# Patient Record
Sex: FEMALE | Race: WHITE | NOT HISPANIC OR LATINO | Employment: OTHER | URBAN - METROPOLITAN AREA
[De-identification: names, ages, dates, MRNs, and addresses within clinical notes are randomized per-mention and may not be internally consistent; named-entity substitution may affect disease eponyms.]

---

## 2017-01-16 ENCOUNTER — PROCEDURE VISIT (OUTPATIENT)
Dept: OPHTHALMOLOGY | Facility: CLINIC | Age: 82
End: 2017-01-16
Payer: MEDICARE

## 2017-01-16 DIAGNOSIS — H35.3120 AGE-RELATED MACULAR DEGENERATION, DRY, LEFT EYE: ICD-10-CM

## 2017-01-16 DIAGNOSIS — H35.721 SEROUS DETACHMENT OF RETINAL PIGMENT EPITHELIUM OF RIGHT EYE: ICD-10-CM

## 2017-01-16 DIAGNOSIS — H35.3211 EXUDATIVE AGE-RELATED MACULAR DEGENERATION OF RIGHT EYE WITH ACTIVE CHOROIDAL NEOVASCULARIZATION: Primary | ICD-10-CM

## 2017-01-16 PROCEDURE — 92014 COMPRE OPH EXAM EST PT 1/>: CPT | Mod: 25,S$PBB,, | Performed by: OPHTHALMOLOGY

## 2017-01-16 PROCEDURE — 92134 CPTRZ OPH DX IMG PST SGM RTA: CPT | Mod: PBBFAC,PO | Performed by: OPHTHALMOLOGY

## 2017-01-16 PROCEDURE — 67028 INJECTION EYE DRUG: CPT | Mod: S$PBB,RT,, | Performed by: OPHTHALMOLOGY

## 2017-01-16 PROCEDURE — 67028 INJECTION EYE DRUG: CPT | Mod: PBBFAC,PO,RT | Performed by: OPHTHALMOLOGY

## 2017-01-16 RX ORDER — SOTALOL HYDROCHLORIDE 80 MG/1
TABLET ORAL
COMMUNITY
Start: 2016-12-30 | End: 2017-01-16

## 2017-01-16 RX ORDER — LISINOPRIL 10 MG/1
10 TABLET ORAL
COMMUNITY
Start: 2016-12-28 | End: 2017-01-16

## 2017-01-16 RX ORDER — ACETAMINOPHEN 500 MG
3 TABLET ORAL DAILY
Status: ON HOLD | COMMUNITY
End: 2023-07-29 | Stop reason: HOSPADM

## 2017-01-16 RX ADMIN — AFLIBERCEPT 2 MG: 40 INJECTION, SOLUTION INTRAVITREAL at 03:01

## 2017-01-16 NOTE — PROGRESS NOTES
OCT - mild increase SRF/SRH  OS - Drusen, no SRF - small HE - ? RAP lesion     Assessment:    1. AMD - Wet OD - post Avastin x 12, post Eylea #20  PED increased prior to 10/12 visit      Had slight increase 5/13/13, 7/14, 5/16    Eylea #21 OD today    Continue 8 week OD    2. H/o Dry OS  But small HE with trace CME - possible RAP lesion with Wet AMD OS?  Post Eylea OS #6    Watch OS      3. PVD OU    4. Pseudophakia - great result    5. Allergic conjunctivitis/sinusitis - try OTC antihistamines     Plan:      8 weeks OCT    Risks, benefits, and alternatives to treatment discussed in detail with the patient.  The patient voiced understanding and wished to proceed with the procedure    Injection Procedure Note:  Diagnosis: Wet AMD OD    Topical Proparacaine and Betadine. Prep Conj only  Inject Eylea OD at 6:00 @ 3.5-4mm posterior to limbus  Post Operative Dx: Same  Complications: None  Follow up as above.

## 2017-01-16 NOTE — PATIENT INSTRUCTIONS

## 2017-01-25 ENCOUNTER — TELEPHONE (OUTPATIENT)
Dept: INTERNAL MEDICINE | Facility: CLINIC | Age: 82
End: 2017-01-25

## 2017-01-25 DIAGNOSIS — R00.2 PALPITATION: Primary | ICD-10-CM

## 2017-01-25 NOTE — TELEPHONE ENCOUNTER
----- Message from Mickey Chance sent at 1/25/2017 10:58 AM CST -----  Contact: Pt  Pt would like a call back from staff for scheduling    Pt is requesting to [facility]    Can be reached at [phone]

## 2017-01-31 ENCOUNTER — TELEPHONE (OUTPATIENT)
Dept: INTERNAL MEDICINE | Facility: CLINIC | Age: 82
End: 2017-01-31

## 2017-01-31 NOTE — TELEPHONE ENCOUNTER
Pt has appt with Dr. Xiong to [facility] 02/22/17    Received medical records from former PCP that's sent to HIM to scan in pt's chart    Please refer to media tab to review document

## 2017-02-02 ENCOUNTER — OFFICE VISIT (OUTPATIENT)
Dept: CARDIOLOGY | Facility: CLINIC | Age: 82
End: 2017-02-02
Payer: MEDICARE

## 2017-02-02 ENCOUNTER — HOSPITAL ENCOUNTER (OUTPATIENT)
Dept: CARDIOLOGY | Facility: CLINIC | Age: 82
Discharge: HOME | End: 2017-02-02
Payer: MEDICARE

## 2017-02-02 VITALS
HEART RATE: 53 BPM | HEIGHT: 63 IN | BODY MASS INDEX: 21.53 KG/M2 | DIASTOLIC BLOOD PRESSURE: 58 MMHG | SYSTOLIC BLOOD PRESSURE: 125 MMHG | WEIGHT: 121.5 LBS

## 2017-02-02 DIAGNOSIS — I48.0 PAROXYSMAL ATRIAL FIBRILLATION: Primary | ICD-10-CM

## 2017-02-02 DIAGNOSIS — H35.3120 AGE-RELATED MACULAR DEGENERATION, DRY, LEFT EYE: ICD-10-CM

## 2017-02-02 DIAGNOSIS — H35.3210 AGE-RELATED MACULAR DEGENERATION, WET, RIGHT EYE: ICD-10-CM

## 2017-02-02 DIAGNOSIS — R00.2 PALPITATION: ICD-10-CM

## 2017-02-02 PROCEDURE — 99213 OFFICE O/P EST LOW 20 MIN: CPT | Mod: PBBFAC | Performed by: INTERNAL MEDICINE

## 2017-02-02 PROCEDURE — 99213 OFFICE O/P EST LOW 20 MIN: CPT | Mod: S$PBB,,, | Performed by: INTERNAL MEDICINE

## 2017-02-02 PROCEDURE — 99999 PR PBB SHADOW E&M-EST. PATIENT-LVL III: CPT | Mod: PBBFAC,,, | Performed by: INTERNAL MEDICINE

## 2017-02-02 PROCEDURE — 93010 ELECTROCARDIOGRAM REPORT: CPT | Mod: S$PBB,,, | Performed by: INTERNAL MEDICINE

## 2017-02-02 RX ORDER — ASPIRIN 325 MG
325 TABLET ORAL DAILY
COMMUNITY
End: 2017-08-08 | Stop reason: ALTCHOICE

## 2017-02-02 NOTE — MR AVS SNAPSHOT
[facility] - Cardiology  [address]  [facility] [address]  Phone: [phone]                  Cathy BRITO Som   2017 2:00 PM   Office Visit    Description:  Female : 1929   Provider:  Eriberto Mcelroy MD   Department:  [facility]           Reason for Visit     Establish Care     Atrial Fibrillation           Diagnoses this Visit        Comments    Exudative age-related macular degeneration of right eye with active choroidal neovascularization    -  Primary     Paroxysmal atrial fibrillation                To Do List           Future Appointments        Provider Department Dept Phone    2017 11:20 AM Wilma Xiong MD [facility] Internal Medicine [phone]    3/2/2017 10:20 AM Keara Peterson MD [facility] Internal Medicine [phone]    3/21/2017 1:50 PM KYAW Mandujano MD [facility] Ophthalmology [phone]      Goals (5 Years of Data)     None      Follow-Up and Disposition     Return in about 3 months (around 2017).      [facility] On Call     [facility] On Call Nurse [facility] - 24/7 Assistance  Registered nurses in the [facility] On Call Center provide clinical advisement, health education, appointment booking, and other advisory services.  Call for this free service at [phone].             Medications           Message regarding Medications     Verify the changes and/or additions to your medication regime listed below are the same as discussed with your clinician today.  If any of these changes or additions are incorrect, please notify your healthcare provider.        STOP taking these medications     omeprazole (PRILOSEC) 40 MG capsule     levofloxacin (LEVAQUIN) 500 MG tablet     aspirin (ECOTRIN) 81 MG EC tablet Take 81 mg by mouth once daily.           Verify that the below list of medications is an accurate representation of the medications you are currently taking.  If none reported, the list may be blank. If incorrect, please contact your  "healthcare provider. Carry this list with you in case of emergency.           Current Medications     ANTIOX #8/OM3/DHA/EPA/LUT/ZEAX (PRESERVISION AREDS 2, OMEGA-3, ORAL) Take 1 tablet by mouth.    aspirin 325 MG tablet Take 325 mg by mouth once daily.    fluticasone-vilanterol (BREO) 100-25 mcg/dose diskus inhaler Inhale 1 puff into the lungs 2 (two) times daily.    lisinopril 10 MG tablet     montelukast (SINGULAIR) 10 mg tablet     omega 3-dha-epa-fish oil 300-1,000 mg Cap Take 3 g by mouth.    sotalol (BETAPACE) 120 MG Tab     ZOSTAVAX, PF, 19,400 unit injection            Clinical Reference Information           Your Vitals Were     BP Pulse Height Weight BMI    125/58 (BP Location: Left arm, Patient Position: Sitting, BP Method: Automatic) 53 5' 3" (1.6 m) 55.1 kg (121 lb 7.6 oz) 21.52 kg/m2      Blood Pressure          Most Recent Value    Right Arm BP - Sitting  136/64    Left Arm BP - Sitting  125/58    BP  (!)  125/58      Allergies as of 2/2/2017     Penicillin G    Sulfa (Sulfonamide Antibiotics)    Pseudoephedrine Hcl      Immunizations Administered on Date of Encounter - 2/2/2017     None      [facility] Sign-Up     Activating your [facility] account is as easy as 1-2-3!     1) Visit [email or web address], select Sign Up Now, enter this activation code and your date of birth, then select Next.  [email or web address]  Expires: 3/19/2017 12:37 PM      2) Create a username and password to use when you visit [facility] in the future and select a security question in case you lose your password and select Next.    3) Enter your e-mail address and click Sign Up!    Additional Information  If you have questions, please e-mail [email or web address] or call [phone] to talk to our [facility] staff. Remember, [facility] is NOT to be used for urgent needs. For medical emergencies, dial 911.         Language Assistance Services     ATTENTION: Language assistance services are available, free of charge. Please call " [phone].      ATENCIÓN: Si habla español, tiene a beltran disposición servicios gratuitos de asistencia lingüística. Llame al [phone].     CHÚ Ý: N?u b?n nói Ti?ng Vi?t, có các d?ch v? h? tr? ngôn ng? mi?n phí dành cho b?n. G?i s? [phone].         [facility] complies with applicable Federal civil rights laws and does not discriminate on the basis of race, color, national origin, age, disability, or sex.

## 2017-02-02 NOTE — Clinical Note
Thank you for referring Cathy Kearns for evaluation of paroxysmal atrial fibrillation. Please see my note for details of this encounter. If you have any questions, please contact me.  Thank you again for the referral.

## 2017-02-02 NOTE — PROGRESS NOTES
"Chart has been dictated using voice recognition software.  It is not been reviewed carefully for any transcriptional errors due to this technology.   Subjective:   Patient ID:  Cathy Kearns is a 87 y.o. female who presents for evaluation of Establish Care and Atrial Fibrillation      HPI: Patient with h/o atrial fibrillation x2, the last time was 2 years ago.  First time was in [place], second time was in [place] where she was cardioverted with IV drug.  Records unavailable at this time.  Symptoms were a rapid heart beat and dizziness.  Only recurrence of palpitations was a year when under stress when she had the wrong flight information in travelling to a cruise. No other known heart problems other than a small leakage in her one of her heart valves. Patient denies any chest discomfort on exertion or at rest. Has had dyspnea under stress or with significant exertion.  Told by pulmonary doc in [place] that she had "asthma". Occasional pedal edema. No orthopnea, no PND, syncope.    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    Skin cancer        Past Surgical History   Procedure Laterality Date    Cataract extraction      Hysterectomy      Appendectomy      Intravitreal injection       Avastin od x's 12 dltx 2-16-12       Social History   Substance Use Topics    Smoking status: Former Smoker    Smokeless tobacco: Never Used    Alcohol use Yes      Comment: 1-2 drinks at night         Current Outpatient Prescriptions:     ANTIOX #8/OM3/DHA/EPA/LUT/ZEAX (PRESERVISION AREDS 2, OMEGA-3, ORAL), Take 1 tablet by mouth., Disp: , Rfl:     aspirin 325 MG tablet, Take 325 mg by mouth once daily., Disp: , Rfl:     fluticasone-vilanterol (BREO) 100-25 mcg/dose diskus inhaler, Inhale 1 puff into the lungs 2 " "(two) times daily., Disp: , Rfl:     lisinopril 10 MG tablet, , Disp: , Rfl:     montelukast (SINGULAIR) 10 mg tablet, , Disp: , Rfl:     omega 3-dha-epa-fish oil 300-1,000 mg Cap, Take 3 g by mouth., Disp: , Rfl:     sotalol (BETAPACE) 120 MG Tab, , Disp: , Rfl:     ZOSTAVAX, PF, 19,400 unit injection, , Disp: , Rfl:     Current Facility-Administered Medications:     bevacizumab (AVASTIN) 2.5 mg / 0.10 mL 25 mg, 25 mg, Intraocular, 1 time in Clinic/HOD, KYAW Mandujano MD    bevacizumab 2.5 mg / 0.10 mL 25 mg, 25 mg, Intraocular, 1 time in Clinic/HOD, KYAW Mandujano MD    bevacizumab 2.5 mg / 0.10 mL 25 mg, 25 mg, Intraocular, 1 time in Clinic/HOD, KYAW Mandujano MD    Review of patient's allergies indicates:   Allergen Reactions    Penicillin g Swelling    Sulfa (sulfonamide antibiotics) Other (See Comments)     Rapid heart beat    Pseudoephedrine hcl Palpitations       ROS - The patient's review of systems is negative for any significant constitutional, respiratory, endocrine, hematologic, musculoskeletal or neurologic symptoms.  Objective:   Physical Exam   Constitutional: She is oriented to person, place, and time. She appears well-developed and well-nourished.   BP (!) 125/58 (BP Location: Left arm, Patient Position: Sitting, BP Method: Automatic)  Pulse (!) 53  Ht 5' 3" (1.6 m)  Wt 55.1 kg (121 lb 7.6 oz)  BMI 21.52 kg/m2   Neck: Neck supple. No JVD present. Carotid bruit is not present. No thyromegaly present.   Cardiovascular: Normal rate, regular rhythm, S1 normal, S2 normal and intact distal pulses.  Exam reveals S4. Exam reveals no friction rub.    No murmur heard.  Pulmonary/Chest: Breath sounds normal. She has no wheezes. She has no rales.   Abdominal: Soft. Bowel sounds are normal. There is no hepatosplenomegaly. There is no tenderness.   Musculoskeletal: She exhibits edema (Trace bilateral ankle edema).   Neurological: She is alert and oriented to person, place, and time. " She has normal strength.   Skin: No cyanosis. Nails show no clubbing.       No results found for: WBC, HGB, HCT, MCV, PLT    No results found for: NA, K, BUN, CREATININE, GLU, HGBA1C, CHOL, HDL, LDLCALC, TRIG, CHOLHDL, HGB, HCT, PLT, INR    Assessment:     1. Paroxysmal atrial fibrillation    2. Age-related macular degeneration, dry, left eye    3. Age-related macular degeneration, wet, right eye      The patient appears to be doing well at this time without any significant recurrence of her atrial fibrillation.  The patient would have better stroke protection she was on full dose anticoagulation, however, in view of her macular degeneration, it is not clear whether the risk of an intraocular bleed may not be greater than the benefit of preventing a CVA.  Therefore, discussion will be had with her ophthalmologist as to the advisability of full anticoagulation.  Additionally discussion will be held with electrophysiology as to the advisability of continuing sotalol in this patient.  The patient should return in 3 months for reevaluation.  Plan:     Cathy was seen today for establish care and atrial fibrillation.    Diagnoses and all orders for this visit:    Paroxysmal atrial fibrillation    Age-related macular degeneration, dry, left eye    Age-related macular degeneration, wet, right eye    Other orders  -     aspirin 325 MG tablet; Take 325 mg by mouth once daily.

## 2017-02-22 ENCOUNTER — HOSPITAL ENCOUNTER (OUTPATIENT)
Dept: RADIOLOGY | Facility: OTHER | Age: 82
Discharge: HOME | End: 2017-02-22
Attending: INTERNAL MEDICINE
Payer: MEDICARE

## 2017-02-22 ENCOUNTER — OFFICE VISIT (OUTPATIENT)
Dept: INTERNAL MEDICINE | Facility: CLINIC | Age: 82
End: 2017-02-22
Attending: INTERNAL MEDICINE
Payer: MEDICARE

## 2017-02-22 VITALS
HEART RATE: 55 BPM | WEIGHT: 119.5 LBS | SYSTOLIC BLOOD PRESSURE: 122 MMHG | BODY MASS INDEX: 21.17 KG/M2 | HEIGHT: 63 IN | DIASTOLIC BLOOD PRESSURE: 66 MMHG | OXYGEN SATURATION: 96 %

## 2017-02-22 DIAGNOSIS — N95.9 POSTMENOPAUSAL SYMPTOMS: ICD-10-CM

## 2017-02-22 DIAGNOSIS — M85.80 OSTEOPENIA: ICD-10-CM

## 2017-02-22 DIAGNOSIS — J45.30 MILD PERSISTENT ASTHMA WITHOUT COMPLICATION: ICD-10-CM

## 2017-02-22 DIAGNOSIS — C44.92 SQUAMOUS CELL SKIN CANCER: ICD-10-CM

## 2017-02-22 DIAGNOSIS — I48.0 PAROXYSMAL ATRIAL FIBRILLATION: ICD-10-CM

## 2017-02-22 DIAGNOSIS — Z00.00 PREVENTATIVE HEALTH CARE: Primary | ICD-10-CM

## 2017-02-22 DIAGNOSIS — I10 ESSENTIAL HYPERTENSION: ICD-10-CM

## 2017-02-22 PROCEDURE — 99204 OFFICE O/P NEW MOD 45 MIN: CPT | Mod: S$PBB,,, | Performed by: INTERNAL MEDICINE

## 2017-02-22 PROCEDURE — 77080 DXA BONE DENSITY AXIAL: CPT | Mod: 26,,, | Performed by: RADIOLOGY

## 2017-02-22 PROCEDURE — 77080 DXA BONE DENSITY AXIAL: CPT | Mod: TC

## 2017-02-22 PROCEDURE — 99999 PR PBB SHADOW E&M-EST. PATIENT-LVL III: CPT | Mod: PBBFAC,,, | Performed by: INTERNAL MEDICINE

## 2017-02-22 NOTE — PATIENT INSTRUCTIONS
Your test results will be communicated to you via: [facility], Telephone or Letter.  If you have not received your test results within one week. Please contact the clinic at [phone].

## 2017-02-22 NOTE — PROGRESS NOTES
Subjective:       Patient ID: Cathy Kearns is a 87 y.o. female.    Chief Complaint: Annual Exam (est care)    HPI     Recently moved from [place] to [facility]  Seen by cards Dr. Mcelroy for hx of afib - on BB  And asa. Not on anticoagulation at this time - cards to discuss with ophtho if ok to start.   F/u with pulm Dr. Tiffany Medrano for asthma per her. Reports breathing markedly improved since moving.   Reports was told had mild kidney disease - reports had labs done recently by prior pcp.   F/u with ophthalmology - [employer]     Review of Systems   Constitutional: Negative for chills and fever.   HENT: Negative for sore throat and trouble swallowing.    Respiratory: Negative for shortness of breath and wheezing.    Cardiovascular: Negative for chest pain and palpitations.   Gastrointestinal: Negative for abdominal pain and diarrhea.   Skin: Negative for color change and rash.   Psychiatric/Behavioral: Negative for dysphoric mood. The patient is not nervous/anxious.        Objective:      Physical Exam   Constitutional: She is oriented to person, place, and time. She appears well-developed and well-nourished.   HENT:   Head: Normocephalic and atraumatic.   Right Ear: External ear normal.   Left Ear: External ear normal.   Nose: Nose normal.   Mouth/Throat: Oropharynx is clear and moist. No oropharyngeal exudate.   No carotid bruits   Eyes: Conjunctivae and EOM are normal.   Neck: Neck supple. No thyromegaly present.   Cardiovascular: Normal rate, regular rhythm and intact distal pulses.    Pulmonary/Chest: Effort normal and breath sounds normal.   Abdominal: Soft. Bowel sounds are normal.   Musculoskeletal: She exhibits no edema or tenderness.   Lymphadenopathy:     She has no cervical adenopathy.   Neurological: She is alert and oriented to person, place, and time. Coordination normal.   Skin: Skin is warm and dry.   Psychiatric: She has a normal mood and affect. Her behavior is normal. Judgment and thought  content normal.       Assessment:       Cathy was seen today for annual exam.    Diagnoses and all orders for this visit:    Preventative health care: review records with recent labs when received. Pt to sign KAIDEN today  Recommend daily sunscreen, cardiovascular exercise min 30 min 5 days per week. Seatbelts routinely.    Postmenopausal symptoms  -     DXA Bone Density Spine And Hip_Axial Skeleton; Future    Paroxysmal atrial fibrillation: followed by cards, cont meds    Osteopenia: rec otc supplement of calcium 1200mg and vit d 800u divided into 2 doses daily along with reg exercise    Essential hypertension: controlled, cont meds    Mild persistent asthma without complication: stable, to f/u with pulm - charles let me know when or if she wants to est care with a new pulm since recent move    Squamous cell skin cancer  -     Ambulatory Referral to Dermatology       HM: utd on zostavax  Pt to sign kaiden to get old records.   Unsure if utd on tdap  Bone density done - unsure of date

## 2017-02-22 NOTE — MR AVS SNAPSHOT
[facility]  [address]  [facility] [address]  Phone: [phone]  Fax: [phone]                  Cathy BRITO Som   2017 11:20 AM   Office Visit    Description:  Female : 1929   Provider:  Wilma Xiong MD   Department:  [facility]           Reason for Visit     Annual Exam           Diagnoses this Visit        Comments    Preventative health care    -  Primary     Postmenopausal symptoms         Paroxysmal atrial fibrillation         Osteopenia         Essential hypertension         Mild persistent asthma without complication         Squamous cell skin cancer                To Do List           Future Appointments        Provider Department Dept Phone    2017 1:00 PM [facility] DEXA1 [facility] [phone]    3/2/2017 10:20 AM Keara Peterson MD [facility] - Internal Medicine [phone]    3/21/2017 1:50 PM KYAW Mandujano MD [facility] - Ophthalmology [phone]      Goals (5 Years of Data)     None      Follow-Up and Disposition     Return in about 6 months (around 2017), or if symptoms worsen or fail to improve.      [facility]     [facility] Care [facility] -  Assistance  Registered nurses in the [facility] Center provide clinical advisement, health education, appointment booking, and other advisory services.  Call for this free service at [phone].             Medications           Message regarding Medications     Verify the changes and/or additions to your medication regime listed below are the same as discussed with your clinician today.  If any of these changes or additions are incorrect, please notify your healthcare provider.             Verify that the below list of medications is an accurate representation of the medications you are currently taking.  If none reported, the list may be blank. If incorrect, please contact your healthcare provider. Carry this list with you in  "case of emergency.           Current Medications     ANTIOX #8/OM3/DHA/EPA/LUT/ZEAX (PRESERVISION AREDS 2, OMEGA-3, ORAL) Take 1 tablet by mouth.    aspirin 325 MG tablet Take 325 mg by mouth once daily.    fluticasone-vilanterol (BREO) 100-25 mcg/dose diskus inhaler Inhale 1 puff into the lungs 2 (two) times daily.    lisinopril 10 MG tablet Take 10 mg by mouth once daily.     montelukast (SINGULAIR) 10 mg tablet     omega 3-dha-epa-fish oil 300-1,000 mg Cap Take 3 g by mouth.    sotalol (BETAPACE) 120 MG Tab     ZOSTAVAX, PF, 19,400 unit injection            Clinical Reference Information           Your Vitals Were     BP Pulse Height Weight SpO2 BMI    122/66 (BP Location: Left arm, Patient Position: Sitting, BP Method: Manual) 55 5' 3" (1.6 m) 54.2 kg (119 lb 7.8 oz) 96% 21.17 kg/m2      Blood Pressure          Most Recent Value    BP  122/66      Allergies as of 2/22/2017     Penicillin G    Sulfa (Sulfonamide Antibiotics)    Pseudoephedrine Hcl      Immunizations Administered on Date of Encounter - 2/22/2017     None      Orders Placed During Today's Visit      Normal Orders This Visit    Ambulatory Referral to Dermatology     Future Labs/Procedures Expected by Expires    DXA Bone Density Spine And Hip_Axial Skeleton  2/22/2017 2/8/2018      Instructions    Your test results will be communicated to you via: [facility], Telephone or Letter.  If you have not received your test results within one week. Please contact the clinic at [phone].             Language Assistance Services     ATTENTION: Language assistance services are available, free of charge. Please call [phone].      ATENCIÓN: Si habla satya, tiene a beltran disposición servicios gratuitos de asistencia lingüística. Llame al [phone].     ALIRIO Ý: N?u b?n nói Ti?ng Vi?t, có các d?ch v? h? tr? ngôn ng? mi?n phí dành cho b?n. G?i s? [phone].         [facility] complies with applicable Federal civil rights laws and " does not discriminate on the basis of race, color, national origin, age, disability, or sex.

## 2017-02-23 ENCOUNTER — TELEPHONE (OUTPATIENT)
Dept: INTERNAL MEDICINE | Facility: CLINIC | Age: 82
End: 2017-02-23

## 2017-02-24 NOTE — TELEPHONE ENCOUNTER
Pt has been advised of pcp's recommendation.Pt states that calcium supplements causes her to have constipation. Pt states that she would like to avoid the constipation that calcium supplements causes her if possible.    Please Advise of further instruction.   LCV 2/22/2017

## 2017-02-24 NOTE — TELEPHONE ENCOUNTER
rec inc dietary sources of calcium (mild, yogurt, cheese, salmon) since unable to rosenda calcium supplements

## 2017-02-24 NOTE — TELEPHONE ENCOUNTER
Left a message on pt's voicemail advising her to increase her diatary source of calcium by eating mild,yogurt,cheese,and salmon.

## 2017-03-21 ENCOUNTER — PROCEDURE VISIT (OUTPATIENT)
Dept: OPHTHALMOLOGY | Facility: CLINIC | Age: 82
End: 2017-03-21
Payer: MEDICARE

## 2017-03-21 VITALS — SYSTOLIC BLOOD PRESSURE: 155 MMHG | DIASTOLIC BLOOD PRESSURE: 61 MMHG | HEART RATE: 56 BPM

## 2017-03-21 DIAGNOSIS — H35.3211 EXUDATIVE AGE-RELATED MACULAR DEGENERATION OF RIGHT EYE WITH ACTIVE CHOROIDAL NEOVASCULARIZATION: Primary | ICD-10-CM

## 2017-03-21 DIAGNOSIS — H35.721 SEROUS DETACHMENT OF RETINAL PIGMENT EPITHELIUM OF RIGHT EYE: ICD-10-CM

## 2017-03-21 PROCEDURE — 92014 COMPRE OPH EXAM EST PT 1/>: CPT | Mod: 25,S$PBB,, | Performed by: OPHTHALMOLOGY

## 2017-03-21 PROCEDURE — 67028 INJECTION EYE DRUG: CPT | Mod: PBBFAC,RT | Performed by: OPHTHALMOLOGY

## 2017-03-21 PROCEDURE — 92134 CPTRZ OPH DX IMG PST SGM RTA: CPT | Mod: PBBFAC | Performed by: OPHTHALMOLOGY

## 2017-03-21 PROCEDURE — 67028 INJECTION EYE DRUG: CPT | Mod: S$PBB,RT,, | Performed by: OPHTHALMOLOGY

## 2017-03-21 RX ADMIN — AFLIBERCEPT 2 MG: 40 INJECTION, SOLUTION INTRAVITREAL at 03:03

## 2017-03-21 NOTE — PROGRESS NOTES
OCT - mild increase SRF/SRH  OS - Drusen, no SRF - small HE - ? RAP lesion     Assessment:    1. AMD - Wet OD - post Avastin x 12, post Eylea #21  PED increased prior to 10/12 visit      Had slight increase 5/13/13, 7/14, 5/16    Eylea #22 OD today    Continue 8 week OD    2. H/o Dry OS  But small HE with trace CME - possible RAP lesion with Wet AMD OS?  Post Eylea OS #6    Watch OS      3. PVD OU    4. Pseudophakia - great result    5. Allergic conjunctivitis/sinusitis - try OTC antihistamines    6. AFib - considering anticoagulation - ok to proceed  On Anti-VEGF therapy, risk of large macular hemorrhage is minimal.       Plan:      8 weeks OCT    Risks, benefits, and alternatives to treatment discussed in detail with the patient.  The patient voiced understanding and wished to proceed with the procedure    Injection Procedure Note:  Diagnosis: Wet AMD OD    Topical Proparacaine and Betadine. Prep Conj only  Inject Eylea OD at 6:00 @ 3.5-4mm posterior to limbus  Post Operative Dx: Same  Complications: None  Follow up as above.

## 2017-03-21 NOTE — MR AVS SNAPSHOT
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      [facility] On Call     [facility] On Call Nurse Care Line - / Assistance  Registered nurses in the [facility] On Call Center provide clinical advisement, health education, appointment booking, and other advisory services.  Call for this free service at [phone].             Medications           Message regarding Medications     Verify the changes and/or additions to your medication regime listed below are the same as discussed with your clinician today.  If any of these changes or additions are incorrect, please notify your healthcare provider.        These medications were administered today        Dose Freq    aflibercept Soln 2 mg 2 mg Clinic/HOD 1 time    Si.05 mLs (2 mg total) by Intravitreal route one time.    Class: Normal    Route: Intravitreal           Verify that the below list of medications is an accurate representation of the medications you are currently taking.  If none reported, the list may be blank. If incorrect, please contact your healthcare provider. Carry this list with you in case of emergency.           Current Medications     ANTIOX #8/OM3/DHA/EPA/LUT/ZEAX (PRESERVISION AREDS 2, OMEGA-3, ORAL) Take 1 tablet by mouth.    aspirin 325 MG tablet Take 325 mg by mouth once daily.     fluticasone-vilanterol (BREO) 100-25 mcg/dose diskus inhaler Inhale 1 puff into the lungs 2 (two) times daily.    lisinopril 10 MG tablet Take 10 mg by mouth once daily.     montelukast (SINGULAIR) 10 mg tablet     omega 3-dha-epa-fish oil 300-1,000 mg Cap Take 3 g by mouth.    sotalol (BETAPACE) 120 MG Tab     ZOSTAVAX, PF, 19,400 unit injection            Clinical Reference Information           Your Vitals Were     BP Pulse                155/61 (BP Location: Right arm, Patient Position: Sitting, BP Method: Automatic) 56          Blood Pressure          Most Recent Value    BP  (!)  155/61      Allergies as of 3/21/2017     Penicillin G    Sulfa (Sulfonamide Antibiotics)    Pseudoephedrine Hcl      Immunizations Administered on Date of Encounter - 3/21/2017     None      Orders Placed During Today's Visit      Normal Orders This Visit    Posterior Segment OCT Retina-Both eyes     Prior Authorization Order     Future Labs/Procedures Expected by Expires    Posterior Segment OCT Retina-Both eyes  As directed 3/21/2018      Language Assistance Services     ATTENTION: Language assistance services are available, free of charge. Please call [phone].      ATENCIÓN: Si habla español, tiene a beltran disposición servicios gratuitos de asistencia lingüística. Llame al [phone].     ALIRIO Ý: N?u b?n nói Ti?ng Vi?t, có các d?ch v? h? tr? ngôn ng? mi?n phí dành cho b?n. G?i s? [phone].         [facility] complies with applicable Federal civil rights laws and does not discriminate on the basis of race, color, national origin, age, disability, or sex.

## 2017-03-21 NOTE — PATIENT INSTRUCTIONS

## 2017-05-18 ENCOUNTER — OFFICE VISIT (OUTPATIENT)
Dept: CARDIOLOGY | Facility: CLINIC | Age: 82
End: 2017-05-18
Payer: MEDICARE

## 2017-05-18 VITALS
BODY MASS INDEX: 20.71 KG/M2 | HEIGHT: 63 IN | DIASTOLIC BLOOD PRESSURE: 65 MMHG | WEIGHT: 116.88 LBS | SYSTOLIC BLOOD PRESSURE: 142 MMHG | HEART RATE: 57 BPM

## 2017-05-18 DIAGNOSIS — H35.30: ICD-10-CM

## 2017-05-18 DIAGNOSIS — I10 ESSENTIAL HYPERTENSION: ICD-10-CM

## 2017-05-18 DIAGNOSIS — I48.0 PAROXYSMAL ATRIAL FIBRILLATION: Primary | ICD-10-CM

## 2017-05-18 PROCEDURE — 99213 OFFICE O/P EST LOW 20 MIN: CPT | Mod: S$PBB,,, | Performed by: INTERNAL MEDICINE

## 2017-05-18 PROCEDURE — 99213 OFFICE O/P EST LOW 20 MIN: CPT | Mod: PBBFAC | Performed by: INTERNAL MEDICINE

## 2017-05-18 PROCEDURE — 99999 PR PBB SHADOW E&M-EST. PATIENT-LVL III: CPT | Mod: PBBFAC,,, | Performed by: INTERNAL MEDICINE

## 2017-05-18 RX ORDER — SOTALOL HYDROCHLORIDE 80 MG/1
TABLET ORAL 2 TIMES DAILY
COMMUNITY
Start: 2017-03-23 | End: 2017-09-01 | Stop reason: SDUPTHER

## 2017-05-18 NOTE — MR AVS SNAPSHOT
[facility] - Cardiology  [address]  [place] [address]  Phone: [phone]                  Cathy BRITO Som   2017 11:00 AM   Office Visit    Description:  Female : 1929   Provider:  Eriberto Mcelroy MD   Department:  [facility] - Cardiology           Reason for Visit     Paroxysmal Atrial Fibrillation           Diagnoses this Visit        Comments    Paroxysmal atrial fibrillation    -  Primary     Essential hypertension         Degeneration macular                To Do List           Future Appointments        Provider Department Dept Phone    2017 10:50 AM KYAW Mandujano MD [facility] - Ophthalmology [phone]      Goals (5 Years of Data)     None      Follow-Up and Disposition     Return in about 3 months (around 2017).       These Medications        Disp Refills Start End    apixaban 2.5 mg Tab 60 tablet 11 2017     Take 1 tablet (2.5 mg total) by mouth 2 (two) times daily. - Oral    Pharmacy: [facility] - [place], [place] - [address] Ph #: [phone]         [facility]     [facility] Nurse Care Line -  Assistance  Unless otherwise directed by your provider, please contact [facility], our nurse care line that is available for  assistance.     Registered nurses in the [facility] Center provide: appointment scheduling, clinical advisement, health education, and other advisory services.  Call: [phone] (toll free)               Medications           Message regarding Medications     Verify the changes and/or additions to your medication regime listed below are the same as discussed with your clinician today.  If any of these changes or additions are incorrect, please notify your healthcare provider.        START taking these NEW medications        Refills    apixaban 2.5 mg Tab 11    Sig: Take 1 tablet (2.5 mg total) by mouth 2 (two) times daily.    Class: Normal    Route: Oral     "  STOP taking these medications     ZOSTAVAX, PF, 19,400 unit injection     montelukast (SINGULAIR) 10 mg tablet     fluticasone-vilanterol (BREO) 100-25 mcg/dose diskus inhaler Inhale 1 puff into the lungs 2 (two) times daily.           Verify that the below list of medications is an accurate representation of the medications you are currently taking.  If none reported, the list may be blank. If incorrect, please contact your healthcare provider. Carry this list with you in case of emergency.           Current Medications     ANTIOX #8/OM3/DHA/EPA/LUT/ZEAX (PRESERVISION AREDS 2, OMEGA-3, ORAL) Take 1 tablet by mouth once daily at 6am.     aspirin 325 MG tablet Take 325 mg by mouth once daily.    lisinopril 10 MG tablet Take 10 mg by mouth once daily.     omega 3-dha-epa-fish oil 300-1,000 mg Cap Take 3 g by mouth.    sotalol (BETAPACE) 80 MG tablet 2 (two) times daily.     apixaban 2.5 mg Tab Take 1 tablet (2.5 mg total) by mouth 2 (two) times daily.           Clinical Reference Information           Your Vitals Were     BP Pulse Height Weight BMI    142/65 (BP Location: Left arm, Patient Position: Sitting, BP Method: Automatic) 57 5' 3" (1.6 m) 53 kg (116 lb 13.5 oz) 20.7 kg/m2      Blood Pressure          Most Recent Value    Right Arm BP - Sitting  149/65    Left Arm BP - Sitting  142/65    BP  (!)  142/65      Allergies as of 5/18/2017     Penicillin G    Sulfa (Sulfonamide Antibiotics)    Pseudoephedrine Hcl      Immunizations Administered on Date of Encounter - 5/18/2017     None      Orders Placed During Today's Visit     Future Labs/Procedures Expected by Expires    Cardiology Lab Abdominal Aorta Evaluation  As directed 5/18/2018      Language Assistance Services     ATTENTION: Language assistance services are available, free of charge. Please call [phone].      ATENCIÓN: Si debla satya, tiene a beltran disposición servicios gratuitos de asistencia lingüística. Llame al [phone].     ALIRIO Ý: N?u " b?n nói Ti?ng Vi?t, có các d?ch v? h? tr? ngôn ng? mi?n phí dành cho b?n. G?i s? [phone].         [facility] complies with applicable Federal civil rights laws and does not discriminate on the basis of race, color, national origin, age, disability, or sex.

## 2017-05-18 NOTE — Clinical Note
Thank you for referring Cathy Kearns for follow-up of paroxysmal atrial fibrillation. Please see my note for details of this encounter. If you have any questions, please contact me.  Thank you again for the referral.

## 2017-05-18 NOTE — PROGRESS NOTES
"Chart has been dictated using voice recognition software.  It is not been reviewed carefully for any transcriptional errors due to this technology.   Subjective:   Patient ID:  Cathy Kearns is a 87 y.o. female who presents for follow-up of Paroxysmal Atrial Fibrillation (8 months fu)      HPI: Patient with history of paroxysmal atrial fibrillation, hyperlipidemia, and macular degeneration hypertension returns for reevaluation.  Patient was not started on anticoagulation last visit concern retinal bleeding with macular degeneration degeneration.  After talking with her ophthalmologist, Dr. Alec Mandujano, she has been cleared for use of anticoagulant.  Patient has been doing well, now living in [facility].  [place]. Patient denies any chest discomfort on exertion or at rest. No dyspnea, walks in park, participates in exercise classes.  Patient notes that her systolic blood pressure acouple of weeks ago at [facility] was 113    Past Medical History:   Diagnosis Date    Aortic valve regurgitation     Arthritis     Asthma in adult     dx 4/2016, followed by pulm Dr. 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       aspirin 325 MG tablet Take 325 mg by mouth once daily.      lisinopril 10 MG tablet Take 10 mg by mouth once daily.  " "     omega 3-dha-epa-fish oil 300-1,000 mg Cap Take 3 g by mouth.      fluticasone-vilanterol (BREO) 100-25 mcg/dose diskus inhaler Inhale 1 puff into the lungs 2 (two) times daily.      montelukast (SINGULAIR) 10 mg tablet       sotalol (BETAPACE) 120 MG Tab       ZOSTAVAX, PF, 19,400 unit injection        Facility-Administered Medications Prior to Visit   Medication Dose Route Frequency Provider Last Rate Last Dose    bevacizumab (AVASTIN) 2.5 mg / 0.10 mL 25 mg  25 mg Intraocular 1 time in Clinic/HOD KYAW Mandujano MD        bevacizumab 2.5 mg / 0.10 mL 25 mg  25 mg Intraocular 1 time in Clinic/HOD KYAW Mandujano MD        bevacizumab 2.5 mg / 0.10 mL 25 mg  25 mg Intraocular 1 time in Clinic/HOD KYAW Mandujano MD           ROS  Objective:   Physical Exam   Constitutional: She is oriented to person, place, and time. She appears well-developed and well-nourished.   BP (!) 142/65 (BP Location: Left arm, Patient Position: Sitting, BP Method: Automatic)  Pulse (!) 57  Ht 5' 3" (1.6 m)  Wt 53 kg (116 lb 13.5 oz)  BMI 20.7 kg/m2   Neck: Neck supple. No JVD present. Carotid bruit is not present. No thyromegaly present.   Cardiovascular: Normal rate, regular rhythm, S1 normal, S2 normal and intact distal pulses.  Exam reveals S4. Exam reveals no friction rub.    No murmur heard.  Pulmonary/Chest: She has wheezes in the right lower field. She has no rales.   Abdominal: Soft. Bowel sounds are normal. She exhibits pulsatile midline mass. There is no hepatosplenomegaly. There is no tenderness.   Musculoskeletal: She exhibits no edema.   Neurological: She is alert and oriented to person, place, and time. She has normal strength.   Skin: No cyanosis. Nails show no clubbing.         No results found for: NA, K, BUN, CREATININE, GLU, HGBA1C, CHOL, HDL, LDLCALC, TRIG, CHOLHDL, HGB, HCT, PLT, INR    Assessment:     1. Paroxysmal atrial fibrillation    2. Essential hypertension    3. Degeneration " macular      The patient appears to be stable without recurrent palpitations.  She has no symptoms compatible with any cerebrovascular incidents. Patient has no symptoms of cardiac ischemia, heart failure, or significant arrhythmias.  The patient is a candidate for full anticoagulation.  Therefore, she will be started on apixaban 2.5 mg twice a day.  Additionally, due to the pulsatile aorta in her abdomen, she will be sent for an ultrasound to determine abdominal aortic size.  Further decisions will be made after reviewing abdominal ultrasound.  Patient should return for reevaluation in 3 months.    Plan:     Cathy was seen today for paroxysmal atrial fibrillation.    Diagnoses and all orders for this visit:    Paroxysmal atrial fibrillation  -     apixaban 2.5 mg Tab; Take 1 tablet (2.5 mg total) by mouth 2 (two) times daily.    Essential hypertension  -     Cardiology Lab Abdominal Aorta Evaluation; Future    Degeneration macular    Other orders  -     sotalol (BETAPACE) 80 MG tablet; 2 (two) times daily.

## 2017-05-19 ENCOUNTER — PROCEDURE VISIT (OUTPATIENT)
Dept: OPHTHALMOLOGY | Facility: CLINIC | Age: 82
End: 2017-05-19
Attending: OPHTHALMOLOGY
Payer: MEDICARE

## 2017-05-19 VITALS — HEART RATE: 52 BPM | DIASTOLIC BLOOD PRESSURE: 66 MMHG | SYSTOLIC BLOOD PRESSURE: 157 MMHG

## 2017-05-19 DIAGNOSIS — H35.721 SEROUS DETACHMENT OF RETINAL PIGMENT EPITHELIUM OF RIGHT EYE: ICD-10-CM

## 2017-05-19 DIAGNOSIS — H35.3211 EXUDATIVE AGE-RELATED MACULAR DEGENERATION OF RIGHT EYE WITH ACTIVE CHOROIDAL NEOVASCULARIZATION: Primary | ICD-10-CM

## 2017-05-19 PROCEDURE — 67028 INJECTION EYE DRUG: CPT | Mod: S$PBB,RT,, | Performed by: OPHTHALMOLOGY

## 2017-05-19 PROCEDURE — 92134 CPTRZ OPH DX IMG PST SGM RTA: CPT | Mod: PBBFAC | Performed by: OPHTHALMOLOGY

## 2017-05-19 PROCEDURE — 92014 COMPRE OPH EXAM EST PT 1/>: CPT | Mod: 25,S$PBB,, | Performed by: OPHTHALMOLOGY

## 2017-05-19 PROCEDURE — 67028 INJECTION EYE DRUG: CPT | Mod: PBBFAC,RT | Performed by: OPHTHALMOLOGY

## 2017-05-19 RX ORDER — LISINOPRIL 10 MG/1
10 TABLET ORAL DAILY
Qty: 90 TABLET | Refills: 3 | Status: SHIPPED | OUTPATIENT
Start: 2017-05-19 | End: 2017-09-01 | Stop reason: SDUPTHER

## 2017-05-19 RX ADMIN — AFLIBERCEPT 2 MG: 40 INJECTION, SOLUTION INTRAVITREAL at 11:05

## 2017-05-19 NOTE — PATIENT INSTRUCTIONS
POST INTRAVITREAL INJECTION PATIENT INSTRUCTIONS   Below are some guidelines for what to expect after your treatment and additional care instructions.    You may experience mild discomfort in your eye after the treatment. Usually your eye feels better within 24 to 48 hours after the procedure.      You have been given drops that numb the surface of your eye. DO NOT rub or touch your eye. DO NOT wear contacts until numbness goes away.      You may experience small spots that appear in your field of vision. These are usually caused by an air bubble or from the medication. It takes a few hours or days for these to go away.      The use of sunglasses will help reduce light sensitivity and glare.      DO NOT swim or put sink water (tap water) in your eyes for at least 4 hours after the injection.      You may get a gritty, burning, irritating or stinging feeling in the injected eye as a result of the antiseptic used. Use artificial tears or eye lubricant to reduce the sensation. These are available over-the-counter from your local pharmacy. If you already have some at home, be sure to check the expiration date and to avoid contaminating your eye. A cool pack over your eye brow above the injected eye may also relieve discomfort.     Call us right away or go to the Emergency Department if you have a dramatic decrease in vision or extreme pain in the eye.   [facility] [phone]   Item: [id number]   Revised: 09/2015         Controlling High Blood Pressure  High blood pressure (hypertension) is often called the silent killer. This is because many people who have it dont know it. High blood pressure is defined as 140/90 mm Hg or higher. Know your blood pressure and remember to check it regularly. Doing so can save your life. Here are some things you can do to help control your blood pressure.    Choose heart-healthy foods  · Select low-salt, low-fat foods. Limit sodium intake to 2,400 mg per day or  the amount suggested by your healthcare provider.  · Limit canned, dried, cured, packaged, and fast foods. These can contain a lot of salt.  · Eat 8 to 10 servings of fruits and vegetables every day.  · Choose lean meats, fish, or chicken.  · Eat whole-grain pasta, brown rice, and beans.  · Eat 2 to 3 servings of low-fat or fat-free dairy products.  · Ask your doctor about the DASH eating plan. This plan helps reduce blood pressure.  · When you go to a restaurant, ask that your meal be prepared with no added salt.  Maintain a healthy weight  · Ask your healthcare provider how many calories to eat a day. Then stick to that number.  · Ask your healthcare provider what weight range is healthiest for you. If you are overweight, a weight loss of only 3% to 5% of your body weight can help lower blood pressure. Generally, a good weight loss goal is to lose 10% of your body weight in a year.  · Limit snacks and sweets.  · Get regular exercise.  Get up and get active  · Choose activities you enjoy. Find ones you can do with friends or family. This includes bicycling, dancing, walking, and jogging.  · Park farther away from building entrances.  · Use stairs instead of the elevator.  · When you can, walk or bike instead of driving.  · Mize leaves, garden, or do household repairs.  · Be active at a moderate to vigorous level of physical activity for at least 40 minutes for a minimum of 3 to 4 days a week.   Manage stress  · Make time to relax and enjoy life. Find time to laugh.  · Communicate your concerns with your loved ones and your healthcare provider.  · Visit with family and friends, and keep up with hobbies.  Limit alcohol and quit smoking  · Men should have no more than 2 drinks per day.  · Women should have no more than 1 drink per day.  · Talk with your healthcare provider about quitting smoking. Smoking significantly increases your risk for heart disease and stroke. Ask your healthcare provider about community  smoking cessation programs and other options.  Medicines  If lifestyle changes arent enough, your healthcare provider may prescribe high blood pressure medicine. Take all medicines as prescribed. If you have any questions about your medicines, ask your healthcare provider before stopping or changing them.   © 6386-6886 [employer]. [address] [place] [address], [place] [address]. All rights reserved. This information is not intended as a substitute for professional medical care. Always follow your healthcare professional's instructions.

## 2017-05-19 NOTE — PROGRESS NOTES
OCT - mild increase SRF/SRH  OS - Drusen, no SRF - small HE - ? RAP lesion     Assessment:    1. AMD - Wet OD - post Avastin x 12, post Eylea #22  PED increased prior to 10/12 visit      Had slight increase 5/13/13, 7/14, 5/16    Eylea OD today    Continue 8 week OD    2. H/o Dry OS  But small HE with trace CME - possible RAP lesion with Wet AMD OS?  Post Eylea OS #6    Watch OS      3. PVD OU    4. Pseudophakia - great result    5. Allergic conjunctivitis/sinusitis - try OTC antihistamines    6. AFib - considering anticoagulation - ok to proceed  On Anti-VEGF therapy, risk of large macular hemorrhage is minimal.       Plan:      8 weeks OCT    Risks, benefits, and alternatives to treatment discussed in detail with the patient.  The patient voiced understanding and wished to proceed with the procedure    Injection Procedure Note:  Diagnosis: Wet AMD OD    Topical Proparacaine and Betadine. Prep Conj only  Inject Eylea OD at 6:00 @ 3.5-4mm posterior to limbus  Post Operative Dx: Same  Complications: None  Follow up as above.

## 2017-05-19 NOTE — MR AVS SNAPSHOT
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      [facility] On [facility]     [facility] On Call Nurse Care Line -  Assistance  Unless otherwise directed by your provider, please contact [facility], our nurse care line that is available for  assistance.     Registered nurses in the [facility] On Call Center provide: appointment scheduling, clinical advisement, health education, and other advisory services.  Call: [phone] (toll free)               Medications           Message regarding Medications     Verify the changes and/or additions to your medication regime listed below are the same as discussed with your clinician today.  If any of these changes or additions are incorrect, please notify your healthcare provider.        These medications were administered today        Dose Freq    aflibercept Soln 2 mg 2 mg Clinic/HOD 1 time    Si.05 mLs (2 mg total) by Intravitreal route one time.    Class: Normal    Route: Intravitreal           Verify that the below list of medications is an accurate representation of the medications you are currently taking.  If none reported, the list may be blank. If  incorrect, please contact your healthcare provider. Carry this list with you in case of emergency.           Current Medications     ANTIOX #8/OM3/DHA/EPA/LUT/ZEAX (PRESERVISION AREDS 2, OMEGA-3, ORAL) Take 1 tablet by mouth once daily at 6am.     apixaban 2.5 mg Tab Take 1 tablet (2.5 mg total) by mouth 2 (two) times daily.    aspirin 325 MG tablet Take 325 mg by mouth once daily.    lisinopril 10 MG tablet Take 10 mg by mouth once daily.     omega 3-dha-epa-fish oil 300-1,000 mg Cap Take 3 g by mouth.    sotalol (BETAPACE) 80 MG tablet 2 (two) times daily.            Clinical Reference Information           Your Vitals Were     BP Pulse                157/66 (BP Location: Right arm, Patient Position: Sitting, BP Method: Automatic) 52          Blood Pressure          Most Recent Value    BP  (!)  157/66      Allergies as of 5/19/2017     Penicillin G    Sulfa (Sulfonamide Antibiotics)    Pseudoephedrine Hcl      Immunizations Administered on Date of Encounter - 5/19/2017     None      Orders Placed During Today's Visit      Normal Orders This Visit    Posterior Segment OCT Retina-Both eyes     Prior Authorization Order     Future Labs/Procedures Expected by Expires    Posterior Segment OCT Retina-Both eyes  As directed 5/19/2018      Language Assistance Services     ATTENTION: Language assistance services are available, free of charge. Please call [phone].      ATENCIÓN: Si matt hernadez, tiene a beltran disposición servicios gratuitos de asistencia lingüística. Llame al [phone].     CHÚ Ý: N?u b?n nói Ti?ng Vi?t, có các d?ch v? h? tr? ngôn ng? mi?n phí dành cho b?n. G?i s? [phone].         [facility] complies with applicable Federal civil rights laws and does not discriminate on the basis of race, color, national origin, age, disability, or sex.

## 2017-05-23 ENCOUNTER — CLINICAL SUPPORT (OUTPATIENT)
Dept: CARDIOLOGY | Facility: CLINIC | Age: 82
End: 2017-05-23
Payer: MEDICARE

## 2017-05-23 DIAGNOSIS — I10 ESSENTIAL HYPERTENSION: ICD-10-CM

## 2017-05-23 LAB
AORTIC ATHEROMA: YES
VASCULAR ABDOMINAL AORTIC ANEURYSM (AAA): 2.1

## 2017-05-23 PROCEDURE — 93978 VASCULAR STUDY: CPT | Mod: PBBFAC | Performed by: INTERNAL MEDICINE

## 2017-06-08 DIAGNOSIS — I48.0 PAROXYSMAL ATRIAL FIBRILLATION: ICD-10-CM

## 2017-06-12 DIAGNOSIS — I48.0 PAROXYSMAL ATRIAL FIBRILLATION: ICD-10-CM

## 2017-06-12 NOTE — TELEPHONE ENCOUNTER
----- Message from Misty Moore sent at 6/12/2017  1:33 PM CDT -----  Contact: pt   Pt need a refill on her Apixaban 2.5 mg and she uses [facility]. She says [facility] [place] [facility] [place] and they deliver to their building; if this medicine gets called in before 3:30 pm today they will include her medicine with the delivery for tomorrow.  LOV 5/18/17 Dr. Mcelroy    Thanks

## 2017-07-18 ENCOUNTER — PROCEDURE VISIT (OUTPATIENT)
Dept: OPHTHALMOLOGY | Facility: CLINIC | Age: 82
End: 2017-07-18
Payer: MEDICARE

## 2017-07-18 VITALS — SYSTOLIC BLOOD PRESSURE: 147 MMHG | DIASTOLIC BLOOD PRESSURE: 74 MMHG | HEART RATE: 58 BPM

## 2017-07-18 DIAGNOSIS — H35.3211 EXUDATIVE AGE-RELATED MACULAR DEGENERATION OF RIGHT EYE WITH ACTIVE CHOROIDAL NEOVASCULARIZATION: Primary | ICD-10-CM

## 2017-07-18 DIAGNOSIS — H35.721 SEROUS DETACHMENT OF RETINAL PIGMENT EPITHELIUM OF RIGHT EYE: ICD-10-CM

## 2017-07-18 PROCEDURE — 92014 COMPRE OPH EXAM EST PT 1/>: CPT | Mod: 25,S$PBB,, | Performed by: OPHTHALMOLOGY

## 2017-07-18 PROCEDURE — 92134 CPTRZ OPH DX IMG PST SGM RTA: CPT | Mod: PBBFAC | Performed by: OPHTHALMOLOGY

## 2017-07-18 PROCEDURE — 67028 INJECTION EYE DRUG: CPT | Mod: PBBFAC,RT | Performed by: OPHTHALMOLOGY

## 2017-07-18 PROCEDURE — 67028 INJECTION EYE DRUG: CPT | Mod: S$PBB,RT,, | Performed by: OPHTHALMOLOGY

## 2017-07-18 RX ADMIN — AFLIBERCEPT 2 MG: 40 INJECTION, SOLUTION INTRAVITREAL at 11:07

## 2017-07-18 NOTE — PROGRESS NOTES
HPI     Eye Problem    Additional comments: 8 week check           Comments   DLS 5/19/17- Black spot in vision is about the same as last visit.       OCT - mild increase SRF/SRH  OS - Drusen, no SRF - small HE - ? RAP lesion     Assessment:    1. AMD - Wet OD - post Avastin x 12, post Eylea #22  PED increased prior to 10/12 visit      Had slight increase 5/13/13, 7/14, 5/16    Eylea OD today    Continue 8 week OD    2. H/o Dry OS  But small HE with trace CME - possible RAP lesion with Wet AMD OS?  Post Eylea OS #6    Watch OS      3. PVD OU    4. Pseudophakia - great result    5. Allergic conjunctivitis/sinusitis - try OTC antihistamines    6. AFib - considering anticoagulation - ok to proceed  On Anti-VEGF therapy, risk of large macular hemorrhage is minimal.       Plan:      8 weeks OCT    Risks, benefits, and alternatives to treatment discussed in detail with the patient.  The patient voiced understanding and wished to proceed with the procedure    Injection Procedure Note:  Diagnosis: Wet AMD OD    Topical Proparacaine and Betadine. Prep Conj only  Inject Eylea OD at 6:00 @ 3.5-4mm posterior to limbus  Post Operative Dx: Same  Complications: None  Follow up as above.

## 2017-08-08 ENCOUNTER — OFFICE VISIT (OUTPATIENT)
Dept: INTERNAL MEDICINE | Facility: CLINIC | Age: 82
End: 2017-08-08
Attending: INTERNAL MEDICINE
Payer: MEDICARE

## 2017-08-08 VITALS
WEIGHT: 114.19 LBS | BODY MASS INDEX: 20.23 KG/M2 | DIASTOLIC BLOOD PRESSURE: 75 MMHG | HEIGHT: 63 IN | HEART RATE: 59 BPM | OXYGEN SATURATION: 99 % | SYSTOLIC BLOOD PRESSURE: 125 MMHG

## 2017-08-08 DIAGNOSIS — I48.0 PAROXYSMAL ATRIAL FIBRILLATION: ICD-10-CM

## 2017-08-08 DIAGNOSIS — H35.30: ICD-10-CM

## 2017-08-08 DIAGNOSIS — M85.80 OSTEOPENIA, UNSPECIFIED LOCATION: ICD-10-CM

## 2017-08-08 DIAGNOSIS — I10 ESSENTIAL HYPERTENSION: Primary | ICD-10-CM

## 2017-08-08 DIAGNOSIS — E78.5 HYPERLIPIDEMIA, UNSPECIFIED HYPERLIPIDEMIA TYPE: ICD-10-CM

## 2017-08-08 PROCEDURE — 99213 OFFICE O/P EST LOW 20 MIN: CPT | Mod: PBBFAC | Performed by: INTERNAL MEDICINE

## 2017-08-08 PROCEDURE — 1159F MED LIST DOCD IN RCRD: CPT | Mod: ,,, | Performed by: INTERNAL MEDICINE

## 2017-08-08 PROCEDURE — 99999 PR PBB SHADOW E&M-EST. PATIENT-LVL III: CPT | Mod: PBBFAC,,, | Performed by: INTERNAL MEDICINE

## 2017-08-08 PROCEDURE — 3008F BODY MASS INDEX DOCD: CPT | Mod: ,,, | Performed by: INTERNAL MEDICINE

## 2017-08-08 PROCEDURE — 1126F AMNT PAIN NOTED NONE PRSNT: CPT | Mod: ,,, | Performed by: INTERNAL MEDICINE

## 2017-08-08 PROCEDURE — 99214 OFFICE O/P EST MOD 30 MIN: CPT | Mod: S$PBB,,, | Performed by: INTERNAL MEDICINE

## 2017-08-08 NOTE — PROGRESS NOTES
Subjective:       Patient ID: Cathy Kearns is a 87 y.o. female.    Chief Complaint: HTN    HPI   Pt here for f/u of HTN - bp at goal on repeat after sitting. She reports that home blood pressure readings are typically 120s/70s-80s  Seen by cards for parox afib and started on eliquis and rosenda this well at this time.   Hx of Asthma - took self off of Breo after made lips swell - reports no wheezing or sob - not taking albuterol inh and no nighttime awakenings. She would like to hold off on establishing care with a new pulmonologist since moving to [place] since her symptoms are stable off medication at this time.   No wheezing or sob. Reports + post nasal drip that is intermittent. denies reflux. Followed previously by pulm on [facility].   rosenda calcium and vitamin D supplementation for osteopenia  Pt due for labs    Review of Systems   Constitutional: Negative for chills and fever.   HENT: Positive for postnasal drip. Negative for congestion and sore throat.    Respiratory: Negative for cough, chest tightness, shortness of breath and wheezing.    Cardiovascular: Negative for chest pain and palpitations.   Gastrointestinal: Negative for abdominal pain, constipation and diarrhea.   Skin: Negative for color change, rash and wound.       Objective:      Physical Exam   Constitutional: She is oriented to person, place, and time. She appears well-developed and well-nourished.   HENT:   Head: Normocephalic and atraumatic.   Right Ear: External ear normal.   Left Ear: External ear normal.   Mouth/Throat: Oropharynx is clear and moist. No oropharyngeal exudate.   Eyes: Conjunctivae and EOM are normal.   Neck: Neck supple.   Cardiovascular: Normal rate, regular rhythm and intact distal pulses.    Pulmonary/Chest: Effort normal and breath sounds normal.   Abdominal: Soft. Normal appearance and bowel sounds are normal. She exhibits no mass. There is no tenderness. There is no guarding.   Musculoskeletal: She exhibits no  tenderness or deformity.   Lymphadenopathy:     She has no cervical adenopathy.   Neurological: She is alert and oriented to person, place, and time. She has normal strength. Gait normal.   Skin: Skin is warm, dry and intact. Capillary refill takes less than 2 seconds. No cyanosis. Nails show no clubbing.   Psychiatric: She has a normal mood and affect. Her speech is normal and behavior is normal. Judgment and thought content normal. Cognition and memory are normal.   Vitals reviewed.      Assessment:       Essential hypertension: bp at goal after repeating - cont meds and low salt diet and graded exercise program  -     CBC auto differential; Future; Expected date: 08/08/2017  -     Comprehensive metabolic panel; Future; Expected date: 08/08/2017    Osteopenia, unspecified location: rec otc supplement of calcium 1200mg and vit d 800u divided into 2 doses daily along with reg exercise    Degeneration macular: followed by ophtho    Hyperlipidemia, unspecified hyperlipidemia type: due for labs - pt would like to hold off on statin but will consider after labs return.   -     TSH; Future; Expected date: 08/08/2017  -     Lipid panel; Future; Expected date: 08/08/2017    PAfib: cont bb and OAC    rec tdap at pharmacy  Recommend flu vaccine in fall

## 2017-08-10 ENCOUNTER — LAB VISIT (OUTPATIENT)
Dept: LAB | Facility: HOSPITAL | Age: 82
End: 2017-08-10
Attending: INTERNAL MEDICINE
Payer: MEDICARE

## 2017-08-10 DIAGNOSIS — E78.5 HYPERLIPIDEMIA, UNSPECIFIED HYPERLIPIDEMIA TYPE: ICD-10-CM

## 2017-08-10 DIAGNOSIS — I10 ESSENTIAL HYPERTENSION: ICD-10-CM

## 2017-08-10 LAB
ALBUMIN SERPL BCP-MCNC: 3.9 G/DL
ALP SERPL-CCNC: 52 U/L
ALT SERPL W/O P-5'-P-CCNC: 12 U/L
ANION GAP SERPL CALC-SCNC: 7 MMOL/L
AST SERPL-CCNC: 15 U/L
BASOPHILS # BLD AUTO: 0.04 K/UL
BASOPHILS NFR BLD: 0.7 %
BILIRUB SERPL-MCNC: 0.7 MG/DL
BUN SERPL-MCNC: 12 MG/DL
CALCIUM SERPL-MCNC: 10.1 MG/DL
CHLORIDE SERPL-SCNC: 104 MMOL/L
CHOLEST/HDLC SERPL: 3.5 {RATIO}
CO2 SERPL-SCNC: 28 MMOL/L
CREAT SERPL-MCNC: 0.7 MG/DL
DIFFERENTIAL METHOD: ABNORMAL
EOSINOPHIL # BLD AUTO: 0.1 K/UL
EOSINOPHIL NFR BLD: 2 %
ERYTHROCYTE [DISTWIDTH] IN BLOOD BY AUTOMATED COUNT: 13.1 %
EST. GFR  (AFRICAN AMERICAN): >60 ML/MIN/1.73 M^2
EST. GFR  (NON AFRICAN AMERICAN): >60 ML/MIN/1.73 M^2
GLUCOSE SERPL-MCNC: 103 MG/DL
HCT VFR BLD AUTO: 38.7 %
HDL/CHOLESTEROL RATIO: 28.9 %
HDLC SERPL-MCNC: 228 MG/DL
HDLC SERPL-MCNC: 66 MG/DL
HGB BLD-MCNC: 13.3 G/DL
LDLC SERPL CALC-MCNC: 138.6 MG/DL
LYMPHOCYTES # BLD AUTO: 2.1 K/UL
LYMPHOCYTES NFR BLD: 37.1 %
MCH RBC QN AUTO: 32 PG
MCHC RBC AUTO-ENTMCNC: 34.4 G/DL
MCV RBC AUTO: 93 FL
MONOCYTES # BLD AUTO: 1 K/UL
MONOCYTES NFR BLD: 18 %
NEUTROPHILS # BLD AUTO: 2.3 K/UL
NEUTROPHILS NFR BLD: 42 %
NONHDLC SERPL-MCNC: 162 MG/DL
PLATELET # BLD AUTO: 199 K/UL
PMV BLD AUTO: 10.4 FL
POTASSIUM SERPL-SCNC: 5.2 MMOL/L
PROT SERPL-MCNC: 7 G/DL
RBC # BLD AUTO: 4.16 M/UL
SODIUM SERPL-SCNC: 139 MMOL/L
TRIGL SERPL-MCNC: 117 MG/DL
TSH SERPL DL<=0.005 MIU/L-ACNC: 1.11 UIU/ML
WBC # BLD AUTO: 5.56 K/UL

## 2017-08-10 PROCEDURE — 85025 COMPLETE CBC W/AUTO DIFF WBC: CPT

## 2017-08-10 PROCEDURE — 84443 ASSAY THYROID STIM HORMONE: CPT

## 2017-08-10 PROCEDURE — 80061 LIPID PANEL: CPT

## 2017-08-10 PROCEDURE — 36415 COLL VENOUS BLD VENIPUNCTURE: CPT

## 2017-08-10 PROCEDURE — 80053 COMPREHEN METABOLIC PANEL: CPT

## 2017-08-11 ENCOUNTER — TELEPHONE (OUTPATIENT)
Dept: INTERNAL MEDICINE | Facility: CLINIC | Age: 82
End: 2017-08-11

## 2017-08-11 DIAGNOSIS — E87.5 HYPERKALEMIA: Primary | ICD-10-CM

## 2017-08-12 NOTE — TELEPHONE ENCOUNTER
Please notify pt that labs returned.   Blood counts, thyroid, kidneys and liver and electrolytes are stable except potassium is mildly elevated. - rec repeat this at main campus lab Monday or tuesday    Cholesterol is elevated - rec cholesterol lowering med lipitor to improve this. If pt agrees please let me know and will order med and labs in 3 months.   I recommend regular exercise along with a diet low in fat and cholesterol and high in fiber and fresh fruits and vegetables to improve this.

## 2017-08-14 NOTE — TELEPHONE ENCOUNTER
Pt has been informed of pcp's message. Pt's lab has been scheduled for Wednesday  as requested by the pt. Pt states that she will not be able to complete the lab on Monday or Tuesday due to not having a ride. Pt states that she would like a print out of her labs results prior to deciding if she would like to start Lipitor rx. Pt's lab results have been mailed as requested.

## 2017-08-16 ENCOUNTER — APPOINTMENT (OUTPATIENT)
Dept: LAB | Facility: HOSPITAL | Age: 82
End: 2017-08-16
Attending: INTERNAL MEDICINE
Payer: MEDICARE

## 2017-08-17 ENCOUNTER — TELEPHONE (OUTPATIENT)
Dept: INTERNAL MEDICINE | Facility: CLINIC | Age: 82
End: 2017-08-17

## 2017-08-17 NOTE — TELEPHONE ENCOUNTER
Please notify pt that repeat labs showed normal potassium level - no further testing is indicated

## 2017-08-18 NOTE — TELEPHONE ENCOUNTER
Spoke w/ pt and discussed pt lab results, pt verbally understands lab results and has no further questions.

## 2017-08-21 ENCOUNTER — OFFICE VISIT (OUTPATIENT)
Dept: CARDIOLOGY | Facility: CLINIC | Age: 82
End: 2017-08-21
Payer: MEDICARE

## 2017-08-21 VITALS
OXYGEN SATURATION: 100 % | SYSTOLIC BLOOD PRESSURE: 179 MMHG | HEIGHT: 63 IN | DIASTOLIC BLOOD PRESSURE: 77 MMHG | HEART RATE: 54 BPM | BODY MASS INDEX: 21.02 KG/M2 | WEIGHT: 118.63 LBS

## 2017-08-21 DIAGNOSIS — R19.00 ABDOMINAL PULSATILE MASS: ICD-10-CM

## 2017-08-21 DIAGNOSIS — I10 ESSENTIAL HYPERTENSION: ICD-10-CM

## 2017-08-21 DIAGNOSIS — D18.03 LIVER HEMANGIOMA: ICD-10-CM

## 2017-08-21 DIAGNOSIS — I48.0 PAROXYSMAL ATRIAL FIBRILLATION: Primary | ICD-10-CM

## 2017-08-21 PROCEDURE — 1159F MED LIST DOCD IN RCRD: CPT | Mod: ,,, | Performed by: NURSE PRACTITIONER

## 2017-08-21 PROCEDURE — 99214 OFFICE O/P EST MOD 30 MIN: CPT | Mod: PBBFAC | Performed by: NURSE PRACTITIONER

## 2017-08-21 PROCEDURE — 1126F AMNT PAIN NOTED NONE PRSNT: CPT | Mod: ,,, | Performed by: NURSE PRACTITIONER

## 2017-08-21 PROCEDURE — 99999 PR PBB SHADOW E&M-EST. PATIENT-LVL IV: CPT | Mod: PBBFAC,,, | Performed by: NURSE PRACTITIONER

## 2017-08-21 PROCEDURE — 99214 OFFICE O/P EST MOD 30 MIN: CPT | Mod: S$PBB,,, | Performed by: NURSE PRACTITIONER

## 2017-08-21 RX ORDER — HYDROCHLOROTHIAZIDE 12.5 MG/1
12.5 TABLET ORAL DAILY
Qty: 30 TABLET | Refills: 11 | Status: SHIPPED | OUTPATIENT
Start: 2017-08-21 | End: 2017-09-01 | Stop reason: ALTCHOICE

## 2017-08-21 NOTE — PATIENT INSTRUCTIONS
Start hydrochlorothiazide 12.5mg daily.   Continue other medications.  Obtain a blood pressure monitor. Take twice daily blood pressure measurements. Call cardiology clinic after one week with measurements.   Blood test in one week.  Return to clinic in 6 months.

## 2017-08-21 NOTE — PROGRESS NOTES
Ms. Kearns is a patient of Dr. Mcelroy and was last seen in [facility] 5/18/2017.      Subjective:   Patient ID:  Cathy Kearns is a 87 y.o. female who presents for follow-up of Paroxysmal atrial fibrillation (3 months fu)  .   HPI:    Ms. Kearns is an 88yo female with a PMHx of paroxysmal atrial fibrillation, HTN, HLD, liver hemangioma (diagnosed years ago), and macular degeneration here for follow up. At her last clinic visit she was started on abixaban 2.5mg after being cleared by her ophthalmologist. She has a pulsatile abdominal aorta but ultrasound on 5/23/2017 was negative for aneurysm. She says that when she started her apixaban she experienced some bilateral hip pain for about 2 weeks which has since resolved. She says that she has not experienced palpitations. She has periodic SOB and says that she has been diagnosed with asthma. She says she can walk detention around [place] before experiencing WHITEHEAD and some light-headedness. She does exercise classes regularly (3 times per week) at [facility]. (She quit [employer] because she said it was too slow). She has periodic leg swelling but not recently. Ms. Kearns denies chest pain with exertion or at rest,  syncope, claudication, PND, or orthopnea.  She is currently taking lisinopril 10mg. She does not take her BP at home but she does have a monitor. Her AF is controlled with sotalol 80mg daily and she is taking apixaban 2.5mg for stroke prophylaxis. She denies bleeding episodes.     Recent Cardiac Tests:    Abdominal U/S (5/23/2017):  There is no evidence of an Abdominal Aortic Aneurysm.    Current Outpatient Prescriptions   Medication Sig    ANTIOX #8/OM3/DHA/EPA/LUT/ZEAX (PRESERVISION AREDS 2, OMEGA-3, ORAL) Take 1 tablet by mouth once daily at 6am.     apixaban 2.5 mg Tab Take 1 tablet (2.5 mg total) by mouth 2 (two) times daily.    lisinopril 10 MG tablet Take 1 tablet (10 mg total) by mouth once daily.    omega 3-dha-epa-fish oil 300-1,000  "mg Cap Take 3 g by mouth.    sotalol (BETAPACE) 80 MG tablet 2 (two) times daily.      Review of Systems   Constitution: Negative for malaise/fatigue.   HENT: Negative for headaches.    Eyes: Negative for blurred vision.   Cardiovascular: Negative for chest pain, claudication, dyspnea on exertion, irregular heartbeat, leg swelling, orthopnea, palpitations, paroxysmal nocturnal dyspnea and syncope.   Respiratory: Negative for shortness of breath.    Hematologic/Lymphatic: Negative for bleeding problem.   Skin: Negative for rash.   Musculoskeletal: Positive for joint pain. Negative for myalgias.   Gastrointestinal: Negative for abdominal pain, constipation, diarrhea and nausea.   Genitourinary: Negative for hematuria.   Neurological: Negative for loss of balance and numbness.   Psychiatric/Behavioral: Negative for altered mental status.   Allergic/Immunologic: Negative for persistent infections.     Objective:     Right Arm BP - Sittin/69 (17 1107)  Left Arm BP - Sittin/77 (17 0956)    BP (!) 179/77 (BP Location: Left arm, Patient Position: Sitting, BP Method: Large (Automatic))   Pulse (!) 54   Ht 5' 3" (1.6 m)   Wt 53.8 kg (118 lb 9.7 oz)   SpO2 100%   BMI 21.01 kg/m²     Physical Exam   Constitutional: She is oriented to person, place, and time. She appears well-developed and well-nourished.   HENT:   Head: Normocephalic.   Nose: Nose normal.   Eyes: Pupils are equal, round, and reactive to light.   Neck: No JVD present. Carotid bruit is not present.   Cardiovascular: Normal rate, regular rhythm, S1 normal and S2 normal.  Exam reveals no gallop.    No murmur heard.  Pulses:       Carotid pulses are 2+ on the right side, and 2+ on the left side.       Radial pulses are 2+ on the right side, and 2+ on the left side.        Dorsalis pedis pulses are 2+ on the right side, and 2+ on the left side.   Pulmonary/Chest: No respiratory distress. She has wheezes in the right lower field and the " left lower field.   Abdominal: Soft. Bowel sounds are normal. She exhibits no distension. There is no tenderness.   Musculoskeletal: Normal range of motion. She exhibits no edema.   Neurological: She is alert and oriented to person, place, and time.   Skin: Skin is warm and dry. No erythema.   Psychiatric: She has a normal mood and affect. Her speech is normal and behavior is normal.   Nursing note and vitals reviewed.    Lab Results   Component Value Date     (L) 08/16/2017    K 4.9 08/16/2017     08/16/2017    CO2 26 08/16/2017    BUN 10 08/16/2017    CREATININE 0.7 08/16/2017    GLU 95 08/16/2017    AST 15 08/10/2017    ALT 12 08/10/2017    ALBUMIN 3.9 08/10/2017    PROT 7.0 08/10/2017    BILITOT 0.7 08/10/2017    WBC 5.56 08/10/2017    HGB 13.3 08/10/2017    HCT 38.7 08/10/2017    MCV 93 08/10/2017     08/10/2017    TSH 1.108 08/10/2017    CHOL 228 (H) 08/10/2017    HDL 66 08/10/2017    LDLCALC 138.6 08/10/2017    TRIG 117 08/10/2017          Test(s) Reviewed  I have reviewed the following in detail:  [] Stress test   [] Angiography   [] Echocardiogram   [x] Labs   [] Other:         Assessment:         1. Paroxysmal atrial fibrillation. On apixaban and sotalol. Patient denies palpitations. In SR today. Denies bleeding episodes.     2. Essential hypertension. Hypertensive in clinic. Will add HCTZ and ask patient to check BP regularly. Report to clinic if BPs are consistently over 140/90.      3. Abdominal pulsatile mass.  No abdominal aneurysm per abdominal u/s on 5/2017.     4. Liver hemangioma. Asymptomatic. No bleeding.     Plan:     Cathy was seen today for paroxysmal atrial fibrillation.    Diagnoses and all orders for this visit:    Paroxysmal atrial fibrillation    Essential hypertension  -     hydrochlorothiazide (HYDRODIURIL) 12.5 MG Tab; Take 1 tablet (12.5 mg total) by mouth once daily.  -     Basic metabolic panel; Future; Expected date: 08/28/2017    Abdominal pulsatile  mass    Liver hemangioma    Other orders  -     CALCIUM CARBONATE (CALCIUM 500 ORAL); Take by mouth once daily.      Start hydrochlorothiazide 12.5mg daily.   Continue other medications.  Obtain a blood pressure monitor. Take twice daily blood pressure measurements. Call cardiology clinic after one week with measurements.   Blood test in one week.  Return to clinic in 6 months.    Return in about 6 months (around 2/21/2018).    ------------------------------------------------------------------    ASIF Vazquez, NP-C  Consult Cardiology

## 2017-08-31 ENCOUNTER — LAB VISIT (OUTPATIENT)
Dept: LAB | Facility: HOSPITAL | Age: 82
End: 2017-08-31
Payer: MEDICARE

## 2017-08-31 DIAGNOSIS — I10 ESSENTIAL HYPERTENSION: ICD-10-CM

## 2017-08-31 LAB
ANION GAP SERPL CALC-SCNC: 4 MMOL/L
BUN SERPL-MCNC: 13 MG/DL
CALCIUM SERPL-MCNC: 10.1 MG/DL
CHLORIDE SERPL-SCNC: 92 MMOL/L
CO2 SERPL-SCNC: 33 MMOL/L
CREAT SERPL-MCNC: 0.8 MG/DL
EST. GFR  (AFRICAN AMERICAN): >60 ML/MIN/1.73 M^2
EST. GFR  (NON AFRICAN AMERICAN): >60 ML/MIN/1.73 M^2
GLUCOSE SERPL-MCNC: 118 MG/DL
POTASSIUM SERPL-SCNC: 4.5 MMOL/L
SODIUM SERPL-SCNC: 129 MMOL/L

## 2017-08-31 PROCEDURE — 36415 COLL VENOUS BLD VENIPUNCTURE: CPT

## 2017-08-31 PROCEDURE — 80048 BASIC METABOLIC PNL TOTAL CA: CPT

## 2017-09-01 ENCOUNTER — TELEPHONE (OUTPATIENT)
Dept: CARDIOLOGY | Facility: CLINIC | Age: 82
End: 2017-09-01

## 2017-09-01 DIAGNOSIS — I10 ESSENTIAL HYPERTENSION: ICD-10-CM

## 2017-09-01 DIAGNOSIS — I48.0 PAROXYSMAL ATRIAL FIBRILLATION: Primary | ICD-10-CM

## 2017-09-01 RX ORDER — LISINOPRIL 20 MG/1
20 TABLET ORAL DAILY
Qty: 30 TABLET | Refills: 11 | Status: SHIPPED | OUTPATIENT
Start: 2017-09-01 | End: 2017-11-10 | Stop reason: SDUPTHER

## 2017-09-01 RX ORDER — SOTALOL HYDROCHLORIDE 80 MG/1
80 TABLET ORAL 2 TIMES DAILY
Qty: 60 TABLET | Refills: 0 | Status: SHIPPED | OUTPATIENT
Start: 2017-09-01 | End: 2017-10-14 | Stop reason: SDUPTHER

## 2017-09-01 NOTE — TELEPHONE ENCOUNTER
Called patient to discuss lab results. Sodium 129. Will stop HCTZ and increase lisinopril to 20mg daily. BMP in one week. Patient to continue taking daily BPs. Patient verbalized understanding. Patient also asked to switch sotalol 80mg BID from [facility] to [facility]. Confirmed change.

## 2017-09-08 ENCOUNTER — LAB VISIT (OUTPATIENT)
Dept: LAB | Facility: HOSPITAL | Age: 82
End: 2017-09-08
Payer: MEDICARE

## 2017-09-08 DIAGNOSIS — I10 ESSENTIAL HYPERTENSION: ICD-10-CM

## 2017-09-08 LAB
ANION GAP SERPL CALC-SCNC: 7 MMOL/L
BUN SERPL-MCNC: 15 MG/DL
CALCIUM SERPL-MCNC: 9.8 MG/DL
CHLORIDE SERPL-SCNC: 96 MMOL/L
CO2 SERPL-SCNC: 28 MMOL/L
CREAT SERPL-MCNC: 0.8 MG/DL
EST. GFR  (AFRICAN AMERICAN): >60 ML/MIN/1.73 M^2
EST. GFR  (NON AFRICAN AMERICAN): >60 ML/MIN/1.73 M^2
GLUCOSE SERPL-MCNC: 100 MG/DL
POTASSIUM SERPL-SCNC: 4.6 MMOL/L
SODIUM SERPL-SCNC: 131 MMOL/L

## 2017-09-08 PROCEDURE — 80048 BASIC METABOLIC PNL TOTAL CA: CPT

## 2017-09-08 PROCEDURE — 36415 COLL VENOUS BLD VENIPUNCTURE: CPT

## 2017-09-12 ENCOUNTER — PROCEDURE VISIT (OUTPATIENT)
Dept: OPHTHALMOLOGY | Facility: CLINIC | Age: 82
End: 2017-09-12
Payer: MEDICARE

## 2017-09-12 VITALS — HEART RATE: 54 BPM | SYSTOLIC BLOOD PRESSURE: 158 MMHG | DIASTOLIC BLOOD PRESSURE: 67 MMHG

## 2017-09-12 DIAGNOSIS — H35.3124 NONEXUDATIVE AGE-RELATED MACULAR DEGENERATION, LEFT EYE, ADVANCED ATROPHIC WITH SUBFOVEAL INVOLVEMENT: ICD-10-CM

## 2017-09-12 DIAGNOSIS — H35.3211 EXUDATIVE AGE-RELATED MACULAR DEGENERATION OF RIGHT EYE WITH ACTIVE CHOROIDAL NEOVASCULARIZATION: Primary | ICD-10-CM

## 2017-09-12 DIAGNOSIS — H35.721 SEROUS DETACHMENT OF RETINAL PIGMENT EPITHELIUM OF RIGHT EYE: ICD-10-CM

## 2017-09-12 PROCEDURE — 92014 COMPRE OPH EXAM EST PT 1/>: CPT | Mod: 25,S$PBB,, | Performed by: OPHTHALMOLOGY

## 2017-09-12 PROCEDURE — 67028 INJECTION EYE DRUG: CPT | Mod: PBBFAC,RT | Performed by: OPHTHALMOLOGY

## 2017-09-12 PROCEDURE — 67028 INJECTION EYE DRUG: CPT | Mod: S$PBB,RT,, | Performed by: OPHTHALMOLOGY

## 2017-09-12 PROCEDURE — 92134 CPTRZ OPH DX IMG PST SGM RTA: CPT | Mod: PBBFAC | Performed by: OPHTHALMOLOGY

## 2017-09-12 RX ADMIN — AFLIBERCEPT 2 MG: 40 INJECTION, SOLUTION INTRAVITREAL at 10:09

## 2017-09-12 NOTE — PATIENT INSTRUCTIONS
POST INTRAVITREAL INJECTION PATIENT INSTRUCTIONS   Below are some guidelines for what to expect after your treatment and additional care instructions.   * you may experience mild discomfort in your eye after the treatment. Your eye usually feels better within 24 to 48 hours after the procedure.   * you have been given drops that numb the surface of your eye.   DO NOT rub or touch your eye, DO NOT wear contacts until numbness goes away.   * you may experience small spots that appear in your field of vision, these are usually caused by an air bubble or from the medication. It taked a few hours or days for these to go away.   * use of sunglasses will help reduce light sensitivity and glare.   * DO NOT swim or put sink water ( tap water ) or swin for at least 24 hours after the injection   * If you have a gritty, burning, irritating or stinging feeling in the injected eye. This may be a result of the antiseptic used. Use artifical tears or eye lubricant ( from over- the- counter from your local pharmacy ). If you have some at home already please check the expiration date, so not to use anything contaminated in your eye. A cool pack over your eye brow above the injected eye may also relieve discomfort.   Call us right away or go to the Emergency Department if you have a dramatic decrease in vision or extreme pain in the eye.   [facility] [phone]

## 2017-09-12 NOTE — PROGRESS NOTES
HPI     Eye Problem    Additional comments: 8 week check           Comments   DLS 07/18/17- No visual changes x last visit. OS has been bloodshot- no   pain or discomfort        OCT - mild increase SRF/SRH  OS - Drusen, no SRF - small HE - ? RAP lesion     Assessment:    1. AMD - Wet OD - post Avastin x 12, post Eylea #23  PED increased prior to 10/12 visit      Had slight increase 5/13/13, 7/14, 5/16    Eylea OD today    Continue 8 week OD    2. H/o Dry OS  But small HE with trace CME - possible RAP lesion with Wet AMD OS?  Post Eylea OS #6    Watch OS      3. PVD OU    4. Pseudophakia - great result    5. Allergic conjunctivitis/sinusitis - try OTC antihistamines    6. AFib - considering anticoagulation - ok to proceed  On Anti-VEGF therapy, risk of large macular hemorrhage is minimal.       Plan:      8 weeks OCT    Risks, benefits, and alternatives to treatment discussed in detail with the patient.  The patient voiced understanding and wished to proceed with the procedure    Injection Procedure Note:  Diagnosis: Wet AMD OD    Topical Proparacaine and Betadine. Prep Conj only  Inject Eylea OD at 6:00 @ 3.5-4mm posterior to limbus  Post Operative Dx: Same  Complications: None  Follow up as above.

## 2017-10-11 DIAGNOSIS — I48.0 PAROXYSMAL ATRIAL FIBRILLATION: ICD-10-CM

## 2017-10-12 RX ORDER — SOTALOL HYDROCHLORIDE 80 MG/1
TABLET ORAL
Qty: 60 TABLET | Refills: 0 | OUTPATIENT
Start: 2017-10-12

## 2017-10-14 DIAGNOSIS — I48.0 PAROXYSMAL ATRIAL FIBRILLATION: ICD-10-CM

## 2017-10-16 DIAGNOSIS — I48.0 PAROXYSMAL ATRIAL FIBRILLATION: ICD-10-CM

## 2017-10-16 RX ORDER — SOTALOL HYDROCHLORIDE 80 MG/1
TABLET ORAL
Qty: 60 TABLET | Refills: 6 | Status: SHIPPED | OUTPATIENT
Start: 2017-10-16 | End: 2017-11-10 | Stop reason: SDUPTHER

## 2017-10-16 RX ORDER — SOTALOL HYDROCHLORIDE 80 MG/1
80 TABLET ORAL 2 TIMES DAILY
Qty: 180 TABLET | Refills: 3 | Status: SHIPPED | OUTPATIENT
Start: 2017-10-16 | End: 2018-12-04 | Stop reason: SDUPTHER

## 2017-10-16 NOTE — TELEPHONE ENCOUNTER
----- Message from Ambrose Aldrich sent at 10/16/2017  9:38 AM CDT -----  Contact: patient  Please call pt at [phone] or [phone] after calling RX into the pharmacy. Refill needed for Sotalol 80 mg x 90 day supply called into [facility] at [phone]. Last seen KI Rodriguez 08/21/17    Thank you

## 2017-11-07 ENCOUNTER — PROCEDURE VISIT (OUTPATIENT)
Dept: OPHTHALMOLOGY | Facility: CLINIC | Age: 82
End: 2017-11-07
Attending: OPHTHALMOLOGY
Payer: MEDICARE

## 2017-11-07 VITALS — DIASTOLIC BLOOD PRESSURE: 62 MMHG | SYSTOLIC BLOOD PRESSURE: 145 MMHG | HEART RATE: 52 BPM

## 2017-11-07 DIAGNOSIS — H35.3124 NONEXUDATIVE AGE-RELATED MACULAR DEGENERATION, LEFT EYE, ADVANCED ATROPHIC WITH SUBFOVEAL INVOLVEMENT: ICD-10-CM

## 2017-11-07 DIAGNOSIS — H35.3211 EXUDATIVE AGE-RELATED MACULAR DEGENERATION OF RIGHT EYE WITH ACTIVE CHOROIDAL NEOVASCULARIZATION: Primary | ICD-10-CM

## 2017-11-07 DIAGNOSIS — H35.721 SEROUS DETACHMENT OF RETINAL PIGMENT EPITHELIUM OF RIGHT EYE: ICD-10-CM

## 2017-11-07 PROCEDURE — 92014 COMPRE OPH EXAM EST PT 1/>: CPT | Mod: S$PBB,,, | Performed by: OPHTHALMOLOGY

## 2017-11-07 PROCEDURE — 67028 INJECTION EYE DRUG: CPT | Mod: S$PBB,RT,, | Performed by: OPHTHALMOLOGY

## 2017-11-07 PROCEDURE — 92134 CPTRZ OPH DX IMG PST SGM RTA: CPT | Mod: PBBFAC | Performed by: OPHTHALMOLOGY

## 2017-11-07 PROCEDURE — 67028 INJECTION EYE DRUG: CPT | Mod: PBBFAC,RT | Performed by: OPHTHALMOLOGY

## 2017-11-07 RX ADMIN — AFLIBERCEPT 2 MG: 40 INJECTION, SOLUTION INTRAVITREAL at 10:11

## 2017-11-07 NOTE — PROGRESS NOTES
HPI     Eye Problem    Additional comments: 8 week check           Comments   DLS 9/12/17- Over the past couple of weeks she has been feeling like she   has trash in her eyes    HPI     Eye Problem    Additional comments: 8 week check           Comments   DLS 07/18/17- No visual changes x last visit. OS has been bloodshot- no   pain or discomfort        OCT - mild increase SRF/SRH  OS - Drusen, no SRF - small HE - ? RAP lesion     Assessment:    1. AMD - Wet OD - post Avastin x 12, post Eylea #24  PED increased prior to 10/12 visit      Had slight increase 5/13/13, 7/14, 5/16    Eylea OD today    Continue 8 week OD    2. H/o Dry OS  But small HE with trace CME - possible RAP lesion with Wet AMD OS?  Post Eylea OS #6    Watch OS      3. PVD OU    4. Pseudophakia - great result    5. Allergic conjunctivitis/sinusitis - try OTC antihistamines    6. AFib - considering anticoagulation - ok to proceed  On Anti-VEGF therapy, risk of large macular hemorrhage is minimal.       Plan:      8 weeks OCT    Risks, benefits, and alternatives to treatment discussed in detail with the patient.  The patient voiced understanding and wished to proceed with the procedure    Injection Procedure Note:  Diagnosis: Wet AMD OD    Topical Proparacaine and Betadine. Prep Conj only  Inject Eylea OD at 6:00 @ 3.5-4mm posterior to limbus  Post Operative Dx: Same  Complications: None  Follow up as above.

## 2017-11-07 NOTE — PATIENT INSTRUCTIONS

## 2017-11-10 DIAGNOSIS — I10 ESSENTIAL HYPERTENSION: ICD-10-CM

## 2017-11-10 DIAGNOSIS — I48.0 PAROXYSMAL ATRIAL FIBRILLATION: ICD-10-CM

## 2017-11-10 RX ORDER — LISINOPRIL 20 MG/1
20 TABLET ORAL DAILY
Qty: 90 TABLET | Refills: 3 | Status: SHIPPED | OUTPATIENT
Start: 2017-11-10 | End: 2018-12-04 | Stop reason: SDUPTHER

## 2017-11-10 RX ORDER — SOTALOL HYDROCHLORIDE 80 MG/1
TABLET ORAL
Qty: 180 TABLET | Refills: 3 | Status: SHIPPED | OUTPATIENT
Start: 2017-11-10 | End: 2017-12-29 | Stop reason: SDUPTHER

## 2017-11-10 NOTE — TELEPHONE ENCOUNTER
----- Message from Ambrose Aldrich sent at 11/10/2017 10:13 AM CST -----  Contact: patient  Please call pt at [phone] or [phone] if any questions. Refill needed for Sotalol 80 mg, Lisinopril 20 mg and Eliquis 2.5 mg x 90 day supply [facility] at [phone]. Last seen Keara Rodriguez 08/21/17    Thank you

## 2017-12-29 ENCOUNTER — PROCEDURE VISIT (OUTPATIENT)
Dept: OPHTHALMOLOGY | Facility: CLINIC | Age: 82
End: 2017-12-29
Attending: OPHTHALMOLOGY
Payer: MEDICARE

## 2017-12-29 VITALS — DIASTOLIC BLOOD PRESSURE: 72 MMHG | HEART RATE: 58 BPM | SYSTOLIC BLOOD PRESSURE: 143 MMHG

## 2017-12-29 DIAGNOSIS — H35.3211 EXUDATIVE AGE-RELATED MACULAR DEGENERATION OF RIGHT EYE WITH ACTIVE CHOROIDAL NEOVASCULARIZATION: Primary | ICD-10-CM

## 2017-12-29 DIAGNOSIS — H35.721 SEROUS DETACHMENT OF RETINAL PIGMENT EPITHELIUM OF RIGHT EYE: ICD-10-CM

## 2017-12-29 DIAGNOSIS — H35.3124 NONEXUDATIVE AGE-RELATED MACULAR DEGENERATION, LEFT EYE, ADVANCED ATROPHIC WITH SUBFOVEAL INVOLVEMENT: ICD-10-CM

## 2017-12-29 PROCEDURE — 67028 INJECTION EYE DRUG: CPT | Mod: S$PBB,RT,, | Performed by: OPHTHALMOLOGY

## 2017-12-29 PROCEDURE — 67028 INJECTION EYE DRUG: CPT | Mod: PBBFAC,RT | Performed by: OPHTHALMOLOGY

## 2017-12-29 PROCEDURE — 92134 CPTRZ OPH DX IMG PST SGM RTA: CPT | Mod: PBBFAC | Performed by: OPHTHALMOLOGY

## 2017-12-29 PROCEDURE — 92014 COMPRE OPH EXAM EST PT 1/>: CPT | Mod: 25,S$PBB,, | Performed by: OPHTHALMOLOGY

## 2017-12-29 RX ADMIN — AFLIBERCEPT 2 MG: 40 INJECTION, SOLUTION INTRAVITREAL at 10:12

## 2017-12-29 NOTE — PROGRESS NOTES
HPI     Macular Degeneration    Additional comments: 8 week ck            Comments   DLS: 11/7/17    Pt here for 8 week ck and Eylea OD.  Pt states no complaints today and feels she can read better.    HPI     Eye Problem    Additional comments: 8 week check           Comments   DLS 9/12/17- Over the past couple of weeks she has been feeling like she   has trash in her eyes    HPI     Eye Problem    Additional comments: 8 week check           Comments   DLS 07/18/17- No visual changes x last visit. OS has been bloodshot- no   pain or discomfort        OCT - mild increase SRF/SRH  OS - Drusen, no SRF - small HE - ? RAP lesion     Assessment:    1. AMD - Wet OD - post Avastin x 12, post Eylea #25  PED increased prior to 10/12 visit      Had slight increase 5/13/13, 7/14, 5/16    Eylea OD today    Continue 8 week OD    2. H/o Dry OS  But small HE with trace CME - possible RAP lesion with Wet AMD OS?  Post Eylea OS #6    Watch OS      3. PVD OU    4. Pseudophakia - great result    5. Allergic conjunctivitis/sinusitis - try OTC antihistamines    6. AFib - considering anticoagulation - ok to proceed  On Anti-VEGF therapy, risk of large macular hemorrhage is minimal.       Plan:      8 weeks OCT    Risks, benefits, and alternatives to treatment discussed in detail with the patient.  The patient voiced understanding and wished to proceed with the procedure    Injection Procedure Note:  Diagnosis: Wet AMD OD    Topical Proparacaine and Betadine. Prep Conj only  Inject Eylea OD at 6:00 @ 3.5-4mm posterior to limbus  Post Operative Dx: Same  Complications: None  Follow up as above.

## 2017-12-29 NOTE — PATIENT INSTRUCTIONS

## 2018-02-27 ENCOUNTER — PROCEDURE VISIT (OUTPATIENT)
Dept: OPHTHALMOLOGY | Facility: CLINIC | Age: 83
End: 2018-02-27
Payer: MEDICARE

## 2018-02-27 VITALS — DIASTOLIC BLOOD PRESSURE: 65 MMHG | SYSTOLIC BLOOD PRESSURE: 157 MMHG | HEART RATE: 54 BPM

## 2018-02-27 DIAGNOSIS — H35.721 SEROUS DETACHMENT OF RETINAL PIGMENT EPITHELIUM OF RIGHT EYE: ICD-10-CM

## 2018-02-27 DIAGNOSIS — H35.3124 NONEXUDATIVE AGE-RELATED MACULAR DEGENERATION, LEFT EYE, ADVANCED ATROPHIC WITH SUBFOVEAL INVOLVEMENT: ICD-10-CM

## 2018-02-27 DIAGNOSIS — H35.3211 EXUDATIVE AGE-RELATED MACULAR DEGENERATION OF RIGHT EYE WITH ACTIVE CHOROIDAL NEOVASCULARIZATION: Primary | ICD-10-CM

## 2018-02-27 PROCEDURE — 67028 INJECTION EYE DRUG: CPT | Mod: 50,PBBFAC | Performed by: OPHTHALMOLOGY

## 2018-02-27 PROCEDURE — 92134 CPTRZ OPH DX IMG PST SGM RTA: CPT | Mod: PBBFAC | Performed by: OPHTHALMOLOGY

## 2018-02-27 PROCEDURE — 99499 UNLISTED E&M SERVICE: CPT | Mod: S$PBB,,, | Performed by: OPHTHALMOLOGY

## 2018-02-27 PROCEDURE — 67028 INJECTION EYE DRUG: CPT | Mod: S$PBB,RT,, | Performed by: OPHTHALMOLOGY

## 2018-02-27 RX ADMIN — AFLIBERCEPT 2 MG: 40 INJECTION, SOLUTION INTRAVITREAL at 10:02

## 2018-02-27 NOTE — PATIENT INSTRUCTIONS

## 2018-02-27 NOTE — PROGRESS NOTES
HPI     8 week / OCT /Eylea OD  DLS-12/29/2017 Dr. Mandujano     Decline in reading vision OD and tired feeling OU.   Denies pain   (-)Flashes (+)Floaters  (+)Photophobia  (-)Glare    [place] PRN         OCT - mild increase SRF/SRH  OS - Drusen, no SRF - small HE - ? RAP lesion     Assessment:    1. AMD - Wet OD - post Avastin x 12, post Eylea #26  PED increased prior to 10/12 visit    Had slight increase 5/13/13, 7/14, 5/16    Eylea OD today    Continue 8 week OD    2. H/o Dry OS  But small HE with trace CME - possible RAP lesion with Wet AMD OS?  Post Eylea OS #6    Watch OS      3. PVD OU    4. Pseudophakia - great result    5. Allergic conjunctivitis/sinusitis - try OTC antihistamines    6. AFib - considering anticoagulation - ok to proceed  On Anti-VEGF therapy, risk of large macular hemorrhage is minimal.       Plan:      8 weeks OCT    Risks, benefits, and alternatives to treatment discussed in detail with the patient.  The patient voiced understanding and wished to proceed with the procedure    Injection Procedure Note:  Diagnosis: Wet AMD OD    Topical Proparacaine and Betadine. Prep Conj only  Inject Eylea OD at 6:00 @ 3.5-4mm posterior to limbus  Post Operative Dx: Same  Complications: None  Follow up as above.

## 2018-03-06 ENCOUNTER — OFFICE VISIT (OUTPATIENT)
Dept: CARDIOLOGY | Facility: CLINIC | Age: 83
End: 2018-03-06
Payer: MEDICARE

## 2018-03-06 VITALS
SYSTOLIC BLOOD PRESSURE: 157 MMHG | WEIGHT: 120.56 LBS | DIASTOLIC BLOOD PRESSURE: 70 MMHG | HEART RATE: 55 BPM | BODY MASS INDEX: 21.36 KG/M2 | HEIGHT: 63 IN

## 2018-03-06 DIAGNOSIS — H35.30 MACULAR DEGENERATION, UNSPECIFIED LATERALITY, UNSPECIFIED TYPE: ICD-10-CM

## 2018-03-06 DIAGNOSIS — I10 ESSENTIAL HYPERTENSION: ICD-10-CM

## 2018-03-06 DIAGNOSIS — I48.0 PAROXYSMAL ATRIAL FIBRILLATION: Primary | ICD-10-CM

## 2018-03-06 PROCEDURE — 99999 PR PBB SHADOW E&M-EST. PATIENT-LVL III: CPT | Mod: PBBFAC,,, | Performed by: INTERNAL MEDICINE

## 2018-03-06 PROCEDURE — 99213 OFFICE O/P EST LOW 20 MIN: CPT | Mod: PBBFAC | Performed by: INTERNAL MEDICINE

## 2018-03-06 PROCEDURE — 99213 OFFICE O/P EST LOW 20 MIN: CPT | Mod: S$PBB,,, | Performed by: INTERNAL MEDICINE

## 2018-03-06 NOTE — PROGRESS NOTES
"Chart has been dictated using voice recognition software.  It is not been reviewed carefully for any transcriptional errors due to this technology.   Subjective:   Patient ID:  Cathy Kearns is a 88 y.o. female who presents for follow-up of No chief complaint on file.      HPI:  Patient with paroxysmal atrial fibrillation, hypertension, liver hemangioma (diagnosed years ago), and macular degeneration here for follow up. Patient denies any chest discomfort on exertion or at rest.  Patient denies any dyspnea at rest or on exertion, orthopnea, PND, or edema. Although she finds she can only walk 1/2 way around [employer] before she is tired.  Patient denies any palpitations, lightheadedness, or syncope.      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       apixaban 2.5 mg Tab Take 1 tablet (2.5 mg total) by mouth 2 (two) times daily. 180 tablet 3    CALCIUM CARBONATE (CALCIUM 500 ORAL) Take 500 mg by mouth once daily.       lisinopril (PRINIVIL,ZESTRIL) 20 MG tablet Take 1 tablet (20 mg total) by mouth once daily. 90 tablet 3    omega 3-dha-epa-fish oil 300-1,000 mg Cap Take 3 g by mouth.      sotalol (BETAPACE) 80 MG tablet Take 1 tablet (80 mg total) by mouth 2 (two) " "times daily. 180 tablet 3     No facility-administered medications prior to visit.        ROS - No change from prior visit in neurologic, respiratory, endocrine, GI, hematologic, or constitutional complaints except not that she does not heal well.   Objective:   Physical Exam   Constitutional: She is oriented to person, place, and time. She appears well-developed and well-nourished.   BP (!) 157/70 (BP Location: Left arm, Patient Position: Sitting, BP Method: Large (Automatic))   Pulse (!) 55   Ht 5' 3" (1.6 m)   Wt 54.7 kg (120 lb 9.5 oz)   BMI 21.36 kg/m²    Neck: Neck supple. No JVD present. Carotid bruit is not present. No thyromegaly present.   Cardiovascular: Normal rate, regular rhythm, S1 normal, S2 normal and intact distal pulses.  Exam reveals S4. Exam reveals no friction rub.    No murmur heard.  Pulmonary/Chest: Breath sounds normal. She has no wheezes. She has no rales.   Abdominal: Soft. Bowel sounds are normal. There is no hepatosplenomegaly. There is no tenderness.   Musculoskeletal: She exhibits no edema.   Neurological: She is alert and oriented to person, place, and time. She has normal strength.   Skin: No cyanosis. Nails show no clubbing.         Lab Results   Component Value Date     (L) 09/08/2017    K 4.6 09/08/2017    BUN 15 09/08/2017    CREATININE 0.8 09/08/2017     09/08/2017    CHOL 228 (H) 08/10/2017    HDL 66 08/10/2017    LDLCALC 138.6 08/10/2017    TRIG 117 08/10/2017    CHOLHDL 28.9 08/10/2017    HGB 13.3 08/10/2017    HCT 38.7 08/10/2017     08/10/2017       Assessment:     1. Paroxysmal atrial fibrillation    2. Essential hypertension    3. Macular degeneration, unspecified laterality, unspecified type      The patient's exercise tolerance is slowly decreasing but I think she still is having a fair amount for patient who is in her late 80s.  No changes will be made at this time.Patient has no symptoms of cardiac ischemia, heart failure, or significant " arrhythmias. The patient should return for reevaluation in 6 months   Plan:     Diagnoses and all orders for this visit:    Paroxysmal atrial fibrillation    Essential hypertension    Macular degeneration, unspecified laterality, unspecified type          Eriberto Mcelroy MD  Consultative Cardiology

## 2018-03-15 ENCOUNTER — TELEPHONE (OUTPATIENT)
Dept: OPHTHALMOLOGY | Facility: CLINIC | Age: 83
End: 2018-03-15

## 2018-04-10 ENCOUNTER — OFFICE VISIT (OUTPATIENT)
Dept: INTERNAL MEDICINE | Facility: CLINIC | Age: 83
End: 2018-04-10
Attending: INTERNAL MEDICINE
Payer: MEDICARE

## 2018-04-10 VITALS
HEART RATE: 59 BPM | BODY MASS INDEX: 21.09 KG/M2 | HEIGHT: 63 IN | DIASTOLIC BLOOD PRESSURE: 70 MMHG | SYSTOLIC BLOOD PRESSURE: 131 MMHG | WEIGHT: 119.06 LBS | OXYGEN SATURATION: 96 %

## 2018-04-10 DIAGNOSIS — I51.89 DIASTOLIC DYSFUNCTION: ICD-10-CM

## 2018-04-10 DIAGNOSIS — H91.90 HEARING LOSS, UNSPECIFIED HEARING LOSS TYPE, UNSPECIFIED LATERALITY: ICD-10-CM

## 2018-04-10 DIAGNOSIS — I48.0 PAROXYSMAL ATRIAL FIBRILLATION: ICD-10-CM

## 2018-04-10 DIAGNOSIS — J45.20 MILD INTERMITTENT ASTHMA, UNSPECIFIED WHETHER COMPLICATED: ICD-10-CM

## 2018-04-10 DIAGNOSIS — I10 ESSENTIAL HYPERTENSION: Primary | ICD-10-CM

## 2018-04-10 DIAGNOSIS — M85.80 OSTEOPENIA, UNSPECIFIED LOCATION: ICD-10-CM

## 2018-04-10 DIAGNOSIS — R06.09 DOE (DYSPNEA ON EXERTION): ICD-10-CM

## 2018-04-10 DIAGNOSIS — Z79.01 ON CONTINUOUS ORAL ANTICOAGULATION: ICD-10-CM

## 2018-04-10 DIAGNOSIS — E55.9 VITAMIN D DEFICIENCY: ICD-10-CM

## 2018-04-10 DIAGNOSIS — E78.5 HYPERLIPIDEMIA, UNSPECIFIED HYPERLIPIDEMIA TYPE: ICD-10-CM

## 2018-04-10 PROCEDURE — 99999 PR PBB SHADOW E&M-EST. PATIENT-LVL IV: CPT | Mod: PBBFAC,,, | Performed by: INTERNAL MEDICINE

## 2018-04-10 PROCEDURE — 99214 OFFICE O/P EST MOD 30 MIN: CPT | Mod: S$PBB,,, | Performed by: INTERNAL MEDICINE

## 2018-04-10 PROCEDURE — 99214 OFFICE O/P EST MOD 30 MIN: CPT | Mod: PBBFAC | Performed by: INTERNAL MEDICINE

## 2018-04-10 NOTE — PROGRESS NOTES
Subjective:   Patient ID: Cathy Kearns is a 88 y.o. female  Chief complaint:   Chief Complaint   Patient presents with    Annual Exam       Hypertension   This is a chronic problem. The current episode started more than 1 year ago. The problem has been waxing and waning since onset. The problem is controlled. Pertinent negatives include no blurred vision, chest pain or headaches. There are no associated agents to hypertension. Risk factors for coronary artery disease include dyslipidemia, post-menopausal state and stress. Past treatments include beta blockers, diuretics and ACE inhibitors. The current treatment provides moderate improvement. There are no compliance problems.  There is no history of CAD/MI or CVA. There is no history of a hypertension causing med.     Pt here for 6 month f/u of HTN     HTN: started taking 30mg lisinopril when last seen by cards - bp dec to 90/55 and 96/48 on 2 occ and felt tired/fatigued with this on new dose. She then reduced back to 20mg since then and bp has been controlled at times. She is not resting for 3-5 min prior to checking bp and agrees to do so from now on. Also taking sotolol  Was started on diuretic and sodium dropped on this - this was stopped   Osteopenia: taking kwame/vit d supplement  P Afib: followed by cards - recently seen - still taking sotalol and eliquis  Asthma: took self off of Breo after made lips swell - reports no wheezing  At times feels sob with exercising - this is intermittent over past few months - she did not report this to her cardiologist at recent visit - this is not occurring today. Followed previously by pulm on [facility] for asthma. Is worried about using an ICS. Denies wheezing with sx above   - never has used albuterol inh - has this at home if needed but not used to date  - no nighttime awakenings  - does have intermittent allergies, has clear runny nose and post nasal drip - was started on multiple meds and per pt pills and nasal sprays  "were not helpful  At [facility] she wanted to hold off on establishing care with a new pulmonologist since moving to [place] since her symptoms are stable off medication at this time - however she agrees to now. No fevers or chills  + post nasal drip that is intermittent.   denies reflux.   Lives with ROICO who is 96 years ago - he had stroke and he recently moved back to [facility] with her    Derm: MD comes to her NH - she is utd on skin exam    Review of Systems   Eyes: Negative for blurred vision.   Cardiovascular: Negative for chest pain.   Neurological: Negative for headaches.       Objective:  Vitals:    04/10/18 0955   BP: 131/70   Pulse: (!) 59   SpO2: 96%   Weight: 54 kg (119 lb 0.8 oz)   Height: 5' 3" (1.6 m)     Body mass index is 21.09 kg/m².    Physical Exam   Constitutional: She is oriented to person, place, and time. She appears well-developed and well-nourished.   HENT:   Head: Normocephalic and atraumatic.   Eyes: Conjunctivae and EOM are normal.   Neck: Normal range of motion. Neck supple.   Cardiovascular: Normal rate, regular rhythm and intact distal pulses.    Pulmonary/Chest: Effort normal and breath sounds normal. She has no wheezes. She has no rales.   Abdominal: Soft. Normal appearance and bowel sounds are normal.   Musculoskeletal: She exhibits no edema or tenderness.   Neurological: She is alert and oriented to person, place, and time. She has normal strength. Gait normal.   Skin: Skin is warm, dry and intact. Capillary refill takes less than 2 seconds. No cyanosis. Nails show no clubbing.   Psychiatric: She has a normal mood and affect. Her speech is normal and behavior is normal. Cognition and memory are normal.   Vitals reviewed.      Assessment:  1. Essential hypertension    2. Hearing loss, unspecified hearing loss type, unspecified laterality    3. WHITEHEAD (dyspnea on exertion)    4. Mild intermittent asthma, unspecified whether complicated    5. Osteopenia, unspecified location    6. " Paroxysmal atrial fibrillation    7. Diastolic dysfunction    8. Hyperlipidemia, unspecified hyperlipidemia type    9. On continuous oral anticoagulation    10. Vitamin D deficiency        Plan:  Cathy was seen today for annual exam.    Diagnoses and all orders for this visit:    Essential hypertension  -     Comprehensive metabolic panel; Future    Hearing loss, unspecified hearing loss type, unspecified laterality  -     Ambulatory Referral to ENT    WHITEHEAD (dyspnea on exertion)  -     Ambulatory Referral to Pulmonology  -     2D Echo w/ Color Flow Doppler; Future  -     TSH; Future  -     CBC auto differential; Future  -     Brain natriuretic peptide; Future    Mild intermittent asthma, unspecified whether complicated  -     Ambulatory Referral to Pulmonology    Osteopenia, unspecified location  -     Vitamin D; Future    Paroxysmal atrial fibrillation    Diastolic dysfunction  -     2D Echo w/ Color Flow Doppler; Future    Hyperlipidemia, unspecified hyperlipidemia type  -     TSH; Future  -     Comprehensive metabolic panel; Future  -     Lipid panel; Future    On continuous oral anticoagulation  -     CBC auto differential; Future    Vitamin D deficiency  -     Vitamin D; Future    Check labs, cont meds  Reviewed correct bp monitoring technique  Keep appt with specialists - pt agrees to schedule sooner appt with cards  Check echo  rec she try albuterol if sx occur to see if helpful - may be pulm vs cardiac. Asymptomatic today. ER prompts reviewed  She is reluctant to try another inh until seen by pulm    Health Maintenance   Topic Date Due    Lipid Panel  08/10/2022    TETANUS VACCINE  08/16/2027    Zoster Vaccine  Completed    Pneumococcal (65+)  Completed    Influenza Vaccine  Completed

## 2018-04-13 ENCOUNTER — HOSPITAL ENCOUNTER (OUTPATIENT)
Dept: CARDIOLOGY | Facility: CLINIC | Age: 83
Discharge: HOME | End: 2018-04-13
Attending: INTERNAL MEDICINE
Payer: MEDICARE

## 2018-04-13 DIAGNOSIS — I51.89 DIASTOLIC DYSFUNCTION: ICD-10-CM

## 2018-04-13 DIAGNOSIS — R06.09 DOE (DYSPNEA ON EXERTION): ICD-10-CM

## 2018-04-13 LAB
AORTIC VALVE REGURGITATION: NORMAL
DIASTOLIC DYSFUNCTION: NO
ESTIMATED PA SYSTOLIC PRESSURE: 18.52
RETIRED EF AND QEF - SEE NOTES: 65 (ref 55–65)
TRICUSPID VALVE REGURGITATION: NORMAL

## 2018-04-13 PROCEDURE — 93306 TTE W/DOPPLER COMPLETE: CPT | Mod: PBBFAC | Performed by: INTERNAL MEDICINE

## 2018-04-24 ENCOUNTER — PROCEDURE VISIT (OUTPATIENT)
Dept: OPHTHALMOLOGY | Facility: CLINIC | Age: 83
End: 2018-04-24
Attending: OPHTHALMOLOGY
Payer: MEDICARE

## 2018-04-24 VITALS — DIASTOLIC BLOOD PRESSURE: 73 MMHG | HEART RATE: 65 BPM | SYSTOLIC BLOOD PRESSURE: 142 MMHG

## 2018-04-24 DIAGNOSIS — H35.3211 EXUDATIVE AGE-RELATED MACULAR DEGENERATION OF RIGHT EYE WITH ACTIVE CHOROIDAL NEOVASCULARIZATION: Primary | ICD-10-CM

## 2018-04-24 DIAGNOSIS — H35.721 SEROUS DETACHMENT OF RETINAL PIGMENT EPITHELIUM OF RIGHT EYE: ICD-10-CM

## 2018-04-24 DIAGNOSIS — H35.3124 NONEXUDATIVE AGE-RELATED MACULAR DEGENERATION, LEFT EYE, ADVANCED ATROPHIC WITH SUBFOVEAL INVOLVEMENT: ICD-10-CM

## 2018-04-24 PROCEDURE — 67028 INJECTION EYE DRUG: CPT | Mod: PBBFAC,RT | Performed by: OPHTHALMOLOGY

## 2018-04-24 PROCEDURE — 92134 CPTRZ OPH DX IMG PST SGM RTA: CPT | Mod: PBBFAC | Performed by: OPHTHALMOLOGY

## 2018-04-24 PROCEDURE — 67028 INJECTION EYE DRUG: CPT | Mod: S$PBB,RT,, | Performed by: OPHTHALMOLOGY

## 2018-04-24 PROCEDURE — 92014 COMPRE OPH EXAM EST PT 1/>: CPT | Mod: 25,S$PBB,, | Performed by: OPHTHALMOLOGY

## 2018-04-24 RX ADMIN — AFLIBERCEPT 2 MG: 40 INJECTION, SOLUTION INTRAVITREAL at 11:04

## 2018-04-24 NOTE — PROGRESS NOTES
HPI     8 week / OCT /Eylea OD  DLS-02/27/2018 Dr. Mandujano     Pt sts about the same vision since last visit. Still seeing black coverage   only when light is off in OS and dim orange coverage in OD when trying to   look out of a window at night time. Never see's it in light   Denies pain occasional irritation .  Occasional headache on right eye   (-)Flashes (+)Floaters  (+)Photophobia  (-)Glare    [place] PRN       OCT - mild increase SRF/SRH  OS - Drusen, no SRF - small HE - ? 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 H/o Dry OS  But small HE with trace CME - possible RAP lesion with Wet AMD OS?  Post Eylea OS #6    Watch OS      3. PVD OU    4. Pseudophakia - great result    5. Allergic conjunctivitis/sinusitis - try OTC antihistamines  Recent trouble with sinusitis - pain behind eye improved with antihistamines  Ok for Zaditor BID    6. AFib - considering anticoagulation - ok to proceed  On Anti-VEGF therapy, risk of large macular hemorrhage is minimal.       Plan:      8 weeks OCT    Risks, benefits, and alternatives to treatment discussed in detail with the patient.  The patient voiced understanding and wished to proceed with the procedure    Injection Procedure Note:  Diagnosis: Wet AMD OD    Topical Proparacaine and Betadine. Prep Conj only  Inject Eylea OD at 6:00 @ 3.5-4mm posterior to limbus  Post Operative Dx: Same  Complications: None  Follow up as above.

## 2018-05-10 ENCOUNTER — OFFICE VISIT (OUTPATIENT)
Dept: OTOLARYNGOLOGY | Facility: CLINIC | Age: 83
End: 2018-05-10
Attending: INTERNAL MEDICINE
Payer: MEDICARE

## 2018-05-10 ENCOUNTER — LAB VISIT (OUTPATIENT)
Dept: LAB | Facility: OTHER | Age: 83
End: 2018-05-10
Attending: INTERNAL MEDICINE
Payer: MEDICARE

## 2018-05-10 VITALS
TEMPERATURE: 97 F | WEIGHT: 119 LBS | BODY MASS INDEX: 21.09 KG/M2 | DIASTOLIC BLOOD PRESSURE: 65 MMHG | SYSTOLIC BLOOD PRESSURE: 151 MMHG | HEART RATE: 52 BPM | HEIGHT: 63 IN

## 2018-05-10 DIAGNOSIS — Z79.01 ON CONTINUOUS ORAL ANTICOAGULATION: ICD-10-CM

## 2018-05-10 DIAGNOSIS — H61.21 IMPACTED CERUMEN OF RIGHT EAR: ICD-10-CM

## 2018-05-10 DIAGNOSIS — E55.9 VITAMIN D DEFICIENCY: ICD-10-CM

## 2018-05-10 DIAGNOSIS — E78.5 HYPERLIPIDEMIA, UNSPECIFIED HYPERLIPIDEMIA TYPE: ICD-10-CM

## 2018-05-10 DIAGNOSIS — H93.13 TINNITUS OF BOTH EARS: ICD-10-CM

## 2018-05-10 DIAGNOSIS — M85.80 OSTEOPENIA, UNSPECIFIED LOCATION: ICD-10-CM

## 2018-05-10 DIAGNOSIS — Z79.01 CURRENT USE OF ANTICOAGULANT THERAPY: ICD-10-CM

## 2018-05-10 DIAGNOSIS — R06.09 DOE (DYSPNEA ON EXERTION): ICD-10-CM

## 2018-05-10 DIAGNOSIS — I10 ESSENTIAL HYPERTENSION: ICD-10-CM

## 2018-05-10 DIAGNOSIS — H61.22 EXCESSIVE CERUMEN IN LEFT EAR CANAL: ICD-10-CM

## 2018-05-10 LAB
25(OH)D3+25(OH)D2 SERPL-MCNC: 38 NG/ML
ALBUMIN SERPL BCP-MCNC: 4.1 G/DL
ALP SERPL-CCNC: 53 U/L
ALT SERPL W/O P-5'-P-CCNC: 11 U/L
ANION GAP SERPL CALC-SCNC: 8 MMOL/L
AST SERPL-CCNC: 19 U/L
BASOPHILS # BLD AUTO: 0.03 K/UL
BASOPHILS NFR BLD: 0.5 %
BILIRUB SERPL-MCNC: 0.6 MG/DL
BNP SERPL-MCNC: 81 PG/ML
BUN SERPL-MCNC: 12 MG/DL
CALCIUM SERPL-MCNC: 9.8 MG/DL
CHLORIDE SERPL-SCNC: 100 MMOL/L
CHOLEST SERPL-MCNC: 224 MG/DL
CHOLEST/HDLC SERPL: 3.2 {RATIO}
CO2 SERPL-SCNC: 25 MMOL/L
CREAT SERPL-MCNC: 0.8 MG/DL
DIFFERENTIAL METHOD: ABNORMAL
EOSINOPHIL # BLD AUTO: 0.1 K/UL
EOSINOPHIL NFR BLD: 1.4 %
ERYTHROCYTE [DISTWIDTH] IN BLOOD BY AUTOMATED COUNT: 13.4 %
EST. GFR  (AFRICAN AMERICAN): >60 ML/MIN/1.73 M^2
EST. GFR  (NON AFRICAN AMERICAN): >60 ML/MIN/1.73 M^2
GLUCOSE SERPL-MCNC: 99 MG/DL
HCT VFR BLD AUTO: 37.4 %
HDLC SERPL-MCNC: 71 MG/DL
HDLC SERPL: 31.7 %
HGB BLD-MCNC: 12.5 G/DL
LDLC SERPL CALC-MCNC: 143.2 MG/DL
LYMPHOCYTES # BLD AUTO: 2 K/UL
LYMPHOCYTES NFR BLD: 34.3 %
MCH RBC QN AUTO: 31.7 PG
MCHC RBC AUTO-ENTMCNC: 33.4 G/DL
MCV RBC AUTO: 95 FL
MONOCYTES # BLD AUTO: 1.1 K/UL
MONOCYTES NFR BLD: 17.9 %
NEUTROPHILS # BLD AUTO: 2.7 K/UL
NEUTROPHILS NFR BLD: 45.7 %
NONHDLC SERPL-MCNC: 153 MG/DL
PLATELET # BLD AUTO: 222 K/UL
PMV BLD AUTO: 10.5 FL
POTASSIUM SERPL-SCNC: 4.8 MMOL/L
PROT SERPL-MCNC: 7.1 G/DL
RBC # BLD AUTO: 3.94 M/UL
SODIUM SERPL-SCNC: 133 MMOL/L
TRIGL SERPL-MCNC: 49 MG/DL
TSH SERPL DL<=0.005 MIU/L-ACNC: 1.04 UIU/ML
WBC # BLD AUTO: 5.91 K/UL

## 2018-05-10 PROCEDURE — 36415 COLL VENOUS BLD VENIPUNCTURE: CPT

## 2018-05-10 PROCEDURE — 85025 COMPLETE CBC W/AUTO DIFF WBC: CPT

## 2018-05-10 PROCEDURE — 80061 LIPID PANEL: CPT

## 2018-05-10 PROCEDURE — 84443 ASSAY THYROID STIM HORMONE: CPT

## 2018-05-10 PROCEDURE — 83880 ASSAY OF NATRIURETIC PEPTIDE: CPT

## 2018-05-10 PROCEDURE — 69210 REMOVE IMPACTED EAR WAX UNI: CPT | Mod: S$GLB,,, | Performed by: OTOLARYNGOLOGY

## 2018-05-10 PROCEDURE — 99203 OFFICE O/P NEW LOW 30 MIN: CPT | Mod: S$GLB,,, | Performed by: OTOLARYNGOLOGY

## 2018-05-10 PROCEDURE — 80053 COMPREHEN METABOLIC PANEL: CPT

## 2018-05-10 PROCEDURE — 82306 VITAMIN D 25 HYDROXY: CPT

## 2018-05-10 RX ORDER — OFLOXACIN 3 MG/ML
5 SOLUTION AURICULAR (OTIC) 2 TIMES DAILY
Qty: 10 ML | Refills: 0 | Status: SHIPPED | OUTPATIENT
Start: 2018-05-10 | End: 2018-05-10 | Stop reason: CLARIF

## 2018-05-10 RX ORDER — OFLOXACIN 3 MG/ML
5 SOLUTION AURICULAR (OTIC) 2 TIMES DAILY
Qty: 10 ML | Refills: 0 | Status: SHIPPED | OUTPATIENT
Start: 2018-05-10 | End: 2018-06-11

## 2018-05-10 NOTE — LETTER
May 11, 2018      Wilma Xiong MD  [address]  [place] [facility] [address]           [facility] - Otorhinolaryngology  [address], [facility] [address]  [place] [address]  Phone: [phone]  Fax: [phone]          Patient: Cathy Kearns   MR Number: [id number]   YOB: 1929   Date of Visit: 5/10/2018       Dear Dr. Wilma Xiong:    Thank you for referring Cathy Kearns to me for evaluation. Attached you will find relevant portions of my assessment and plan of care.    If you have questions, please do not hesitate to call me. I look forward to following Cathy Kearns along with you.    Sincerely,    Moriah Jimenez MD    Enclosure  CC:  No Recipients    If you would like to receive this communication electronically, please contact [email or web address] or [phone] to request more information on [facility] Link access.    For providers and/or their staff who would like to refer a patient to [facility], please contact us through our one-stop-shop provider referral line, [facility], at [phone].    If you feel you have received this communication in error or would no longer like to receive these types of communications, please e-mail [email or web address]

## 2018-05-10 NOTE — PATIENT INSTRUCTIONS
Use the prescription ear drops in the right ear 2 - 3 times a day for one week.    Follow up in 2 weeks.

## 2018-05-11 ENCOUNTER — TELEPHONE (OUTPATIENT)
Dept: OTOLARYNGOLOGY | Facility: CLINIC | Age: 83
End: 2018-05-11

## 2018-05-11 ENCOUNTER — OFFICE VISIT (OUTPATIENT)
Dept: OTOLARYNGOLOGY | Facility: CLINIC | Age: 83
End: 2018-05-11
Payer: MEDICARE

## 2018-05-11 VITALS
WEIGHT: 119 LBS | SYSTOLIC BLOOD PRESSURE: 149 MMHG | DIASTOLIC BLOOD PRESSURE: 65 MMHG | BODY MASS INDEX: 21.09 KG/M2 | HEART RATE: 51 BPM | HEIGHT: 63 IN

## 2018-05-11 DIAGNOSIS — S00.412A EAR CANAL ABRASION, LEFT, INITIAL ENCOUNTER: Primary | ICD-10-CM

## 2018-05-11 PROCEDURE — 99213 OFFICE O/P EST LOW 20 MIN: CPT | Mod: S$GLB,,, | Performed by: SPECIALIST

## 2018-05-11 RX ORDER — MUPIROCIN 20 MG/G
OINTMENT TOPICAL
Refills: 0 | COMMUNITY
Start: 2018-05-02 | End: 2021-11-03

## 2018-05-11 NOTE — PROCEDURES
Ear Cerumen Removal  Date/Time: 5/11/2018 2:34 PM  Performed by: LEATHA THAKUR  Authorized by: LEATHA THAKUR     Consent Done?:  Yes (Verbal)  Location details:  Both ears  Procedure type: curette    Cerumen  Removal Results:  Cerumen completely removed     The ears were cleaned with curettes bilaterally revealing otherwise normal canals and tympanic membranes.  Soon after removing the right cerumen impaction mild oozing of bright red blood was noted from a small adherent area.  This was monitored and carefully/gently suctioned periodically to minimize any build up and associated muffled hearing.  After extended observation, cipro drops were instilled and continued with resolution and released after instructions reviewed.

## 2018-05-11 NOTE — TELEPHONE ENCOUNTER
"----- Message from Courtney Lopez sent at 5/11/2018  7:40 AM CDT -----  Contact: KAROLINA SINGH [[id number]]    Name of Who is Calling: KAROLINA SINGH [[id number]]      What is the request in detail: Patient called requesting to be seen today. Says, "she's bleeding externally and internally from her right ear."  I did attempt to accommodate patient's request but was unsuccessful.  Please give patient a call back at your earliest convenience.      THANKS!      Can the clinic reply by [facility]: No      What Number to Call Back if not in [facility]: KAROLINA SINGH / ph# [phone]                                    "

## 2018-05-11 NOTE — PROGRESS NOTES
Subjective:       Patient ID: Cathy Kearns is a 88 y.o. female.    Chief Complaint: Follow-up and Ear Drainage    The patient is taking adequate liquids for atrial fibrillation.  Yesterday she underwent removal of cerumen from the right ear and developed bleeding subsequent to that removal.  She had bleeding for the remainder of the day and from last night.  Her right ear does feel mildly blocked.  She is leaving town in 3 days and is concerned about the ear not healing properly.  She was given a prescription for Floxin otic drops, but has not yet started them.  They're to be delivered to her this afternoon.      Review of Systems   Constitutional: Negative for activity change, appetite change, chills, fatigue, fever and unexpected weight change.   HENT: Positive for ear discharge (bloody) and hearing loss. Negative for congestion, ear pain, facial swelling, mouth sores, postnasal drip, rhinorrhea, sinus pain, sinus pressure, sneezing, sore throat, tinnitus, trouble swallowing and voice change.    Eyes: Positive for visual disturbance. Negative for photophobia, pain, discharge, redness and itching.   Respiratory: Negative for apnea, cough, choking, shortness of breath and wheezing.    Cardiovascular: Negative for chest pain and palpitations.   Gastrointestinal: Negative for abdominal pain, nausea and vomiting.   Musculoskeletal: Negative for neck pain and neck stiffness.   Skin: Negative.    Allergic/Immunologic: Negative for environmental allergies, food allergies and immunocompromised state.   Neurological: Negative for dizziness, facial asymmetry, speech difficulty, weakness, light-headedness, numbness and headaches.   Hematological: Negative for adenopathy. Does not bruise/bleed easily.   Psychiatric/Behavioral: Negative for agitation, confusion, decreased concentration and sleep disturbance.       Objective:      Physical Exam   Constitutional: She is oriented to person, place, and time. She appears  well-developed and well-nourished.   HENT:   Head: Normocephalic.   Right Ear: Hearing, tympanic membrane, external ear and ear canal normal. No lacerations (abrasion of the skin overlying the lateral bony ear canal).   Left Ear: Hearing, tympanic membrane, external ear and ear canal normal.   Nose: Septal deviation present. No mucosal edema or rhinorrhea.   Mouth/Throat: Uvula is midline, oropharynx is clear and moist and mucous membranes are normal. No oropharyngeal exudate. Tonsils are 1+ on the right. Tonsils are 1+ on the left.   Eyes: Conjunctivae, EOM and lids are normal. Pupils are equal, round, and reactive to light. Right eye exhibits no discharge. Left eye exhibits no discharge. Right conjunctiva is not injected. Left conjunctiva is not injected.   Neck: Trachea normal, normal range of motion and full passive range of motion without pain. Neck supple. No tracheal deviation present. No thyroid mass and no thyromegaly present.   Cardiovascular: Normal rate, regular rhythm, normal heart sounds and normal pulses.  Exam reveals no gallop.    No murmur heard.  Pulmonary/Chest: Effort normal and breath sounds normal. She has no decreased breath sounds. She has no wheezes. She has no rhonchi. She has no rales.   Abdominal: Soft. Bowel sounds are normal. There is no tenderness.   Musculoskeletal: Normal range of motion.        Right shoulder: Normal.   Lymphadenopathy:        Head (right side): No submental, no submandibular and no occipital adenopathy present.        Head (left side): No submental, no submandibular and no occipital adenopathy present.     She has no cervical adenopathy.   Neurological: She is alert and oriented to person, place, and time. She has normal strength. No cranial nerve deficit or sensory deficit. Gait normal.   Skin: Skin is warm and dry. No rash noted. No cyanosis. Nails show no clubbing.   Psychiatric: She has a normal mood and affect. Her speech is normal and behavior is normal.  Cognition and memory are normal.   Vitals reviewed.      Assessment:       1. Ear canal abrasion, left, initial encounter        Plan:       I will have the patient use the Floxin otic drops for 5-7 days.  She needs to recheck with Dr. Jimenez  as schedule.

## 2018-05-11 NOTE — TELEPHONE ENCOUNTER
Called and spoke with pt about she called blood in her ear and would like to be seen today. Pt was added on to Dr. Petr chen.

## 2018-05-11 NOTE — PROGRESS NOTES
Subjective:       Patient ID: Cathy Kearns is a 88 y.o. female.    Chief Complaint: Ear Fullness (right ear more than left)    She is a new patient for me here today complaining of ear blockage.  She reports a history of recurring cerumen impactions requiring periodic cleaning.  She also reports feeling a scab at the right external meatus that she picks at periodically.  She denies otalgia or otorrhea.  She has had ringing in her ears for 10 years and more which she describes as the sound of crickets.  She denies acoustical trauma or use of NSAIDs and only one caffeinated beverage per day.  There is otherwise no prior otologic history except for a few earaches as a child.  There are no nasal throat or other otolaryngologic complaints.  Past medical history is pertinent for atrial fibrillation for which she is on Eloquis.          Review of Systems   Ears: Positive for hearing loss, ear pressure and ringing in ear.  Negative for ear pain, ear discharge, ear infections, dizziness, head trauma, taken gentramycin/streptomycin and family history of hearing loss.    Nose:  Positive for postnasal drip. Negative for nasal obstruction, nasal or sinus surgery, loss of smell and snoring.    Mouth/Throat: Negative for pain swallowing, impaired swallowing, throat mass, neck mass, oral ulcers and neck lumps.   Constitutional: Negative for recent unexplained weight loss, fever, chills and night sweats.    Eyes:  Negative for history of glaucoma.   Cardiovascular:  Positive for history of high blood pressure. Negative for chest pain and palpitations.   Respiratory:  Negative for asthma, emphysema, history of tuberculosis, recent cough and shortness of breath.    Gastrointestinal:  Positive for acid reflux and indigestion. Negative for history of stomach ulcers or pain, blood in stool and change in stool.   Other:  Negative for kidney problem, bladder problem, arthritis, weakness, disturbances in coordination, slurred, confusion,  swollen glands, anemia and persistent infections.           Objective:        Vitals:    05/10/18 1112   BP: (!) 151/65   Pulse: (!) 52   Temp: 97.1 °F (36.2 °C)     Body mass index is 21.08 kg/m².  Physical Exam   Constitutional: She is oriented to person, place, and time. She appears well-developed and well-nourished. No distress.   HENT:   Head: Normocephalic and atraumatic.   Right Ear: Tympanic membrane and external ear normal.   Left Ear: Tympanic membrane and external ear normal.   Nose: No mucosal edema, rhinorrhea or nasal deformity. No epistaxis.   Mouth/Throat: Uvula is midline, oropharynx is clear and moist and mucous membranes are normal. No oral lesions. No trismus in the jaw. No uvula swelling. No oropharyngeal exudate, posterior oropharyngeal edema or posterior oropharyngeal erythema.   The right ear canal is occluded by impacted cerumen and there is increased cerumen on the left but nonobstructing.   Eyes: Conjunctivae are normal. Right eye exhibits no discharge. Left eye exhibits no discharge. No scleral icterus.   Neck: Phonation normal. Neck supple. No tracheal deviation present. No thyromegaly present.   Pulmonary/Chest: Effort normal. No stridor. No respiratory distress.   Musculoskeletal: Normal range of motion. She exhibits no edema.   Lymphadenopathy:     She has no cervical adenopathy.   Neurological: She is alert and oriented to person, place, and time. She displays no weakness. Coordination normal.   Skin: Skin is warm and dry. She is not diaphoretic.   Psychiatric: She has a normal mood and affect. Her behavior is normal. Her speech is not slurred.       Tests / Results:        Assessment:       1. Impacted cerumen of right ear    2. Excessive cerumen in left ear canal    3. Current use of anticoagulant therapy    4. Tinnitus of both ears        Plan:        Ears cleaned as per procedure note.  See procedure note.  Generic Floxin drops ordered for the right ear and reviewed use with  patient.  Otherwise patient not to scratch or pick at ear canal.  Follow-up in 2 weeks unless problems prior.

## 2018-06-11 ENCOUNTER — HOSPITAL ENCOUNTER (OUTPATIENT)
Dept: PULMONOLOGY | Facility: CLINIC | Age: 83
Discharge: HOME | End: 2018-06-11
Payer: MEDICARE

## 2018-06-11 ENCOUNTER — OFFICE VISIT (OUTPATIENT)
Dept: PULMONOLOGY | Facility: CLINIC | Age: 83
End: 2018-06-11
Payer: MEDICARE

## 2018-06-11 ENCOUNTER — HOSPITAL ENCOUNTER (OUTPATIENT)
Dept: RADIOLOGY | Facility: HOSPITAL | Age: 83
Discharge: HOME | End: 2018-06-11
Attending: INTERNAL MEDICINE
Payer: MEDICARE

## 2018-06-11 VITALS
HEART RATE: 51 BPM | WEIGHT: 118.63 LBS | SYSTOLIC BLOOD PRESSURE: 100 MMHG | OXYGEN SATURATION: 98 % | DIASTOLIC BLOOD PRESSURE: 68 MMHG | BODY MASS INDEX: 21.02 KG/M2 | HEIGHT: 63 IN

## 2018-06-11 DIAGNOSIS — J45.909 ASTHMA, UNSPECIFIED ASTHMA SEVERITY, UNSPECIFIED WHETHER COMPLICATED, UNSPECIFIED WHETHER PERSISTENT: Primary | ICD-10-CM

## 2018-06-11 DIAGNOSIS — R06.09 DYSPNEA ON EXERTION: ICD-10-CM

## 2018-06-11 DIAGNOSIS — J44.9 CHRONIC OBSTRUCTIVE PULMONARY DISEASE, UNSPECIFIED COPD TYPE: Primary | ICD-10-CM

## 2018-06-11 DIAGNOSIS — J45.909 ASTHMA, UNSPECIFIED ASTHMA SEVERITY, UNSPECIFIED WHETHER COMPLICATED, UNSPECIFIED WHETHER PERSISTENT: ICD-10-CM

## 2018-06-11 DIAGNOSIS — R09.81 NASAL CONGESTION: ICD-10-CM

## 2018-06-11 LAB
POST FEV1 FVC: 0.62
POST FEV1: 1.44
POST FVC: 2.34
PRE FEV1 FVC: 61
PRE FEV1: 1.38
PRE FVC: 2.28
PREDICTED FEV1 FVC: 75
PREDICTED FEV1: 1.78
PREDICTED FVC: 2.44

## 2018-06-11 PROCEDURE — 71046 X-RAY EXAM CHEST 2 VIEWS: CPT | Mod: TC,FY

## 2018-06-11 PROCEDURE — 94060 EVALUATION OF WHEEZING: CPT | Mod: 26,S$PBB,, | Performed by: INTERNAL MEDICINE

## 2018-06-11 PROCEDURE — 99999 PR PBB SHADOW E&M-EST. PATIENT-LVL III: CPT | Mod: PBBFAC,,, | Performed by: INTERNAL MEDICINE

## 2018-06-11 PROCEDURE — 94060 EVALUATION OF WHEEZING: CPT | Mod: PBBFAC | Performed by: INTERNAL MEDICINE

## 2018-06-11 PROCEDURE — 99204 OFFICE O/P NEW MOD 45 MIN: CPT | Mod: S$PBB,,, | Performed by: INTERNAL MEDICINE

## 2018-06-11 PROCEDURE — 94729 DIFFUSING CAPACITY: CPT | Mod: 26,S$PBB,, | Performed by: INTERNAL MEDICINE

## 2018-06-11 PROCEDURE — 71046 X-RAY EXAM CHEST 2 VIEWS: CPT | Mod: 26,,, | Performed by: RADIOLOGY

## 2018-06-11 PROCEDURE — 99213 OFFICE O/P EST LOW 20 MIN: CPT | Mod: PBBFAC,25 | Performed by: INTERNAL MEDICINE

## 2018-06-11 PROCEDURE — 94729 DIFFUSING CAPACITY: CPT | Mod: PBBFAC | Performed by: INTERNAL MEDICINE

## 2018-06-11 NOTE — LETTER
June 14, 2018      Wilma Xiong MD  [address]  [facility] [address]           [facility] - Pulmonary Services  [address]  [place] [address]  Phone: [phone]          Patient: Cathy Kearns   MR Number: [id number]   YOB: 1929   Date of Visit: 6/11/2018       Dear Dr. Wilma Xoing:    Thank you for referring Cathy Kearns to me for evaluation. Attached you will find relevant portions of my assessment and plan of care.    If you have questions, please do not hesitate to call me. I look forward to following Cathy Kearns along with you.    Sincerely,    Xavi Rosenthal MD    Enclosure  CC:  No Recipients    If you would like to receive this communication electronically, please contact [email or web address] or [phone] to request more information on DEQ Link access.    For providers and/or their staff who would like to refer a patient to [facility], please contact us through our one-stop-shop provider referral line, [facility], at [phone].    If you feel you have received this communication in error or would no longer like to receive these types of communications, please e-mail [email or web address]

## 2018-06-11 NOTE — PROGRESS NOTES
Cathy Kearns  was seen as a new patient at the request  Wilma Xiong MD for the evaluation of  dyspnea.    CHIEF COMPLAINT:  Asthma (establish care); Shortness of Breath; and Atrial Fibrillation      HISTORY OF PRESENT ILLNESS: Cathy Kearns is a 88 y.o. female  has a past medical history of Aortic valve regurgitation; Arthritis; Asthma in adult; Atrial fibrillation; Diastolic dysfunction; Disorder of kidney and ureter; Hemangioma of liver; History of second hand smoke exposure; History of tobacco use; Hyperlipidemia; Hypertension; Macular degeneration; Osteopenia; 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 (7/9/2012); Skin cancer; and Vasovagal syncope.  Patient smoke ~6 cigarette per day x 20 years.  Lots of second hand smoke since  smoke 3 ppd.  Patient quit smoking since age 44.      Currently, patient with chronic benavidez ~ 0.75 mile.  Patient was diagnosed with asthma by Dr. Florence Medrano.  Patient was started on breo but stopped after 3 weeks due to irritation of skin around lips area.  Patient with daily cough.  Lots of nasal congestion.   No fever/chills.  No chest pain.  No orthopnea.  No pnd.  No hemoptysis.  No weight loss.      PAST MEDICAL HISTORY:    Active Ambulatory Problems     Diagnosis Date Noted    Degenerative drusen 02/16/2012    Nonexudative age-related macular degeneration, left eye, advanced atrophic with subfoveal involvement 05/14/2012    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 07/09/2012    Exudative age-related macular degeneration of right eye with active choroidal neovascularization 12/15/2014    Paroxysmal atrial fibrillation 02/02/2017    Degeneration macular 07/10/2013    Osteopenia 02/22/2017    Hypertension 02/22/2017    Asthma     Liver hemangioma 08/21/2017     Resolved Ambulatory Problems     Diagnosis Date Noted    PVD (posterior vitreous detachment) 02/16/2012     Past Medical History:   Diagnosis Date     "Aortic valve regurgitation     Arthritis     Asthma in adult     Atrial fibrillation     Diastolic dysfunction     Disorder of kidney and ureter     Hemangioma of liver     History of second hand smoke exposure     History of tobacco use     Hyperlipidemia     Hypertension     Macular degeneration     Osteopenia     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 7/9/2012    Skin cancer     Vasovagal syncope                 PAST SURGICAL HISTORY:    Past Surgical History:   Procedure Laterality Date    ADENOIDECTOMY      APPENDECTOMY      CATARACT EXTRACTION W/  INTRAOCULAR LENS IMPLANT Bilateral n/a    HYSTERECTOMY      2/2 uterine CA, removed ovaries, no longer f/u with gyn    Intravitreal Injection      Avastin od x's 12 dltx 2-16-12    TONSILLECTOMY           FAMILY HISTORY:                Family History   Problem Relation Age of Onset    Cancer Mother         ovarian CA    Cancer Father         prostate    Cataracts Father     Cancer Maternal Grandmother         colon    Heart attack Maternal Grandfather     Cancer Paternal Grandmother         colon    Cancer Paternal Grandfather     Strabismus Paternal Aunt     Hypertension Daughter     Cancer Daughter         "cancer of bone"    Hyperlipidemia Daughter     Thyroid disease Grandchild     Heart attack Brother     Cancer Brother         prostate, esophagus, bladder    Hypertension Son     Hyperlipidemia Son     Cancer Brother         Non hodgkins lymphoma    Amblyopia Neg Hx     Blindness Neg Hx     Diabetes Neg Hx     Glaucoma Neg Hx     Macular degeneration Neg Hx     Retinal detachment Neg Hx     Stroke Neg Hx        SOCIAL HISTORY:          Tobacco:   History   Smoking Status    Former Smoker    Packs/day: 0.25    Years: 15.00   Smokeless Tobacco    Never Used     alcohol use:    History   Alcohol Use    Yes     Comment: 1-2 drinks at night               Occupation:  Former stewardess, travel " "agent and     ALLERGIES:    Review of patient's allergies indicates:   Allergen Reactions    Penicillins Swelling    Pseudoephedrine hcl Palpitations    Sulfa (sulfonamide antibiotics) Palpitations       CURRENT MEDICATIONS:    Current Outpatient Prescriptions   Medication Sig Dispense Refill    ANTIOX #8/OM3/DHA/EPA/LUT/ZEAX (PRESERVISION AREDS 2, OMEGA-3, ORAL) Take 1 tablet by mouth once daily at 6am.       apixaban 2.5 mg Tab Take 1 tablet (2.5 mg total) by mouth 2 (two) times daily. 180 tablet 3    CALCIUM CARBONATE (CALCIUM 500 ORAL) Take 500 mg by mouth once daily.       lisinopril (PRINIVIL,ZESTRIL) 20 MG tablet Take 1 tablet (20 mg total) by mouth once daily. 90 tablet 3    mupirocin (BACTROBAN) 2 % ointment CRAIG AFTER SALT WATER COMPRESSES BID  0    omega 3-dha-epa-fish oil 300-1,000 mg Cap Take 3 g by mouth.      sotalol (BETAPACE) 80 MG tablet Take 1 tablet (80 mg total) by mouth 2 (two) times daily. 180 tablet 3     No current facility-administered medications for this visit.                   REVIEW OF SYSTEMS:     Pulmonary related symptoms as per HPI.  Gen:  no weight loss, no fever, no night sweat  HEENT:  + visual changes a/w macular degeneration, no sore throat, + hearing loss  CV:  Per hpi  GI:  no melena, no hematochezia, no diarhea, no constipation.  :  no dysuria, no hematuria, no hesistancy, no dribbling.  Occasional incontinence.  Neuro:  no syncope, no vertigo, no tinitus  Psych:  No homocide or suicide ideation; no depression.  Endocrine:  No heat or cold intolerance.  Sleep:  No snoring; no witnessed apnea.  Feeling rested upon awake.    Otherwise, a balance of systems reviewed is negative.          PHYSICAL EXAM:  Vitals:    06/11/18 1014   BP: 100/68   Pulse: (!) 51   SpO2: 98%   Weight: 53.8 kg (118 lb 9.7 oz)   Height: 5' 3" (1.6 m)     Body mass index is 21.01 kg/m².     GENERAL:  well develop; no apparent distress  HEENT:  + nasal congestion; no discharge " noted; class 2 modified mallampatti.   NECK:  supple; no palpable masses.  CARDIO: regular rate and rhythm  PULM:  clear to auscultation bilaterally; no intercostals retractions; no accessory muscle usage   ABDOMEN:  soft nontender/nondistended.  +bowel sound  EXTREMITIES no cce  NEURO:  CN II-XII intact.  5/5 motor in all extremities.  sensation grossly intact   to light touch.  PSYCH:  normal affect.  Alert and oriented x 4    LABS  Pulmonary Functions Testing Results: 6/11/18 ratio 61%; fvc 93%; fev1 78%; dlco 106%  ABG none  CXR:  6/11/18 calcified mediastinal lymph node.    CT CHEST:  none    bnp 5/10/18 81    4/13/18  CONCLUSIONS     1 - Normal left ventricular systolic function (EF 60-65%).     2 - No wall motion abnormalities.     3 - Normal left ventricular diastolic function.     4 - Normal right ventricular systolic function .     5 - The estimated PA systolic pressure is 19 mmHg.     6 - Mild aortic regurgitation.     7 - Trivial to mild tricuspid regurgitation.     ASSESSMENT    ICD-10-CM ICD-9-CM    1. Chronic obstructive pulmonary disease, unspecified COPD type J44.9 496    2. Dyspnea on exertion R06.09 786.09    3. Nasal congestion R09.81 478.19        PLAN:  -dyspnea - intermittent.  pft and cxr confirmed mild copd.  Poor tolerance of breo. Will start a trial of abuterol and reasess.    -tobacco abuse - quit over 40 year.      -nasal congestion - can contribute to increased wob.  Will recommend nasal steroid and sinus irrigation.    Patient will Follow-up in about 6 months (around 12/11/2018). with md.    CC: Send copy of this note to Wilma Xiong MD

## 2018-06-11 NOTE — PATIENT INSTRUCTIONS
1.  NeilMed Sinus Rinse Regular Kit    Recipe 1/4 teaspoon of salt and 1/4 teaspoon of baking soda in distilled water.  Be sure to tilt head forward as shown in picture.               flonase or nasonex over the counter.  2 sprays per nostril daily. Be sure to tilt head forward when dispensing medication.

## 2018-06-14 RX ORDER — ALBUTEROL SULFATE 90 UG/1
2 AEROSOL, METERED RESPIRATORY (INHALATION) EVERY 6 HOURS PRN
Qty: 18 G | Refills: 0 | Status: SHIPPED | OUTPATIENT
Start: 2018-06-14 | End: 2019-09-04 | Stop reason: SDUPTHER

## 2018-06-19 ENCOUNTER — PROCEDURE VISIT (OUTPATIENT)
Dept: OPHTHALMOLOGY | Facility: CLINIC | Age: 83
End: 2018-06-19
Payer: MEDICARE

## 2018-06-19 VITALS — DIASTOLIC BLOOD PRESSURE: 66 MMHG | SYSTOLIC BLOOD PRESSURE: 131 MMHG | HEART RATE: 57 BPM

## 2018-06-19 DIAGNOSIS — H35.3211 EXUDATIVE AGE-RELATED MACULAR DEGENERATION OF RIGHT EYE WITH ACTIVE CHOROIDAL NEOVASCULARIZATION: Primary | ICD-10-CM

## 2018-06-19 DIAGNOSIS — H35.721 SEROUS DETACHMENT OF RETINAL PIGMENT EPITHELIUM OF RIGHT EYE: ICD-10-CM

## 2018-06-19 DIAGNOSIS — H35.3124 NONEXUDATIVE AGE-RELATED MACULAR DEGENERATION, LEFT EYE, ADVANCED ATROPHIC WITH SUBFOVEAL INVOLVEMENT: ICD-10-CM

## 2018-06-19 PROCEDURE — 67028 INJECTION EYE DRUG: CPT | Mod: S$PBB,RT,, | Performed by: OPHTHALMOLOGY

## 2018-06-19 PROCEDURE — 67028 INJECTION EYE DRUG: CPT | Mod: PBBFAC,RT | Performed by: OPHTHALMOLOGY

## 2018-06-19 PROCEDURE — 92014 COMPRE OPH EXAM EST PT 1/>: CPT | Mod: 25,S$PBB,, | Performed by: OPHTHALMOLOGY

## 2018-06-19 PROCEDURE — 92134 CPTRZ OPH DX IMG PST SGM RTA: CPT | Mod: PBBFAC | Performed by: OPHTHALMOLOGY

## 2018-06-19 RX ADMIN — AFLIBERCEPT 2 MG: 40 INJECTION, SOLUTION INTRAVITREAL at 11:06

## 2018-06-19 NOTE — PROGRESS NOTES
"HPI     DLS-04/24/2018 Dr. Mandujano       8 weeks ck   Pt states that VA is stable ----still seeing the "black" coverage in left   eye ----denies eye pain or flashes     Eyewash PRN       OCT - mild increase SRF/SRH  OS - Drusen, no SRF - small HE - ? RAP lesion     Assessment:    1. AMD - Wet OD - post Avastin x 12, post Eylea #28  PED increased prior to 10/12 visit    Had slight increase 5/13/13, 7/14, 5/16    Eylea OD today    Continue 8 week OD    2. H/o Dry OS  But small HE with trace CME - possible RAP lesion with Wet AMD OS?  Post Eylea OS #6    Watch OS      3. PVD OU    4. Pseudophakia - great result    5. Allergic conjunctivitis/sinusitis - try OTC antihistamines  Recent trouble with sinusitis - pain behind eye improved with antihistamines  Ok for Zaditor BID - has improved with topical tx    6. AFib - considering anticoagulation - ok to proceed  On Anti-VEGF therapy, risk of large macular hemorrhage is minimal.         Plan:      8 weeks OCT    Risks, benefits, and alternatives to treatment discussed in detail with the patient.  The patient voiced understanding and wished to proceed with the procedure    Injection Procedure Note:  Diagnosis: Wet AMD OD    Topical Proparacaine and Betadine. Prep Conj only  Inject Eylea OD at 6:00 @ 3.5-4mm posterior to limbus  Post Operative Dx: Same  Complications: None  Follow up as above.  "

## 2018-08-21 ENCOUNTER — PROCEDURE VISIT (OUTPATIENT)
Dept: OPHTHALMOLOGY | Facility: CLINIC | Age: 83
End: 2018-08-21
Payer: MEDICARE

## 2018-08-21 VITALS — DIASTOLIC BLOOD PRESSURE: 71 MMHG | SYSTOLIC BLOOD PRESSURE: 143 MMHG | HEART RATE: 57 BPM

## 2018-08-21 DIAGNOSIS — H35.3211 EXUDATIVE AGE-RELATED MACULAR DEGENERATION OF RIGHT EYE WITH ACTIVE CHOROIDAL NEOVASCULARIZATION: Primary | ICD-10-CM

## 2018-08-21 DIAGNOSIS — H35.721 SEROUS DETACHMENT OF RETINAL PIGMENT EPITHELIUM OF RIGHT EYE: ICD-10-CM

## 2018-08-21 DIAGNOSIS — H35.3124 NONEXUDATIVE AGE-RELATED MACULAR DEGENERATION, LEFT EYE, ADVANCED ATROPHIC WITH SUBFOVEAL INVOLVEMENT: ICD-10-CM

## 2018-08-21 PROCEDURE — 67028 INJECTION EYE DRUG: CPT | Mod: PBBFAC,RT | Performed by: OPHTHALMOLOGY

## 2018-08-21 PROCEDURE — 67028 INJECTION EYE DRUG: CPT | Mod: S$PBB,RT,, | Performed by: OPHTHALMOLOGY

## 2018-08-21 PROCEDURE — 92134 CPTRZ OPH DX IMG PST SGM RTA: CPT | Mod: PBBFAC | Performed by: OPHTHALMOLOGY

## 2018-08-21 PROCEDURE — 92014 COMPRE OPH EXAM EST PT 1/>: CPT | Mod: 25,S$PBB,, | Performed by: OPHTHALMOLOGY

## 2018-08-21 RX ADMIN — AFLIBERCEPT 2 MG: 40 INJECTION, SOLUTION INTRAVITREAL at 11:08

## 2018-08-21 NOTE — PROGRESS NOTES
HPI     DLS-06/19/2018 Dr. Mandujano       8 weeks ck   Pt states that VA is okay today ----denies eye pain or flashes     Eyewash PRN       OCT - mild increase SRF/SRH  OS - Drusen, no SRF - small HE - ? RAP lesion     Assessment:    1. AMD - Wet OD - post Avastin x 12, post Eylea #29  PED increased prior to 10/12 visit    Had slight increase 5/13/13, 7/14, 5/16    Eylea OD today    Continue 8 week OD    2. 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 PVD OU    4. Pseudophakia - great result    5. Allergic conjunctivitis/sinusitis - try OTC antihistamines  Recent trouble with sinusitis - pain behind eye improved with antihistamines  Ok for Zaditor BID - has improved with topical tx    6. AFib - considering anticoagulation - ok to proceed  On Anti-VEGF therapy, risk of large macular hemorrhage is minimal.         Plan:      8 weeks OCT    Risks, benefits, and alternatives to treatment discussed in detail with the patient.  The patient voiced understanding and wished to proceed with the procedure    Injection Procedure Note:  Diagnosis: Wet AMD OD    Topical Proparacaine and Betadine. Prep Conj only  Inject Eylea OD at 6:00 @ 3.5-4mm posterior to limbus  Post Operative Dx: Same  Complications: None  Follow up as above.

## 2018-08-21 NOTE — PATIENT INSTRUCTIONS

## 2018-09-17 ENCOUNTER — OFFICE VISIT (OUTPATIENT)
Dept: CARDIOLOGY | Facility: CLINIC | Age: 83
End: 2018-09-17
Payer: MEDICARE

## 2018-09-17 VITALS
WEIGHT: 119.25 LBS | HEART RATE: 54 BPM | DIASTOLIC BLOOD PRESSURE: 65 MMHG | BODY MASS INDEX: 21.13 KG/M2 | HEIGHT: 63 IN | SYSTOLIC BLOOD PRESSURE: 143 MMHG

## 2018-09-17 DIAGNOSIS — I10 ESSENTIAL HYPERTENSION: ICD-10-CM

## 2018-09-17 DIAGNOSIS — I48.0 PAROXYSMAL ATRIAL FIBRILLATION: Primary | ICD-10-CM

## 2018-09-17 DIAGNOSIS — H35.30 MACULAR DEGENERATION OF BOTH EYES, UNSPECIFIED TYPE: ICD-10-CM

## 2018-09-17 PROCEDURE — 99999 PR PBB SHADOW E&M-EST. PATIENT-LVL III: CPT | Mod: PBBFAC,,, | Performed by: INTERNAL MEDICINE

## 2018-09-17 PROCEDURE — 99213 OFFICE O/P EST LOW 20 MIN: CPT | Mod: PBBFAC | Performed by: INTERNAL MEDICINE

## 2018-09-17 PROCEDURE — 99213 OFFICE O/P EST LOW 20 MIN: CPT | Mod: S$PBB,,, | Performed by: INTERNAL MEDICINE

## 2018-09-17 NOTE — PROGRESS NOTES
"Chart has been dictated using voice recognition software.  It is not been reviewed carefully for any transcriptional errors due to this technology.   Subjective:   Patient ID:  Cathy Kearns is a 88 y.o. female who presents for follow-up of Atrial Fibrillation (6 month f/u ) and Medication Management (eliquis)      HPI: Patient with paroxysmal atrial fibrillation, hypertension, liver hemangioma (diagnosed years ago), and macular degeneration recently diagnosed with COPD.    Patient is doing well with more exercise tolerance.  Patient denies any dyspnea at rest or on exertion, orthopnea, PND, or edema.  Patient denies any chest discomfort on exertion or at rest. Patient denies any palpitations, lightheadedness, or syncope. BP in PM in 140s and is in 120s in AM    Has lesion on foot that does not heal.  Ear had bleeding problem a couple of months ago and "has not healed". Bruises easily.    Past Medical History:   Diagnosis Date    Aortic valve regurgitation     Arthritis     Asthma in adult     [facility]    Atrial fibrillation     Diastolic dysfunction     Disorder of kidney and ureter     Hemangioma of liver     History of second hand smoke exposure     History of tobacco use     Hyperlipidemia     Hypertension     Macular degeneration     Osteopenia     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 7/9/2012    Skin cancer     "pre-uterine" s/p hysterectomy,  skin - squamous    Vasovagal syncope        Outpatient Medications Prior to Visit   Medication Sig Dispense Refill    ANTIOX #8/OM3/DHA/EPA/LUT/ZEAX (PRESERVISION AREDS 2, OMEGA-3, ORAL) Take 1 tablet by mouth once daily at 6am.       apixaban 2.5 mg Tab Take 1 tablet (2.5 mg total) by mouth 2 (two) times daily. 180 tablet 3    CALCIUM CARBONATE (CALCIUM 500 ORAL) Take 500 mg by mouth once daily.       lisinopril (PRINIVIL,ZESTRIL) 20 MG tablet Take 1 tablet (20 mg " total) by mouth once daily. 90 tablet 3    mupirocin (BACTROBAN) 2 % ointment CRAIG AFTER SALT WATER COMPRESSES BID  0    omega 3-dha-epa-fish oil 300-1,000 mg Cap Take 3 g by mouth.      sotalol (BETAPACE) 80 MG tablet Take 1 tablet (80 mg total) by mouth 2 (two) times daily. 180 tablet 3    albuterol 90 mcg/actuation inhaler Inhale 2 puffs into the lungs every 6 (six) hours as needed for Wheezing. 18 g 0     No facility-administered medications prior to visit.        ROS - No change from prior visit in neurologic, respiratory, endocrine, GI, hematologic, or constitutional complaints    Objective:   Physical Exam   Constitutional: She appears well-developed and well-nourished.   Cardiovascular: Regular rhythm, S1 normal, S2 normal and intact distal pulses. Bradycardia present. Exam reveals gallop and S4.   Pulmonary/Chest: Effort normal and breath sounds normal.   Musculoskeletal: She exhibits no edema.   Skin:   Small scab in ear canal at 3:00. Small nonhealing scab on left foot lateral surface         Lab Results   Component Value Date     (L) 05/10/2018    K 4.8 05/10/2018    BUN 12 05/10/2018    CREATININE 0.8 05/10/2018    GLU 99 05/10/2018    CHOL 224 (H) 05/10/2018    HDL 71 05/10/2018    LDLCALC 143.2 05/10/2018    TRIG 49 05/10/2018    CHOLHDL 31.7 05/10/2018    HGB 12.5 05/10/2018    HCT 37.4 05/10/2018     05/10/2018       Assessment:     1. Paroxysmal atrial fibrillation    2. Essential hypertension    3. Macular degeneration of both eyes, unspecified type      Patient has no symptoms of cardiac ischemia, heart failure, or significant arrhythmias.  Patient has been referred back to her primary care physician for dermatology referral to evaluate her skin lesions.  It is not clear to me why these lesions are clearing.  Patient is already on a reduced dose of apixaban.  Reducing the dose further with patient risk for stroke from her paroxysmal atrial fibrillation.    Unless there any  intervening problems, the patient should return in 6 months for re-evaluation.  Plan:     Cathy was seen today for atrial fibrillation and medication management.    Diagnoses and all orders for this visit:    Paroxysmal atrial fibrillation    Essential hypertension    Macular degeneration of both eyes, unspecified type          Eriberto Mcelroy MD  Consultative Cardiology

## 2018-10-16 ENCOUNTER — PROCEDURE VISIT (OUTPATIENT)
Dept: OPHTHALMOLOGY | Facility: CLINIC | Age: 83
End: 2018-10-16
Payer: MEDICARE

## 2018-10-16 VITALS — DIASTOLIC BLOOD PRESSURE: 65 MMHG | HEART RATE: 50 BPM | SYSTOLIC BLOOD PRESSURE: 128 MMHG

## 2018-10-16 DIAGNOSIS — H35.3211 EXUDATIVE AGE-RELATED MACULAR DEGENERATION OF RIGHT EYE WITH ACTIVE CHOROIDAL NEOVASCULARIZATION: Primary | ICD-10-CM

## 2018-10-16 DIAGNOSIS — H35.721 SEROUS DETACHMENT OF RETINAL PIGMENT EPITHELIUM OF RIGHT EYE: ICD-10-CM

## 2018-10-16 DIAGNOSIS — H35.3124 NONEXUDATIVE AGE-RELATED MACULAR DEGENERATION, LEFT EYE, ADVANCED ATROPHIC WITH SUBFOVEAL INVOLVEMENT: ICD-10-CM

## 2018-10-16 PROCEDURE — 92014 COMPRE OPH EXAM EST PT 1/>: CPT | Mod: 25,S$PBB,, | Performed by: OPHTHALMOLOGY

## 2018-10-16 PROCEDURE — 67028 INJECTION EYE DRUG: CPT | Mod: S$PBB,RT,, | Performed by: OPHTHALMOLOGY

## 2018-10-16 PROCEDURE — 67028 INJECTION EYE DRUG: CPT | Mod: PBBFAC,RT | Performed by: OPHTHALMOLOGY

## 2018-10-16 PROCEDURE — 92134 CPTRZ OPH DX IMG PST SGM RTA: CPT | Mod: PBBFAC | Performed by: OPHTHALMOLOGY

## 2018-10-16 RX ADMIN — AFLIBERCEPT 2 MG: 40 INJECTION, SOLUTION INTRAVITREAL at 11:10

## 2018-10-16 NOTE — PATIENT INSTRUCTIONS

## 2018-10-16 NOTE — PROGRESS NOTES
HPI     DLS-08/21/2018 Dr. Mandujano       8 weeks ck   Pt noticed that VA OU is stable ----OD has a large smooth spot that is orange and OS has a jaggy spot ---this is only noticeable in a dark room with a white background    Eyewash PRN       OCT - mild increase SRF/SRH  OS - Drusen, no SRF - small HE - ? RAP lesion     Assessment:    1. AMD - Wet OD - post Avastin x 12, post Eylea #30  PED increased prior to 10/12 visit    Had slight increase 5/13/13, 7/14, 5/16    Eylea OD today    Continue 8 week OD    2. 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 PVD OU    4. Pseudophakia - great result    5. Allergic conjunctivitis/sinusitis - try OTC antihistamines  Recent trouble with sinusitis - pain behind eye improved with antihistamines  Ok for Zaditor BID - has improved with topical tx    6. AFib - considering anticoagulation - ok to proceed  On Anti-VEGF therapy, risk of large macular hemorrhage is minimal.         Plan:      8 weeks OCT    Risks, benefits, and alternatives to treatment discussed in detail with the patient.  The patient voiced understanding and wished to proceed with the procedure    Injection Procedure Note:  Diagnosis: Wet AMD OD    Patient Identified and Time Out complete  Topical Proparacaine and Betadine. Conj lido  Inject Eylea OD at 6:00 @ 3.5-4mm posterior to limbus  Post Operative Dx: Same  Complications: None  Follow up as above.

## 2018-12-04 DIAGNOSIS — I48.0 PAROXYSMAL ATRIAL FIBRILLATION: ICD-10-CM

## 2018-12-04 DIAGNOSIS — I10 ESSENTIAL HYPERTENSION: ICD-10-CM

## 2018-12-04 RX ORDER — LISINOPRIL 20 MG/1
20 TABLET ORAL DAILY
Qty: 90 TABLET | Refills: 3 | Status: SHIPPED | OUTPATIENT
Start: 2018-12-04 | End: 2019-01-29

## 2018-12-04 RX ORDER — SOTALOL HYDROCHLORIDE 80 MG/1
80 TABLET ORAL 2 TIMES DAILY
Qty: 180 TABLET | Refills: 3 | Status: SHIPPED | OUTPATIENT
Start: 2018-12-04 | End: 2019-09-04 | Stop reason: SDUPTHER

## 2018-12-18 ENCOUNTER — PROCEDURE VISIT (OUTPATIENT)
Dept: OPHTHALMOLOGY | Facility: CLINIC | Age: 83
End: 2018-12-18
Attending: OPHTHALMOLOGY
Payer: MEDICARE

## 2018-12-18 VITALS — SYSTOLIC BLOOD PRESSURE: 194 MMHG | HEART RATE: 71 BPM | DIASTOLIC BLOOD PRESSURE: 77 MMHG

## 2018-12-18 DIAGNOSIS — H35.3124 NONEXUDATIVE AGE-RELATED MACULAR DEGENERATION, LEFT EYE, ADVANCED ATROPHIC WITH SUBFOVEAL INVOLVEMENT: ICD-10-CM

## 2018-12-18 DIAGNOSIS — H35.3211 EXUDATIVE AGE-RELATED MACULAR DEGENERATION OF RIGHT EYE WITH ACTIVE CHOROIDAL NEOVASCULARIZATION: Primary | ICD-10-CM

## 2018-12-18 DIAGNOSIS — H35.721 SEROUS DETACHMENT OF RETINAL PIGMENT EPITHELIUM OF RIGHT EYE: ICD-10-CM

## 2018-12-18 PROCEDURE — 92014 COMPRE OPH EXAM EST PT 1/>: CPT | Mod: 25,S$PBB,, | Performed by: OPHTHALMOLOGY

## 2018-12-18 PROCEDURE — 67028 INJECTION EYE DRUG: CPT | Mod: S$PBB,RT,, | Performed by: OPHTHALMOLOGY

## 2018-12-18 PROCEDURE — 92134 CPTRZ OPH DX IMG PST SGM RTA: CPT | Mod: PBBFAC | Performed by: OPHTHALMOLOGY

## 2018-12-18 PROCEDURE — 67028 INJECTION EYE DRUG: CPT | Mod: PBBFAC,RT | Performed by: OPHTHALMOLOGY

## 2018-12-18 RX ADMIN — AFLIBERCEPT 2 MG: 40 INJECTION, SOLUTION INTRAVITREAL at 11:12

## 2018-12-18 NOTE — PATIENT INSTRUCTIONS

## 2018-12-18 NOTE — PROGRESS NOTES
HPI     DLS 10/16/18  Pt sts she think her vision is a little worse in OD.       Eyewash PRN         OCT - mild increase SRF/SRH  OS - Drusen, no SRF - small HE - ? RAP lesion     Assessment:    1. AMD - Wet OD - post Avastin x 12, post Eylea #31  PED increased prior to 10/12 visit    Had slight increase 5/13/13, 7/14, 5/16    Eylea OD today    Continue 8 week OD    2. 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 PVD OU    4. Pseudophakia - great result    5. Allergic conjunctivitis/sinusitis - try OTC antihistamines  Recent trouble with sinusitis - pain behind eye improved with antihistamines  Ok for Zaditor BID - has improved with topical tx    6. AFib - considering anticoagulation - ok to proceed  On Anti-VEGF therapy, risk of large macular hemorrhage is minimal.         Plan:      8 weeks OCT    Risks, benefits, and alternatives to treatment discussed in detail with the patient.  The patient voiced understanding and wished to proceed with the procedure    Injection Procedure Note:  Diagnosis: Wet AMD OD    Patient Identified and Time Out complete  Topical Proparacaine and Betadine. Conj lido  Inject Eylea OD at 6:00 @ 3.5-4mm posterior to limbus  Post Operative Dx: Same  Complications: None  Follow up as above.

## 2019-01-29 ENCOUNTER — OFFICE VISIT (OUTPATIENT)
Dept: INTERNAL MEDICINE | Facility: CLINIC | Age: 84
End: 2019-01-29
Attending: INTERNAL MEDICINE
Payer: MEDICARE

## 2019-01-29 VITALS
SYSTOLIC BLOOD PRESSURE: 150 MMHG | WEIGHT: 117.5 LBS | HEART RATE: 58 BPM | OXYGEN SATURATION: 98 % | HEIGHT: 69 IN | BODY MASS INDEX: 17.4 KG/M2 | DIASTOLIC BLOOD PRESSURE: 72 MMHG

## 2019-01-29 DIAGNOSIS — H35.3211 EXUDATIVE AGE-RELATED MACULAR DEGENERATION OF RIGHT EYE WITH ACTIVE CHOROIDAL NEOVASCULARIZATION: ICD-10-CM

## 2019-01-29 DIAGNOSIS — M85.80 OSTEOPENIA, UNSPECIFIED LOCATION: ICD-10-CM

## 2019-01-29 DIAGNOSIS — I51.89 DIASTOLIC DYSFUNCTION: ICD-10-CM

## 2019-01-29 DIAGNOSIS — L97.501 ULCER OF FOOT, LIMITED TO BREAKDOWN OF SKIN, UNSPECIFIED LATERALITY: ICD-10-CM

## 2019-01-29 DIAGNOSIS — I48.0 PAROXYSMAL ATRIAL FIBRILLATION: ICD-10-CM

## 2019-01-29 DIAGNOSIS — C44.329 SQUAMOUS CELL CANCER OF SKIN OF EYEBROW: ICD-10-CM

## 2019-01-29 DIAGNOSIS — I10 ESSENTIAL HYPERTENSION: Primary | ICD-10-CM

## 2019-01-29 DIAGNOSIS — J44.9 CHRONIC OBSTRUCTIVE PULMONARY DISEASE, UNSPECIFIED COPD TYPE: ICD-10-CM

## 2019-01-29 PROCEDURE — 99214 PR OFFICE/OUTPT VISIT, EST, LEVL IV, 30-39 MIN: ICD-10-PCS | Mod: S$PBB,,, | Performed by: INTERNAL MEDICINE

## 2019-01-29 PROCEDURE — 99214 OFFICE O/P EST MOD 30 MIN: CPT | Mod: S$PBB,,, | Performed by: INTERNAL MEDICINE

## 2019-01-29 PROCEDURE — 99999 PR PBB SHADOW E&M-EST. PATIENT-LVL IV: ICD-10-PCS | Mod: PBBFAC,,, | Performed by: INTERNAL MEDICINE

## 2019-01-29 PROCEDURE — 99999 PR PBB SHADOW E&M-EST. PATIENT-LVL IV: CPT | Mod: PBBFAC,,, | Performed by: INTERNAL MEDICINE

## 2019-01-29 PROCEDURE — 99214 OFFICE O/P EST MOD 30 MIN: CPT | Mod: PBBFAC | Performed by: INTERNAL MEDICINE

## 2019-01-29 RX ORDER — AMLODIPINE BESYLATE 5 MG/1
5 TABLET ORAL DAILY
Qty: 30 TABLET | Refills: 1 | Status: SHIPPED | OUTPATIENT
Start: 2019-01-29 | End: 2019-02-27

## 2019-01-29 RX ORDER — SCOLOPAMINE TRANSDERMAL SYSTEM 1 MG/1
1 PATCH, EXTENDED RELEASE TRANSDERMAL
Qty: 10 PATCH | Refills: 0 | Status: SHIPPED | OUTPATIENT
Start: 2019-01-29 | End: 2020-08-26

## 2019-01-29 RX ORDER — FLUTICASONE PROPIONATE 50 MCG
2 SPRAY, SUSPENSION (ML) NASAL DAILY
Qty: 16 G | Refills: 12
Start: 2019-01-29 | End: 2019-02-28

## 2019-01-29 RX ORDER — LISINOPRIL 40 MG/1
40 TABLET ORAL DAILY
Qty: 90 TABLET | Refills: 3 | Status: SHIPPED | OUTPATIENT
Start: 2019-01-29 | End: 2019-09-04 | Stop reason: SDUPTHER

## 2019-01-29 NOTE — PATIENT INSTRUCTIONS
Twice daily nasal saline rinses (arm and hammer or oceans nasal saline 3-4 sprays per nostril twice daily) and gently blow nose followed by flonase 1 spray twice daily for 1 month to look for improvement. Flonase takes about 1 week to start working.     In pharmacy:   Shingles: shingrix

## 2019-01-29 NOTE — PROGRESS NOTES
"Subjective:   Patient ID: Cathy Kearns is a 89 y.o. female  Chief complaint:   Chief Complaint   Patient presents with    Annual Exam       HPI    Here for annual exam    P Afib: followed by cards - recently seen - still taking sotalol and eliquis  Seen by cards      HTN: home bp 139-149/60-75  Reports bp more elevated over past few months- prev taking lisinopril 20mg and recently increased to 40mg on her own   - taking sotalol 80mg twice daily   - took 40mg of lisinopril and 80mg of sotalol last night   - took am dose of BB this am   - checking bp at home as above   - no cp, sob, ha, vision changes   - exercising more   - following low salt diet     COPD:   Seen by pulm - dx with COPD - plan is to use albuterol inh prn - she reports has never used this to date but does have a refill for this and Rx at home if needed   - denies cp or wheezing, intermittent sob improved since last seen and better exercise tolerance   took self off of Breo after made lips swell     Post nasal drip: using NS nasal saline rinses intermittently - stopped flonase but did get good results from this - agrees to resume     Osteopenia: taking kwame/vit d supplement    Nonhealing ulcer at left lateral foot - present x 1 year per pt   - per pt wearing tennis shoes regularly which are comfortable per her - reports area "forms scab on sock and then comes off each time I take my sock off so it is not getting a chance to heal"  - seen by derm at [facility] facility and this was biopsied and not cancer per pt - was given topical ointment for impetigo - bactroban but this was too strong and did not complete treatment as causes mild irritation  - did not f/u with derm for this as was rec per pt   - area has not worsened but not healed either  - no cyanosis, or pain in LE with exertion/exercise   - no pain in feet or LE at night that improves with placing feet on floor    - has lesion at left eyebrow and bx and dx with SCC - has not f/u yet with " "derm and wanted to f/u with cards first prior to f/u for this   - would like to f/u with derm with [facility]   - area is stable     Review of Systems    Objective:  Vitals:    01/29/19 0931   BP: (!) 150/72   Pulse: (!) 58   SpO2: 98%   Weight: 53.3 kg (117 lb 8.1 oz)   Height: 5' 9" (1.753 m)     Body mass index is 17.35 kg/m².    Physical Exam   Constitutional: She is oriented to person, place, and time. She appears well-developed and well-nourished. No distress.   HENT:   Head: Normocephalic and atraumatic.   Right Ear: External ear normal.   Left Ear: External ear normal.   Nose: Nose normal.   Mouth/Throat: Oropharynx is clear and moist. No oropharyngeal exudate.   Eyes: Conjunctivae and EOM are normal.   Neck: Normal range of motion. Neck supple.   Cardiovascular: Normal rate, regular rhythm and intact distal pulses.   +2 distal pulses    Pulmonary/Chest: Effort normal and breath sounds normal. No stridor. She has no wheezes. She has no rales.   Abdominal: Soft. Normal appearance and bowel sounds are normal.   Musculoskeletal: She exhibits no edema or tenderness.   Neurological: She is alert and oriented to person, place, and time. She has normal strength. Gait normal.   Skin: Skin is warm, dry and intact. Capillary refill takes 2 to 3 seconds. She is not diaphoretic. No cyanosis. Nails show no clubbing.   Small shallow ulcer at left lateral foot - about 1 cm wide with honey crusted scab  + dorsalis pedis pulses  Cap refill < 3 seconds  Feet warm - no cyanosis or cool feet   Sensation to light touch in tact    Psychiatric: She has a normal mood and affect. Her speech is normal and behavior is normal. Cognition and memory are normal.   Vitals reviewed.    Assessment:  1. Essential hypertension    2. 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 Chronic obstructive pulmonary disease, unspecified COPD type    4. Osteopenia, unspecified location    5. Paroxysmal atrial fibrillation    6. Exudative age-related " macular degeneration of right eye with active choroidal neovascularization    7. Diastolic dysfunction    8. Ulcer of foot, limited to breakdown of skin, unspecified laterality        Plan:  Cathy was seen today for annual exam.    Diagnoses and all orders for this visit:    Essential hypertension  -     lisinopril (PRINIVIL,ZESTRIL) 40 MG tablet; Take 1 tablet (40 mg total) by mouth once daily.  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  -     Ambulatory Referral to Dermatology    Chronic obstructive pulmonary disease, unspecified COPD type  Stable - has not required alb inh - cont to monitor sx     Osteopenia, unspecified location  Cont ca and vit d supplements and exercise     Paroxysmal atrial fibrillation  Stable - followed by cards - on BB and OAC    Exudative age-related macular degeneration of right eye with active choroidal neovascularization  Followed by ophthal    Diastolic dysfunction  Needs better bp control   See plan above     Ulcer of foot, limited to breakdown of skin, unspecified laterality  -     Ambulatory Referral to Wound Clinic  Will consider ABIs if does not improve with wound care     Other orders  -     fluticasone (FLONASE) 50 mcg/actuation nasal spray; 2 sprays (100 mcg total) by Each Nare route once daily.  -     amLODIPine (NORVASC) 5 MG tablet; Take 1 tablet (5 mg total) by mouth once daily.  -     scopolamine (TRANSDERM-SCOP) 1.3-1.5 mg (1 mg over 3 days); Place 1 patch onto the skin every 72 hours.    Going on cruise in April and requesting rx for scopolamine patch - reports rosenda this without issues in past   Refill given     Health Maintenance   Topic Date Due    Lipid Panel  05/10/2023    TETANUS VACCINE  08/16/2027    Zoster Vaccine  Completed    Influenza Vaccine  Completed

## 2019-01-31 ENCOUNTER — TELEPHONE (OUTPATIENT)
Dept: INTERNAL MEDICINE | Facility: CLINIC | Age: 84
End: 2019-01-31

## 2019-01-31 NOTE — TELEPHONE ENCOUNTER
Left voice message for patient to schedule appointment from referral to Wound Clinic.  Flaco METZ  [phone]

## 2019-02-14 ENCOUNTER — INITIAL CONSULT (OUTPATIENT)
Dept: DERMATOLOGY | Facility: CLINIC | Age: 84
End: 2019-02-14
Attending: INTERNAL MEDICINE
Payer: MEDICARE

## 2019-02-14 DIAGNOSIS — L82.1 SEBORRHEIC KERATOSIS: ICD-10-CM

## 2019-02-14 DIAGNOSIS — S90.812A ABRASION, LEFT FOOT, INITIAL ENCOUNTER: ICD-10-CM

## 2019-02-14 DIAGNOSIS — L57.0 ACTINIC KERATOSIS: Primary | ICD-10-CM

## 2019-02-14 PROCEDURE — 17000 DESTRUCT PREMALG LESION: CPT | Mod: S$GLB,,, | Performed by: DERMATOLOGY

## 2019-02-14 PROCEDURE — 17003 DESTRUCTION, PREMALIGNANT LESIONS; SECOND THROUGH 14 LESIONS: ICD-10-PCS | Mod: S$GLB,,, | Performed by: DERMATOLOGY

## 2019-02-14 PROCEDURE — 17003 DESTRUCT PREMALG LES 2-14: CPT | Mod: S$GLB,,, | Performed by: DERMATOLOGY

## 2019-02-14 PROCEDURE — 17000 PR DESTRUCTION(LASER SURGERY,CRYOSURGERY,CHEMOSURGERY),PREMALIGNANT LESIONS,FIRST LESION: ICD-10-PCS | Mod: S$GLB,,, | Performed by: DERMATOLOGY

## 2019-02-14 PROCEDURE — 99202 PR OFFICE/OUTPT VISIT, NEW, LEVL II, 15-29 MIN: ICD-10-PCS | Mod: 25,S$GLB,, | Performed by: DERMATOLOGY

## 2019-02-14 PROCEDURE — 99202 OFFICE O/P NEW SF 15 MIN: CPT | Mod: 25,S$GLB,, | Performed by: DERMATOLOGY

## 2019-02-14 NOTE — PATIENT INSTRUCTIONS
INSTRUCTIONS FOR TREATING PRECANCERS ON THE SKIN WITH  FLUOROURACIL 5% + CALCIPOTRIENE 0.005% CREAM    1. Fluorouracil is a cream that will help eliminate early skin cancers by selectively destroying the precancerous cells.  You are to apply the cream to the following entire areas, not to individual spots: face  2. Apply only a pea-sized amount of the cream to each of these areas.  3. When applying to the face and ears, keep the cream away from the creases around the eyes, ears, nose and mouth.  4. When applying to the forearms and hands, apply the cream to the tops or backs of the arms and hands.   5. The cream may rub in more easily if you dampen the skin first.  6. After 2-3 days you will see small red spots appear.  This means the cream is working as it should.  7. To help avoid irritation, you can use a skin moisturizer, such as CeraVe cream or ointment.  8. Stop using the medication if any areas begin to ulcerate, bleed, blister, or ooze.  9. Apply a sunscreen every morning, especially when you are outside.  10. Treatment usually takes:   4-7 days for the scalp, face, ears, neck & chest, or 7-10 days for the arms and legs  11. Please schedule your follow up visit for 3-4 weeks from the time you start using the cream.                       Please message us via [facility] or call the office at [phone] if you have any questions or problems while using this medication.

## 2019-02-14 NOTE — LETTER
February 14, 2019      Wilma Xiong MD  [address] [facility] [address]           [facility] - Dermatology  [address] [place] [id number]  Phone: [phone]  Fax: [phone]          Patient: Cathy Kearns   MR Number: [id number]   YOB: 1929   Date of Visit: 2/14/2019       Dear Dr. Wilma Xiong:    Thank you for referring Cathy Kearns to me for evaluation. Attached you will find relevant portions of my assessment and plan of care.    If you have questions, please do not hesitate to call me. I look forward to following Cathy Kearns along with you.    Sincerely,    Meenakshi Butcher MD    Enclosure  CC:  No Recipients    If you would like to receive this communication electronically, please contact [email or web address] or [phone] to request more information on [facility] access.    For providers and/or their staff who would like to refer a patient to [facility], please contact us through our one-stop-shop provider referral line, [facility], at [phone].    If you feel you have received this communication in error or would no longer like to receive these types of communications, please e-mail [email or web address]

## 2019-02-14 NOTE — PROGRESS NOTES
Subjective:       Patient ID:  Cathy Kearns is a 89 y.o. female who presents for   Chief Complaint   Patient presents with    Lesion     left foot, 1 year    Spot     face, squam      Pt is here today today with a c/o of lesions to her face and spot on her left foot.       Lesion  - Initial  Affected locations: face (left eyebrow, left upper lip)  Duration: 6 months  Signs / symptoms: scaling  Severity: mild  Timing: constant  Aggravated by: nothing  Relieving factors/Treatments tried: nothing    Spot  - Initial  Affected locations: left foot  Duration: 1 year  Signs / symptoms: non-healing  Severity: mild  Timing: constant  Aggravated by: pressure  Treatments tried: bactroban ointment   Improvement on treatment: mild      Review of Systems   Skin: Negative for itching and rash.   Hematologic/Lymphatic: Bruises/bleeds easily (on Eliquis).        Objective:    Physical Exam   Constitutional: She appears well-developed and well-nourished. No distress.   HENT:   Mouth/Throat: Lips normal.    Eyes: Lids are normal.  No conjunctival no injection.   Neurological: She is alert and oriented to person, place, and time. She is not disoriented.   Psychiatric: She has a normal mood and affect.   Skin:   Areas Examined (abnormalities noted in diagram):   Head / Face Inspection Performed  Neck Inspection Performed  RUE Inspected  LUE Inspection Performed  LLE Inspection Performed                       Diagram Legend     Erythematous scaling macule/papule c/w actinic keratosis       Vascular papule c/w angioma      Pigmented verrucoid papule/plaque c/w seborrheic keratosis      Yellow umbilicated papule c/w sebaceous hyperplasia      Irregularly shaped tan macule c/w lentigo     1-2 mm smooth white papules consistent with Milia      Movable subcutaneous cyst with punctum c/w epidermal inclusion cyst      Subcutaneous movable cyst c/w pilar cyst      Firm pink to brown papule c/w dermatofibroma      Pedunculated fleshy  papule(s) c/w skin tag(s)      Evenly pigmented macule c/w junctional nevus     Mildly variegated pigmented, slightly irregular-bordered macule c/w mildly atypical nevus      Flesh colored to evenly pigmented papule c/w intradermal nevus       Pink pearly papule/plaque c/w basal cell carcinoma      Erythematous hyperkeratotic cursted plaque c/w SCC      Surgical scar with no sign of skin cancer recurrence      Open and closed comedones      Inflammatory papules and pustules      Verrucoid papule consistent consistent with wart     Erythematous eczematous patches and plaques     Dystrophic onycholytic nail with subungual debris c/w onychomycosis     Umbilicated papule    Erythematous-base heme-crusted tan verrucoid plaque consistent with inflamed seborrheic keratosis     Erythematous Silvery Scaling Plaque c/w Psoriasis     See annotation      Assessment / Plan:        Actinic keratosis  Prescribed fluorouracil 5% + calcipotriene 0.005% cream. Apply a pea-sized amount to entire face BID x 4-5 days.  Patient was counseled extensively regarding the proper use and expected side effects of fluorouracil cream. Written instructions were provided.  Patient will discontinue use if areas begin to ulcerate, bleed, blister, etc.    Cryosurgery procedure note:  Risk, benefits, and alternatives of cryosurgery are discussed with the patient, including risk of hypo- or hyperpigmentation, scar, infection, recurrence, and need for additional treatment of site. Verbal consent obtained from patient. Liquid nitrogen cryosurgery applied to 2 lesion(s) to produce a freeze injury. Counseled patient that blisters may form and instructed patient on wound care with gentle cleansing and use of Vaseline ointment to keep moist until healed. Handout was provided, and patient was instructed to return to clinic in 1-2 months if lesions do not completely resolve.    Seborrheic keratosis  These are benign, inherited growths without a malignant  potential. Reassurance given to patient. No treatment is necessary. Handout was provided.    Abrasion, left foot, initial encounter  Appears to be healing at present time. No signs of infection on exam today.  Recommended daily cleaning with a gentle cleanser, followed by Vaseline and a bandage until completely healed.    Follow-up in about 4 weeks (around 3/14/2019).

## 2019-02-15 ENCOUNTER — CLINICAL SUPPORT (OUTPATIENT)
Dept: INTERNAL MEDICINE | Facility: CLINIC | Age: 84
End: 2019-02-15
Payer: MEDICARE

## 2019-02-15 ENCOUNTER — TELEPHONE (OUTPATIENT)
Dept: INTERNAL MEDICINE | Facility: CLINIC | Age: 84
End: 2019-02-15

## 2019-02-15 VITALS — HEART RATE: 59 BPM | DIASTOLIC BLOOD PRESSURE: 68 MMHG | OXYGEN SATURATION: 99 % | SYSTOLIC BLOOD PRESSURE: 110 MMHG

## 2019-02-15 PROCEDURE — 99999 PR PBB SHADOW E&M-EST. PATIENT-LVL II: ICD-10-PCS | Mod: PBBFAC,,,

## 2019-02-15 PROCEDURE — 99212 OFFICE O/P EST SF 10 MIN: CPT | Mod: PBBFAC

## 2019-02-15 PROCEDURE — 99999 PR PBB SHADOW E&M-EST. PATIENT-LVL II: CPT | Mod: PBBFAC,,,

## 2019-02-15 NOTE — Clinical Note
Does patient have record of home blood pressure readings no. Readings have been averaging n/a. Last dose of blood pressure medication was taken at 9:00 pm 2/14/19.Patient is asymptomatic. Denies headaches, dizziness, CP, SOB, numbness/tingling to extremities.BP: 110/68 , Pulse: (!) 59.Dr. Santiago notified.

## 2019-02-15 NOTE — TELEPHONE ENCOUNTER
----- Message from Omid Mo LPN sent at 2/15/2019 10:17 AM CST -----  Does patient have record of home blood pressure readings no. Readings have been averaging n/a.   Last dose of blood pressure medication was taken at 9:00 pm 2/14/19.  Patient is asymptomatic.   Denies headaches, dizziness, CP, SOB, numbness/tingling to extremities.    BP: 110/68 , Pulse: (!) 59.    Dr. Santiago notified.

## 2019-02-15 NOTE — TELEPHONE ENCOUNTER
Sent message to inform pt Dr. Xiong recommendation:  Blood pressure in good range - rec cont current meds as controlled

## 2019-02-15 NOTE — PROGRESS NOTES
Cathy ALISHA Kearns 89 y.o. female is here today for Blood Pressure check.   History of HTN yes.    Review of patient's allergies indicates:   Allergen Reactions    Penicillins Swelling    Pseudoephedrine hcl Palpitations    Sulfa (sulfonamide antibiotics) Palpitations     Creatinine   Date Value Ref Range Status   05/10/2018 0.8 0.5 - 1.4 mg/dL Final     Sodium   Date Value Ref Range Status   05/10/2018 133 (L) 136 - 145 mmol/L Final     Potassium   Date Value Ref Range Status   05/10/2018 4.8 3.5 - 5.1 mmol/L Final   ]  Patient verifies taking blood pressure medications on a regular bases at the same time of the day.     Current Outpatient Medications:     amLODIPine (NORVASC) 5 MG tablet, Take 1 tablet (5 mg total) by mouth once daily., Disp: 30 tablet, Rfl: 1    lisinopril (PRINIVIL,ZESTRIL) 40 MG tablet, Take 1 tablet (40 mg total) by mouth once daily., Disp: 90 tablet, Rfl: 3    sotalol (BETAPACE) 80 MG tablet, Take 1 tablet (80 mg total) by mouth 2 (two) times daily., Disp: 180 tablet, Rfl: 3    albuterol 90 mcg/actuation inhaler, Inhale 2 puffs into the lungs every 6 (six) hours as needed for Wheezing., Disp: 18 g, Rfl: 0    ANTIOX #8/OM3/DHA/EPA/LUT/ZEAX (PRESERVISION AREDS 2, OMEGA-3, ORAL), Take 1 tablet by mouth once daily at 6am. , Disp: , Rfl:     apixaban (ELIQUIS) 2.5 mg Tab, Take 1 tablet (2.5 mg total) by mouth 2 (two) times daily., Disp: 180 tablet, Rfl: 3    CALCIUM CARBONATE (CALCIUM 500 ORAL), Take 500 mg by mouth once daily. , Disp: , Rfl:     fluticasone (FLONASE) 50 mcg/actuation nasal spray, 2 sprays (100 mcg total) by Each Nare route once daily., Disp: 16 g, Rfl: 12    mupirocin (BACTROBAN) 2 % ointment, CRAIG AFTER SALT WATER COMPRESSES BID, Disp: , Rfl: 0    omega 3-dha-epa-fish oil 300-1,000 mg Cap, Take 3 g by mouth., Disp: , Rfl:     scopolamine (TRANSDERM-SCOP) 1.3-1.5 mg (1 mg over 3 days), Place 1 patch onto the skin every 72 hours., Disp: 10 patch, Rfl: 0  Does patient have  record of home blood pressure readings no. Readings have been averaging n/a.   Last dose of blood pressure medication was taken at 9:00 pm 2/14/19.  Patient is asymptomatic.   Denies headaches, dizziness, CP, SOB, numbness/tingling to extremities.    BP: 110/68 , Pulse: (!) 59.    Dr. Santiago notified.

## 2019-02-19 ENCOUNTER — PROCEDURE VISIT (OUTPATIENT)
Dept: OPHTHALMOLOGY | Facility: CLINIC | Age: 84
End: 2019-02-19
Attending: OPHTHALMOLOGY
Payer: MEDICARE

## 2019-02-19 VITALS — DIASTOLIC BLOOD PRESSURE: 62 MMHG | SYSTOLIC BLOOD PRESSURE: 144 MMHG | HEART RATE: 57 BPM

## 2019-02-19 DIAGNOSIS — H35.3124 NONEXUDATIVE AGE-RELATED MACULAR DEGENERATION, LEFT EYE, ADVANCED ATROPHIC WITH SUBFOVEAL INVOLVEMENT: ICD-10-CM

## 2019-02-19 DIAGNOSIS — H35.721 SEROUS DETACHMENT OF RETINAL PIGMENT EPITHELIUM OF RIGHT EYE: ICD-10-CM

## 2019-02-19 DIAGNOSIS — H35.3211 EXUDATIVE AGE-RELATED MACULAR DEGENERATION OF RIGHT EYE WITH ACTIVE CHOROIDAL NEOVASCULARIZATION: Primary | ICD-10-CM

## 2019-02-19 PROCEDURE — 67028 INJECTION EYE DRUG: CPT | Mod: S$PBB,RT,, | Performed by: OPHTHALMOLOGY

## 2019-02-19 PROCEDURE — 92014 COMPRE OPH EXAM EST PT 1/>: CPT | Mod: 25,S$PBB,, | Performed by: OPHTHALMOLOGY

## 2019-02-19 PROCEDURE — 92134 CPTRZ OPH DX IMG PST SGM RTA: CPT | Mod: PBBFAC | Performed by: OPHTHALMOLOGY

## 2019-02-19 PROCEDURE — 67028 PR INJECT INTRAVITREAL PHARMCOLOGIC: ICD-10-PCS | Mod: S$PBB,RT,, | Performed by: OPHTHALMOLOGY

## 2019-02-19 PROCEDURE — 92014 PR EYE EXAM, EST PATIENT,COMPREHESV: ICD-10-PCS | Mod: 25,S$PBB,, | Performed by: OPHTHALMOLOGY

## 2019-02-19 PROCEDURE — 92134 POSTERIOR SEGMENT OCT RETINA (OCULAR COHERENCE TOMOGRAPHY)-BOTH EYES: ICD-10-PCS | Mod: 26,S$PBB,, | Performed by: OPHTHALMOLOGY

## 2019-02-19 PROCEDURE — 67028 INJECTION EYE DRUG: CPT | Mod: PBBFAC,RT | Performed by: OPHTHALMOLOGY

## 2019-02-19 RX ADMIN — AFLIBERCEPT 2 MG: 40 INJECTION, SOLUTION INTRAVITREAL at 11:02

## 2019-02-19 NOTE — PROGRESS NOTES
HPI     Eye Problem      Additional comments: 8 week check              Comments     DLS 12/18/18 She feels like her vision is getting dimmer. On dreary days like today she has a hard time seeing.         OCT - mild increase SRF/SRH  OS - Drusen, no SRF - small HE - ? RAP lesion     Assessment:    1. AMD - Wet OD - post Avastin x 12, post Eylea #32  PED increased prior to 10/12 visit    Had slight increase 5/13/13, 7/14, 5/16    Eylea OD today    Continue 8 week OD    2. 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 PVD OU    4. Pseudophakia - great result    5. Allergic conjunctivitis/sinusitis - try OTC antihistamines  Recent trouble with sinusitis - pain behind eye improved with antihistamines  Ok for Zaditor BID - has improved with topical tx    6. AFib - considering anticoagulation - ok to proceed  On Anti-VEGF therapy, risk of large macular hemorrhage is minimal.         Plan:      8 weeks OCT    Risks, benefits, and alternatives to treatment discussed in detail with the patient.  The patient voiced understanding and wished to proceed with the procedure    Injection Procedure Note:  Diagnosis: Wet AMD OD    Patient Identified and Time Out complete  Topical Proparacaine and Betadine. Conj lido  Inject Eylea OD at 6:00 @ 3.5-4mm posterior to limbus  Post Operative Dx: Same  Complications: None  Follow up as above.

## 2019-02-27 ENCOUNTER — OFFICE VISIT (OUTPATIENT)
Dept: CARDIOLOGY | Facility: CLINIC | Age: 84
End: 2019-02-27
Payer: MEDICARE

## 2019-02-27 VITALS
OXYGEN SATURATION: 98 % | SYSTOLIC BLOOD PRESSURE: 129 MMHG | BODY MASS INDEX: 20.98 KG/M2 | HEART RATE: 56 BPM | WEIGHT: 118.38 LBS | DIASTOLIC BLOOD PRESSURE: 58 MMHG | HEIGHT: 63 IN

## 2019-02-27 DIAGNOSIS — I10 ESSENTIAL HYPERTENSION: ICD-10-CM

## 2019-02-27 DIAGNOSIS — I48.0 PAROXYSMAL ATRIAL FIBRILLATION: Primary | ICD-10-CM

## 2019-02-27 DIAGNOSIS — Z79.899 LONG TERM CURRENT USE OF ANTIARRHYTHMIC DRUG: ICD-10-CM

## 2019-02-27 DIAGNOSIS — J44.9 CHRONIC OBSTRUCTIVE PULMONARY DISEASE, UNSPECIFIED COPD TYPE: ICD-10-CM

## 2019-02-27 DIAGNOSIS — S91.302A NON-HEALING WOUND OF LEFT HEEL: ICD-10-CM

## 2019-02-27 DIAGNOSIS — I51.89 DIASTOLIC DYSFUNCTION: ICD-10-CM

## 2019-02-27 PROCEDURE — 93010 EKG 12-LEAD: ICD-10-PCS | Mod: S$PBB,,, | Performed by: INTERNAL MEDICINE

## 2019-02-27 PROCEDURE — 99999 PR PBB SHADOW E&M-EST. PATIENT-LVL III: CPT | Mod: PBBFAC,,, | Performed by: NURSE PRACTITIONER

## 2019-02-27 PROCEDURE — 99214 OFFICE O/P EST MOD 30 MIN: CPT | Mod: S$PBB,,, | Performed by: NURSE PRACTITIONER

## 2019-02-27 PROCEDURE — 99999 PR PBB SHADOW E&M-EST. PATIENT-LVL III: ICD-10-PCS | Mod: PBBFAC,,, | Performed by: NURSE PRACTITIONER

## 2019-02-27 PROCEDURE — 93005 ELECTROCARDIOGRAM TRACING: CPT | Mod: PBBFAC | Performed by: INTERNAL MEDICINE

## 2019-02-27 PROCEDURE — 93010 ELECTROCARDIOGRAM REPORT: CPT | Mod: S$PBB,,, | Performed by: INTERNAL MEDICINE

## 2019-02-27 PROCEDURE — 99213 OFFICE O/P EST LOW 20 MIN: CPT | Mod: PBBFAC | Performed by: NURSE PRACTITIONER

## 2019-02-27 PROCEDURE — 99214 PR OFFICE/OUTPT VISIT, EST, LEVL IV, 30-39 MIN: ICD-10-PCS | Mod: S$PBB,,, | Performed by: NURSE PRACTITIONER

## 2019-02-27 RX ORDER — AMLODIPINE BESYLATE 5 MG/1
5 TABLET ORAL DAILY
Qty: 90 TABLET | Refills: 3 | Status: SHIPPED | OUTPATIENT
Start: 2019-02-27 | End: 2019-09-04 | Stop reason: SDUPTHER

## 2019-02-27 NOTE — PROGRESS NOTES
Ms. Kearns is a patient of Dr. Mcelroy and was last seen in [facility] 9/17/2018.    Subjective:   Patient ID:  Cathy Kearns is a 89 y.o. female who presents for follow-up of Hypertension    Problems:  Paroxysmal atrial fibrillation  -on sotolol  HTN  HLD  Diastolic dysfunction, EF 60-65%  Mild AI  COPD    HPI  Ms. Kearns is in clinic today for hypertension follow up after seeing her PCP on 1/29/19.  Her PCP added amlodipine 5 mg and increased her lisinopril to 40 mg because her SBP was going into the 140-180s routinely.  Her home BPs have improved to 120s.  Reports SOB with exertion over the course of the last year.  She was seen by Dr. Rosenthal in pulmonary clinic in 6/2018 and was dx with COPD.  Patient denies chest pain with exertion or at rest, palpitations, dizziness, syncope, edema, orthopnea, PND, or claudication.  Reports routine exercise, line dancing and group exercise.  Patient is taking apixaban 2.5 mg BID for thromboembolic prophylaxis.  Denies bleeding gums, epistaxis, hematuria, and hematochezia.      Review of Systems   Constitution: Negative for decreased appetite, diaphoresis, weakness, malaise/fatigue, weight gain and weight loss.   Eyes: Negative for visual disturbance.   Cardiovascular: Positive for dyspnea on exertion. Negative for chest pain, claudication, irregular heartbeat, leg swelling, near-syncope, orthopnea, palpitations, paroxysmal nocturnal dyspnea and syncope.        Denies chest pressure   Respiratory: Negative for cough, hemoptysis, shortness of breath, sleep disturbances due to breathing and snoring.    Endocrine: Negative for cold intolerance and heat intolerance.   Hematologic/Lymphatic: Negative for bleeding problem. Does not bruise/bleed easily.   Musculoskeletal: Negative for myalgias.   Gastrointestinal: Negative for bloating, abdominal pain, anorexia, change in bowel habit, constipation, diarrhea, nausea and vomiting.   Neurological: Negative for difficulty with  "concentration, disturbances in coordination, excessive daytime sleepiness, dizziness, headaches, light-headedness, loss of balance and numbness.   Psychiatric/Behavioral: The patient does not have insomnia.        Allergies and current medications updated and reviewed:  Review of patient's allergies indicates:   Allergen Reactions    Penicillins Swelling    Pseudoephedrine hcl Palpitations    Sulfa (sulfonamide antibiotics) Palpitations     Current Outpatient Medications   Medication Sig    albuterol 90 mcg/actuation inhaler Inhale 2 puffs into the lungs every 6 (six) hours as needed for Wheezing.    amLODIPine (NORVASC) 5 MG tablet Take 1 tablet (5 mg total) by mouth once daily.    ANTIOX #8/OM3/DHA/EPA/LUT/ZEAX (PRESERVISION AREDS 2, OMEGA-3, ORAL) Take 1 tablet by mouth once daily at 6am.     apixaban (ELIQUIS) 2.5 mg Tab Take 1 tablet (2.5 mg total) by mouth 2 (two) times daily.    CALCIUM CARBONATE (CALCIUM 500 ORAL) Take 500 mg by mouth once daily.     fluticasone (FLONASE) 50 mcg/actuation nasal spray 2 sprays (100 mcg total) by Each Nare route once daily.    lisinopril (PRINIVIL,ZESTRIL) 40 MG tablet Take 1 tablet (40 mg total) by mouth once daily.    mupirocin (BACTROBAN) 2 % ointment CRAIG AFTER SALT WATER COMPRESSES BID    omega 3-dha-epa-fish oil 300-1,000 mg Cap Take 3 g by mouth.    scopolamine (TRANSDERM-SCOP) 1.3-1.5 mg (1 mg over 3 days) Place 1 patch onto the skin every 72 hours.    sotalol (BETAPACE) 80 MG tablet Take 1 tablet (80 mg total) by mouth 2 (two) times daily.     No current facility-administered medications for this visit.      Objective:     Right Arm BP - Sittin/59 (19 1258)  Left Arm BP - Sittin/58 (19 1258)    BP (!) 129/58 (BP Location: Left arm, Patient Position: Sitting, BP Method: Medium (Automatic))   Pulse (!) 56   Ht 5' 3" (1.6 m)   Wt 53.7 kg (118 lb 6.2 oz)   SpO2 98%   BMI 20.97 kg/m²       Physical Exam   Constitutional: She is " oriented to person, place, and time. Vital signs are normal. She appears well-developed and well-nourished. She is active. No distress.   HENT:   Head: Normocephalic and atraumatic.   Eyes: Conjunctivae and lids are normal. No scleral icterus.   Neck: Neck supple. Normal carotid pulses, no hepatojugular reflux and no JVD present. Carotid bruit is not present.   Cardiovascular: Normal rate, regular rhythm, S1 normal, S2 normal and intact distal pulses. PMI is not displaced. Exam reveals no gallop and no friction rub.   No murmur heard.  Pulses:       Carotid pulses are 2+ on the right side, and 2+ on the left side.       Radial pulses are 2+ on the right side, and 2+ on the left side.        Dorsalis pedis pulses are 2+ on the right side, and 2+ on the left side.        Posterior tibial pulses are 1+ on the right side, and 1+ on the left side.   Pulmonary/Chest: Effort normal and breath sounds normal. No respiratory distress. She has no decreased breath sounds. She has no wheezes. She has no rhonchi. She has no rales. She exhibits no tenderness.   Abdominal: Soft. Normal appearance and bowel sounds are normal. She exhibits no distension, no fluid wave, no abdominal bruit, no ascites and no pulsatile midline mass. There is no hepatosplenomegaly. There is no tenderness.   Musculoskeletal: She exhibits no edema.   Neurological: She is alert and oriented to person, place, and time. Gait normal.   Skin: Skin is warm, dry and intact. No rash noted. She is not diaphoretic. Nails show no clubbing.   Psychiatric: She has a normal mood and affect. Her speech is normal and behavior is normal. Judgment and thought content normal. Cognition and memory are normal.   Nursing note and vitals reviewed.      Chemistry        Component Value Date/Time     (L) 05/10/2018 0900    K 4.8 05/10/2018 0900     05/10/2018 0900    CO2 25 05/10/2018 0900    BUN 12 05/10/2018 0900    CREATININE 0.8 05/10/2018 0900    GLU 99 05/10/2018  0900        Component Value Date/Time    CALCIUM 9.8 05/10/2018 0900    ALKPHOS 53 (L) 05/10/2018 0900    AST 19 05/10/2018 0900    ALT 11 05/10/2018 0900    BILITOT 0.6 05/10/2018 0900    ESTGFRAFRICA >60 05/10/2018 0900    EGFRNONAA >60 05/10/2018 0900        No results found for: LABA1C, HGBA1C      Recent Labs   Lab 05/10/18  0900   WHITE BLOOD CELL COUNT 5.91   HEMOGLOBIN 12.5   HEMATOCRIT 37.4   MCV 95   PLATELETS 222   BNP 81   TSH 1.042   CHOLESTEROL 224 H   HDL 71   LDL CHOLESTEROL 143.2   TRIGLYCERIDES 49   HDL/CHOLESTEROL RATIO 31.7              Test(s) Reviewed  I have reviewed the following in detail:  [] Stress test   [] Angiography   [x] Echocardiogram   [x] Labs   [] Other:         Assessment/Plan:   1. Paroxysmal atrial fibrillation  RRR. ECG today with normal QTc. Continue sotolol and eliquis 2.5mg BID.     - EKG 12-lead    2. Long term current use of antiarrhythmic drug- Sotolol    - EKG 12-lead    3. Essential hypertension  BP at goal <130/80. Continue current regimen.    - amLODIPine (NORVASC) 5 MG tablet; Take 1 tablet (5 mg total) by mouth once daily.  Dispense: 90 tablet; Refill: 3    4. Diastolic dysfunction  Euvolemic on exam.  Chronotropically competent, HR increased when walking in hallway and POX remained normal during ambulation. WHITEHEAD likely secondary to COPD.     5. Chronic obstructive pulmonary disease, unspecified COPD type      6. Non-healing wound of left heel  Reports left heal wound that has been preseffrent despite seeing wound care for about a year now.  Will check arterial US to evaluate circulation.     - CV Ultrasound doppler arterial legs bilat (Cupid Only); Future      Patient was discussed with but not examined by Dr. Snyder    Follow-up in about 6 months (around 8/27/2019).

## 2019-03-01 ENCOUNTER — CLINICAL SUPPORT (OUTPATIENT)
Dept: CARDIOLOGY | Facility: CLINIC | Age: 84
End: 2019-03-01
Attending: NURSE PRACTITIONER
Payer: MEDICARE

## 2019-03-01 DIAGNOSIS — S91.302A NON-HEALING WOUND OF LEFT HEEL: ICD-10-CM

## 2019-03-01 LAB
LEFT ANT TIBIAL SYS PSV: 93 CM/S
LEFT CFA PSV: 87 CM/S
LEFT EXTERNAL ILIAC PSV: 147 CM/S
LEFT PERONEAL SYS PSV: 29 CM/S
LEFT POPLITEAL PSV: 112 CM/S
LEFT POST TIBIAL SYS PSV: 67 CM/S
LEFT PROFUNDA SYS PSV: 81 CM/S
LEFT SUPER FEMORAL DIST SYS PSV: 138 CM/S
LEFT SUPER FEMORAL MID SYS PSV: 106 CM/S
LEFT SUPER FEMORAL OSTIAL SYS PSV: 140 CM/S
LEFT SUPER FEMORAL PROX SYS PSV: 106 CM/S
LEFT TIB/PER TRUNK SYS PSV: 75 CM/S
OHS CV LEFT LOWER EXTREMITY ABI (NO CALC): 1.21
OHS CV RIGHT ABI LOWER EXTREMITY (NO CALC): 1.14
RIGHT ANT TIBIAL SYS PSV: 67 CM/S
RIGHT CFA PSV: 135 CM/S
RIGHT EXTERNAL ILLIAC PSV: 108 CM/S
RIGHT PERONEAL SYS PSV: 38 CM/S
RIGHT POPLITEAL PSV: 152 CM/S
RIGHT POST TIBIAL SYS PSV: 58 CM/S
RIGHT PROFUNDA SYS PSV: 93 CM/S
RIGHT SUPER FEMORAL DIST SYS PSV: 90 CM/S
RIGHT SUPER FEMORAL MID SYS PSV: 93 CM/S
RIGHT SUPER FEMORAL OSTIAL SYS PSV: 108 CM/S
RIGHT SUPER FEMORAL PROX SYS PSV: 95 CM/S
RIGHT TIB/PER TRUNK SYS PSV: 59 CM/S

## 2019-03-01 PROCEDURE — 93925 CV US DOPPLER ARTERIAL LEGS BILATERAL (CUPID ONLY): ICD-10-PCS | Mod: 26,S$PBB,, | Performed by: INTERNAL MEDICINE

## 2019-03-01 PROCEDURE — 93925 LOWER EXTREMITY STUDY: CPT | Mod: PBBFAC | Performed by: INTERNAL MEDICINE

## 2019-03-09 ENCOUNTER — PATIENT MESSAGE (OUTPATIENT)
Dept: CARDIOLOGY | Facility: CLINIC | Age: 84
End: 2019-03-09

## 2019-04-23 ENCOUNTER — PROCEDURE VISIT (OUTPATIENT)
Dept: OPHTHALMOLOGY | Facility: CLINIC | Age: 84
End: 2019-04-23
Attending: OPHTHALMOLOGY
Payer: MEDICARE

## 2019-04-23 VITALS — SYSTOLIC BLOOD PRESSURE: 128 MMHG | DIASTOLIC BLOOD PRESSURE: 59 MMHG | HEART RATE: 59 BPM

## 2019-04-23 DIAGNOSIS — H35.721 SEROUS DETACHMENT OF RETINAL PIGMENT EPITHELIUM OF RIGHT EYE: ICD-10-CM

## 2019-04-23 DIAGNOSIS — H35.3211 EXUDATIVE AGE-RELATED MACULAR DEGENERATION OF RIGHT EYE WITH ACTIVE CHOROIDAL NEOVASCULARIZATION: Primary | ICD-10-CM

## 2019-04-23 DIAGNOSIS — H35.3124 NONEXUDATIVE AGE-RELATED MACULAR DEGENERATION, LEFT EYE, ADVANCED ATROPHIC WITH SUBFOVEAL INVOLVEMENT: ICD-10-CM

## 2019-04-23 PROCEDURE — 67028 INJECTION EYE DRUG: CPT | Mod: S$PBB,RT,, | Performed by: OPHTHALMOLOGY

## 2019-04-23 PROCEDURE — 92014 PR EYE EXAM, EST PATIENT,COMPREHESV: ICD-10-PCS | Mod: 25,S$PBB,, | Performed by: OPHTHALMOLOGY

## 2019-04-23 PROCEDURE — 92134 CPTRZ OPH DX IMG PST SGM RTA: CPT | Mod: PBBFAC | Performed by: OPHTHALMOLOGY

## 2019-04-23 PROCEDURE — 92014 COMPRE OPH EXAM EST PT 1/>: CPT | Mod: 25,S$PBB,, | Performed by: OPHTHALMOLOGY

## 2019-04-23 PROCEDURE — 67028 INJECTION EYE DRUG: CPT | Mod: PBBFAC,RT | Performed by: OPHTHALMOLOGY

## 2019-04-23 PROCEDURE — 92134 POSTERIOR SEGMENT OCT RETINA (OCULAR COHERENCE TOMOGRAPHY)-BOTH EYES: ICD-10-PCS | Mod: 26,S$PBB,, | Performed by: OPHTHALMOLOGY

## 2019-04-23 PROCEDURE — 67028 PR INJECT INTRAVITREAL PHARMCOLOGIC: ICD-10-PCS | Mod: S$PBB,RT,, | Performed by: OPHTHALMOLOGY

## 2019-04-23 RX ADMIN — AFLIBERCEPT 2 MG: 40 INJECTION, SOLUTION INTRAVITREAL at 10:04

## 2019-04-23 NOTE — PROGRESS NOTES
HPI     8 wk / OCT/ Eylea   DLS- 02/19/2019 Dr. Mandjuano     Pt sts vision getting dimmer can not the the clock anymore unless its back   lit. But overall vision the same.  (-)Flashes (+)Floaters  (-)Photophobia  (-)Glare    AT's PRN         OCT - mild increase SRF/SRH  OS - Drusen, no SRF - small HE - ? RAP lesion     Assessment:    1. AMD - Wet OD - post Avastin x 12, post Eylea #33  PED increased prior to 10/12 visit    Had slight increase 5/13/13, 7/14, 5/16    Eylea OD today    Continue 8 week OD    2. 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 PVD OU    4. Pseudophakia - great result    5. Allergic conjunctivitis/sinusitis - try OTC antihistamines  Recent trouble with sinusitis - pain behind eye improved with antihistamines  Ok for Zaditor BID - has improved with topical tx    6. AFib - considering anticoagulation - ok to proceed  On Anti-VEGF therapy, risk of large macular hemorrhage is minimal.         Plan:      8 weeks OCT    Risks, benefits, and alternatives to treatment discussed in detail with the patient.  The patient voiced understanding and wished to proceed with the procedure    Injection Procedure Note:  Diagnosis: Wet AMD OD    Patient Identified and Time Out complete  Topical Proparacaine and Betadine. Conj lido  Inject Eylea OD at 6:00 @ 3.5-4mm posterior to limbus  Post Operative Dx: Same  Complications: None  Follow up as above.

## 2019-04-23 NOTE — PATIENT INSTRUCTIONS

## 2019-05-01 ENCOUNTER — PES CALL (OUTPATIENT)
Dept: ADMINISTRATIVE | Facility: CLINIC | Age: 84
End: 2019-05-01

## 2019-06-17 ENCOUNTER — TELEPHONE (OUTPATIENT)
Dept: DERMATOLOGY | Facility: CLINIC | Age: 84
End: 2019-06-17

## 2019-06-17 NOTE — TELEPHONE ENCOUNTER
L/M for pt. To call back and schedule an appointment.  ----- Message from Rosalina Wells MA sent at 6/17/2019  8:41 AM CDT -----  Contact: pt  I believe this patient needs to reschedule? We have never seen her before and I noticed she had a cancellation with Dr. Butcher on 5/3/2019. Thanks!  ----- Message -----  From: Chyna Nava  Sent: 6/15/2019   9:49 AM  To: Bao Dumont S Staff    Pt would like to be called back regarding getting a new a patient appt    Pt can be reached at [phone]

## 2019-06-18 ENCOUNTER — PROCEDURE VISIT (OUTPATIENT)
Dept: OPHTHALMOLOGY | Facility: CLINIC | Age: 84
End: 2019-06-18
Payer: MEDICARE

## 2019-06-18 VITALS — HEART RATE: 56 BPM | SYSTOLIC BLOOD PRESSURE: 137 MMHG | DIASTOLIC BLOOD PRESSURE: 66 MMHG

## 2019-06-18 DIAGNOSIS — H35.3124 NONEXUDATIVE AGE-RELATED MACULAR DEGENERATION, LEFT EYE, ADVANCED ATROPHIC WITH SUBFOVEAL INVOLVEMENT: ICD-10-CM

## 2019-06-18 DIAGNOSIS — H35.721 SEROUS DETACHMENT OF RETINAL PIGMENT EPITHELIUM OF RIGHT EYE: ICD-10-CM

## 2019-06-18 DIAGNOSIS — H35.3211 EXUDATIVE AGE-RELATED MACULAR DEGENERATION OF RIGHT EYE WITH ACTIVE CHOROIDAL NEOVASCULARIZATION: Primary | ICD-10-CM

## 2019-06-18 PROCEDURE — 92134 CPTRZ OPH DX IMG PST SGM RTA: CPT | Mod: PBBFAC | Performed by: OPHTHALMOLOGY

## 2019-06-18 PROCEDURE — 92014 COMPRE OPH EXAM EST PT 1/>: CPT | Mod: 25,S$PBB,, | Performed by: OPHTHALMOLOGY

## 2019-06-18 PROCEDURE — 67028 INJECTION EYE DRUG: CPT | Mod: S$PBB,RT,, | Performed by: OPHTHALMOLOGY

## 2019-06-18 PROCEDURE — 92014 PR EYE EXAM, EST PATIENT,COMPREHESV: ICD-10-PCS | Mod: 25,S$PBB,, | Performed by: OPHTHALMOLOGY

## 2019-06-18 PROCEDURE — 92134 POSTERIOR SEGMENT OCT RETINA (OCULAR COHERENCE TOMOGRAPHY)-BOTH EYES: ICD-10-PCS | Mod: 26,S$PBB,, | Performed by: OPHTHALMOLOGY

## 2019-06-18 PROCEDURE — 67028 PR INJECT INTRAVITREAL PHARMCOLOGIC: ICD-10-PCS | Mod: S$PBB,RT,, | Performed by: OPHTHALMOLOGY

## 2019-06-18 PROCEDURE — 67028 INJECTION EYE DRUG: CPT | Mod: PBBFAC,RT | Performed by: OPHTHALMOLOGY

## 2019-06-18 RX ADMIN — AFLIBERCEPT 2 MG: 40 INJECTION, SOLUTION INTRAVITREAL at 09:06

## 2019-06-18 NOTE — PROGRESS NOTES
[facility]     DLS- 4-23-19 Dr. Mandujano       AT's PRN         OCT - mild increase SRF/SRH  OS - Drusen, no SRF - small HE - ? RAP lesion     Assessment:    1. AMD - Wet OD - post Avastin x 12, post Eylea #34  PED increased prior to 10/12 visit    Had slight increase 5/13/13, 7/14, 5/16    Eylea OD today    Continue 8 week OD    2. 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 PVD OU    4. Pseudophakia - great result    5. Allergic conjunctivitis/sinusitis - try OTC antihistamines  Recent trouble with sinusitis - pain behind eye improved with antihistamines  Ok for Zaditor BID - has improved with topical tx    6. AFib - considering anticoagulation - ok to proceed  On Anti-VEGF therapy, risk of large macular hemorrhage is minimal.         Plan:      8 weeks OCT    Risks, benefits, and alternatives to treatment discussed in detail with the patient.  The patient voiced understanding and wished to proceed with the procedure    Injection Procedure Note:  Diagnosis: Wet AMD OD    Patient Identified and Time Out complete  Topical Proparacaine and Betadine. Conj lido  Inject Eylea OD at 6:00 @ 3.5-4mm posterior to limbus  Post Operative Dx: Same  Complications: None  Follow up as above.

## 2019-06-18 NOTE — PATIENT INSTRUCTIONS

## 2019-08-23 ENCOUNTER — PROCEDURE VISIT (OUTPATIENT)
Dept: OPHTHALMOLOGY | Facility: CLINIC | Age: 84
End: 2019-08-23
Attending: OPHTHALMOLOGY
Payer: MEDICARE

## 2019-08-23 VITALS — DIASTOLIC BLOOD PRESSURE: 63 MMHG | SYSTOLIC BLOOD PRESSURE: 140 MMHG | HEART RATE: 59 BPM

## 2019-08-23 DIAGNOSIS — H35.721 SEROUS DETACHMENT OF RETINAL PIGMENT EPITHELIUM OF RIGHT EYE: ICD-10-CM

## 2019-08-23 DIAGNOSIS — H35.3211 EXUDATIVE AGE-RELATED MACULAR DEGENERATION OF RIGHT EYE WITH ACTIVE CHOROIDAL NEOVASCULARIZATION: Primary | ICD-10-CM

## 2019-08-23 DIAGNOSIS — H35.3124 NONEXUDATIVE AGE-RELATED MACULAR DEGENERATION, LEFT EYE, ADVANCED ATROPHIC WITH SUBFOVEAL INVOLVEMENT: ICD-10-CM

## 2019-08-23 PROCEDURE — 67028 PR INJECT INTRAVITREAL PHARMCOLOGIC: ICD-10-PCS | Mod: S$PBB,RT,, | Performed by: OPHTHALMOLOGY

## 2019-08-23 PROCEDURE — 92014 COMPRE OPH EXAM EST PT 1/>: CPT | Mod: 25,S$PBB,, | Performed by: OPHTHALMOLOGY

## 2019-08-23 PROCEDURE — 92134 CPTRZ OPH DX IMG PST SGM RTA: CPT | Mod: PBBFAC | Performed by: OPHTHALMOLOGY

## 2019-08-23 PROCEDURE — 92134 POSTERIOR SEGMENT OCT RETINA (OCULAR COHERENCE TOMOGRAPHY)-BOTH EYES: ICD-10-PCS | Mod: 26,S$PBB,, | Performed by: OPHTHALMOLOGY

## 2019-08-23 PROCEDURE — 92014 PR EYE EXAM, EST PATIENT,COMPREHESV: ICD-10-PCS | Mod: 25,S$PBB,, | Performed by: OPHTHALMOLOGY

## 2019-08-23 PROCEDURE — 67028 INJECTION EYE DRUG: CPT | Mod: PBBFAC,RT | Performed by: OPHTHALMOLOGY

## 2019-08-23 PROCEDURE — 67028 INJECTION EYE DRUG: CPT | Mod: S$PBB,RT,, | Performed by: OPHTHALMOLOGY

## 2019-08-23 RX ADMIN — AFLIBERCEPT 2 MG: 40 INJECTION, SOLUTION INTRAVITREAL at 10:08

## 2019-08-23 NOTE — PATIENT INSTRUCTIONS

## 2019-08-23 NOTE — PROGRESS NOTES
HPI     8 wk OCT Eylea   DLS- 06/18/2019 Dr. Mandujano     Pt sts OS vision declining central va has been bad for a long time but feels peripheral va declining as well. OD seems to be fatigued from being the better eye always using it more to read   Denies pain     (-)Flashes (+)Floaters always   (-)Photophobia need more light to see   (-)Glare    AT's QID       OCT - mild increase SRF/SRH  OS - Drusen, no SRF - small HE - ? RAP lesion     Assessment:    1. AMD - Wet OD - post Avastin x 12, post Eylea #35  PED increased prior to 10/12 visit    Had slight increase 5/13/13, 7/14, 5/16    Eylea OD today    Continue 8 week OD    2. 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 PVD OU    4. Pseudophakia - great result    5. Allergic conjunctivitis/sinusitis - try OTC antihistamines  Recent trouble with sinusitis - pain behind eye improved with antihistamines  Ok for Zaditor BID - has improved with topical tx    6. AFib - considering anticoagulation - ok to proceed  On Anti-VEGF therapy, risk of large macular hemorrhage is minimal.         Plan:      8 weeks OCT    Risks, benefits, and alternatives to treatment discussed in detail with the patient.  The patient voiced understanding and wished to proceed with the procedure    Injection Procedure Note:  Diagnosis: Wet AMD OD    Patient Identified and Time Out complete  Topical Proparacaine and Betadine. Conj prep only  Inject Eylea OD at 6:00 @ 3.5-4mm posterior to limbus  Post Operative Dx: Same  Complications: None  Follow up as above.

## 2019-09-04 ENCOUNTER — LAB VISIT (OUTPATIENT)
Dept: LAB | Facility: OTHER | Age: 84
End: 2019-09-04
Attending: INTERNAL MEDICINE
Payer: MEDICARE

## 2019-09-04 ENCOUNTER — OFFICE VISIT (OUTPATIENT)
Dept: INTERNAL MEDICINE | Facility: CLINIC | Age: 84
End: 2019-09-04
Attending: INTERNAL MEDICINE
Payer: MEDICARE

## 2019-09-04 VITALS
HEIGHT: 63 IN | HEART RATE: 67 BPM | DIASTOLIC BLOOD PRESSURE: 63 MMHG | WEIGHT: 118.38 LBS | SYSTOLIC BLOOD PRESSURE: 134 MMHG | BODY MASS INDEX: 20.98 KG/M2 | OXYGEN SATURATION: 99 %

## 2019-09-04 DIAGNOSIS — N95.9 MENOPAUSAL PROBLEM: ICD-10-CM

## 2019-09-04 DIAGNOSIS — M85.80 OSTEOPENIA, UNSPECIFIED LOCATION: ICD-10-CM

## 2019-09-04 DIAGNOSIS — Z12.39 SCREENING FOR BREAST CANCER: ICD-10-CM

## 2019-09-04 DIAGNOSIS — C44.329 SQUAMOUS CELL CANCER OF SKIN OF EYEBROW: ICD-10-CM

## 2019-09-04 DIAGNOSIS — I10 ESSENTIAL HYPERTENSION: ICD-10-CM

## 2019-09-04 DIAGNOSIS — I51.89 DIASTOLIC DYSFUNCTION: ICD-10-CM

## 2019-09-04 DIAGNOSIS — I10 ESSENTIAL HYPERTENSION: Primary | ICD-10-CM

## 2019-09-04 DIAGNOSIS — I48.0 PAROXYSMAL ATRIAL FIBRILLATION: ICD-10-CM

## 2019-09-04 DIAGNOSIS — J44.9 CHRONIC OBSTRUCTIVE PULMONARY DISEASE, UNSPECIFIED COPD TYPE: ICD-10-CM

## 2019-09-04 LAB
ALBUMIN SERPL BCP-MCNC: 4.5 G/DL (ref 3.5–5.2)
ALP SERPL-CCNC: 59 U/L (ref 55–135)
ALT SERPL W/O P-5'-P-CCNC: 13 U/L (ref 10–44)
ANION GAP SERPL CALC-SCNC: 10 MMOL/L (ref 8–16)
AST SERPL-CCNC: 16 U/L (ref 10–40)
BASOPHILS # BLD AUTO: 0.05 K/UL (ref 0–0.2)
BASOPHILS NFR BLD: 0.8 % (ref 0–1.9)
BILIRUB SERPL-MCNC: 0.6 MG/DL (ref 0.1–1)
BUN SERPL-MCNC: 12 MG/DL (ref 8–23)
CALCIUM SERPL-MCNC: 10.5 MG/DL (ref 8.7–10.5)
CHLORIDE SERPL-SCNC: 99 MMOL/L (ref 95–110)
CHOLEST SERPL-MCNC: 247 MG/DL (ref 120–199)
CHOLEST/HDLC SERPL: 3.1 {RATIO} (ref 2–5)
CO2 SERPL-SCNC: 25 MMOL/L (ref 23–29)
CREAT SERPL-MCNC: 0.7 MG/DL (ref 0.5–1.4)
DIFFERENTIAL METHOD: ABNORMAL
EOSINOPHIL # BLD AUTO: 0.1 K/UL (ref 0–0.5)
EOSINOPHIL NFR BLD: 1.4 % (ref 0–8)
ERYTHROCYTE [DISTWIDTH] IN BLOOD BY AUTOMATED COUNT: 13.5 % (ref 11.5–14.5)
EST. GFR  (AFRICAN AMERICAN): >60 ML/MIN/1.73 M^2
EST. GFR  (NON AFRICAN AMERICAN): >60 ML/MIN/1.73 M^2
GLUCOSE SERPL-MCNC: 91 MG/DL (ref 70–110)
HCT VFR BLD AUTO: 37.3 % (ref 37–48.5)
HDLC SERPL-MCNC: 79 MG/DL (ref 40–75)
HDLC SERPL: 32 % (ref 20–50)
HGB BLD-MCNC: 12.6 G/DL (ref 12–16)
IMM GRANULOCYTES # BLD AUTO: 0.04 K/UL (ref 0–0.04)
IMM GRANULOCYTES NFR BLD AUTO: 0.7 % (ref 0–0.5)
LDLC SERPL CALC-MCNC: 151 MG/DL (ref 63–159)
LYMPHOCYTES # BLD AUTO: 2.2 K/UL (ref 1–4.8)
LYMPHOCYTES NFR BLD: 37 % (ref 18–48)
MCH RBC QN AUTO: 31.1 PG (ref 27–31)
MCHC RBC AUTO-ENTMCNC: 33.8 G/DL (ref 32–36)
MCV RBC AUTO: 92 FL (ref 82–98)
MONOCYTES # BLD AUTO: 1.1 K/UL (ref 0.3–1)
MONOCYTES NFR BLD: 18 % (ref 4–15)
NEUTROPHILS # BLD AUTO: 2.5 K/UL (ref 1.8–7.7)
NEUTROPHILS NFR BLD: 42.1 % (ref 38–73)
NONHDLC SERPL-MCNC: 168 MG/DL
NRBC BLD-RTO: 0 /100 WBC
PLATELET # BLD AUTO: 225 K/UL (ref 150–350)
PMV BLD AUTO: 11 FL (ref 9.2–12.9)
POTASSIUM SERPL-SCNC: 4.7 MMOL/L (ref 3.5–5.1)
PROT SERPL-MCNC: 7.7 G/DL (ref 6–8.4)
RBC # BLD AUTO: 4.05 M/UL (ref 4–5.4)
SODIUM SERPL-SCNC: 134 MMOL/L (ref 136–145)
TRIGL SERPL-MCNC: 85 MG/DL (ref 30–150)
TSH SERPL DL<=0.005 MIU/L-ACNC: 0.81 UIU/ML (ref 0.4–4)
WBC # BLD AUTO: 5.89 K/UL (ref 3.9–12.7)

## 2019-09-04 PROCEDURE — 84443 ASSAY THYROID STIM HORMONE: CPT

## 2019-09-04 PROCEDURE — 99214 PR OFFICE/OUTPT VISIT, EST, LEVL IV, 30-39 MIN: ICD-10-PCS | Mod: S$PBB,,, | Performed by: INTERNAL MEDICINE

## 2019-09-04 PROCEDURE — 99214 OFFICE O/P EST MOD 30 MIN: CPT | Mod: S$PBB,,, | Performed by: INTERNAL MEDICINE

## 2019-09-04 PROCEDURE — 99999 PR PBB SHADOW E&M-EST. PATIENT-LVL IV: ICD-10-PCS | Mod: PBBFAC,,, | Performed by: INTERNAL MEDICINE

## 2019-09-04 PROCEDURE — 99999 PR PBB SHADOW E&M-EST. PATIENT-LVL IV: CPT | Mod: PBBFAC,,, | Performed by: INTERNAL MEDICINE

## 2019-09-04 PROCEDURE — 80053 COMPREHEN METABOLIC PANEL: CPT

## 2019-09-04 PROCEDURE — 36415 COLL VENOUS BLD VENIPUNCTURE: CPT

## 2019-09-04 PROCEDURE — 82306 VITAMIN D 25 HYDROXY: CPT

## 2019-09-04 PROCEDURE — 80061 LIPID PANEL: CPT

## 2019-09-04 PROCEDURE — 85025 COMPLETE CBC W/AUTO DIFF WBC: CPT

## 2019-09-04 PROCEDURE — 99214 OFFICE O/P EST MOD 30 MIN: CPT | Mod: PBBFAC | Performed by: INTERNAL MEDICINE

## 2019-09-04 RX ORDER — AMLODIPINE BESYLATE 5 MG/1
5 TABLET ORAL DAILY
Qty: 90 TABLET | Refills: 3 | Status: SHIPPED | OUTPATIENT
Start: 2019-09-04 | End: 2020-02-19 | Stop reason: SDUPTHER

## 2019-09-04 RX ORDER — ALBUTEROL SULFATE 90 UG/1
2 AEROSOL, METERED RESPIRATORY (INHALATION) EVERY 6 HOURS PRN
Qty: 18 G | Refills: 11 | Status: SHIPPED | OUTPATIENT
Start: 2019-09-04 | End: 2020-09-29

## 2019-09-04 RX ORDER — LISINOPRIL 40 MG/1
40 TABLET ORAL DAILY
Qty: 90 TABLET | Refills: 3 | Status: SHIPPED | OUTPATIENT
Start: 2019-09-04 | End: 2020-02-17

## 2019-09-04 RX ORDER — SOTALOL HYDROCHLORIDE 80 MG/1
80 TABLET ORAL 2 TIMES DAILY
Qty: 180 TABLET | Refills: 3 | Status: SHIPPED | OUTPATIENT
Start: 2019-09-04 | End: 2020-02-19 | Stop reason: SDUPTHER

## 2019-09-04 NOTE — PATIENT INSTRUCTIONS
Ortho Knee specialists:  Dr. Lockwood [facility] Dr. George Chimento    In pharmacy:   rec flu vaccine and shingrix (shingles)

## 2019-09-04 NOTE — PROGRESS NOTES
"Subjective:   Patient ID: Cathy Kearns is a 89 y.o. female  Chief complaint:   Chief Complaint   Patient presents with    Hypertension     f/u       HPI  Here for HTN:    Since last seen has f/u cami - anuj Mandujano - [place], derm      P Afib and diastolic dysfunction:  -  followed by marcela - 2/2019 - still taking sotalol and eliquis and rosenda meds well      HTN:   - home bp - not monitoring   - readings at clinic appts 120-130/50-60s  - taking sotalol 80mg twice daily   - took lisinopril 40mg    - amlodipine 5mg   - exercising more   - following low salt diet     COPD:   - Seen by pulm - dx with COPD - plan is to use albuterol inh prn    she reports has not used this to date but does have a refill for this and Rx at home if needed   - denies cp or wheezing, intermittent sob improved since last seen and better exercise tolerance   took self off of Breo after made lips swell    - all sx stable at this time      Osteopenia: taking kwame/vit d supplement  dexa - 2/2017 and due for repeat    SCC: had 3 Mohs surgeries of left foot, lip and eyebrow  - followed by Dr. [facility]ueroa at [facility]    - area is stable and healing       OA of knees with intermittent knee pain:   Reports more recently had episode of intermittent left knee  - in past has required steroid injections which were effective at that time when needed    - Is going to be using staircases more often as elevators are being repaired at [facility] - last flare was a couple of months ago   - currently no pain, edema or redness at this time but would like names of rec ortho in case needed in future     HM:   rec flu vaccine and shingrix (shingles)    Review of Systems    Objective:  Vitals:    09/04/19 1107   BP: 134/63   Pulse: 67   SpO2: 99%   Weight: 53.7 kg (118 lb 6.2 oz)   Height: 5' 3" (1.6 m)     Body mass index is 20.97 kg/m².    Physical Exam   Constitutional: She is oriented to person, place, and time. She appears well-developed and well-nourished. "   HENT:   Head: Normocephalic and atraumatic.   Right Ear: External ear normal.   Left Ear: External ear normal.   Nose: Nose normal.   Mouth/Throat: Oropharynx is clear and moist. No oropharyngeal exudate.   No carotid bruits   Eyes: Conjunctivae and EOM are normal.   Neck: Neck supple. No thyromegaly present.   Cardiovascular: Normal rate, regular rhythm and intact distal pulses.   Pulmonary/Chest: Effort normal and breath sounds normal.   Abdominal: Soft. Bowel sounds are normal.   Musculoskeletal: She exhibits no edema or tenderness.   Lymphadenopathy:     She has no cervical adenopathy.   Neurological: She is alert and oriented to person, place, and time.   Skin: Skin is warm and dry.   Psychiatric: Her behavior is normal. Thought content normal.   Vitals reviewed.    Assessment:  1. Essential hypertension    2. Paroxysmal atrial fibrillation    3. Chronic obstructive pulmonary disease, unspecified COPD type    4. Menopausal problem    5. Screening for breast cancer    6. Osteopenia, unspecified location    7. Diastolic dysfunction    8. 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    Diagnoses and all orders for this visit:    Essential hypertension  -     lisinopril (PRINIVIL,ZESTRIL) 40 MG tablet; Take 1 tablet (40 mg total) by mouth once daily.  -     amLODIPine (NORVASC) 5 MG tablet; Take 1 tablet (5 mg total) by mouth once daily.  -     TSH; Future  -     Lipid panel; Future  -     CBC auto differential; Future  -     Comprehensive metabolic panel; Future  Stable, cont meds     Paroxysmal atrial fibrillation  -     sotalol (BETAPACE) 80 MG tablet; Take 1 tablet (80 mg total) by mouth 2 (two) times daily.  -     apixaban (ELIQUIS) 2.5 mg Tab; Take 1 tablet (2.5 mg total) by mouth 2 (two) times daily.  -     CBC auto differential; Future  Stable, cont meds and followed by cards     Chronic obstructive pulmonary disease, unspecified COPD type  -     albuterol  (PROVENTIL/VENTOLIN HFA) 90 mcg/actuation inhaler; Inhale 2 puffs into the lungs every 6 (six) hours as needed for Wheezing.    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

## 2019-09-05 LAB — 25(OH)D3+25(OH)D2 SERPL-MCNC: 37 NG/ML (ref 30–96)

## 2019-09-09 ENCOUNTER — TELEPHONE (OUTPATIENT)
Dept: INTERNAL MEDICINE | Facility: CLINIC | Age: 84
End: 2019-09-09

## 2019-09-09 DIAGNOSIS — R79.89 ABNORMAL CBC: Primary | ICD-10-CM

## 2019-09-09 DIAGNOSIS — E78.5 HYPERLIPIDEMIA, UNSPECIFIED HYPERLIPIDEMIA TYPE: ICD-10-CM

## 2019-09-09 NOTE — TELEPHONE ENCOUNTER
Ms Jose Antonio Xiong has ordered labs cbc for 2 weeks and fasting lipid panel for 3 months with will be in December. I need a day and time to schedule these lab work

## 2019-09-09 NOTE — TELEPHONE ENCOUNTER
Message sent to pt via my chart with lab results and updates to plan.     Please arrange cbc and path interp diff (peripheral blood smear) in 2 weeks     Please arrange flp in 3 months

## 2019-10-22 ENCOUNTER — PROCEDURE VISIT (OUTPATIENT)
Dept: OPHTHALMOLOGY | Facility: CLINIC | Age: 84
End: 2019-10-22
Payer: MEDICARE

## 2019-10-22 VITALS — SYSTOLIC BLOOD PRESSURE: 128 MMHG | DIASTOLIC BLOOD PRESSURE: 56 MMHG | HEART RATE: 57 BPM

## 2019-10-22 DIAGNOSIS — H35.3211 EXUDATIVE AGE-RELATED MACULAR DEGENERATION OF RIGHT EYE WITH ACTIVE CHOROIDAL NEOVASCULARIZATION: Primary | ICD-10-CM

## 2019-10-22 DIAGNOSIS — H35.3124 NONEXUDATIVE AGE-RELATED MACULAR DEGENERATION, LEFT EYE, ADVANCED ATROPHIC WITH SUBFOVEAL INVOLVEMENT: ICD-10-CM

## 2019-10-22 DIAGNOSIS — H35.721 SEROUS DETACHMENT OF RETINAL PIGMENT EPITHELIUM OF RIGHT EYE: ICD-10-CM

## 2019-10-22 PROCEDURE — 92134 POSTERIOR SEGMENT OCT RETINA (OCULAR COHERENCE TOMOGRAPHY)-BOTH EYES: ICD-10-PCS | Mod: 26,S$PBB,, | Performed by: OPHTHALMOLOGY

## 2019-10-22 PROCEDURE — 67028 INJECTION EYE DRUG: CPT | Mod: S$PBB,RT,, | Performed by: OPHTHALMOLOGY

## 2019-10-22 PROCEDURE — 92134 CPTRZ OPH DX IMG PST SGM RTA: CPT | Mod: PBBFAC | Performed by: OPHTHALMOLOGY

## 2019-10-22 PROCEDURE — 67028 INJECTION EYE DRUG: CPT | Mod: PBBFAC,RT | Performed by: OPHTHALMOLOGY

## 2019-10-22 PROCEDURE — 67028 PR INJECT INTRAVITREAL PHARMCOLOGIC: ICD-10-PCS | Mod: S$PBB,RT,, | Performed by: OPHTHALMOLOGY

## 2019-10-22 PROCEDURE — 92014 PR EYE EXAM, EST PATIENT,COMPREHESV: ICD-10-PCS | Mod: 25,S$PBB,, | Performed by: OPHTHALMOLOGY

## 2019-10-22 PROCEDURE — 92014 COMPRE OPH EXAM EST PT 1/>: CPT | Mod: 25,S$PBB,, | Performed by: OPHTHALMOLOGY

## 2019-10-22 RX ADMIN — AFLIBERCEPT 2 MG: 40 INJECTION, SOLUTION INTRAVITREAL at 09:10

## 2019-12-17 ENCOUNTER — PROCEDURE VISIT (OUTPATIENT)
Dept: OPHTHALMOLOGY | Facility: CLINIC | Age: 84
End: 2019-12-17
Attending: OPHTHALMOLOGY
Payer: MEDICARE

## 2019-12-17 DIAGNOSIS — H35.3211 EXUDATIVE AGE-RELATED MACULAR DEGENERATION OF RIGHT EYE WITH ACTIVE CHOROIDAL NEOVASCULARIZATION: Primary | ICD-10-CM

## 2019-12-17 DIAGNOSIS — H35.721 SEROUS DETACHMENT OF RETINAL PIGMENT EPITHELIUM OF RIGHT EYE: ICD-10-CM

## 2019-12-17 DIAGNOSIS — H35.3124 NONEXUDATIVE AGE-RELATED MACULAR DEGENERATION, LEFT EYE, ADVANCED ATROPHIC WITH SUBFOVEAL INVOLVEMENT: ICD-10-CM

## 2019-12-17 PROCEDURE — 92014 COMPRE OPH EXAM EST PT 1/>: CPT | Mod: 25,S$PBB,, | Performed by: OPHTHALMOLOGY

## 2019-12-17 PROCEDURE — 92014 PR EYE EXAM, EST PATIENT,COMPREHESV: ICD-10-PCS | Mod: 25,S$PBB,, | Performed by: OPHTHALMOLOGY

## 2019-12-17 PROCEDURE — 67028 INJECTION EYE DRUG: CPT | Mod: S$PBB,RT,, | Performed by: OPHTHALMOLOGY

## 2019-12-17 PROCEDURE — 92134 POSTERIOR SEGMENT OCT RETINA (OCULAR COHERENCE TOMOGRAPHY)-BOTH EYES: ICD-10-PCS | Mod: 26,S$PBB,, | Performed by: OPHTHALMOLOGY

## 2019-12-17 PROCEDURE — 92134 CPTRZ OPH DX IMG PST SGM RTA: CPT | Mod: PBBFAC | Performed by: OPHTHALMOLOGY

## 2019-12-17 PROCEDURE — 67028 INJECTION EYE DRUG: CPT | Mod: PBBFAC,RT | Performed by: OPHTHALMOLOGY

## 2019-12-17 PROCEDURE — 67028 PR INJECT INTRAVITREAL PHARMCOLOGIC: ICD-10-PCS | Mod: S$PBB,RT,, | Performed by: OPHTHALMOLOGY

## 2019-12-17 RX ADMIN — AFLIBERCEPT 2 MG: 40 INJECTION, SOLUTION INTRAVITREAL at 10:12

## 2019-12-17 NOTE — PROGRESS NOTES
HPI     Pt here for 2 month follow up ARMD OD    Last edited by Jacobo Valdez on 12/17/2019  9:21 AM. (History)          OCT - mild increase SRF/SRH  OS - Drusen, no SRF - small HE - ? RAP lesion     Assessment:    1. AMD - Wet OD - post Avastin x 12, post Eylea #36  PED increased prior to 10/12 visit    Had slight increase 5/13/13, 7/14, 5/16    Eylea OD today    Continue 8 week OD    2. 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 PVD OU    4. Pseudophakia - great result    5. Allergic conjunctivitis/sinusitis - try OTC antihistamines  Recent trouble with sinusitis - pain behind eye improved with antihistamines  Ok for Zaditor BID - has improved with topical tx    6. AFib - considering anticoagulation - ok to proceed  On Anti-VEGF therapy, risk of large macular hemorrhage is minimal.         Plan:      8 weeks OCT    Risks, benefits, and alternatives to treatment discussed in detail with the patient.  The patient voiced understanding and wished to proceed with the procedure    Injection Procedure Note:  Diagnosis: Wet AMD OD    Patient Identified and Time Out complete  Topical Proparacaine and Betadine. Conj prep only  Inject Eylea OD at 6:00 @ 3.5-4mm posterior to limbus  Post Operative Dx: Same  Complications: None  Follow up as above.

## 2019-12-17 NOTE — PATIENT INSTRUCTIONS

## 2020-02-17 DIAGNOSIS — I10 ESSENTIAL HYPERTENSION: ICD-10-CM

## 2020-02-17 RX ORDER — LISINOPRIL 40 MG/1
TABLET ORAL
Qty: 90 TABLET | Refills: 3 | Status: SHIPPED | OUTPATIENT
Start: 2020-02-17 | End: 2020-02-19 | Stop reason: SDUPTHER

## 2020-02-18 ENCOUNTER — PROCEDURE VISIT (OUTPATIENT)
Dept: OPHTHALMOLOGY | Facility: CLINIC | Age: 85
End: 2020-02-18
Attending: OPHTHALMOLOGY
Payer: MEDICARE

## 2020-02-18 VITALS — SYSTOLIC BLOOD PRESSURE: 119 MMHG | HEART RATE: 50 BPM | DIASTOLIC BLOOD PRESSURE: 59 MMHG

## 2020-02-18 DIAGNOSIS — H35.3124 NONEXUDATIVE AGE-RELATED MACULAR DEGENERATION, LEFT EYE, ADVANCED ATROPHIC WITH SUBFOVEAL INVOLVEMENT: ICD-10-CM

## 2020-02-18 DIAGNOSIS — H35.721 SEROUS DETACHMENT OF RETINAL PIGMENT EPITHELIUM OF RIGHT EYE: ICD-10-CM

## 2020-02-18 DIAGNOSIS — H35.3211 EXUDATIVE AGE-RELATED MACULAR DEGENERATION OF RIGHT EYE WITH ACTIVE CHOROIDAL NEOVASCULARIZATION: Primary | ICD-10-CM

## 2020-02-18 PROCEDURE — 67028 PR INJECT INTRAVITREAL PHARMCOLOGIC: ICD-10-PCS | Mod: S$PBB,RT,, | Performed by: OPHTHALMOLOGY

## 2020-02-18 PROCEDURE — 92134 POSTERIOR SEGMENT OCT RETINA (OCULAR COHERENCE TOMOGRAPHY)-BOTH EYES: ICD-10-PCS | Mod: 26,S$PBB,, | Performed by: OPHTHALMOLOGY

## 2020-02-18 PROCEDURE — 92014 PR EYE EXAM, EST PATIENT,COMPREHESV: ICD-10-PCS | Mod: 25,S$PBB,, | Performed by: OPHTHALMOLOGY

## 2020-02-18 PROCEDURE — 67028 INJECTION EYE DRUG: CPT | Mod: PBBFAC,RT | Performed by: OPHTHALMOLOGY

## 2020-02-18 PROCEDURE — 67028 INJECTION EYE DRUG: CPT | Mod: S$PBB,RT,, | Performed by: OPHTHALMOLOGY

## 2020-02-18 PROCEDURE — 92134 CPTRZ OPH DX IMG PST SGM RTA: CPT | Mod: PBBFAC | Performed by: OPHTHALMOLOGY

## 2020-02-18 PROCEDURE — 92014 COMPRE OPH EXAM EST PT 1/>: CPT | Mod: 25,S$PBB,, | Performed by: OPHTHALMOLOGY

## 2020-02-18 RX ADMIN — AFLIBERCEPT 2 MG: 40 INJECTION, SOLUTION INTRAVITREAL at 10:02

## 2020-02-18 NOTE — PROGRESS NOTES
HPI     DLS: 12/17/2019 Dr. Mandujano     Patient states she sees a transparent screen in the center vision OS. She   states it looks like its pulsating. Vision OD is stable.      AT's QID         OCT - mild increase SRF/SRH  OS - Drusen, no SRF - small HE - ? RAP lesion     Assessment:    1. AMD - Wet OD - post Avastin x 12, post Eylea #37  PED increased prior to 10/12 visit    Had slight increase 5/13/13, 7/14, 5/16    Eylea OD today    Continue 8 week OD    2. 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 PVD OU    4. Pseudophakia - great result    5. Allergic conjunctivitis/sinusitis - try OTC antihistamines  Recent trouble with sinusitis - pain behind eye improved with antihistamines  Ok for Zaditor BID - has improved with topical tx    6. AFib - considering anticoagulation - ok to proceed  On Anti-VEGF therapy, risk of large macular hemorrhage is minimal.         Plan:      8 weeks OCT    Risks, benefits, and alternatives to treatment discussed in detail with the patient.  The patient voiced understanding and wished to proceed with the procedure    Injection Procedure Note:  Diagnosis: Wet AMD OD    Patient Identified and Time Out complete  Topical Proparacaine and Betadine. Conj prep only  Inject Eylea OD at 6:00 @ 3.5-4mm posterior to limbus  Post Operative Dx: Same  Complications: None  Follow up as above.

## 2020-02-18 NOTE — PATIENT INSTRUCTIONS

## 2020-02-19 ENCOUNTER — OFFICE VISIT (OUTPATIENT)
Dept: CARDIOLOGY | Facility: CLINIC | Age: 85
End: 2020-02-19
Payer: MEDICARE

## 2020-02-19 VITALS
WEIGHT: 122.81 LBS | HEIGHT: 63 IN | DIASTOLIC BLOOD PRESSURE: 65 MMHG | HEART RATE: 61 BPM | BODY MASS INDEX: 21.76 KG/M2 | SYSTOLIC BLOOD PRESSURE: 143 MMHG

## 2020-02-19 DIAGNOSIS — I10 ESSENTIAL HYPERTENSION: ICD-10-CM

## 2020-02-19 DIAGNOSIS — J44.9 CHRONIC OBSTRUCTIVE PULMONARY DISEASE, UNSPECIFIED COPD TYPE: ICD-10-CM

## 2020-02-19 DIAGNOSIS — H35.30 MACULAR DEGENERATION OF BOTH EYES, UNSPECIFIED TYPE: Primary | ICD-10-CM

## 2020-02-19 DIAGNOSIS — I48.0 PAROXYSMAL ATRIAL FIBRILLATION: ICD-10-CM

## 2020-02-19 PROCEDURE — 99999 PR PBB SHADOW E&M-EST. PATIENT-LVL III: CPT | Mod: PBBFAC,,, | Performed by: INTERNAL MEDICINE

## 2020-02-19 PROCEDURE — 99214 PR OFFICE/OUTPT VISIT, EST, LEVL IV, 30-39 MIN: ICD-10-PCS | Mod: S$PBB,,, | Performed by: INTERNAL MEDICINE

## 2020-02-19 PROCEDURE — 99999 PR PBB SHADOW E&M-EST. PATIENT-LVL III: ICD-10-PCS | Mod: PBBFAC,,, | Performed by: INTERNAL MEDICINE

## 2020-02-19 PROCEDURE — 99214 OFFICE O/P EST MOD 30 MIN: CPT | Mod: S$PBB,,, | Performed by: INTERNAL MEDICINE

## 2020-02-19 PROCEDURE — 99213 OFFICE O/P EST LOW 20 MIN: CPT | Mod: PBBFAC | Performed by: INTERNAL MEDICINE

## 2020-02-19 RX ORDER — LISINOPRIL 40 MG/1
TABLET ORAL
Qty: 90 TABLET | Refills: 3 | Status: SHIPPED | OUTPATIENT
Start: 2020-02-19 | End: 2021-03-12 | Stop reason: SDUPTHER

## 2020-02-19 RX ORDER — SOTALOL HYDROCHLORIDE 80 MG/1
80 TABLET ORAL 2 TIMES DAILY
Qty: 180 TABLET | Refills: 3 | Status: SHIPPED | OUTPATIENT
Start: 2020-02-19 | End: 2021-03-12 | Stop reason: SDUPTHER

## 2020-02-19 RX ORDER — AMLODIPINE BESYLATE 5 MG/1
5 TABLET ORAL DAILY
Qty: 90 TABLET | Refills: 3 | Status: SHIPPED | OUTPATIENT
Start: 2020-02-19 | End: 2021-03-12 | Stop reason: SDUPTHER

## 2020-02-19 NOTE — PROGRESS NOTES
"Chart has been dictated using voice recognition software.  It is not been reviewed carefully for any transcriptional errors due to this technology.   Subjective:   Patient ID:  Cathy Kearns is a 89 y.o. female who presents for follow-up of Paroxysmal atrial fibrillation (1 yr f/u )      HPI:  Patient with paroxysmal atrial fibrillation, hypertension, hyperlipidemia, normal ejection fraction with diastolic dysfunction, mild aortic insufficiency, and COPD.    Patient has some dyspnea on exertion, but states that it edwina be due to COPD.  Able to walk fro [facility] to [facility] and back without stopping to catch he breath.   Patient denies any dyspnea at rest, orthopnea, PND, or edema.  Patient denies any chest discomfort on exertion or at rest. Patient denies any palpitations, lightheadedness, or syncope, although has some mild orthostatic dizziness.     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 18 g 11    ANTIOX #8/OM3/DHA/EPA/LUT/ZEAX (PRESERVISION AREDS 2, OMEGA-3, ORAL) Take 1 tablet by mouth once daily at 6am.       CALCIUM CARBONATE (CALCIUM 500 ORAL) Take 500 mg by mouth once daily.       mupirocin (BACTROBAN) 2 % ointment CRAIG AFTER SALT WATER COMPRESSES BID  0    omega 3-dha-epa-fish oil 300-1,000 mg Cap " "Take 3 g by mouth.      amLODIPine (NORVASC) 5 MG tablet Take 1 tablet (5 mg total) by mouth once daily. 90 tablet 3    apixaban (ELIQUIS) 2.5 mg Tab Take 1 tablet (2.5 mg total) by mouth 2 (two) times daily. 180 tablet 3    lisinopril (PRINIVIL,ZESTRIL) 40 MG tablet TAKE 1 TABLET(40 MG) BY MOUTH EVERY DAY 90 tablet 3    sotalol (BETAPACE) 80 MG tablet Take 1 tablet (80 mg total) by mouth 2 (two) times daily. 180 tablet 3    scopolamine (TRANSDERM-SCOP) 1.3-1.5 mg (1 mg over 3 days) Place 1 patch onto the skin every 72 hours. (Patient not taking: Reported on 2/19/2020) 10 patch 0     No facility-administered medications prior to visit.        Review of Systems   Constitution: Negative for weight gain and weight loss.   HENT: Positive for congestion (sinus and ear). Negative for nosebleeds.    Eyes: Positive for vision loss in left eye (has only peripheral vison) and vision loss in right eye (diminished due to macular degeneration+).   Cardiovascular: Negative for claudication.        As above   Respiratory: Negative for hemoptysis, shortness of breath, snoring, sputum production and wheezing.    Endocrine: Negative for polydipsia and polyuria.   Hematologic/Lymphatic: Does not bruise/bleed easily.   Musculoskeletal: Negative for myalgias.   Gastrointestinal: Negative for change in bowel habit, hematemesis, hematochezia, melena, nausea and vomiting.   Genitourinary: Negative for hematuria.   Neurological: Positive for loss of balance (mild). Negative for focal weakness and numbness.      Objective:   Physical Exam   Constitutional: She is oriented to person, place, and time. She appears well-developed and well-nourished.   BP (!) 143/65 (BP Location: Left arm, Patient Position: Sitting, BP Method: Medium (Automatic))   Pulse 61   Ht 5' 3" (1.6 m)   Wt 55.7 kg (122 lb 12.7 oz)   BMI 21.75 kg/m²    Neck: Neck supple. No JVD present. Carotid bruit is not present. No thyromegaly present.   Cardiovascular: Normal " rate, regular rhythm, S1 normal, S2 normal and intact distal pulses. Exam reveals S4. Exam reveals no friction rub.   No murmur heard.  Pulmonary/Chest: Breath sounds normal. She has no wheezes. She has no rales.   Abdominal: Soft. Normal aorta and bowel sounds are normal. She exhibits no pulsatile midline mass. There is no hepatosplenomegaly. There is no tenderness.   Musculoskeletal: She exhibits edema (trace ankle bilateral).   Neurological: She is alert and oriented to person, place, and time. She has normal strength.   Skin: No cyanosis. Nails show no clubbing.          Lab Results   Component Value Date     (L) 09/04/2019    K 4.7 09/04/2019    BUN 12 09/04/2019    CREATININE 0.7 09/04/2019    GLU 91 09/04/2019    CHOL 247 (H) 09/04/2019    HDL 79 (H) 09/04/2019    LDLCALC 151.0 09/04/2019    TRIG 85 09/04/2019    CHOLHDL 32.0 09/04/2019    HGB 12.6 09/04/2019    HCT 37.3 09/04/2019     09/04/2019     Patient has no symptoms of cardiac ischemia, heart failure, or significant arrhythmias.  No changes will be made in the patient's medications at this time. Unless there are intervening problems, patient should return for re-evaluation in 1 year.   Assessment:     1. Macular degeneration of both eyes, unspecified type    2. Paroxysmal atrial fibrillation    3. Essential hypertension    4. Chronic obstructive pulmonary disease, unspecified COPD type        Plan:     Cathy was seen today for paroxysmal atrial fibrillation.    Diagnoses and all orders for this visit:    Macular degeneration of both eyes, unspecified type    Paroxysmal atrial fibrillation  -     apixaban (ELIQUIS) 2.5 mg Tab; Take 1 tablet (2.5 mg total) by mouth 2 (two) times daily.  -     sotalol (BETAPACE) 80 MG tablet; Take 1 tablet (80 mg total) by mouth 2 (two) times daily.    Essential hypertension  -     lisinopril (PRINIVIL,ZESTRIL) 40 MG tablet; TAKE 1 TABLET(40 MG) BY MOUTH EVERY DAY  -     amLODIPine (NORVASC) 5 MG tablet; Take  1 tablet (5 mg total) by mouth once daily.    Chronic obstructive pulmonary disease, unspecified COPD type          Eriberto Mcelroy MD  Consultative Cardiology

## 2020-06-02 ENCOUNTER — PROCEDURE VISIT (OUTPATIENT)
Dept: OPHTHALMOLOGY | Facility: CLINIC | Age: 85
End: 2020-06-02
Attending: OPHTHALMOLOGY
Payer: MEDICARE

## 2020-06-02 VITALS — DIASTOLIC BLOOD PRESSURE: 76 MMHG | HEART RATE: 74 BPM | SYSTOLIC BLOOD PRESSURE: 179 MMHG

## 2020-06-02 DIAGNOSIS — H35.3124 NONEXUDATIVE AGE-RELATED MACULAR DEGENERATION, LEFT EYE, ADVANCED ATROPHIC WITH SUBFOVEAL INVOLVEMENT: ICD-10-CM

## 2020-06-02 DIAGNOSIS — H35.3211 EXUDATIVE AGE-RELATED MACULAR DEGENERATION OF RIGHT EYE WITH ACTIVE CHOROIDAL NEOVASCULARIZATION: Primary | ICD-10-CM

## 2020-06-02 DIAGNOSIS — H35.721 SEROUS DETACHMENT OF RETINAL PIGMENT EPITHELIUM OF RIGHT EYE: ICD-10-CM

## 2020-06-02 PROCEDURE — 67028 INJECTION EYE DRUG: CPT | Mod: PBBFAC,RT | Performed by: OPHTHALMOLOGY

## 2020-06-02 PROCEDURE — 92014 PR EYE EXAM, EST PATIENT,COMPREHESV: ICD-10-PCS | Mod: 25,S$PBB,, | Performed by: OPHTHALMOLOGY

## 2020-06-02 PROCEDURE — 67028 PR INJECT INTRAVITREAL PHARMCOLOGIC: ICD-10-PCS | Mod: S$PBB,RT,, | Performed by: OPHTHALMOLOGY

## 2020-06-02 PROCEDURE — 67028 INJECTION EYE DRUG: CPT | Mod: S$PBB,RT,, | Performed by: OPHTHALMOLOGY

## 2020-06-02 PROCEDURE — 92014 COMPRE OPH EXAM EST PT 1/>: CPT | Mod: 25,S$PBB,, | Performed by: OPHTHALMOLOGY

## 2020-06-02 PROCEDURE — 92134 CPTRZ OPH DX IMG PST SGM RTA: CPT | Mod: PBBFAC | Performed by: OPHTHALMOLOGY

## 2020-06-02 PROCEDURE — 92134 POSTERIOR SEGMENT OCT RETINA (OCULAR COHERENCE TOMOGRAPHY)-BOTH EYES: ICD-10-PCS | Mod: 26,S$PBB,, | Performed by: OPHTHALMOLOGY

## 2020-06-02 RX ADMIN — AFLIBERCEPT 2 MG: 40 INJECTION, SOLUTION INTRAVITREAL at 10:06

## 2020-06-02 NOTE — PATIENT INSTRUCTIONS

## 2020-06-02 NOTE — PROGRESS NOTES
HPI     DLS: 02/18/2020 Dr. Mandujano     Patient states her vision has been stable but states she has layer of fog   in her eyes. Patient states she was in [place] for a few months and did   have na injection while there. She states her records were pulled but not   sure of the name of the medication given. The injection was only done in   her right eye.     AT's QID         OCT - mild increase SRF/SRH  OS - Drusen, no SRF - small HE - ? RAP lesion     Assessment:    1. AMD - Wet OD - post Avastin x 12, post Eylea #38  PED increased prior to 10/12 visit    Had slight increase 5/13/13, 7/14, 5/16 4/20 tx done in [place] during Covid    Eylea OD today    Continue q8 week tx    2. 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 PVD OU    4. Pseudophakia - great result    5. Allergic conjunctivitis/sinusitis - try OTC antihistamines  Recent trouble with sinusitis - pain behind eye improved with antihistamines  Ok for Zaditor BID - has improved with topical tx    6. AFib - considering anticoagulation - ok to proceed  On Anti-VEGF therapy, risk of large macular hemorrhage is minimal.         Plan:      2  months OCT Dilate OU    Risks, benefits, and alternatives to treatment discussed in detail with the patient.  The patient voiced understanding and wished to proceed with the procedure    Injection Procedure Note:  Diagnosis: Wet AMD OD    Patient Identified and Time Out complete  Topical Proparacaine and Betadine. Conj prep only  Inject Eylea OD at 6:00 @ 3.5-4mm posterior to limbus  Post Operative Dx: Same  Complications: None  Follow up as above.

## 2020-07-15 DIAGNOSIS — Z71.89 COMPLEX CARE COORDINATION: ICD-10-CM

## 2020-08-04 ENCOUNTER — PROCEDURE VISIT (OUTPATIENT)
Dept: OPHTHALMOLOGY | Facility: CLINIC | Age: 85
End: 2020-08-04
Payer: MEDICARE

## 2020-08-04 VITALS — DIASTOLIC BLOOD PRESSURE: 56 MMHG | SYSTOLIC BLOOD PRESSURE: 109 MMHG | HEART RATE: 58 BPM

## 2020-08-04 DIAGNOSIS — H35.3124 NONEXUDATIVE AGE-RELATED MACULAR DEGENERATION, LEFT EYE, ADVANCED ATROPHIC WITH SUBFOVEAL INVOLVEMENT: ICD-10-CM

## 2020-08-04 DIAGNOSIS — H35.3211 EXUDATIVE AGE-RELATED MACULAR DEGENERATION OF RIGHT EYE WITH ACTIVE CHOROIDAL NEOVASCULARIZATION: Primary | ICD-10-CM

## 2020-08-04 DIAGNOSIS — H35.721 SEROUS DETACHMENT OF RETINAL PIGMENT EPITHELIUM OF RIGHT EYE: ICD-10-CM

## 2020-08-04 PROCEDURE — 92134 CPTRZ OPH DX IMG PST SGM RTA: CPT | Mod: PBBFAC | Performed by: OPHTHALMOLOGY

## 2020-08-04 PROCEDURE — 92134 POSTERIOR SEGMENT OCT RETINA (OCULAR COHERENCE TOMOGRAPHY)-BOTH EYES: ICD-10-PCS | Mod: 26,S$PBB,, | Performed by: OPHTHALMOLOGY

## 2020-08-04 PROCEDURE — 67028 PR INJECT INTRAVITREAL PHARMCOLOGIC: ICD-10-PCS | Mod: S$PBB,RT,, | Performed by: OPHTHALMOLOGY

## 2020-08-04 PROCEDURE — 67028 INJECTION EYE DRUG: CPT | Mod: S$PBB,RT,, | Performed by: OPHTHALMOLOGY

## 2020-08-04 PROCEDURE — 67028 INJECTION EYE DRUG: CPT | Mod: PBBFAC,LT | Performed by: OPHTHALMOLOGY

## 2020-08-04 PROCEDURE — 92012 PR EYE EXAM, EST PATIENT,INTERMED: ICD-10-PCS | Mod: 25,S$PBB,, | Performed by: OPHTHALMOLOGY

## 2020-08-04 PROCEDURE — 92012 INTRM OPH EXAM EST PATIENT: CPT | Mod: 25,S$PBB,, | Performed by: OPHTHALMOLOGY

## 2020-08-04 RX ADMIN — AFLIBERCEPT 2 MG: 40 INJECTION, SOLUTION INTRAVITREAL at 12:08

## 2020-08-04 NOTE — PROGRESS NOTES
HPI     DLS 06/02/2020 by Dr. JULITO Mandujano MD    89 YO F here today for her Eylea OD, OCT and Full Exam.     CC:     Eye Meds: AT's QID           OCT - mild increase SRF/SRH  OS - Drusen, no SRF - small HE - ? RAP lesion     Assessment:    1. AMD - Wet OD - post Avastin x 12, post Eylea #39  PED increased prior to 10/12 visit    Had slight increase 5/13/13, 7/14, 5/16 4/20 tx done in [place] during Covid    Eylea OD today    Continue q8 week tx    2. 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 PVD OU    4. Pseudophakia - great result    5. Allergic conjunctivitis/sinusitis - try OTC antihistamines  Recent trouble with sinusitis - pain behind eye improved with antihistamines  Ok for Zaditor BID - has improved with topical tx    6. AFib - considering anticoagulation - ok to proceed  On Anti-VEGF therapy, risk of large macular hemorrhage is minimal.         Plan:      2  months OCT no dilate    Risks, benefits, and alternatives to treatment discussed in detail with the patient.  The patient voiced understanding and wished to proceed with the procedure    Injection Procedure Note:  Diagnosis: Wet AMD OD    Patient Identified and Time Out complete  Topical Proparacaine and Betadine. Conj prep only  Inject Eylea OD at 6:00 @ 3.5-4mm posterior to limbus  Post Operative Dx: Same  Complications: None  Follow up as above.

## 2020-08-04 NOTE — PATIENT INSTRUCTIONS

## 2020-08-19 ENCOUNTER — PATIENT OUTREACH (OUTPATIENT)
Dept: ADMINISTRATIVE | Facility: HOSPITAL | Age: 85
End: 2020-08-19

## 2020-08-26 ENCOUNTER — OFFICE VISIT (OUTPATIENT)
Dept: INTERNAL MEDICINE | Facility: CLINIC | Age: 85
End: 2020-08-26
Attending: INTERNAL MEDICINE
Payer: MEDICARE

## 2020-08-26 ENCOUNTER — HOSPITAL ENCOUNTER (OUTPATIENT)
Dept: RADIOLOGY | Facility: OTHER | Age: 85
Discharge: HOME | End: 2020-08-26
Attending: INTERNAL MEDICINE
Payer: MEDICARE

## 2020-08-26 VITALS
OXYGEN SATURATION: 97 % | WEIGHT: 121.94 LBS | HEIGHT: 63 IN | SYSTOLIC BLOOD PRESSURE: 126 MMHG | BODY MASS INDEX: 21.61 KG/M2 | DIASTOLIC BLOOD PRESSURE: 65 MMHG | HEART RATE: 52 BPM

## 2020-08-26 DIAGNOSIS — N95.9 MENOPAUSAL PROBLEM: ICD-10-CM

## 2020-08-26 DIAGNOSIS — C44.329 SQUAMOUS CELL CANCER OF SKIN OF EYEBROW: ICD-10-CM

## 2020-08-26 DIAGNOSIS — M85.80 OSTEOPENIA, UNSPECIFIED LOCATION: ICD-10-CM

## 2020-08-26 DIAGNOSIS — I10 ESSENTIAL HYPERTENSION: Primary | ICD-10-CM

## 2020-08-26 DIAGNOSIS — I48.0 PAROXYSMAL ATRIAL FIBRILLATION: ICD-10-CM

## 2020-08-26 DIAGNOSIS — H35.3211 EXUDATIVE AGE-RELATED MACULAR DEGENERATION OF RIGHT EYE WITH ACTIVE CHOROIDAL NEOVASCULARIZATION: ICD-10-CM

## 2020-08-26 DIAGNOSIS — J44.9 CHRONIC OBSTRUCTIVE PULMONARY DISEASE, UNSPECIFIED COPD TYPE: ICD-10-CM

## 2020-08-26 PROCEDURE — 99999 PR PBB SHADOW E&M-EST. PATIENT-LVL V: CPT | Mod: PBBFAC,,, | Performed by: INTERNAL MEDICINE

## 2020-08-26 PROCEDURE — 99214 OFFICE O/P EST MOD 30 MIN: CPT | Mod: S$PBB,,, | Performed by: INTERNAL MEDICINE

## 2020-08-26 PROCEDURE — 77080 DEXA BONE DENSITY SPINE HIP: ICD-10-PCS | Mod: 26,,, | Performed by: INTERNAL MEDICINE

## 2020-08-26 PROCEDURE — 77080 DXA BONE DENSITY AXIAL: CPT | Mod: TC

## 2020-08-26 PROCEDURE — 99215 OFFICE O/P EST HI 40 MIN: CPT | Mod: PBBFAC,25 | Performed by: INTERNAL MEDICINE

## 2020-08-26 PROCEDURE — 99999 PR PBB SHADOW E&M-EST. PATIENT-LVL V: ICD-10-PCS | Mod: PBBFAC,,, | Performed by: INTERNAL MEDICINE

## 2020-08-26 PROCEDURE — 77080 DXA BONE DENSITY AXIAL: CPT | Mod: 26,,, | Performed by: INTERNAL MEDICINE

## 2020-08-26 PROCEDURE — 99214 PR OFFICE/OUTPT VISIT, EST, LEVL IV, 30-39 MIN: ICD-10-PCS | Mod: S$PBB,,, | Performed by: INTERNAL MEDICINE

## 2020-08-26 RX ORDER — FLUTICASONE PROPIONATE 50 MCG
2 SPRAY, SUSPENSION (ML) NASAL DAILY
Qty: 16 G | Refills: 3 | Status: SHIPPED | OUTPATIENT
Start: 2020-08-26 | End: 2021-11-03

## 2020-08-26 RX ORDER — AZELASTINE 1 MG/ML
1 SPRAY, METERED NASAL 2 TIMES DAILY
Qty: 30 ML | Refills: 11 | Status: SHIPPED | OUTPATIENT
Start: 2020-08-26 | End: 2021-07-02

## 2020-08-26 NOTE — PROGRESS NOTES
"Subjective:   Patient ID: Cathy Kearns is a 89 y.o. female  Chief complaint:   Chief Complaint   Patient presents with    Annual Exam    Sinus Problem       HPI    Here for annual exam and HTN:    Living at [facility] and 'hearing aid lady is coming tomorrow'     Since last seen has f/u cami - Nazia, anuj - Navi, derm      Chronic allergic rhinitis and ear fullness and pressure over past few years:    - used flonase and nasal saline in past and helped and then stopped a while ago   - never tried astelin   - taking oral anti-histamine intermittently     P Afib and diastolic dysfunction:  -  followed by marcela - 2/2020   - still taking sotalol and eliquis and rosenda meds well   - no cp or sob or decrease in stamina     HTN:   - home bp - 110-120/50-60s  - taking sotalol 80mg twice daily   - took lisinopril 40mg    - amlodipine 5mg   - exercising   - following low salt diet     COPD:   - Seen by pulm - dx with COPD - plan is to use albuterol inh prn    she reports has not used this to date but does have a refill for this and Rx at home if needed   - denies cp or wheezing, intermittent sob improved since last seen and better exercise tolerance   - previously, took self off of Breo after made lips swell    - all sx stable at this time      Osteopenia:   - taking kwame/vit d supplement but not taking full amt as diet contains good sources of calcium and vit d   dexa - 2/2017 and due for repeat    SCC: had mult Mohs surgeries of left foot, lip and eyebrow - areas have healed well   - followed by maribel in [place]      OA of knees with intermittent knee pain: stable     Foot ulcer healed     HM:   rec flu vaccine and shingrix (shingles)  dexa   Declines further mammograms     Review of Systems      Objective:  Vitals:    08/26/20 0857 08/26/20 0928   BP: (!) 140/72 126/65   BP Location: Left arm    Patient Position: Sitting    Pulse: (!) 52    SpO2: 97%    Weight: 55.3 kg (121 lb 14.6 oz)    Height: 5' 3" (1.6 m)  "     Body mass index is 21.6 kg/m².    Physical Exam  Vitals signs reviewed.   Constitutional:       Appearance: Normal appearance. She is well-developed.   HENT:      Head: Normocephalic and atraumatic.      Right Ear: Tympanic membrane, ear canal and external ear normal.      Left Ear: Tympanic membrane, ear canal and external ear normal.      Nose: No rhinorrhea.      Comments: Nasal turbinates pale and edematous  No ttp of maxillary sinuses     Mouth/Throat:      Mouth: Mucous membranes are moist.      Pharynx: Oropharynx is clear. No oropharyngeal exudate.   Eyes:      Extraocular Movements: Extraocular movements intact.      Conjunctiva/sclera: Conjunctivae normal.   Neck:      Musculoskeletal: Neck supple.      Thyroid: No thyromegaly.   Cardiovascular:      Rate and Rhythm: Normal rate and regular rhythm.      Pulses: Normal pulses.      Heart sounds: Normal heart sounds.   Pulmonary:      Effort: Pulmonary effort is normal.      Breath sounds: Normal breath sounds.   Abdominal:      General: Bowel sounds are normal.      Palpations: Abdomen is soft.   Musculoskeletal:         General: No swelling or tenderness.   Lymphadenopathy:      Cervical: No cervical adenopathy.   Skin:     General: Skin is warm and dry.      Capillary Refill: Capillary refill takes less than 2 seconds.   Neurological:      General: No focal deficit present.      Mental Status: She is alert and oriented to person, place, and time. Mental status is at baseline.   Psychiatric:         Behavior: Behavior normal.         Thought Content: Thought content normal.       Assessment:  1. Essential hypertension    2. Exudative age-related macular degeneration of right eye with active choroidal neovascularization    3. Chronic obstructive pulmonary disease, unspecified COPD type    4. Osteopenia, unspecified location    5. Paroxysmal atrial fibrillation    6. 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 Menopausal problem        Plan:  Refills  given   Cont current meds   Keep appt with specialists - cards and derm     HM: due for shingrix, flu vaccine   Check flp, cbc, cmp  Bone density  rtc in 6 months or sooner prn     Health Maintenance   Topic Date Due    Lipid Panel  09/04/2024    TETANUS VACCINE  08/16/2027    Pneumococcal Vaccine (65+ High/Highest Risk)  Completed

## 2020-08-27 ENCOUNTER — TELEPHONE (OUTPATIENT)
Dept: INTERNAL MEDICINE | Facility: CLINIC | Age: 85
End: 2020-08-27

## 2020-08-27 DIAGNOSIS — R79.9 ABNORMAL BLOOD SMEAR: Primary | ICD-10-CM

## 2020-08-27 NOTE — TELEPHONE ENCOUNTER
Please notify pt that her labs are stable overall except that one lab that evaluates blood counts showed a finding that one type of white blood cell has an atypical appearance - recommend follow up with hematology to review this further     - referral signed - please arrange

## 2020-09-02 ENCOUNTER — OFFICE VISIT (OUTPATIENT)
Dept: HEMATOLOGY/ONCOLOGY | Facility: CLINIC | Age: 85
End: 2020-09-02
Payer: MEDICARE

## 2020-09-02 VITALS
SYSTOLIC BLOOD PRESSURE: 161 MMHG | BODY MASS INDEX: 21.62 KG/M2 | WEIGHT: 122 LBS | TEMPERATURE: 99 F | HEIGHT: 63 IN | OXYGEN SATURATION: 97 % | HEART RATE: 60 BPM | RESPIRATION RATE: 18 BRPM | DIASTOLIC BLOOD PRESSURE: 70 MMHG

## 2020-09-02 DIAGNOSIS — I48.0 PAROXYSMAL ATRIAL FIBRILLATION: ICD-10-CM

## 2020-09-02 DIAGNOSIS — R79.9 ABNORMAL BLOOD SMEAR: ICD-10-CM

## 2020-09-02 DIAGNOSIS — E78.5 HYPERLIPIDEMIA, UNSPECIFIED HYPERLIPIDEMIA TYPE: ICD-10-CM

## 2020-09-02 DIAGNOSIS — I51.89 DIASTOLIC DYSFUNCTION: ICD-10-CM

## 2020-09-02 DIAGNOSIS — D72.821 MONOCYTOSIS: ICD-10-CM

## 2020-09-02 DIAGNOSIS — C44.329 SQUAMOUS CELL CANCER OF SKIN OF EYEBROW: Primary | ICD-10-CM

## 2020-09-02 DIAGNOSIS — I10 ESSENTIAL HYPERTENSION: ICD-10-CM

## 2020-09-02 DIAGNOSIS — J44.9 CHRONIC OBSTRUCTIVE PULMONARY DISEASE, UNSPECIFIED COPD TYPE: ICD-10-CM

## 2020-09-02 PROCEDURE — 99999 PR PBB SHADOW E&M-EST. PATIENT-LVL IV: ICD-10-PCS | Mod: PBBFAC,,, | Performed by: INTERNAL MEDICINE

## 2020-09-02 PROCEDURE — 99214 OFFICE O/P EST MOD 30 MIN: CPT | Mod: PBBFAC | Performed by: INTERNAL MEDICINE

## 2020-09-02 PROCEDURE — 99999 PR PBB SHADOW E&M-EST. PATIENT-LVL IV: CPT | Mod: PBBFAC,,, | Performed by: INTERNAL MEDICINE

## 2020-09-02 PROCEDURE — 99205 PR OFFICE/OUTPT VISIT, NEW, LEVL V, 60-74 MIN: ICD-10-PCS | Mod: S$PBB,,, | Performed by: INTERNAL MEDICINE

## 2020-09-02 PROCEDURE — 99205 OFFICE O/P NEW HI 60 MIN: CPT | Mod: S$PBB,,, | Performed by: INTERNAL MEDICINE

## 2020-09-02 NOTE — PROGRESS NOTES
CC: Abnormal CBC, hematology consultation      HPI: Ms. Kearns,89, is here for hematology consultation for abnormal CBC. She has HLD, HTn, diastolic dysfunction. Recent CBC ( 8/26/20) showed abnormal appearing white blood cells, and was reviewed by pathology. It showed increased monocyte sand atypical lymphs. No recent change in weight or appetite. No recent fevers, night sweats, or other s/s of infections. She has mild monocytosis from at least 2017.         Review of Systems   Constitutional: Positive for malaise/fatigue. Negative for chills, fever and weight loss.   HENT: Negative for congestion, hearing loss, nosebleeds and tinnitus.    Eyes: Positive for blurred vision. Negative for double vision, photophobia and pain.   Respiratory: Negative for cough and sputum production.    Cardiovascular: Negative for chest pain, palpitations and claudication.   Gastrointestinal: Negative for abdominal pain, constipation, diarrhea and heartburn.   Genitourinary: Negative for dysuria, frequency, hematuria and urgency.   Musculoskeletal: Negative for joint pain, myalgias and neck pain.   Neurological: Negative for dizziness, tremors, sensory change, speech change, seizures and headaches.   Endo/Heme/Allergies: Does not bruise/bleed easily.   Psychiatric/Behavioral: Negative for depression and hallucinations. The patient is not nervous/anxious.          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    Vasovagal syncope          Past Surgical History:   Procedure Laterality Date    ADENOIDECTOMY      APPENDECTOMY      CATARACT EXTRACTION W/  INTRAOCULAR LENS IMPLANT Bilateral n/a    HYSTERECTOMY      2/2 uterine CA,  "removed ovaries, no longer f/u with gyn    Intravitreal Injection      Avastin od x's 12 dltx 2-16-12    TONSILLECTOMY             Family History   Problem Relation Age of Onset    Cancer Mother         ovarian CA    Cancer Father         prostate    Cataracts Father     Cancer Maternal Grandmother         colon    Heart attack Maternal Grandfather     Cancer Paternal Grandmother         colon    Cancer Paternal Grandfather     Strabismus Paternal Aunt     Hypertension Daughter     Cancer Daughter         "cancer of bone"           Review of patient's allergies indicates:   Allergen Reactions    Penicillins Swelling    Pseudoephedrine hcl Palpitations    Sulfa (sulfonamide antibiotics) Palpitations         Current Outpatient Medications   Medication Sig    albuterol (PROVENTIL/VENTOLIN HFA) 90 mcg/actuation inhaler Inhale 2 puffs into the lungs every 6 (six) hours as needed for Wheezing. (Patient not taking: Reported on 8/26/2020)    amLODIPine (NORVASC) 5 MG tablet Take 1 tablet (5 mg total) by mouth once daily.    ANTIOX #8/OM3/DHA/EPA/LUT/ZEAX (PRESERVISION AREDS 2, OMEGA-3, ORAL) Take 1 tablet by mouth once daily at 6am.     apixaban (ELIQUIS) 2.5 mg Tab Take 1 tablet (2.5 mg total) by mouth 2 (two) times daily.    azelastine (ASTELIN) 137 mcg (0.1 %) nasal spray 1 spray (137 mcg total) by Nasal route 2 (two) times daily.    CALCIUM CARBONATE (CALCIUM 500 ORAL) Take 500 mg by mouth once daily.     fluticasone propionate (FLONASE) 50 mcg/actuation nasal spray 2 sprays (100 mcg total) by Each Nostril route once daily.    lisinopril (PRINIVIL,ZESTRIL) 40 MG tablet TAKE 1 TABLET(40 MG) BY MOUTH EVERY DAY    mupirocin (BACTROBAN) 2 % ointment CRAIG AFTER SALT WATER COMPRESSES BID    omega 3-dha-epa-fish oil 300-1,000 mg Cap Take 3 g by mouth.    sotalol (BETAPACE) 80 MG tablet Take 1 tablet (80 mg total) by mouth 2 (two) times daily.     Current Facility-Administered Medications   Medication "    aflibercept Syrg 2 mg         Vitals:    09/02/20 1329   BP: (!) 161/70   Pulse: 60   Resp: 18   Temp: 98.8 °F (37.1 °C)       Physical Exam   Constitutional: She appears well-developed.   HENT:   Head: Normocephalic and atraumatic.   Mouth/Throat: No oropharyngeal exudate.   Eyes: No scleral icterus.   Cardiovascular: Normal rate.   No murmur heard.  Pulmonary/Chest: Effort normal. No respiratory distress. She has no wheezes. She has no rales.   Abdominal: Soft. She exhibits no distension. There is no abdominal tenderness. There is no rebound.   Musculoskeletal:         General: No edema.   Lymphadenopathy:     She has no cervical adenopathy.   Neurological: She is alert. No cranial nerve deficit.   Skin: Skin is warm.     Component      Latest Ref Rng & Units 8/26/2020 9/4/2019   WBC      3.90 - 12.70 K/uL 7.08 5.89   RBC      4.00 - 5.40 M/uL 4.21 4.05   Hemoglobin      12.0 - 16.0 g/dL 13.3 12.6   Hematocrit      37.0 - 48.5 % 38.8 37.3   MCV      82 - 98 fL 92 92   MCH      27.0 - 31.0 pg 31.6 (H) 31.1 (H)   MCHC      32.0 - 36.0 g/dL 34.3 33.8   RDW      11.5 - 14.5 % 13.2 13.5   Platelets      150 - 350 K/uL 206 225   MPV      9.2 - 12.9 fL 10.5 11.0   Immature Granulocytes      0.0 - 0.5 % 0.4 0.7 (H)   Gran # (ANC)      1.8 - 7.7 K/uL 3.6 2.5   Immature Grans (Abs)      0.00 - 0.04 K/uL 0.03 0.04   Lymph #      1.0 - 4.8 K/uL 2.1 2.2   Mono #      0.3 - 1.0 K/uL 1.3 (H) 1.1 (H)   Eos #      0.0 - 0.5 K/uL 0.0 0.1   Baso #      0.00 - 0.20 K/uL 0.06 0.05   nRBC      0 /100 WBC 0 0   Gran%      38.0 - 73.0 % 50.7 42.1   Lymph%      18.0 - 48.0 % 29.4 37.0   Mono%      4.0 - 15.0 % 18.1 (H) 18.0 (H)   Eosinophil%      0.0 - 8.0 % 0.6 1.4   Basophil%      0.0 - 1.9 % 0.8 0.8   Differential Method       Automated Automated   Sodium      136 - 145 mmol/L 133 (L) 134 (L)   Potassium      3.5 - 5.1 mmol/L 4.4 4.7   Chloride      95 - 110 mmol/L 100 99   CO2      23 - 29 mmol/L 25 25   Glucose      70 - 110  mg/dL 96 91   BUN, Bld      8 - 23 mg/dL 13 12   Creatinine      0.5 - 1.4 mg/dL 0.8 0.7   Calcium      8.7 - 10.5 mg/dL 10.2 10.5   PROTEIN TOTAL      6.0 - 8.4 g/dL 7.6 7.7   Albumin      3.5 - 5.2 g/dL 4.4 4.5   BILIRUBIN TOTAL      0.1 - 1.0 mg/dL 0.7 0.6   Alkaline Phosphatase      55 - 135 U/L 50 (L) 59   AST      10 - 40 U/L 15 16   ALT      10 - 44 U/L 10 13   Anion Gap      8 - 16 mmol/L 8 10   eGFR if African American      >60 mL/min/1.73 m:2 >60 >60   eGFR if non African American      >60 mL/min/1.73 m:2 >60 >60   Cholesterol      120 - 199 mg/dL 238 (H)    Triglycerides      30 - 150 mg/dL 96    HDL      40 - 75 mg/dL 70    LDL Cholesterol External      63.0 - 159.0 mg/dL 148.8    Hdl/Cholesterol Ratio      20.0 - 50.0 % 29.4    Total Cholesterol/HDL Ratio      2.0 - 5.0 3.4    Non-HDL Cholesterol      mg/dL 168      8/26/20 pathologist review of peripheral smear:     --RBCs are within normal limits with mild anisopoikiloctosis.   --WBCs are adequate in number with circulating monocytes and atypical and large granular lymphocytes. No blasts. No basophilia.   --Platelets within normal limits.   --Findings in WBCs are suggestive of possible recent viral illness.   Repeat CBC at a clinically designated interval may be useful      Assessment:    1. Relative monocytosis  2. Mild absolute monocytosis  3. Dyslipidemia  4. Htn, essential  5. COPD  6. Atrial fibrillation        Plan:    1,2: Mild, chronic, asymptomatic. 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 Bone marrow biopsy was discussed. Treatment options for either MDS or CMML are limited at her age , and outcomes uncertain. She elected to defer BM biopsy. She will follow here in 3 months [facility] repeat CBC.          4. She is on Amlodipine. Follows with her PCP    5. She uses albuterol inhaler as needed.      6. Rate controlled, on Apixaban

## 2020-09-02 NOTE — LETTER
September 2, 2020      Wilma Xiong MD  [address]  [facility] [address]           [facility]  [address]  [facility] [address]  Phone: [phone]          Patient: Cathy Kearns   MR Number: [id number]   YOB: 1929   Date of Visit: 9/2/2020       Dear Dr. Wilma Xiong:    Thank you for referring Cathy Kearns to me for evaluation. Attached you will find relevant portions of my assessment and plan of care.    If you have questions, please do not hesitate to call me. I look forward to following Cathy Kearns along with you.    Sincerely,    Vladislav Yun MD    Enclosure  CC:  No Recipients    If you would like to receive this communication electronically, please contact [email or web address] or [phone] to request more information on [employer] Link access.    For providers and/or their staff who would like to refer a patient to [facility], please contact us through our one-stop-shop provider referral line, [facility], at [phone].    If you feel you have received this communication in error or would no longer like to receive these types of communications, please e-mail [email or web address]

## 2020-09-10 ENCOUNTER — OFFICE VISIT (OUTPATIENT)
Dept: INTERNAL MEDICINE | Facility: CLINIC | Age: 85
End: 2020-09-10
Attending: INTERNAL MEDICINE
Payer: MEDICARE

## 2020-09-10 ENCOUNTER — TELEPHONE (OUTPATIENT)
Dept: INTERNAL MEDICINE | Facility: CLINIC | Age: 85
End: 2020-09-10

## 2020-09-10 DIAGNOSIS — Z79.899 LONG TERM CURRENT USE OF ANTIARRHYTHMIC DRUG: ICD-10-CM

## 2020-09-10 DIAGNOSIS — I48.0 PAROXYSMAL ATRIAL FIBRILLATION: ICD-10-CM

## 2020-09-10 DIAGNOSIS — R40.20 LOC (LOSS OF CONSCIOUSNESS): Primary | ICD-10-CM

## 2020-09-10 PROCEDURE — 99442 PR PHYSICIAN TELEPHONE EVALUATION 11-20 MIN: CPT | Mod: 95,,, | Performed by: INTERNAL MEDICINE

## 2020-09-10 PROCEDURE — 99442 PR PHYSICIAN TELEPHONE EVALUATION 11-20 MIN: ICD-10-PCS | Mod: 95,,, | Performed by: INTERNAL MEDICINE

## 2020-09-10 NOTE — TELEPHONE ENCOUNTER
1. Please see if pt can be scheduled for virtual visit with me today or idris if that works for her   2. Recommend that she follow up with her cardiologist ASAP as well   3. Agree with stopping astelin   4. If she has another episode of loss of consciousness then she should go to ER immediately for full evaluation

## 2020-09-10 NOTE — PROGRESS NOTES
Subjective:   Patient ID: Cathy Kearns is a 89 y.o. female  Chief complaint: No chief complaint on file.      HPI  Audio Only Telehealth Visit     The patient location is: home  The chief complaint leading to consultation is: 20 min  Visit type: Virtual visit with audio only (telephone)  Total time spent with patient: 20     The reason for the audio only service rather than synchronous audio and video virtual visit was related to technical difficulties or patient preference/necessity.     Each patient to whom I provide medical services by telemedicine is:  (1) informed of the relationship between the physician and patient and the respective role of any other health care provider with respect to management of the patient; and (2) notified that they may decline to receive medical services by telemedicine and may withdraw from such care at any time. Patient verbally consented to receive this service via voice-only telephone call.       HPI:   89F with pAfib, HLD, HTN, diast dysfunction, COPD    She fainted last Sunday evening now 5 days ago.    Prior to this was sitting outside on the [place] visiting with friend and reports that it was quite hot  Felt very sleepy while eating dinner a little later  She had eaten dinner, and walked to kitchen sink and reports that she started to feel very drowsy and lost consciousness as she fell into sink. Thinks she was out for a few seconds. She awoke leaning over sink with water running over her head. She then turned water off and then later awoke feeling very comfortable on the floor - no pain at that time. When she awoke she was not panicked. She then got up, took out hearing aids, changed clothes and went to bed.   The next day she noticed that only bruise was on left arm which she attrib to when she fell into sink as arm was caught on faucet   The next day that noticed that ribs were sore from where she was leaning over into sink.    No palpitations prior to this episode or  chest pain.  Reports that she knows what afib feels like but this was not c/w that    - does not recall becoming sweating or hot prior to this episode of loc  Denies head pain, or neck pain     Vitals when she checked at home were 148/60, pulse around 40 the next day and reports pulse has increased to 60 bpm since then when she has checked at home    Reports had an episode of loc when started on bp meds many years ago and at that time hit head on tile floor and had bruising - but again none with this most recent episode     She was taking antihistamine nasal spray (astelin) 2 sprays twice daily after she doubled dose thinking that this was flonase dosing. She has since stopped this along with flonase     Today she denies: confusion, weakness, recurrent falls, LOC since then   No LH or feeling like this may happen again   No numbness or tingling, gait disturbance, incontinence, dysarthria, dysphagia, facial asymmetry.   No nosebleeds or facial bruising   - no cp, sob, orthopnea, swelling, palpitations  - denies dysuria, hematuria, inc frequency or urgency   - denies neck or back pain     Assessment and plan:    - recommend that she come into clinic today for in person assessment due to LOC and taking OAC - she reports that she has an appt at 1p for hearing aid follow up and declines coming in today as she is feeling well overall   - discussed that if she changes her mind to let office know so she can be scheduled with a provider this afternoon   - counseled her extensively on ER prompts - if any new or concerning symptoms then she agrees to be assessed today     - she does agree to in person assessment with me at 1pm idris 9/11/2020  - f/u with cards asap  - will send message to discuss if should decrease BB       This service was not originating from a related E/M service provided within the previous 7 days nor will  to an E/M service or procedure within the next 24 hours or my soonest available appointment.   Prevailing standard of care was able to be met in this audio-only visit.

## 2020-09-10 NOTE — TELEPHONE ENCOUNTER
Spoke with pt, she fainted last Sunday evening. She had eaten dinner, and sat out on the balcony. She then got sleepy. She got up to take her plate to the kitchen sink and fainted. She leaned over on the counter and fainted and now her ribs under her breast is sore. She woke up under the water. She then must have passed out again because she woke back up on the floor. She was comfy.     She was taking antihistamine nasal spray (astelin), she doubled dosed this. Could this be the reason she passed out? She read the side effects are 'extreme drowsiness'. She has stopped using this and Flonase.    She got new hearing aids that are tuned up a little too loud.  She has been taking her blood pressure and it is running okay. She truly thinks it may be nasal spray. Her daughter thinks she remembers this happening last year with the nasal spray.    She also got dizziness previously will sitting outside in the heat.      ----- Message from Fernando Rosario sent at 9/8/2020  1:53 PM CDT -----  Pt would like to be called back regarding questions concerning her current condition.  Pt would like to discuss further.    Pt can be reached at [phone].    Thanks

## 2020-09-11 ENCOUNTER — HOSPITAL ENCOUNTER (OUTPATIENT)
Dept: RADIOLOGY | Facility: OTHER | Age: 85
Discharge: HOME | End: 2020-09-11
Attending: INTERNAL MEDICINE
Payer: MEDICARE

## 2020-09-11 ENCOUNTER — OFFICE VISIT (OUTPATIENT)
Dept: INTERNAL MEDICINE | Facility: CLINIC | Age: 85
End: 2020-09-11
Attending: INTERNAL MEDICINE
Payer: MEDICARE

## 2020-09-11 VITALS
HEART RATE: 57 BPM | WEIGHT: 122.13 LBS | RESPIRATION RATE: 12 BRPM | BODY MASS INDEX: 21.64 KG/M2 | DIASTOLIC BLOOD PRESSURE: 70 MMHG | OXYGEN SATURATION: 98 % | SYSTOLIC BLOOD PRESSURE: 112 MMHG | HEIGHT: 63 IN

## 2020-09-11 DIAGNOSIS — R53.83 FATIGUE, UNSPECIFIED TYPE: ICD-10-CM

## 2020-09-11 DIAGNOSIS — R07.9 LEFT-SIDED CHEST PAIN: ICD-10-CM

## 2020-09-11 DIAGNOSIS — R55 SYNCOPE, UNSPECIFIED SYNCOPE TYPE: ICD-10-CM

## 2020-09-11 DIAGNOSIS — I10 ESSENTIAL HYPERTENSION: ICD-10-CM

## 2020-09-11 DIAGNOSIS — R55 SYNCOPE, UNSPECIFIED SYNCOPE TYPE: Primary | ICD-10-CM

## 2020-09-11 DIAGNOSIS — I48.0 PAROXYSMAL ATRIAL FIBRILLATION: ICD-10-CM

## 2020-09-11 PROCEDURE — 93010 ELECTROCARDIOGRAM REPORT: CPT | Mod: S$PBB,,, | Performed by: INTERNAL MEDICINE

## 2020-09-11 PROCEDURE — 71100 X-RAY EXAM RIBS UNI 2 VIEWS: CPT | Mod: TC,FY,LT

## 2020-09-11 PROCEDURE — 70450 CT HEAD/BRAIN W/O DYE: CPT | Mod: 26,,, | Performed by: RADIOLOGY

## 2020-09-11 PROCEDURE — 93010 EKG 12-LEAD: ICD-10-PCS | Mod: S$PBB,,, | Performed by: INTERNAL MEDICINE

## 2020-09-11 PROCEDURE — 71100 XR RIBS 2 VIEW LEFT: ICD-10-PCS | Mod: 26,LT,, | Performed by: RADIOLOGY

## 2020-09-11 PROCEDURE — 93005 ELECTROCARDIOGRAM TRACING: CPT | Mod: PBBFAC | Performed by: INTERNAL MEDICINE

## 2020-09-11 PROCEDURE — 99215 OFFICE O/P EST HI 40 MIN: CPT | Mod: PBBFAC,25 | Performed by: INTERNAL MEDICINE

## 2020-09-11 PROCEDURE — 99214 PR OFFICE/OUTPT VISIT, EST, LEVL IV, 30-39 MIN: ICD-10-PCS | Mod: S$PBB,,, | Performed by: INTERNAL MEDICINE

## 2020-09-11 PROCEDURE — 70450 CT HEAD WITHOUT CONTRAST: ICD-10-PCS | Mod: 26,,, | Performed by: RADIOLOGY

## 2020-09-11 PROCEDURE — 70450 CT HEAD/BRAIN W/O DYE: CPT | Mod: TC

## 2020-09-11 PROCEDURE — 99214 OFFICE O/P EST MOD 30 MIN: CPT | Mod: S$PBB,,, | Performed by: INTERNAL MEDICINE

## 2020-09-11 PROCEDURE — 71100 X-RAY EXAM RIBS UNI 2 VIEWS: CPT | Mod: 26,LT,, | Performed by: RADIOLOGY

## 2020-09-11 PROCEDURE — 99999 PR PBB SHADOW E&M-EST. PATIENT-LVL V: CPT | Mod: PBBFAC,,, | Performed by: INTERNAL MEDICINE

## 2020-09-11 PROCEDURE — 99999 PR PBB SHADOW E&M-EST. PATIENT-LVL V: ICD-10-PCS | Mod: PBBFAC,,, | Performed by: INTERNAL MEDICINE

## 2020-09-11 NOTE — PROGRESS NOTES
"Subjective:   Patient ID: Cathy Kearns is a 89 y.o. female  Chief complaint: No chief complaint on file.      HPI  Pt here for follow from [facility] yesterday - see note for more those details  Stopped astelin     Since yesterday no LH, no feeling like will pass out     This am in general felt tired but once got dressed and started moving felt more energized which is typical for her  EKG stable and unchanged today   Reviewed this with her cardiologist during appt - plan is to continue current meds at current doses but if has recurrence then will f/u with cards for extensive eval     No LH with standing up or changing position   No dysuria or inc frequency   No change in smell or color or look of her urine   Has been monitoring water intake     Left arm bruising since fall   And ttp at left ant chest wall - no popping or clicking at rib    No pain in head or neck     Reports no defecation at time of syncope but had urinated on self after discussion today - does not recall if confused upon waking  - no hx of sz or changes in meds      Seen by h/o     Review of Systems    Objective:  Vitals:    09/11/20 1300   BP: 112/70   BP Location: Left arm   Patient Position: Sitting   BP Method: Medium (Manual)   Pulse: (!) 57   Resp: 12   SpO2: 98%   Weight: 55.4 kg (122 lb 2.2 oz)   Height: 5' 3" (1.6 m)     Body mass index is 21.64 kg/m².    Physical Exam  Vitals signs reviewed.   Constitutional:       General: She is not in acute distress.     Appearance: Normal appearance. She is well-developed. She is not toxic-appearing.   HENT:      Head: Normocephalic and atraumatic.      Right Ear: Tympanic membrane, ear canal and external ear normal.      Left Ear: Tympanic membrane, ear canal and external ear normal.      Ears:      Comments: No hemotympanum      Nose: Nose normal. No congestion.      Mouth/Throat:      Mouth: Mucous membranes are moist.      Pharynx: Oropharynx is clear. No oropharyngeal exudate.   Eyes:      Extraocular " Movements: Extraocular movements intact.      Conjunctiva/sclera: Conjunctivae normal.      Pupils: Pupils are equal, round, and reactive to light.   Neck:      Musculoskeletal: Neck supple.      Thyroid: No thyromegaly.   Cardiovascular:      Rate and Rhythm: Normal rate and regular rhythm.      Pulses: Normal pulses.      Heart sounds: Normal heart sounds.   Pulmonary:      Effort: Pulmonary effort is normal.      Breath sounds: Normal breath sounds.      Comments: + ttp at left ant chest wall   Chest:      Chest wall: Tenderness present.   Abdominal:      General: Bowel sounds are normal.      Palpations: Abdomen is soft.   Musculoskeletal:         General: No swelling or tenderness.      Comments: No spinal ttp   Lymphadenopathy:      Cervical: No cervical adenopathy.   Skin:     General: Skin is warm and dry.      Capillary Refill: Capillary refill takes less than 2 seconds.      Comments: Bruise at left forearm - no skin breakdown   Neurological:      General: No focal deficit present.      Mental Status: She is alert and oriented to person, place, and time. Mental status is at baseline.      Comments: PERRLA, EOMI  No nystagmus  Unable to test visual field due to chronic eye disease/vision loss  + facial symmetry and sensation in tact  Hearing in tact bilaterally  SCM and SS in tact  Tongue and uvula midline  Tongue strength in tact  Light touch in tact bilateral UE and LE  Gait normal  5/5 strength throughout  +2 reflexes throughout  FAIZA in tact UE and LE  + Romberg  Normal heal to toe      Psychiatric:         Behavior: Behavior normal.         Thought Content: Thought content normal.         Assessment:  1. Syncope, unspecified syncope type    2. Fatigue, unspecified type    3. Left-sided chest pain    4. Paroxysmal atrial fibrillation    5. Essential hypertension        Plan:  Diagnoses and all orders for this visit:    Syncope, unspecified syncope type  -     IN OFFICE EKG 12-LEAD (to Muse)  -     Basic  metabolic panel; Future  -     TSH; Future  -     Cancel: CT Head Without Contrast; Future  -     CT Head Without Contrast; Future  -     Ambulatory referral/consult to Neurology; Future    Fatigue, unspecified type  -     TSH; Future  -     Urinalysis, Reflex to Urine Culture Urine, Clean Catch  -     POCT urinalysis, dipstick or tablet reag    Left-sided chest pain  -     X-Ray Ribs 2 View Left; Future    Paroxysmal atrial fibrillation    Essential hypertension    reviewed ekg with cardiologist   Stable when compared to prior  Cont currents meds   Will check imaging today and refer to neuro for opinion   Er and rtc prompts discussed at length -if recurrence over the weekend she understands to go to ER   All questions were answered and pt verbalized understanding of plan.     Addendum: called pt and lmovm with xray - rib fx, nondisplaced - and ct head - stable with no bleed     Check approp labs     Health Maintenance   Topic Date Due    Lipid Panel  08/26/2025    TETANUS VACCINE  08/16/2027    Pneumococcal Vaccine (65+ High/Highest Risk)  Completed

## 2020-09-14 ENCOUNTER — TELEPHONE (OUTPATIENT)
Dept: INTERNAL MEDICINE | Facility: CLINIC | Age: 85
End: 2020-09-14

## 2020-09-14 NOTE — TELEPHONE ENCOUNTER
Please CALL pt today to ensure that she received my voicemail on Friday informing her that her CT scan was negative for a bleed - great news! - BUT her rib xray showed a fracture of the 8th rib.     This should heal on it's own over the next several weeks.   Recommend take tylenol arthritis which is otc for pain and if she feels like she needs something stronger to let me know.

## 2020-09-15 NOTE — TELEPHONE ENCOUNTER
Yes, I recommend that she follow up with her cardiologist in the next few weeks   - please help schedule neuro appt as well - thanks!

## 2020-09-15 NOTE — TELEPHONE ENCOUNTER
Spoke with pt, they had a verbal understanding. Patient would like to know if she should see cardiology.

## 2020-09-29 ENCOUNTER — PATIENT MESSAGE (OUTPATIENT)
Dept: OTHER | Facility: OTHER | Age: 85
End: 2020-09-29

## 2020-09-29 ENCOUNTER — PROCEDURE VISIT (OUTPATIENT)
Dept: OPHTHALMOLOGY | Facility: CLINIC | Age: 85
End: 2020-09-29
Payer: MEDICARE

## 2020-09-29 ENCOUNTER — LAB VISIT (OUTPATIENT)
Dept: LAB | Facility: HOSPITAL | Age: 85
End: 2020-09-29
Attending: INTERNAL MEDICINE
Payer: MEDICARE

## 2020-09-29 DIAGNOSIS — R53.83 FATIGUE, UNSPECIFIED TYPE: ICD-10-CM

## 2020-09-29 DIAGNOSIS — H35.3211 EXUDATIVE AGE-RELATED MACULAR DEGENERATION OF RIGHT EYE WITH ACTIVE CHOROIDAL NEOVASCULARIZATION: Primary | ICD-10-CM

## 2020-09-29 DIAGNOSIS — R55 SYNCOPE, UNSPECIFIED SYNCOPE TYPE: ICD-10-CM

## 2020-09-29 DIAGNOSIS — H35.3124 NONEXUDATIVE AGE-RELATED MACULAR DEGENERATION, LEFT EYE, ADVANCED ATROPHIC WITH SUBFOVEAL INVOLVEMENT: ICD-10-CM

## 2020-09-29 DIAGNOSIS — H35.721 SEROUS DETACHMENT OF RETINAL PIGMENT EPITHELIUM OF RIGHT EYE: ICD-10-CM

## 2020-09-29 LAB
ANION GAP SERPL CALC-SCNC: 13 MMOL/L (ref 8–16)
BUN SERPL-MCNC: 12 MG/DL (ref 8–23)
CALCIUM SERPL-MCNC: 10.4 MG/DL (ref 8.7–10.5)
CHLORIDE SERPL-SCNC: 98 MMOL/L (ref 95–110)
CO2 SERPL-SCNC: 25 MMOL/L (ref 23–29)
CREAT SERPL-MCNC: 0.8 MG/DL (ref 0.5–1.4)
EST. GFR  (AFRICAN AMERICAN): >60 ML/MIN/1.73 M^2
EST. GFR  (NON AFRICAN AMERICAN): >60 ML/MIN/1.73 M^2
GLUCOSE SERPL-MCNC: 94 MG/DL (ref 70–110)
POTASSIUM SERPL-SCNC: 4.6 MMOL/L (ref 3.5–5.1)
SODIUM SERPL-SCNC: 136 MMOL/L (ref 136–145)
TSH SERPL DL<=0.005 MIU/L-ACNC: 0.87 UIU/ML (ref 0.4–4)

## 2020-09-29 PROCEDURE — 67028 INJECTION EYE DRUG: CPT | Mod: S$PBB,RT,, | Performed by: OPHTHALMOLOGY

## 2020-09-29 PROCEDURE — 92012 PR EYE EXAM, EST PATIENT,INTERMED: ICD-10-PCS | Mod: 25,S$PBB,, | Performed by: OPHTHALMOLOGY

## 2020-09-29 PROCEDURE — 92134 POSTERIOR SEGMENT OCT RETINA (OCULAR COHERENCE TOMOGRAPHY)-BOTH EYES: ICD-10-PCS | Mod: 26,S$PBB,, | Performed by: OPHTHALMOLOGY

## 2020-09-29 PROCEDURE — 36415 COLL VENOUS BLD VENIPUNCTURE: CPT

## 2020-09-29 PROCEDURE — 92012 INTRM OPH EXAM EST PATIENT: CPT | Mod: 25,S$PBB,, | Performed by: OPHTHALMOLOGY

## 2020-09-29 PROCEDURE — 92134 CPTRZ OPH DX IMG PST SGM RTA: CPT | Mod: PBBFAC | Performed by: OPHTHALMOLOGY

## 2020-09-29 PROCEDURE — 84443 ASSAY THYROID STIM HORMONE: CPT

## 2020-09-29 PROCEDURE — 67028 INJECTION EYE DRUG: CPT | Mod: PBBFAC,RT | Performed by: OPHTHALMOLOGY

## 2020-09-29 PROCEDURE — 67028 PR INJECT INTRAVITREAL PHARMCOLOGIC: ICD-10-PCS | Mod: S$PBB,RT,, | Performed by: OPHTHALMOLOGY

## 2020-09-29 PROCEDURE — 80048 BASIC METABOLIC PNL TOTAL CA: CPT

## 2020-09-29 RX ADMIN — AFLIBERCEPT 2 MG: 40 INJECTION, SOLUTION INTRAVITREAL at 10:09

## 2020-09-29 NOTE — PROGRESS NOTES
HPI     8 wk Eylea  DLS- 08/04/20 Dr. Mandujano    Vision in OD getting dimmer and is worried she wishes she could hold on to   good vision in OD.  (-)Flashes (+)Floaters only when closing eyes   (-)Photophobia  (-)Glare      Eye Meds: AT's QID         OCT - mild increase SRF/SRH  OS - Drusen, no SRF - small HE - ? RAP lesion     Assessment:    1. AMD - Wet OD - post Avastin x 12, post Eylea #40  PED increased prior to 10/12 visit    Had slight increase 5/13/13, 7/14, 5/16 4/20 tx done in [place] during Covid    Eylea OD today    Continue q8 week tx    2. 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 PVD OU    4. Pseudophakia - great result    5. Allergic conjunctivitis/sinusitis - try OTC antihistamines  Recent trouble with sinusitis - pain behind eye improved with antihistamines  Ok for Zaditor BID - has improved with topical tx    6. AFib - considering anticoagulation - ok to proceed  On Anti-VEGF therapy, risk of large macular hemorrhage is minimal.         Plan:      2  months OCT no dilate    Risks, benefits, and alternatives to treatment discussed in detail with the patient.  The patient voiced understanding and wished to proceed with the procedure    Injection Procedure Note:  Diagnosis: Wet AMD OD    Patient Identified and Time Out complete  Topical Proparacaine and Betadine. Conj prep only  Inject Eylea OD at 6:00 @ 3.5-4mm posterior to limbus  Post Operative Dx: Same  Complications: None  Follow up as above.

## 2020-09-29 NOTE — PATIENT INSTRUCTIONS

## 2020-10-02 ENCOUNTER — OFFICE VISIT (OUTPATIENT)
Dept: CARDIOLOGY | Facility: CLINIC | Age: 85
End: 2020-10-02
Payer: MEDICARE

## 2020-10-02 VITALS
OXYGEN SATURATION: 98 % | WEIGHT: 121.69 LBS | SYSTOLIC BLOOD PRESSURE: 120 MMHG | HEART RATE: 58 BPM | BODY MASS INDEX: 21.56 KG/M2 | HEIGHT: 63 IN | DIASTOLIC BLOOD PRESSURE: 58 MMHG

## 2020-10-02 DIAGNOSIS — E78.5 HYPERLIPIDEMIA, UNSPECIFIED HYPERLIPIDEMIA TYPE: ICD-10-CM

## 2020-10-02 DIAGNOSIS — I10 ESSENTIAL HYPERTENSION: Primary | ICD-10-CM

## 2020-10-02 DIAGNOSIS — I48.0 PAROXYSMAL ATRIAL FIBRILLATION: ICD-10-CM

## 2020-10-02 DIAGNOSIS — Z79.899 LONG TERM CURRENT USE OF ANTIARRHYTHMIC DRUG: ICD-10-CM

## 2020-10-02 PROCEDURE — 99999 PR PBB SHADOW E&M-EST. PATIENT-LVL IV: CPT | Mod: PBBFAC,,, | Performed by: INTERNAL MEDICINE

## 2020-10-02 PROCEDURE — 99214 OFFICE O/P EST MOD 30 MIN: CPT | Mod: S$PBB,,, | Performed by: INTERNAL MEDICINE

## 2020-10-02 PROCEDURE — 99999 PR PBB SHADOW E&M-EST. PATIENT-LVL IV: ICD-10-PCS | Mod: PBBFAC,,, | Performed by: INTERNAL MEDICINE

## 2020-10-02 PROCEDURE — 99214 OFFICE O/P EST MOD 30 MIN: CPT | Mod: PBBFAC | Performed by: INTERNAL MEDICINE

## 2020-10-02 PROCEDURE — 99214 PR OFFICE/OUTPT VISIT, EST, LEVL IV, 30-39 MIN: ICD-10-PCS | Mod: S$PBB,,, | Performed by: INTERNAL MEDICINE

## 2020-10-02 NOTE — LETTER
October 2, 2020      Wilma Xiong MD  [address]  [facility] [address]           [facility] 3rd Floor  [address]  [facility] [address]  Phone: [phone]          Patient: Cathy Kearns   MR Number: [id number]   YOB: 1929   Date of Visit: 10/2/2020       Dear Dr. Wilma Xiong:    Thank you for referring Cathy Kearns to me for evaluation. Attached you will find relevant portions of my assessment and plan of care.    If you have questions, please do not hesitate to call me. I look forward to following Cathy Kearns along with you.    Sincerely,    Jayy Narvaez MD    Enclosure  CC:  No Recipients    If you would like to receive this communication electronically, please contact [email or web address] or [phone] to request more information on [employer] access.    For providers and/or their staff who would like to refer a patient to [facility], please contact us through our one-stop-shop provider referral line, [facility], at [phone].    If you feel you have received this communication in error or would no longer like to receive these types of communications, please e-mail [email or web address]

## 2020-10-02 NOTE — PROGRESS NOTES
Subjective:   Patient ID:  Cathy Kearns is a 89 y.o. female who presents for follow up of Atrial Fibrillation    Problems:  Paroxysmal atrial fibrillation  -on sotolol  HTN  HLD  Diastolic dysfunction, EF 60-65%  Mild AI  COPD - mild    HPI: Very pleasant woman previously seen here by other physicians and NPs presents for evaluation after an episode of syncope.  She was eating dinner and then was out on her [place] [facility] when she began feeling bad and lightheaded.  She then walked back inside and at some point lost consciousness.  She woke up with some lower costal margin pain and was found to have a fracture of her left 8th rib.  She feels better now.  No head/facial injury.      She states that her BP always drops in the afternoon and evening after meals, and that she feels fairly lousy with SBPs < 120.    Otherwise, she is doing well with no new symptoms or cardiovascular complaints and no change in exercise capacity.  She denies chest discomfort, WHITEHEAD, palpitations, PND/orthopnea, lightheadedness and syncope.    No problematic bleeding, hematochezia, or melena.  No TIA/stroke symtoms.        Dr. Mcelroy's Feb 2020 HPI:  Patient with paroxysmal atrial fibrillation, hypertension, hyperlipidemia, normal ejection fraction with diastolic dysfunction, mild aortic insufficiency, and COPD.     Patient has some dyspnea on exertion, but states that it edwina be due to COPD.  Able to walk fro [facility] to [facility] and back without stopping to catch he breath.   Patient denies any dyspnea at rest, orthopnea, PND, or edema.  Patient denies any chest discomfort on exertion or at rest. Patient denies any palpitations, lightheadedness, or syncope, although has some mild orthostatic dizziness.     Ms. Sam' Feb 2019 HPI  Ms. Kearns is in clinic today for hypertension follow up after seeing her PCP on 1/29/19.  Her PCP added amlodipine 5 mg and increased her lisinopril to 40 mg  because her SBP was going into the 140-180s routinely.  Her home BPs have improved to 120s.  Reports SOB with exertion over the course of the last year.  She was seen by Dr. Rosenthal in pulmonary clinic in 6/2018 and was dx with COPD.  Patient denies chest pain with exertion or at rest, palpitations, dizziness, syncope, edema, orthopnea, PND, or claudication.  Reports routine exercise, line dancing and group exercise.  Patient is taking apixaban 2.5 mg BID for thromboembolic prophylaxis.  Denies bleeding gums, epistaxis, hematuria, and hematochezia.      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    ANTIOX #8/OM3/DHA/EPA/LUT/ZEAX (PRESERVISION AREDS 2, OMEGA-3, ORAL) Take 1 tablet by mouth once daily at 6am.     apixaban (ELIQUIS) 2.5 mg Tab Take 1 tablet (2.5 mg total) by mouth 2 (two) times daily.    azelastine (ASTELIN) 137 mcg (0.1 %) nasal spray 1 spray (137 mcg total) by Nasal route 2 (two) times daily.    CALCIUM CARBONATE (CALCIUM 500 ORAL) Take 500 mg by mouth once daily.     fluticasone propionate (FLONASE) 50 mcg/actuation nasal spray 2 sprays (100 mcg total) by Each Nostril route once daily.    lisinopril (PRINIVIL,ZESTRIL) 40 MG tablet TAKE 1 TABLET(40 MG) BY MOUTH  EVERY DAY    mupirocin (BACTROBAN) 2 % ointment CRAIG AFTER SALT WATER COMPRESSES BID    omega 3-dha-epa-fish oil 300-1,000 mg Cap Take 3 g by mouth.    sotalol (BETAPACE) 80 MG tablet Take 1 tablet (80 mg total) by mouth 2 (two) times daily.     Current Facility-Administered Medications   Medication    aflibercept Syrg 2 mg       Review of Systems   Constitution: Negative.   HENT: Negative.    Eyes: Negative.    Cardiovascular: Positive for syncope. Negative for chest pain, dyspnea on exertion, near-syncope, orthopnea and palpitations.   Respiratory: Negative.  Negative for cough and shortness of breath.    Endocrine: Negative.    Hematologic/Lymphatic: Negative.    Skin: Negative.    Musculoskeletal: Negative.    Gastrointestinal: Negative.    Genitourinary: Negative.    Psychiatric/Behavioral: Negative.      Objective:   Physical Exam   Constitutional: She is oriented to person, place, and time. She appears well-developed and well-nourished.   HENT:   Head: Normocephalic and atraumatic.   Mouth/Throat: Oropharynx is clear and moist.   Eyes: Conjunctivae and EOM are normal. No scleral icterus.   Neck: Normal range of motion. Neck supple. No JVD present.   Cardiovascular: Normal rate, regular rhythm, normal heart sounds and intact distal pulses. Exam reveals no gallop and no friction rub.   No murmur heard.  Pulmonary/Chest: Effort normal and breath sounds normal. She has no wheezes. She has no rales.   Abdominal: Soft. Bowel sounds are normal. She exhibits no distension. There is no abdominal tenderness.   Musculoskeletal: Normal range of motion.         General: No edema.   Neurological: She is alert and oriented to person, place, and time.   Skin: Skin is warm and dry. No rash noted. No erythema.   Psychiatric: She has a normal mood and affect. Her behavior is normal. Judgment and thought content normal.   Vitals reviewed.      Lab Results   Component Value Date    WBC 7.08 08/26/2020    HGB 13.3 08/26/2020     HCT 38.8 08/26/2020    MCV 92 08/26/2020     08/26/2020         Chemistry        Component Value Date/Time     09/29/2020 1117    K 4.6 09/29/2020 1117    CL 98 09/29/2020 1117    CO2 25 09/29/2020 1117    BUN 12 09/29/2020 1117    CREATININE 0.8 09/29/2020 1117    GLU 94 09/29/2020 1117        Component Value Date/Time    CALCIUM 10.4 09/29/2020 1117    ALKPHOS 50 (L) 08/26/2020 0952    AST 15 08/26/2020 0952    ALT 10 08/26/2020 0952    BILITOT 0.7 08/26/2020 0952    ESTGFRAFRICA >60.0 09/29/2020 1117    EGFRNONAA >60.0 09/29/2020 1117            Lab Results   Component Value Date    CHOL 238 (H) 08/26/2020    CHOL 247 (H) 09/04/2019    CHOL 224 (H) 05/10/2018     Lab Results   Component Value Date    HDL 70 08/26/2020    HDL 79 (H) 09/04/2019    HDL 71 05/10/2018     Lab Results   Component Value Date    LDLCALC 148.8 08/26/2020    LDLCALC 151.0 09/04/2019    LDLCALC 143.2 05/10/2018     Lab Results   Component Value Date    TRIG 96 08/26/2020    TRIG 85 09/04/2019    TRIG 49 05/10/2018     Lab Results   Component Value Date    CHOLHDL 29.4 08/26/2020    CHOLHDL 32.0 09/04/2019    CHOLHDL 31.7 05/10/2018       Lab Results   Component Value Date    TSH 0.872 09/29/2020       No results found for: HGBA1C    Assessment:     1. Essential hypertension    2. Hyperlipidemia, unspecified hyperlipidemia type    3. Paroxysmal atrial fibrillation    4. Long term current use of antiarrhythmic drug- Sotolol      ECG reviewed from 9/11/20 - NSR, left axis deviation, normal QT interval.  NOT a septal infarct, as the computer read.    Plan:     Continue current medicines.  Agree with lower dose of Eliquis given age > 80 and weight < 60kg.    I offered her holter/event monitoring and she declines, which I don't think is unreasonable.    Encouraged good hydration, especially with meals.    Diet/exercise goals reinforced.    Hand washing and social distancing stressed.    F/U 12 months

## 2020-11-12 DIAGNOSIS — I48.0 PAROXYSMAL ATRIAL FIBRILLATION: ICD-10-CM

## 2020-12-01 ENCOUNTER — LAB VISIT (OUTPATIENT)
Dept: LAB | Facility: HOSPITAL | Age: 85
End: 2020-12-01
Payer: MEDICARE

## 2020-12-01 ENCOUNTER — OFFICE VISIT (OUTPATIENT)
Dept: HEMATOLOGY/ONCOLOGY | Facility: CLINIC | Age: 85
End: 2020-12-01
Payer: MEDICARE

## 2020-12-01 VITALS
SYSTOLIC BLOOD PRESSURE: 178 MMHG | WEIGHT: 120.25 LBS | TEMPERATURE: 98 F | OXYGEN SATURATION: 96 % | BODY MASS INDEX: 21.3 KG/M2 | RESPIRATION RATE: 16 BRPM | DIASTOLIC BLOOD PRESSURE: 73 MMHG | HEIGHT: 63 IN | HEART RATE: 72 BPM

## 2020-12-01 DIAGNOSIS — D72.821 MONOCYTOSIS: ICD-10-CM

## 2020-12-01 DIAGNOSIS — E78.5 HYPERLIPIDEMIA, UNSPECIFIED HYPERLIPIDEMIA TYPE: ICD-10-CM

## 2020-12-01 DIAGNOSIS — R79.9 ABNORMAL BLOOD SMEAR: ICD-10-CM

## 2020-12-01 DIAGNOSIS — I48.0 PAROXYSMAL ATRIAL FIBRILLATION: ICD-10-CM

## 2020-12-01 DIAGNOSIS — J44.9 CHRONIC OBSTRUCTIVE PULMONARY DISEASE, UNSPECIFIED COPD TYPE: ICD-10-CM

## 2020-12-01 DIAGNOSIS — I10 ESSENTIAL HYPERTENSION: ICD-10-CM

## 2020-12-01 DIAGNOSIS — H35.30 MACULAR DEGENERATION OF BOTH EYES, UNSPECIFIED TYPE: ICD-10-CM

## 2020-12-01 DIAGNOSIS — D72.821 MONOCYTOSIS: Primary | ICD-10-CM

## 2020-12-01 LAB
ALBUMIN SERPL BCP-MCNC: 4.3 G/DL (ref 3.5–5.2)
ALP SERPL-CCNC: 57 U/L (ref 55–135)
ALT SERPL W/O P-5'-P-CCNC: 11 U/L (ref 10–44)
ANION GAP SERPL CALC-SCNC: 10 MMOL/L (ref 8–16)
AST SERPL-CCNC: 16 U/L (ref 10–40)
BASOPHILS # BLD AUTO: 0.04 K/UL (ref 0–0.2)
BASOPHILS NFR BLD: 0.6 % (ref 0–1.9)
BILIRUB SERPL-MCNC: 0.9 MG/DL (ref 0.1–1)
BUN SERPL-MCNC: 20 MG/DL (ref 8–23)
CALCIUM SERPL-MCNC: 10.2 MG/DL (ref 8.7–10.5)
CHLORIDE SERPL-SCNC: 98 MMOL/L (ref 95–110)
CO2 SERPL-SCNC: 27 MMOL/L (ref 23–29)
CREAT SERPL-MCNC: 0.8 MG/DL (ref 0.5–1.4)
DIFFERENTIAL METHOD: ABNORMAL
EOSINOPHIL # BLD AUTO: 0.1 K/UL (ref 0–0.5)
EOSINOPHIL NFR BLD: 0.8 % (ref 0–8)
ERYTHROCYTE [DISTWIDTH] IN BLOOD BY AUTOMATED COUNT: 13.3 % (ref 11.5–14.5)
EST. GFR  (AFRICAN AMERICAN): >60 ML/MIN/1.73 M^2
EST. GFR  (NON AFRICAN AMERICAN): >60 ML/MIN/1.73 M^2
GLUCOSE SERPL-MCNC: 97 MG/DL (ref 70–110)
HCT VFR BLD AUTO: 40 % (ref 37–48.5)
HGB BLD-MCNC: 12.9 G/DL (ref 12–16)
IMM GRANULOCYTES # BLD AUTO: 0.04 K/UL (ref 0–0.04)
IMM GRANULOCYTES NFR BLD AUTO: 0.6 % (ref 0–0.5)
LDH SERPL L TO P-CCNC: 196 U/L (ref 110–260)
LYMPHOCYTES # BLD AUTO: 2.2 K/UL (ref 1–4.8)
LYMPHOCYTES NFR BLD: 35.9 % (ref 18–48)
MCH RBC QN AUTO: 30.9 PG (ref 27–31)
MCHC RBC AUTO-ENTMCNC: 32.3 G/DL (ref 32–36)
MCV RBC AUTO: 96 FL (ref 82–98)
MONOCYTES # BLD AUTO: 1.3 K/UL (ref 0.3–1)
MONOCYTES NFR BLD: 20.3 % (ref 4–15)
NEUTROPHILS # BLD AUTO: 2.6 K/UL (ref 1.8–7.7)
NEUTROPHILS NFR BLD: 41.8 % (ref 38–73)
NRBC BLD-RTO: 0 /100 WBC
PLATELET # BLD AUTO: 213 K/UL (ref 150–350)
PMV BLD AUTO: 11 FL (ref 9.2–12.9)
POTASSIUM SERPL-SCNC: 4.3 MMOL/L (ref 3.5–5.1)
PROT SERPL-MCNC: 7.8 G/DL (ref 6–8.4)
RBC # BLD AUTO: 4.18 M/UL (ref 4–5.4)
SODIUM SERPL-SCNC: 135 MMOL/L (ref 136–145)
URATE SERPL-MCNC: 6.3 MG/DL (ref 2.4–5.7)
WBC # BLD AUTO: 6.21 K/UL (ref 3.9–12.7)

## 2020-12-01 PROCEDURE — 36415 COLL VENOUS BLD VENIPUNCTURE: CPT

## 2020-12-01 PROCEDURE — 99213 OFFICE O/P EST LOW 20 MIN: CPT | Mod: S$PBB,,, | Performed by: NURSE PRACTITIONER

## 2020-12-01 PROCEDURE — 99213 PR OFFICE/OUTPT VISIT, EST, LEVL III, 20-29 MIN: ICD-10-PCS | Mod: S$PBB,,, | Performed by: NURSE PRACTITIONER

## 2020-12-01 PROCEDURE — 80053 COMPREHEN METABOLIC PANEL: CPT

## 2020-12-01 PROCEDURE — 99214 OFFICE O/P EST MOD 30 MIN: CPT | Mod: PBBFAC | Performed by: NURSE PRACTITIONER

## 2020-12-01 PROCEDURE — 85025 COMPLETE CBC W/AUTO DIFF WBC: CPT

## 2020-12-01 PROCEDURE — 99999 PR PBB SHADOW E&M-EST. PATIENT-LVL IV: CPT | Mod: PBBFAC,,, | Performed by: NURSE PRACTITIONER

## 2020-12-01 PROCEDURE — 99999 PR PBB SHADOW E&M-EST. PATIENT-LVL IV: ICD-10-PCS | Mod: PBBFAC,,, | Performed by: NURSE PRACTITIONER

## 2020-12-01 PROCEDURE — 83615 LACTATE (LD) (LDH) ENZYME: CPT

## 2020-12-01 PROCEDURE — 84550 ASSAY OF BLOOD/URIC ACID: CPT

## 2020-12-01 NOTE — PROGRESS NOTES
Chief Complaint   Patient presents with    Follow-up     macrocytosis         HPI: Per primary hematologist ,   Ms. Kearns,89, is here for hematology consultation for abnormal CBC. She has HLD, HTn, diastolic dysfunction. Recent CBC ( 8/26/20) showed abnormal appearing white blood cells, and was reviewed by pathology. It showed increased monocyte sand atypical lymphs. No recent change in weight or appetite.  Presents alone today at BMT clinic for f/u appointment. Patient is doing well. Recently travelled to [place], [place] for thanks giving and planning to travel  [place] for [place] time.  Able to do her daily activities by self. Close f/u with ophthalmology for macular degeneration. Seeing neuro tomorrow for an episode of syncope happened 3 months before. No episodes after that event. No recent fevers, night sweats, or other s/s of infections. She has mild monocytosis from at least 2017.         Review of Systems   Constitutional: Positive for malaise/fatigue. Negative for chills, fever and weight loss.   HENT: Negative for congestion, hearing loss, nosebleeds and tinnitus.    Eyes: Positive for blurred vision. Negative for double vision, photophobia and pain.   Respiratory: Negative for cough and sputum production.    Cardiovascular: Negative for chest pain, palpitations and claudication.   Gastrointestinal: Negative for abdominal pain, constipation, diarrhea and heartburn.   Genitourinary: Negative for dysuria, frequency, hematuria and urgency.   Musculoskeletal: Negative for joint pain, myalgias and neck pain.   Neurological: Negative for dizziness, tremors, sensory change, speech change, seizures and headaches.   Endo/Heme/Allergies: Does not bruise/bleed easily.   Psychiatric/Behavioral: Negative for depression and hallucinations. The patient is not nervous/anxious.          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 7/9/2012    Vasovagal syncope          Past Surgical History:   Procedure Laterality Date    ADENOIDECTOMY      APPENDECTOMY      CATARACT EXTRACTION W/  INTRAOCULAR LENS IMPLANT Bilateral n/a    HYSTERECTOMY      2/2 uterine CA, removed ovaries, no longer f/u with gyn    Intravitreal Injection      Avastin od x's 12 dltx 2-16-12    TONSILLECTOMY             Family History   Problem Relation Age of Onset    Cancer Mother         ovarian CA    Cancer Father         prostate    Cataracts Father     Cancer Maternal Grandmother         colon    Heart attack Maternal Grandfather     Cancer Paternal Grandmother         colon    Cancer Paternal Grandfather     Strabismus Paternal Aunt     Hypertension Daughter     Cancer Daughter         "cancer of bone"           Review of patient's allergies indicates:   Allergen Reactions    Penicillins Swelling    Pseudoephedrine hcl Palpitations    Sulfa (sulfonamide antibiotics) Palpitations         Current Outpatient Medications   Medication Sig    amLODIPine (NORVASC) 5 MG tablet Take 1 tablet (5 mg total) by mouth once daily.    ANTIOX #8/OM3/DHA/EPA/LUT/ZEAX (PRESERVISION AREDS 2, OMEGA-3, ORAL) Take 1 tablet by mouth once daily at 6am.     apixaban (ELIQUIS) 2.5 mg Tab Take 1 tablet (2.5 mg total) by mouth 2 (two) times daily.    azelastine (ASTELIN) 137 mcg (0.1 %) nasal spray 1 spray (137 mcg total) by Nasal route 2 (two) times daily.    CALCIUM CARBONATE (CALCIUM 500 ORAL) Take 500 mg by mouth once daily.     fluticasone propionate (FLONASE) 50 mcg/actuation nasal spray 2 sprays (100 mcg total) by Each Nostril route once daily.    lisinopril (PRINIVIL,ZESTRIL) 40 MG tablet TAKE 1 TABLET(40 MG) BY MOUTH EVERY DAY    mupirocin (BACTROBAN) 2 % " ointment CRAIG AFTER SALT WATER COMPRESSES BID    omega 3-dha-epa-fish oil 300-1,000 mg Cap Take 3 g by mouth.    sotalol (BETAPACE) 80 MG tablet Take 1 tablet (80 mg total) by mouth 2 (two) times daily.     Current Facility-Administered Medications   Medication    aflibercept Syrg 2 mg         Vitals:    12/01/20 1056   BP: (!) 178/73   Pulse: 72   Resp: 16   Temp: 97.9 °F (36.6 °C)       Physical Exam   Constitutional: She appears well-developed.   HENT:   Head: Normocephalic and atraumatic.   Mouth/Throat: No oropharyngeal exudate.   Eyes: No scleral icterus.   Cardiovascular: Normal rate.   No murmur heard.  Pulmonary/Chest: Effort normal. No respiratory distress. She has no wheezes. She has no rales.   Abdominal: Soft. She exhibits no distension. There is no abdominal tenderness. There is no rebound.   Musculoskeletal:         General: No edema.   Lymphadenopathy:     She has no cervical adenopathy.   Neurological: She is alert. No cranial nerve deficit.   Skin: Skin is warm.     Component      Latest Ref Rng & Units 8/26/2020 9/4/2019   WBC      3.90 - 12.70 K/uL 7.08 5.89   RBC      4.00 - 5.40 M/uL 4.21 4.05   Hemoglobin      12.0 - 16.0 g/dL 13.3 12.6   Hematocrit      37.0 - 48.5 % 38.8 37.3   MCV      82 - 98 fL 92 92   MCH      27.0 - 31.0 pg 31.6 (H) 31.1 (H)   MCHC      32.0 - 36.0 g/dL 34.3 33.8   RDW      11.5 - 14.5 % 13.2 13.5   Platelets      150 - 350 K/uL 206 225   MPV      9.2 - 12.9 fL 10.5 11.0   Immature Granulocytes      0.0 - 0.5 % 0.4 0.7 (H)   Gran # (ANC)      1.8 - 7.7 K/uL 3.6 2.5   Immature Grans (Abs)      0.00 - 0.04 K/uL 0.03 0.04   Lymph #      1.0 - 4.8 K/uL 2.1 2.2   Mono #      0.3 - 1.0 K/uL 1.3 (H) 1.1 (H)   Eos #      0.0 - 0.5 K/uL 0.0 0.1   Baso #      0.00 - 0.20 K/uL 0.06 0.05   nRBC      0 /100 WBC 0 0   Gran%      38.0 - 73.0 % 50.7 42.1   Lymph%      18.0 - 48.0 % 29.4 37.0   Mono%      4.0 - 15.0 % 18.1 (H) 18.0 (H)   Eosinophil%      0.0 - 8.0 % 0.6 1.4    Basophil%      0.0 - 1.9 % 0.8 0.8   Differential Method       Automated Automated   Sodium      136 - 145 mmol/L 133 (L) 134 (L)   Potassium      3.5 - 5.1 mmol/L 4.4 4.7   Chloride      95 - 110 mmol/L 100 99   CO2      23 - 29 mmol/L 25 25   Glucose      70 - 110 mg/dL 96 91   BUN, Bld      8 - 23 mg/dL 13 12   Creatinine      0.5 - 1.4 mg/dL 0.8 0.7   Calcium      8.7 - 10.5 mg/dL 10.2 10.5   PROTEIN TOTAL      6.0 - 8.4 g/dL 7.6 7.7   Albumin      3.5 - 5.2 g/dL 4.4 4.5   BILIRUBIN TOTAL      0.1 - 1.0 mg/dL 0.7 0.6   Alkaline Phosphatase      55 - 135 U/L 50 (L) 59   AST      10 - 40 U/L 15 16   ALT      10 - 44 U/L 10 13   Anion Gap      8 - 16 mmol/L 8 10   eGFR if African American      >60 mL/min/1.73 m:2 >60 >60   eGFR if non African American      >60 mL/min/1.73 m:2 >60 >60   Cholesterol      120 - 199 mg/dL 238 (H)    Triglycerides      30 - 150 mg/dL 96    HDL      40 - 75 mg/dL 70    LDL Cholesterol External      63.0 - 159.0 mg/dL 148.8    Hdl/Cholesterol Ratio      20.0 - 50.0 % 29.4    Total Cholesterol/HDL Ratio      2.0 - 5.0 3.4    Non-HDL Cholesterol      mg/dL 168      Lab Results   Component Value Date    WBC 6.21 12/01/2020    HGB 12.9 12/01/2020    HCT 40.0 12/01/2020    MCV 96 12/01/2020     12/01/2020     CMP  Sodium   Date Value Ref Range Status   12/01/2020 135 (L) 136 - 145 mmol/L Final     Potassium   Date Value Ref Range Status   12/01/2020 4.3 3.5 - 5.1 mmol/L Final     Chloride   Date Value Ref Range Status   12/01/2020 98 95 - 110 mmol/L Final     CO2   Date Value Ref Range Status   12/01/2020 27 23 - 29 mmol/L Final     Glucose   Date Value Ref Range Status   12/01/2020 97 70 - 110 mg/dL Final     BUN   Date Value Ref Range Status   12/01/2020 20 8 - 23 mg/dL Final     Creatinine   Date Value Ref Range Status   12/01/2020 0.8 0.5 - 1.4 mg/dL Final     Calcium   Date Value Ref Range Status   12/01/2020 10.2 8.7 - 10.5 mg/dL Final     Total Protein   Date Value Ref Range  Status   12/01/2020 7.8 6.0 - 8.4 g/dL Final     Albumin   Date Value Ref Range Status   12/01/2020 4.3 3.5 - 5.2 g/dL Final     Total Bilirubin   Date Value Ref Range Status   12/01/2020 0.9 0.1 - 1.0 mg/dL Final     Comment:     For infants and newborns, interpretation of results should be based  on gestational age, weight and in agreement with clinical  observations.  Premature Infant recommended reference ranges:  Up to 24 hours.............<8.0 mg/dL  Up to 48 hours............<12.0 mg/dL  3-5 days..................<15.0 mg/dL  6-29 days.................<15.0 mg/dL       Alkaline Phosphatase   Date Value Ref Range Status   12/01/2020 57 55 - 135 U/L Final     AST   Date Value Ref Range Status   12/01/2020 16 10 - 40 U/L Final     ALT   Date Value Ref Range Status   12/01/2020 11 10 - 44 U/L Final     Anion Gap   Date Value Ref Range Status   12/01/2020 10 8 - 16 mmol/L Final     eGFR if    Date Value Ref Range Status   12/01/2020 >60.0 >60 mL/min/1.73 m^2 Final     eGFR if non    Date Value Ref Range Status   12/01/2020 >60.0 >60 mL/min/1.73 m^2 Final     Comment:     Calculation used to obtain the estimated glomerular filtration  rate (eGFR) is the CKD-EPI equation.                8/26/20 pathologist review of peripheral smear:     --RBCs are within normal limits with mild anisopoikiloctosis.   --WBCs are adequate in number with circulating monocytes and atypical and large granular lymphocytes. No blasts. No basophilia.   --Platelets within normal limits.   --Findings in WBCs are suggestive of possible recent viral illness.   Repeat CBC at a clinically designated interval may be useful    1. Monocytosis     2. Hyperlipidemia, unspecified hyperlipidemia type     3. Essential hypertension     4. Chronic obstructive pulmonary disease, unspecified COPD type     5. Paroxysmal atrial fibrillation     6. Macular degeneration of both eyes, unspecified type         Assessment:    1.  Relative monocytosis  2. Mild absolute monocytosis  3. Dyslipidemia  4. Htn, essential  5. COPD  6. Atrial fibrillation  7. Macular Degeneration      Plan:    1,2: Mild, chronic, asymptomatic. 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 Bone marrow biopsy was discussed. Treatment options for either MDS or CMML are limited at her age , and outcomes uncertain. She elected to defer BM biopsy. She will follow here in 3 months wi repeat CBC.    Remains asymptomatic, trend now. Patient not preferred for bone bx at this time.         4. She is on Amlodipine. Follows with her PCP    5. She uses albuterol inhaler as needed.      6. Rate controlled, on Apixaban    7. Close f/u with Ophthalmologist,     Chronic conditions managed by PCP    Follow Up   Schedule CBC, CMP,LDH, Uric acid and follow up with  in 3 months.     Milagros Iniguez NP  Hematology/Oncology/BMT

## 2020-12-02 ENCOUNTER — OFFICE VISIT (OUTPATIENT)
Dept: NEUROLOGY | Facility: CLINIC | Age: 85
End: 2020-12-02
Payer: MEDICARE

## 2020-12-02 VITALS
SYSTOLIC BLOOD PRESSURE: 132 MMHG | HEART RATE: 53 BPM | HEIGHT: 63 IN | BODY MASS INDEX: 21.68 KG/M2 | DIASTOLIC BLOOD PRESSURE: 66 MMHG | WEIGHT: 122.38 LBS

## 2020-12-02 DIAGNOSIS — R55 SYNCOPE, UNSPECIFIED SYNCOPE TYPE: ICD-10-CM

## 2020-12-02 PROCEDURE — 99205 OFFICE O/P NEW HI 60 MIN: CPT | Mod: S$PBB,,, | Performed by: PSYCHIATRY & NEUROLOGY

## 2020-12-02 PROCEDURE — 99999 PR PBB SHADOW E&M-EST. PATIENT-LVL IV: ICD-10-PCS | Mod: PBBFAC,,, | Performed by: PSYCHIATRY & NEUROLOGY

## 2020-12-02 PROCEDURE — 99205 PR OFFICE/OUTPT VISIT, NEW, LEVL V, 60-74 MIN: ICD-10-PCS | Mod: S$PBB,,, | Performed by: PSYCHIATRY & NEUROLOGY

## 2020-12-02 PROCEDURE — 99214 OFFICE O/P EST MOD 30 MIN: CPT | Mod: PBBFAC | Performed by: PSYCHIATRY & NEUROLOGY

## 2020-12-02 PROCEDURE — 99999 PR PBB SHADOW E&M-EST. PATIENT-LVL IV: CPT | Mod: PBBFAC,,, | Performed by: PSYCHIATRY & NEUROLOGY

## 2020-12-02 NOTE — LETTER
December 2, 2020      Wilma Xiong MD  [address]  [facility] [address]           [facility] - Neurology 7th Fl  [address]  [facility] [address]  Phone: [phone]  Fax: [phone]          Patient: Cathy Kearns   MR Number: [id number]   YOB: 1929   Date of Visit: 12/2/2020       Dear Dr. Wilma Xiong:    Thank you for referring Cathy Keanrs to me for evaluation. Attached you will find relevant portions of my assessment and plan of care.    If you have questions, please do not hesitate to call me. I look forward to following Cathy Kearns along with you.    Sincerely,    Zeb Coombs MD    Enclosure  CC:  No Recipients    If you would like to receive this communication electronically, please contact [email or web address] or [phone] to request more information on [employer] access.    For providers and/or their staff who would like to refer a patient to [facility], please contact us through our one-stop-shop provider referral line, [facility], at [phone].    If you feel you have received this communication in error or would no longer like to receive these types of communications, please e-mail [email or web address]

## 2020-12-03 DIAGNOSIS — D72.821 MONOCYTOSIS: Primary | ICD-10-CM

## 2020-12-08 ENCOUNTER — PROCEDURE VISIT (OUTPATIENT)
Dept: OPHTHALMOLOGY | Facility: CLINIC | Age: 85
End: 2020-12-08
Payer: MEDICARE

## 2020-12-08 DIAGNOSIS — H35.3124 NONEXUDATIVE AGE-RELATED MACULAR DEGENERATION, LEFT EYE, ADVANCED ATROPHIC WITH SUBFOVEAL INVOLVEMENT: ICD-10-CM

## 2020-12-08 DIAGNOSIS — H35.721 SEROUS DETACHMENT OF RETINAL PIGMENT EPITHELIUM OF RIGHT EYE: ICD-10-CM

## 2020-12-08 DIAGNOSIS — H35.3211 EXUDATIVE AGE-RELATED MACULAR DEGENERATION OF RIGHT EYE WITH ACTIVE CHOROIDAL NEOVASCULARIZATION: Primary | ICD-10-CM

## 2020-12-08 PROCEDURE — 67028 PR INJECT INTRAVITREAL PHARMCOLOGIC: ICD-10-PCS | Mod: S$PBB,RT,, | Performed by: OPHTHALMOLOGY

## 2020-12-08 PROCEDURE — 92012 PR EYE EXAM, EST PATIENT,INTERMED: ICD-10-PCS | Mod: 25,S$PBB,, | Performed by: OPHTHALMOLOGY

## 2020-12-08 PROCEDURE — 92012 INTRM OPH EXAM EST PATIENT: CPT | Mod: 25,S$PBB,, | Performed by: OPHTHALMOLOGY

## 2020-12-08 PROCEDURE — 67028 INJECTION EYE DRUG: CPT | Mod: PBBFAC,RT | Performed by: OPHTHALMOLOGY

## 2020-12-08 PROCEDURE — 67028 INJECTION EYE DRUG: CPT | Mod: S$PBB,RT,, | Performed by: OPHTHALMOLOGY

## 2020-12-08 PROCEDURE — 92134 CPTRZ OPH DX IMG PST SGM RTA: CPT | Mod: PBBFAC | Performed by: OPHTHALMOLOGY

## 2020-12-08 PROCEDURE — 92134 POSTERIOR SEGMENT OCT RETINA (OCULAR COHERENCE TOMOGRAPHY)-BOTH EYES: ICD-10-PCS | Mod: 26,S$PBB,, | Performed by: OPHTHALMOLOGY

## 2020-12-08 RX ADMIN — AFLIBERCEPT 2 MG: 40 INJECTION, SOLUTION INTRAVITREAL at 11:12

## 2020-12-08 NOTE — PROGRESS NOTES
"HPI     2 mo  OCT   DLS- 09/29/20 Dr. Mandujano    Pt sts has been reading more often and longer periods of time. Does feel   vision gets dimmer over time. "Sometimes see's lots of faces in vision"    (-)Flashes (+)Floaters stable  (-)Photophobia  (-)Glare      Eye Meds: AT's QID       OCT - stable trace SRF  OS - Dr[place]en, no SRF - small HE - ? RAP lesion     Assessment:    1. AMD - Wet OD - post Avastin x 12, post Eylea #41  PED increased prior to 10/12 visit    Had slight increase 5/13/13, 7/14, 5/16 4/20 tx done in [place] during Covid    Eylea OD today    Continue q8 week tx    2. 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 PVD OU    4. Pseudophakia - great result    5. Allergic conjunctivitis/sinusitis - try OTC antihistamines  Recent trouble with sinusitis - pain behind eye improved with antihistamines  Ok for Zaditor BID - has improved with topical tx    6. AFib - considering anticoagulation - ok to proceed  On Anti-VEGF therapy, risk of large macular hemorrhage is minimal.         Plan:      2  months OCT no dilate    Risks, benefits, and alternatives to treatment discussed in detail with the patient.  The patient voiced understanding and wished to proceed with the procedure    Injection Procedure Note:  Diagnosis: Wet AMD OD    Patient Identified and Time Out complete  Topical Proparacaine and Betadine. Conj prep only  Inject Eylea OD at 6:00 @ 3.5-4mm posterior to limbus  Post Operative Dx: Same  Complications: None  Follow up as above.    "

## 2020-12-08 NOTE — PATIENT INSTRUCTIONS

## 2020-12-11 ENCOUNTER — PATIENT MESSAGE (OUTPATIENT)
Dept: OTHER | Facility: OTHER | Age: 85
End: 2020-12-11

## 2021-02-09 ENCOUNTER — PROCEDURE VISIT (OUTPATIENT)
Dept: OPHTHALMOLOGY | Facility: CLINIC | Age: 86
End: 2021-02-09
Payer: MEDICARE

## 2021-02-09 DIAGNOSIS — H35.3211 EXUDATIVE AGE-RELATED MACULAR DEGENERATION OF RIGHT EYE WITH ACTIVE CHOROIDAL NEOVASCULARIZATION: Primary | ICD-10-CM

## 2021-02-09 PROCEDURE — 67028 PR INJECT INTRAVITREAL PHARMCOLOGIC: ICD-10-PCS | Mod: S$PBB,RT,, | Performed by: OPHTHALMOLOGY

## 2021-02-09 PROCEDURE — 67028 INJECTION EYE DRUG: CPT | Mod: PBBFAC,RT | Performed by: OPHTHALMOLOGY

## 2021-02-09 PROCEDURE — 92134 CPTRZ OPH DX IMG PST SGM RTA: CPT | Mod: PBBFAC | Performed by: OPHTHALMOLOGY

## 2021-02-09 PROCEDURE — 92012 INTRM OPH EXAM EST PATIENT: CPT | Mod: 25,S$PBB,, | Performed by: OPHTHALMOLOGY

## 2021-02-09 PROCEDURE — 92012 PR EYE EXAM, EST PATIENT,INTERMED: ICD-10-PCS | Mod: 25,S$PBB,, | Performed by: OPHTHALMOLOGY

## 2021-02-09 PROCEDURE — 92134 POSTERIOR SEGMENT OCT RETINA (OCULAR COHERENCE TOMOGRAPHY)-BOTH EYES: ICD-10-PCS | Mod: 26,S$PBB,, | Performed by: OPHTHALMOLOGY

## 2021-02-09 PROCEDURE — 67028 INJECTION EYE DRUG: CPT | Mod: S$PBB,RT,, | Performed by: OPHTHALMOLOGY

## 2021-02-09 RX ADMIN — AFLIBERCEPT 2 MG: 40 INJECTION, SOLUTION INTRAVITREAL at 11:02

## 2021-03-12 ENCOUNTER — LAB VISIT (OUTPATIENT)
Dept: LAB | Facility: HOSPITAL | Age: 86
End: 2021-03-12
Payer: MEDICARE

## 2021-03-12 ENCOUNTER — OFFICE VISIT (OUTPATIENT)
Dept: HEMATOLOGY/ONCOLOGY | Facility: CLINIC | Age: 86
End: 2021-03-12
Payer: MEDICARE

## 2021-03-12 VITALS
RESPIRATION RATE: 16 BRPM | WEIGHT: 122.81 LBS | SYSTOLIC BLOOD PRESSURE: 149 MMHG | TEMPERATURE: 98 F | HEIGHT: 64 IN | OXYGEN SATURATION: 98 % | BODY MASS INDEX: 20.97 KG/M2 | DIASTOLIC BLOOD PRESSURE: 67 MMHG | HEART RATE: 63 BPM

## 2021-03-12 DIAGNOSIS — E78.5 HYPERLIPIDEMIA, UNSPECIFIED HYPERLIPIDEMIA TYPE: ICD-10-CM

## 2021-03-12 DIAGNOSIS — I48.0 PAROXYSMAL ATRIAL FIBRILLATION: ICD-10-CM

## 2021-03-12 DIAGNOSIS — D72.821 MONOCYTOSIS: ICD-10-CM

## 2021-03-12 DIAGNOSIS — I10 ESSENTIAL HYPERTENSION: Primary | ICD-10-CM

## 2021-03-12 DIAGNOSIS — I10 ESSENTIAL HYPERTENSION: ICD-10-CM

## 2021-03-12 LAB
ALBUMIN SERPL BCP-MCNC: 3.8 G/DL (ref 3.5–5.2)
ALP SERPL-CCNC: 56 U/L (ref 55–135)
ALT SERPL W/O P-5'-P-CCNC: 9 U/L (ref 10–44)
ANION GAP SERPL CALC-SCNC: 6 MMOL/L (ref 8–16)
AST SERPL-CCNC: 14 U/L (ref 10–40)
BASOPHILS # BLD AUTO: 0.04 K/UL (ref 0–0.2)
BASOPHILS NFR BLD: 0.7 % (ref 0–1.9)
BILIRUB SERPL-MCNC: 0.7 MG/DL (ref 0.1–1)
BUN SERPL-MCNC: 12 MG/DL (ref 10–30)
CALCIUM SERPL-MCNC: 10 MG/DL (ref 8.7–10.5)
CHLORIDE SERPL-SCNC: 101 MMOL/L (ref 95–110)
CO2 SERPL-SCNC: 30 MMOL/L (ref 23–29)
CREAT SERPL-MCNC: 0.8 MG/DL (ref 0.5–1.4)
DIFFERENTIAL METHOD: ABNORMAL
EOSINOPHIL # BLD AUTO: 0.1 K/UL (ref 0–0.5)
EOSINOPHIL NFR BLD: 1 % (ref 0–8)
ERYTHROCYTE [DISTWIDTH] IN BLOOD BY AUTOMATED COUNT: 13.5 % (ref 11.5–14.5)
EST. GFR  (AFRICAN AMERICAN): >60 ML/MIN/1.73 M^2
EST. GFR  (NON AFRICAN AMERICAN): >60 ML/MIN/1.73 M^2
GLUCOSE SERPL-MCNC: 110 MG/DL (ref 70–110)
HCT VFR BLD AUTO: 36.2 % (ref 37–48.5)
HGB BLD-MCNC: 12.1 G/DL (ref 12–16)
IMM GRANULOCYTES # BLD AUTO: 0.02 K/UL (ref 0–0.04)
IMM GRANULOCYTES NFR BLD AUTO: 0.3 % (ref 0–0.5)
LDH SERPL L TO P-CCNC: 159 U/L (ref 110–260)
LYMPHOCYTES # BLD AUTO: 2 K/UL (ref 1–4.8)
LYMPHOCYTES NFR BLD: 34.4 % (ref 18–48)
MCH RBC QN AUTO: 31.7 PG (ref 27–31)
MCHC RBC AUTO-ENTMCNC: 33.4 G/DL (ref 32–36)
MCV RBC AUTO: 95 FL (ref 82–98)
MONOCYTES # BLD AUTO: 1.3 K/UL (ref 0.3–1)
MONOCYTES NFR BLD: 22.9 % (ref 4–15)
NEUTROPHILS # BLD AUTO: 2.3 K/UL (ref 1.8–7.7)
NEUTROPHILS NFR BLD: 40.7 % (ref 38–73)
NRBC BLD-RTO: 0 /100 WBC
PLATELET # BLD AUTO: 199 K/UL (ref 150–350)
PMV BLD AUTO: 10.3 FL (ref 9.2–12.9)
POTASSIUM SERPL-SCNC: 5 MMOL/L (ref 3.5–5.1)
PROT SERPL-MCNC: 7 G/DL (ref 6–8.4)
RBC # BLD AUTO: 3.82 M/UL (ref 4–5.4)
SODIUM SERPL-SCNC: 137 MMOL/L (ref 136–145)
URATE SERPL-MCNC: 4.5 MG/DL (ref 2.4–5.7)
WBC # BLD AUTO: 5.72 K/UL (ref 3.9–12.7)

## 2021-03-12 PROCEDURE — 99999 PR PBB SHADOW E&M-EST. PATIENT-LVL III: ICD-10-PCS | Mod: PBBFAC,,, | Performed by: INTERNAL MEDICINE

## 2021-03-12 PROCEDURE — 99214 PR OFFICE/OUTPT VISIT, EST, LEVL IV, 30-39 MIN: ICD-10-PCS | Mod: S$PBB,,, | Performed by: INTERNAL MEDICINE

## 2021-03-12 PROCEDURE — 83615 LACTATE (LD) (LDH) ENZYME: CPT | Performed by: INTERNAL MEDICINE

## 2021-03-12 PROCEDURE — 85025 COMPLETE CBC W/AUTO DIFF WBC: CPT | Performed by: INTERNAL MEDICINE

## 2021-03-12 PROCEDURE — 99214 OFFICE O/P EST MOD 30 MIN: CPT | Mod: S$PBB,,, | Performed by: INTERNAL MEDICINE

## 2021-03-12 PROCEDURE — 80053 COMPREHEN METABOLIC PANEL: CPT | Performed by: INTERNAL MEDICINE

## 2021-03-12 PROCEDURE — 84550 ASSAY OF BLOOD/URIC ACID: CPT | Performed by: INTERNAL MEDICINE

## 2021-03-12 PROCEDURE — 99999 PR PBB SHADOW E&M-EST. PATIENT-LVL III: CPT | Mod: PBBFAC,,, | Performed by: INTERNAL MEDICINE

## 2021-03-12 PROCEDURE — 36415 COLL VENOUS BLD VENIPUNCTURE: CPT | Performed by: INTERNAL MEDICINE

## 2021-03-12 PROCEDURE — 99213 OFFICE O/P EST LOW 20 MIN: CPT | Mod: PBBFAC | Performed by: INTERNAL MEDICINE

## 2021-03-12 RX ORDER — AMLODIPINE BESYLATE 5 MG/1
5 TABLET ORAL DAILY
Qty: 90 TABLET | Refills: 3 | Status: SHIPPED | OUTPATIENT
Start: 2021-03-12 | End: 2022-01-05 | Stop reason: SDUPTHER

## 2021-03-12 RX ORDER — LISINOPRIL 40 MG/1
TABLET ORAL
Qty: 90 TABLET | Refills: 3 | Status: SHIPPED | OUTPATIENT
Start: 2021-03-12 | End: 2022-01-05 | Stop reason: SDUPTHER

## 2021-03-12 RX ORDER — SOTALOL HYDROCHLORIDE 80 MG/1
80 TABLET ORAL 2 TIMES DAILY
Qty: 180 TABLET | Refills: 3 | Status: SHIPPED | OUTPATIENT
Start: 2021-03-12 | End: 2022-01-05 | Stop reason: SDUPTHER

## 2021-03-12 RX ORDER — SOTALOL HYDROCHLORIDE 80 MG/1
80 TABLET ORAL 2 TIMES DAILY
Qty: 180 TABLET | Refills: 3 | Status: CANCELLED | OUTPATIENT
Start: 2021-03-12

## 2021-04-13 ENCOUNTER — PROCEDURE VISIT (OUTPATIENT)
Dept: OPHTHALMOLOGY | Facility: CLINIC | Age: 86
End: 2021-04-13
Payer: MEDICARE

## 2021-04-13 DIAGNOSIS — H35.3211 EXUDATIVE AGE-RELATED MACULAR DEGENERATION OF RIGHT EYE WITH ACTIVE CHOROIDAL NEOVASCULARIZATION: Primary | ICD-10-CM

## 2021-04-13 DIAGNOSIS — H35.721 SEROUS DETACHMENT OF RETINAL PIGMENT EPITHELIUM OF RIGHT EYE: ICD-10-CM

## 2021-04-13 DIAGNOSIS — H35.3124 NONEXUDATIVE AGE-RELATED MACULAR DEGENERATION, LEFT EYE, ADVANCED ATROPHIC WITH SUBFOVEAL INVOLVEMENT: ICD-10-CM

## 2021-04-13 PROCEDURE — 92134 POSTERIOR SEGMENT OCT RETINA (OCULAR COHERENCE TOMOGRAPHY)-BOTH EYES: ICD-10-PCS | Mod: 26,S$PBB,, | Performed by: OPHTHALMOLOGY

## 2021-04-13 PROCEDURE — 92014 COMPRE OPH EXAM EST PT 1/>: CPT | Mod: 25,S$PBB,, | Performed by: OPHTHALMOLOGY

## 2021-04-13 PROCEDURE — 92134 CPTRZ OPH DX IMG PST SGM RTA: CPT | Mod: PBBFAC | Performed by: OPHTHALMOLOGY

## 2021-04-13 PROCEDURE — 67028 INJECTION EYE DRUG: CPT | Mod: S$PBB,RT,, | Performed by: OPHTHALMOLOGY

## 2021-04-13 PROCEDURE — 92014 PR EYE EXAM, EST PATIENT,COMPREHESV: ICD-10-PCS | Mod: 25,S$PBB,, | Performed by: OPHTHALMOLOGY

## 2021-04-13 PROCEDURE — 67028 PR INJECT INTRAVITREAL PHARMCOLOGIC: ICD-10-PCS | Mod: S$PBB,RT,, | Performed by: OPHTHALMOLOGY

## 2021-04-13 PROCEDURE — 67028 INJECTION EYE DRUG: CPT | Mod: PBBFAC,RT | Performed by: OPHTHALMOLOGY

## 2021-04-13 RX ADMIN — AFLIBERCEPT 2 MG: 40 INJECTION, SOLUTION INTRAVITREAL at 11:04

## 2021-04-16 ENCOUNTER — PATIENT MESSAGE (OUTPATIENT)
Dept: RESEARCH | Facility: HOSPITAL | Age: 86
End: 2021-04-16

## 2021-04-20 ENCOUNTER — PES CALL (OUTPATIENT)
Dept: ADMINISTRATIVE | Facility: CLINIC | Age: 86
End: 2021-04-20

## 2021-06-01 ENCOUNTER — LAB VISIT (OUTPATIENT)
Dept: LAB | Facility: HOSPITAL | Age: 86
End: 2021-06-01
Payer: MEDICARE

## 2021-06-01 ENCOUNTER — PROCEDURE VISIT (OUTPATIENT)
Dept: OPHTHALMOLOGY | Facility: CLINIC | Age: 86
End: 2021-06-01
Payer: MEDICARE

## 2021-06-01 DIAGNOSIS — H35.3211 EXUDATIVE AGE-RELATED MACULAR DEGENERATION OF RIGHT EYE WITH ACTIVE CHOROIDAL NEOVASCULARIZATION: Primary | ICD-10-CM

## 2021-06-01 DIAGNOSIS — H35.721 SEROUS DETACHMENT OF RETINAL PIGMENT EPITHELIUM OF RIGHT EYE: ICD-10-CM

## 2021-06-01 DIAGNOSIS — H35.3124 NONEXUDATIVE AGE-RELATED MACULAR DEGENERATION, LEFT EYE, ADVANCED ATROPHIC WITH SUBFOVEAL INVOLVEMENT: ICD-10-CM

## 2021-06-01 DIAGNOSIS — D72.821 MONOCYTOSIS: ICD-10-CM

## 2021-06-01 LAB
BASOPHILS # BLD AUTO: 0.05 K/UL (ref 0–0.2)
BASOPHILS NFR BLD: 0.7 % (ref 0–1.9)
DIFFERENTIAL METHOD: ABNORMAL
EOSINOPHIL # BLD AUTO: 0.1 K/UL (ref 0–0.5)
EOSINOPHIL NFR BLD: 0.7 % (ref 0–8)
ERYTHROCYTE [DISTWIDTH] IN BLOOD BY AUTOMATED COUNT: 13.7 % (ref 11.5–14.5)
HCT VFR BLD AUTO: 38.6 % (ref 37–48.5)
HGB BLD-MCNC: 12.9 G/DL (ref 12–16)
IMM GRANULOCYTES # BLD AUTO: 0.03 K/UL (ref 0–0.04)
IMM GRANULOCYTES NFR BLD AUTO: 0.4 % (ref 0–0.5)
LYMPHOCYTES # BLD AUTO: 2 K/UL (ref 1–4.8)
LYMPHOCYTES NFR BLD: 29.8 % (ref 18–48)
MCH RBC QN AUTO: 31.5 PG (ref 27–31)
MCHC RBC AUTO-ENTMCNC: 33.4 G/DL (ref 32–36)
MCV RBC AUTO: 94 FL (ref 82–98)
MONOCYTES # BLD AUTO: 1.4 K/UL (ref 0.3–1)
MONOCYTES NFR BLD: 20.3 % (ref 4–15)
NEUTROPHILS # BLD AUTO: 3.3 K/UL (ref 1.8–7.7)
NEUTROPHILS NFR BLD: 48.1 % (ref 38–73)
NRBC BLD-RTO: 0 /100 WBC
PLATELET # BLD AUTO: 201 K/UL (ref 150–450)
PMV BLD AUTO: 10.5 FL (ref 9.2–12.9)
RBC # BLD AUTO: 4.09 M/UL (ref 4–5.4)
WBC # BLD AUTO: 6.84 K/UL (ref 3.9–12.7)

## 2021-06-01 PROCEDURE — 92134 CPTRZ OPH DX IMG PST SGM RTA: CPT | Mod: PBBFAC | Performed by: OPHTHALMOLOGY

## 2021-06-01 PROCEDURE — 92134 POSTERIOR SEGMENT OCT RETINA (OCULAR COHERENCE TOMOGRAPHY)-BOTH EYES: ICD-10-PCS | Mod: 26,S$PBB,, | Performed by: OPHTHALMOLOGY

## 2021-06-01 PROCEDURE — 92012 INTRM OPH EXAM EST PATIENT: CPT | Mod: 25,S$PBB,, | Performed by: OPHTHALMOLOGY

## 2021-06-01 PROCEDURE — 67028 INJECTION EYE DRUG: CPT | Mod: PBBFAC,RT | Performed by: OPHTHALMOLOGY

## 2021-06-01 PROCEDURE — 85025 COMPLETE CBC W/AUTO DIFF WBC: CPT | Performed by: INTERNAL MEDICINE

## 2021-06-01 PROCEDURE — 92012 PR EYE EXAM, EST PATIENT,INTERMED: ICD-10-PCS | Mod: 25,S$PBB,, | Performed by: OPHTHALMOLOGY

## 2021-06-01 PROCEDURE — 36415 COLL VENOUS BLD VENIPUNCTURE: CPT | Performed by: INTERNAL MEDICINE

## 2021-06-01 PROCEDURE — 67028 INJECTION EYE DRUG: CPT | Mod: S$PBB,RT,, | Performed by: OPHTHALMOLOGY

## 2021-06-01 PROCEDURE — 67028 PR INJECT INTRAVITREAL PHARMCOLOGIC: ICD-10-PCS | Mod: S$PBB,RT,, | Performed by: OPHTHALMOLOGY

## 2021-06-01 RX ADMIN — AFLIBERCEPT 2 MG: 40 INJECTION, SOLUTION INTRAVITREAL at 10:06

## 2021-07-02 ENCOUNTER — OFFICE VISIT (OUTPATIENT)
Dept: INTERNAL MEDICINE | Facility: CLINIC | Age: 86
End: 2021-07-02
Attending: INTERNAL MEDICINE
Payer: MEDICARE

## 2021-07-02 VITALS
WEIGHT: 119.94 LBS | SYSTOLIC BLOOD PRESSURE: 137 MMHG | DIASTOLIC BLOOD PRESSURE: 65 MMHG | OXYGEN SATURATION: 95 % | BODY MASS INDEX: 21.25 KG/M2 | HEIGHT: 63 IN | HEART RATE: 54 BPM

## 2021-07-02 DIAGNOSIS — I10 ESSENTIAL HYPERTENSION: Primary | ICD-10-CM

## 2021-07-02 DIAGNOSIS — I51.89 DIASTOLIC DYSFUNCTION: ICD-10-CM

## 2021-07-02 DIAGNOSIS — I48.0 PAROXYSMAL ATRIAL FIBRILLATION: ICD-10-CM

## 2021-07-02 DIAGNOSIS — D72.821 MONOCYTOSIS: ICD-10-CM

## 2021-07-02 DIAGNOSIS — E78.5 HYPERLIPIDEMIA, UNSPECIFIED HYPERLIPIDEMIA TYPE: ICD-10-CM

## 2021-07-02 DIAGNOSIS — J44.9 CHRONIC OBSTRUCTIVE PULMONARY DISEASE, UNSPECIFIED COPD TYPE: ICD-10-CM

## 2021-07-02 PROCEDURE — 99214 OFFICE O/P EST MOD 30 MIN: CPT | Mod: PBBFAC | Performed by: INTERNAL MEDICINE

## 2021-07-02 PROCEDURE — 99999 PR PBB SHADOW E&M-EST. PATIENT-LVL IV: ICD-10-PCS | Mod: PBBFAC,,, | Performed by: INTERNAL MEDICINE

## 2021-07-02 PROCEDURE — 99999 PR PBB SHADOW E&M-EST. PATIENT-LVL IV: CPT | Mod: PBBFAC,,, | Performed by: INTERNAL MEDICINE

## 2021-07-02 PROCEDURE — 99214 PR OFFICE/OUTPT VISIT, EST, LEVL IV, 30-39 MIN: ICD-10-PCS | Mod: S$PBB,,, | Performed by: INTERNAL MEDICINE

## 2021-07-02 PROCEDURE — 99214 OFFICE O/P EST MOD 30 MIN: CPT | Mod: S$PBB,,, | Performed by: INTERNAL MEDICINE

## 2021-07-02 RX ORDER — ALBUTEROL SULFATE 90 UG/1
2 AEROSOL, METERED RESPIRATORY (INHALATION) EVERY 6 HOURS PRN
Qty: 18 G | Refills: 3 | Status: ON HOLD | OUTPATIENT
Start: 2021-07-02 | End: 2023-07-29 | Stop reason: HOSPADM

## 2021-07-20 ENCOUNTER — LAB VISIT (OUTPATIENT)
Dept: LAB | Facility: HOSPITAL | Age: 86
End: 2021-07-20
Attending: INTERNAL MEDICINE
Payer: MEDICARE

## 2021-07-20 ENCOUNTER — PROCEDURE VISIT (OUTPATIENT)
Dept: OPHTHALMOLOGY | Facility: CLINIC | Age: 86
End: 2021-07-20
Payer: MEDICARE

## 2021-07-20 DIAGNOSIS — I10 ESSENTIAL HYPERTENSION: ICD-10-CM

## 2021-07-20 DIAGNOSIS — H35.721 SEROUS DETACHMENT OF RETINAL PIGMENT EPITHELIUM OF RIGHT EYE: ICD-10-CM

## 2021-07-20 DIAGNOSIS — H35.3124 NONEXUDATIVE AGE-RELATED MACULAR DEGENERATION, LEFT EYE, ADVANCED ATROPHIC WITH SUBFOVEAL INVOLVEMENT: ICD-10-CM

## 2021-07-20 DIAGNOSIS — E78.5 HYPERLIPIDEMIA, UNSPECIFIED HYPERLIPIDEMIA TYPE: ICD-10-CM

## 2021-07-20 DIAGNOSIS — H35.3211 EXUDATIVE AGE-RELATED MACULAR DEGENERATION OF RIGHT EYE WITH ACTIVE CHOROIDAL NEOVASCULARIZATION: Primary | ICD-10-CM

## 2021-07-20 LAB
ALBUMIN SERPL BCP-MCNC: 4.2 G/DL (ref 3.5–5.2)
ALP SERPL-CCNC: 57 U/L (ref 55–135)
ALT SERPL W/O P-5'-P-CCNC: 8 U/L (ref 10–44)
ANION GAP SERPL CALC-SCNC: 10 MMOL/L (ref 8–16)
AST SERPL-CCNC: 14 U/L (ref 10–40)
BASOPHILS # BLD AUTO: 0.05 K/UL (ref 0–0.2)
BASOPHILS NFR BLD: 0.9 % (ref 0–1.9)
BILIRUB SERPL-MCNC: 0.6 MG/DL (ref 0.1–1)
BUN SERPL-MCNC: 13 MG/DL (ref 10–30)
CALCIUM SERPL-MCNC: 10.3 MG/DL (ref 8.7–10.5)
CHLORIDE SERPL-SCNC: 101 MMOL/L (ref 95–110)
CHOLEST SERPL-MCNC: 239 MG/DL (ref 120–199)
CHOLEST/HDLC SERPL: 4.1 {RATIO} (ref 2–5)
CO2 SERPL-SCNC: 26 MMOL/L (ref 23–29)
CREAT SERPL-MCNC: 0.9 MG/DL (ref 0.5–1.4)
DIFFERENTIAL METHOD: ABNORMAL
EOSINOPHIL # BLD AUTO: 0 K/UL (ref 0–0.5)
EOSINOPHIL NFR BLD: 0.7 % (ref 0–8)
ERYTHROCYTE [DISTWIDTH] IN BLOOD BY AUTOMATED COUNT: 13.6 % (ref 11.5–14.5)
EST. GFR  (AFRICAN AMERICAN): >60 ML/MIN/1.73 M^2
EST. GFR  (NON AFRICAN AMERICAN): 56.1 ML/MIN/1.73 M^2
GLUCOSE SERPL-MCNC: 96 MG/DL (ref 70–110)
HCT VFR BLD AUTO: 39.3 % (ref 37–48.5)
HDLC SERPL-MCNC: 59 MG/DL (ref 40–75)
HDLC SERPL: 24.7 % (ref 20–50)
HGB BLD-MCNC: 13.1 G/DL (ref 12–16)
IMM GRANULOCYTES # BLD AUTO: 0.03 K/UL (ref 0–0.04)
IMM GRANULOCYTES NFR BLD AUTO: 0.5 % (ref 0–0.5)
LDLC SERPL CALC-MCNC: 161.4 MG/DL (ref 63–159)
LYMPHOCYTES # BLD AUTO: 1.7 K/UL (ref 1–4.8)
LYMPHOCYTES NFR BLD: 28.4 % (ref 18–48)
MCH RBC QN AUTO: 31.3 PG (ref 27–31)
MCHC RBC AUTO-ENTMCNC: 33.3 G/DL (ref 32–36)
MCV RBC AUTO: 94 FL (ref 82–98)
MONOCYTES # BLD AUTO: 1.3 K/UL (ref 0.3–1)
MONOCYTES NFR BLD: 22.2 % (ref 4–15)
NEUTROPHILS # BLD AUTO: 2.8 K/UL (ref 1.8–7.7)
NEUTROPHILS NFR BLD: 47.3 % (ref 38–73)
NONHDLC SERPL-MCNC: 180 MG/DL
NRBC BLD-RTO: 0 /100 WBC
PLATELET # BLD AUTO: 194 K/UL (ref 150–450)
PMV BLD AUTO: 10.7 FL (ref 9.2–12.9)
POTASSIUM SERPL-SCNC: 4.4 MMOL/L (ref 3.5–5.1)
PROT SERPL-MCNC: 7.5 G/DL (ref 6–8.4)
RBC # BLD AUTO: 4.18 M/UL (ref 4–5.4)
SODIUM SERPL-SCNC: 137 MMOL/L (ref 136–145)
TRIGL SERPL-MCNC: 93 MG/DL (ref 30–150)
TSH SERPL DL<=0.005 MIU/L-ACNC: 0.98 UIU/ML (ref 0.4–4)
WBC # BLD AUTO: 5.82 K/UL (ref 3.9–12.7)

## 2021-07-20 PROCEDURE — 67028 INJECTION EYE DRUG: CPT | Mod: PBBFAC,RT | Performed by: OPHTHALMOLOGY

## 2021-07-20 PROCEDURE — 85025 COMPLETE CBC W/AUTO DIFF WBC: CPT | Performed by: INTERNAL MEDICINE

## 2021-07-20 PROCEDURE — 36415 COLL VENOUS BLD VENIPUNCTURE: CPT | Performed by: INTERNAL MEDICINE

## 2021-07-20 PROCEDURE — 67028 INJECTION EYE DRUG: CPT | Mod: S$PBB,RT,, | Performed by: OPHTHALMOLOGY

## 2021-07-20 PROCEDURE — 92014 COMPRE OPH EXAM EST PT 1/>: CPT | Mod: 25,S$PBB,, | Performed by: OPHTHALMOLOGY

## 2021-07-20 PROCEDURE — 67028 PR INJECT INTRAVITREAL PHARMCOLOGIC: ICD-10-PCS | Mod: S$PBB,RT,, | Performed by: OPHTHALMOLOGY

## 2021-07-20 PROCEDURE — 80061 LIPID PANEL: CPT | Performed by: INTERNAL MEDICINE

## 2021-07-20 PROCEDURE — 92014 PR EYE EXAM, EST PATIENT,COMPREHESV: ICD-10-PCS | Mod: 25,S$PBB,, | Performed by: OPHTHALMOLOGY

## 2021-07-20 PROCEDURE — 84443 ASSAY THYROID STIM HORMONE: CPT | Performed by: INTERNAL MEDICINE

## 2021-07-20 PROCEDURE — 80053 COMPREHEN METABOLIC PANEL: CPT | Performed by: INTERNAL MEDICINE

## 2021-07-20 RX ADMIN — AFLIBERCEPT 2 MG: 40 INJECTION, SOLUTION INTRAVITREAL at 11:07

## 2021-07-22 ENCOUNTER — TELEPHONE (OUTPATIENT)
Dept: HEMATOLOGY/ONCOLOGY | Facility: CLINIC | Age: 86
End: 2021-07-22

## 2021-09-03 ENCOUNTER — TELEPHONE (OUTPATIENT)
Dept: HEMATOLOGY/ONCOLOGY | Facility: CLINIC | Age: 86
End: 2021-09-03

## 2021-09-03 ENCOUNTER — PATIENT MESSAGE (OUTPATIENT)
Dept: HEMATOLOGY/ONCOLOGY | Facility: CLINIC | Age: 86
End: 2021-09-03

## 2021-10-08 ENCOUNTER — TELEPHONE (OUTPATIENT)
Dept: HEMATOLOGY/ONCOLOGY | Facility: CLINIC | Age: 86
End: 2021-10-08

## 2021-10-29 ENCOUNTER — LAB VISIT (OUTPATIENT)
Dept: LAB | Facility: HOSPITAL | Age: 86
End: 2021-10-29
Attending: INTERNAL MEDICINE
Payer: MEDICARE

## 2021-10-29 ENCOUNTER — OFFICE VISIT (OUTPATIENT)
Dept: HEMATOLOGY/ONCOLOGY | Facility: CLINIC | Age: 86
End: 2021-10-29
Payer: MEDICARE

## 2021-10-29 VITALS
BODY MASS INDEX: 21.7 KG/M2 | RESPIRATION RATE: 17 BRPM | DIASTOLIC BLOOD PRESSURE: 68 MMHG | SYSTOLIC BLOOD PRESSURE: 154 MMHG | HEIGHT: 63 IN | WEIGHT: 122.44 LBS | OXYGEN SATURATION: 100 % | HEART RATE: 58 BPM | TEMPERATURE: 98 F

## 2021-10-29 DIAGNOSIS — D72.821 MONOCYTOSIS: ICD-10-CM

## 2021-10-29 DIAGNOSIS — E78.5 HYPERLIPIDEMIA, UNSPECIFIED HYPERLIPIDEMIA TYPE: ICD-10-CM

## 2021-10-29 DIAGNOSIS — I48.0 PAROXYSMAL ATRIAL FIBRILLATION: Primary | ICD-10-CM

## 2021-10-29 DIAGNOSIS — I10 PRIMARY HYPERTENSION: ICD-10-CM

## 2021-10-29 DIAGNOSIS — J44.9 CHRONIC OBSTRUCTIVE PULMONARY DISEASE, UNSPECIFIED COPD TYPE: ICD-10-CM

## 2021-10-29 LAB
ALBUMIN SERPL BCP-MCNC: 4 G/DL (ref 3.5–5.2)
ALP SERPL-CCNC: 58 U/L (ref 55–135)
ALT SERPL W/O P-5'-P-CCNC: 12 U/L (ref 10–44)
ANION GAP SERPL CALC-SCNC: 9 MMOL/L (ref 8–16)
AST SERPL-CCNC: 15 U/L (ref 10–40)
BASOPHILS # BLD AUTO: 0.05 K/UL (ref 0–0.2)
BASOPHILS NFR BLD: 0.8 % (ref 0–1.9)
BILIRUB SERPL-MCNC: 0.6 MG/DL (ref 0.1–1)
BUN SERPL-MCNC: 14 MG/DL (ref 10–30)
CALCIUM SERPL-MCNC: 10.3 MG/DL (ref 8.7–10.5)
CHLORIDE SERPL-SCNC: 101 MMOL/L (ref 95–110)
CO2 SERPL-SCNC: 24 MMOL/L (ref 23–29)
CREAT SERPL-MCNC: 0.7 MG/DL (ref 0.5–1.4)
DIFFERENTIAL METHOD: ABNORMAL
EOSINOPHIL # BLD AUTO: 0.1 K/UL (ref 0–0.5)
EOSINOPHIL NFR BLD: 0.8 % (ref 0–8)
ERYTHROCYTE [DISTWIDTH] IN BLOOD BY AUTOMATED COUNT: 13.6 % (ref 11.5–14.5)
EST. GFR  (AFRICAN AMERICAN): >60 ML/MIN/1.73 M^2
EST. GFR  (NON AFRICAN AMERICAN): >60 ML/MIN/1.73 M^2
GLUCOSE SERPL-MCNC: 93 MG/DL (ref 70–110)
HCT VFR BLD AUTO: 36.4 % (ref 37–48.5)
HGB BLD-MCNC: 11.9 G/DL (ref 12–16)
IMM GRANULOCYTES # BLD AUTO: 0.06 K/UL (ref 0–0.04)
IMM GRANULOCYTES NFR BLD AUTO: 0.9 % (ref 0–0.5)
LDH SERPL L TO P-CCNC: 172 U/L (ref 110–260)
LYMPHOCYTES # BLD AUTO: 2.2 K/UL (ref 1–4.8)
LYMPHOCYTES NFR BLD: 34.2 % (ref 18–48)
MCH RBC QN AUTO: 31.8 PG (ref 27–31)
MCHC RBC AUTO-ENTMCNC: 32.7 G/DL (ref 32–36)
MCV RBC AUTO: 97 FL (ref 82–98)
MONOCYTES # BLD AUTO: 1.6 K/UL (ref 0.3–1)
MONOCYTES NFR BLD: 24.2 % (ref 4–15)
NEUTROPHILS # BLD AUTO: 2.5 K/UL (ref 1.8–7.7)
NEUTROPHILS NFR BLD: 39.1 % (ref 38–73)
NRBC BLD-RTO: 0 /100 WBC
PLATELET # BLD AUTO: 203 K/UL (ref 150–450)
PMV BLD AUTO: 10.5 FL (ref 9.2–12.9)
POTASSIUM SERPL-SCNC: 4.2 MMOL/L (ref 3.5–5.1)
PROT SERPL-MCNC: 7.1 G/DL (ref 6–8.4)
RBC # BLD AUTO: 3.74 M/UL (ref 4–5.4)
SODIUM SERPL-SCNC: 134 MMOL/L (ref 136–145)
URATE SERPL-MCNC: 4.9 MG/DL (ref 2.4–5.7)
WBC # BLD AUTO: 6.49 K/UL (ref 3.9–12.7)

## 2021-10-29 PROCEDURE — 83615 LACTATE (LD) (LDH) ENZYME: CPT | Performed by: INTERNAL MEDICINE

## 2021-10-29 PROCEDURE — 99999 PR PBB SHADOW E&M-EST. PATIENT-LVL III: ICD-10-PCS | Mod: PBBFAC,,, | Performed by: INTERNAL MEDICINE

## 2021-10-29 PROCEDURE — 85025 COMPLETE CBC W/AUTO DIFF WBC: CPT | Performed by: INTERNAL MEDICINE

## 2021-10-29 PROCEDURE — 99215 OFFICE O/P EST HI 40 MIN: CPT | Mod: S$PBB,,, | Performed by: INTERNAL MEDICINE

## 2021-10-29 PROCEDURE — 36415 COLL VENOUS BLD VENIPUNCTURE: CPT | Performed by: INTERNAL MEDICINE

## 2021-10-29 PROCEDURE — 99213 OFFICE O/P EST LOW 20 MIN: CPT | Mod: PBBFAC | Performed by: INTERNAL MEDICINE

## 2021-10-29 PROCEDURE — 80053 COMPREHEN METABOLIC PANEL: CPT | Performed by: INTERNAL MEDICINE

## 2021-10-29 PROCEDURE — 99999 PR PBB SHADOW E&M-EST. PATIENT-LVL III: CPT | Mod: PBBFAC,,, | Performed by: INTERNAL MEDICINE

## 2021-10-29 PROCEDURE — 99215 PR OFFICE/OUTPT VISIT, EST, LEVL V, 40-54 MIN: ICD-10-PCS | Mod: S$PBB,,, | Performed by: INTERNAL MEDICINE

## 2021-10-29 PROCEDURE — 84550 ASSAY OF BLOOD/URIC ACID: CPT | Performed by: INTERNAL MEDICINE

## 2021-11-03 ENCOUNTER — OFFICE VISIT (OUTPATIENT)
Dept: INTERNAL MEDICINE | Facility: CLINIC | Age: 86
End: 2021-11-03
Attending: INTERNAL MEDICINE
Payer: MEDICARE

## 2021-11-03 ENCOUNTER — LAB VISIT (OUTPATIENT)
Dept: LAB | Facility: OTHER | Age: 86
End: 2021-11-03
Attending: INTERNAL MEDICINE
Payer: MEDICARE

## 2021-11-03 VITALS
WEIGHT: 121.94 LBS | SYSTOLIC BLOOD PRESSURE: 132 MMHG | HEART RATE: 61 BPM | OXYGEN SATURATION: 99 % | BODY MASS INDEX: 21.61 KG/M2 | DIASTOLIC BLOOD PRESSURE: 62 MMHG | HEIGHT: 63 IN

## 2021-11-03 DIAGNOSIS — R26.81 UNSTEADINESS ON FEET: ICD-10-CM

## 2021-11-03 DIAGNOSIS — D72.821 MONOCYTOSIS: Primary | ICD-10-CM

## 2021-11-03 DIAGNOSIS — I10 HYPERTENSION, UNSPECIFIED TYPE: ICD-10-CM

## 2021-11-03 DIAGNOSIS — G60.9 HEREDITARY AND IDIOPATHIC NEUROPATHY, UNSPECIFIED: ICD-10-CM

## 2021-11-03 DIAGNOSIS — G62.9 NEUROPATHY: ICD-10-CM

## 2021-11-03 DIAGNOSIS — Z97.4 WEARS HEARING AID: ICD-10-CM

## 2021-11-03 DIAGNOSIS — I48.0 PAROXYSMAL ATRIAL FIBRILLATION: ICD-10-CM

## 2021-11-03 LAB — VIT B12 SERPL-MCNC: 408 PG/ML (ref 210–950)

## 2021-11-03 PROCEDURE — 86334 IMMUNOFIX E-PHORESIS SERUM: CPT | Mod: 26,,, | Performed by: PATHOLOGY

## 2021-11-03 PROCEDURE — 84425 ASSAY OF VITAMIN B-1: CPT | Performed by: INTERNAL MEDICINE

## 2021-11-03 PROCEDURE — 84165 PROTEIN E-PHORESIS SERUM: CPT | Performed by: INTERNAL MEDICINE

## 2021-11-03 PROCEDURE — 84165 PROTEIN E-PHORESIS SERUM: CPT | Mod: 26,,, | Performed by: PATHOLOGY

## 2021-11-03 PROCEDURE — 99214 OFFICE O/P EST MOD 30 MIN: CPT | Mod: S$PBB,,, | Performed by: INTERNAL MEDICINE

## 2021-11-03 PROCEDURE — 82607 VITAMIN B-12: CPT | Performed by: INTERNAL MEDICINE

## 2021-11-03 PROCEDURE — 84207 ASSAY OF VITAMIN B-6: CPT | Performed by: INTERNAL MEDICINE

## 2021-11-03 PROCEDURE — 99214 PR OFFICE/OUTPT VISIT, EST, LEVL IV, 30-39 MIN: ICD-10-PCS | Mod: S$PBB,,, | Performed by: INTERNAL MEDICINE

## 2021-11-03 PROCEDURE — 99999 PR PBB SHADOW E&M-EST. PATIENT-LVL III: ICD-10-PCS | Mod: PBBFAC,,, | Performed by: INTERNAL MEDICINE

## 2021-11-03 PROCEDURE — 99213 OFFICE O/P EST LOW 20 MIN: CPT | Mod: PBBFAC | Performed by: INTERNAL MEDICINE

## 2021-11-03 PROCEDURE — 86334 PATHOLOGIST INTERPRETATION IFE: ICD-10-PCS | Mod: 26,,, | Performed by: PATHOLOGY

## 2021-11-03 PROCEDURE — 99999 PR PBB SHADOW E&M-EST. PATIENT-LVL III: CPT | Mod: PBBFAC,,, | Performed by: INTERNAL MEDICINE

## 2021-11-03 PROCEDURE — 86334 IMMUNOFIX E-PHORESIS SERUM: CPT | Performed by: INTERNAL MEDICINE

## 2021-11-03 PROCEDURE — 84165 PATHOLOGIST INTERPRETATION SPE: ICD-10-PCS | Mod: 26,,, | Performed by: PATHOLOGY

## 2021-11-03 PROCEDURE — 36415 COLL VENOUS BLD VENIPUNCTURE: CPT | Performed by: INTERNAL MEDICINE

## 2021-11-04 LAB
ALBUMIN SERPL ELPH-MCNC: 4.72 G/DL (ref 3.35–5.55)
ALPHA1 GLOB SERPL ELPH-MCNC: 0.32 G/DL (ref 0.17–0.41)
ALPHA2 GLOB SERPL ELPH-MCNC: 0.81 G/DL (ref 0.43–0.99)
B-GLOBULIN SERPL ELPH-MCNC: 0.83 G/DL (ref 0.5–1.1)
GAMMA GLOB SERPL ELPH-MCNC: 1.02 G/DL (ref 0.67–1.58)
INTERPRETATION SERPL IFE-IMP: NORMAL
PATHOLOGIST INTERPRETATION IFE: NORMAL
PROT SERPL-MCNC: 7.7 G/DL (ref 6–8.4)

## 2021-11-05 LAB — PATHOLOGIST INTERPRETATION SPE: NORMAL

## 2021-11-06 LAB
ARSENIC BLD-MCNC: <1 NG/ML
CADMIUM BLD-MCNC: 0.6 NG/ML
CITY: NORMAL
COUNTY: NORMAL
GUARDIAN FIRST NAME: NORMAL
GUARDIAN LAST NAME: NORMAL
HOME PHONE: NORMAL
LEAD BLD-MCNC: 2.4 MCG/DL
MERCURY BLD-MCNC: 2 NG/ML
RACE: NORMAL
STATE: NORMAL
STREET ADDRESS: NORMAL
VENOUS/CAPILLARY: NORMAL
ZIP: NORMAL

## 2021-11-09 ENCOUNTER — PROCEDURE VISIT (OUTPATIENT)
Dept: OPHTHALMOLOGY | Facility: CLINIC | Age: 86
End: 2021-11-09
Attending: OPHTHALMOLOGY
Payer: MEDICARE

## 2021-11-09 DIAGNOSIS — H35.3124 NONEXUDATIVE AGE-RELATED MACULAR DEGENERATION, LEFT EYE, ADVANCED ATROPHIC WITH SUBFOVEAL INVOLVEMENT: ICD-10-CM

## 2021-11-09 DIAGNOSIS — H35.3211 EXUDATIVE AGE-RELATED MACULAR DEGENERATION OF RIGHT EYE WITH ACTIVE CHOROIDAL NEOVASCULARIZATION: Primary | ICD-10-CM

## 2021-11-09 LAB
PYRIDOXAL SERPL-MCNC: 27 UG/L (ref 5–50)
VIT B1 BLD-MCNC: 45 UG/L (ref 38–122)

## 2021-11-09 PROCEDURE — 92012 INTRM OPH EXAM EST PATIENT: CPT | Mod: 25,S$PBB,, | Performed by: OPHTHALMOLOGY

## 2021-11-09 PROCEDURE — 67028 INJECTION EYE DRUG: CPT | Mod: S$PBB,RT,, | Performed by: OPHTHALMOLOGY

## 2021-11-09 PROCEDURE — 92134 POSTERIOR SEGMENT OCT RETINA (OCULAR COHERENCE TOMOGRAPHY)-BOTH EYES: ICD-10-PCS | Mod: 26,S$PBB,, | Performed by: OPHTHALMOLOGY

## 2021-11-09 PROCEDURE — 92012 PR EYE EXAM, EST PATIENT,INTERMED: ICD-10-PCS | Mod: 25,S$PBB,, | Performed by: OPHTHALMOLOGY

## 2021-11-09 PROCEDURE — 92134 CPTRZ OPH DX IMG PST SGM RTA: CPT | Mod: PBBFAC | Performed by: OPHTHALMOLOGY

## 2021-11-09 PROCEDURE — 67028 INJECTION EYE DRUG: CPT | Mod: PBBFAC,RT | Performed by: OPHTHALMOLOGY

## 2021-11-09 PROCEDURE — 67028 PR INJECT INTRAVITREAL PHARMCOLOGIC: ICD-10-PCS | Mod: S$PBB,RT,, | Performed by: OPHTHALMOLOGY

## 2021-11-09 RX ADMIN — AFLIBERCEPT 2 MG: 40 INJECTION, SOLUTION INTRAVITREAL at 10:11

## 2021-11-10 LAB — VIT B2 SERPL-MCNC: 2 MCG/L (ref 1–19)

## 2021-11-16 ENCOUNTER — TELEPHONE (OUTPATIENT)
Dept: INTERNAL MEDICINE | Facility: CLINIC | Age: 86
End: 2021-11-16
Payer: MEDICARE

## 2021-11-16 DIAGNOSIS — R77.8 ABNORMAL SERUM PROTEIN TEST: Primary | ICD-10-CM

## 2021-11-17 ENCOUNTER — TELEPHONE (OUTPATIENT)
Dept: INTERNAL MEDICINE | Facility: CLINIC | Age: 86
End: 2021-11-17
Payer: MEDICARE

## 2022-01-05 ENCOUNTER — PATIENT MESSAGE (OUTPATIENT)
Dept: INTERNAL MEDICINE | Facility: CLINIC | Age: 87
End: 2022-01-05
Payer: MEDICARE

## 2022-01-05 ENCOUNTER — TELEPHONE (OUTPATIENT)
Dept: INTERNAL MEDICINE | Facility: CLINIC | Age: 87
End: 2022-01-05
Payer: MEDICARE

## 2022-01-05 DIAGNOSIS — I48.0 PAROXYSMAL ATRIAL FIBRILLATION: ICD-10-CM

## 2022-01-05 DIAGNOSIS — I10 ESSENTIAL HYPERTENSION: ICD-10-CM

## 2022-01-05 RX ORDER — AMLODIPINE BESYLATE 5 MG/1
5 TABLET ORAL DAILY
Qty: 90 TABLET | Refills: 3 | Status: SHIPPED | OUTPATIENT
Start: 2022-01-05 | End: 2023-01-03

## 2022-01-05 RX ORDER — SOTALOL HYDROCHLORIDE 80 MG/1
80 TABLET ORAL 2 TIMES DAILY
Qty: 180 TABLET | Refills: 3 | Status: SHIPPED | OUTPATIENT
Start: 2022-01-05 | End: 2022-09-30

## 2022-01-05 RX ORDER — LISINOPRIL 40 MG/1
TABLET ORAL
Qty: 90 TABLET | Refills: 3 | Status: SHIPPED | OUTPATIENT
Start: 2022-01-05 | End: 2022-09-30

## 2022-01-05 NOTE — TELEPHONE ENCOUNTER
Called pt back to inquire about which prescriptions she needs refilled. No answer, left VM for pt to call back with which refills she needs. Also sent portal message requesting this info.

## 2022-01-05 NOTE — TELEPHONE ENCOUNTER
----- Message from Theodora Mera sent at 1/5/2022  1:57 PM CST -----  Regarding: Refills  Name of Who is Calling: KAROLINA SINGH [[id number]]           What is the request in detail: Patient is requesting a call back for prescription refills.  Patient couldn't provide the names of the medications needed.  Please assist.           Can the clinic reply by [facility]: No           What Number to Call Back [facility] not [facility]: [phone]

## 2022-01-07 ENCOUNTER — PROCEDURE VISIT (OUTPATIENT)
Dept: OPHTHALMOLOGY | Facility: CLINIC | Age: 87
End: 2022-01-07
Payer: MEDICARE

## 2022-01-07 DIAGNOSIS — H35.3124 NONEXUDATIVE AGE-RELATED MACULAR DEGENERATION, LEFT EYE, ADVANCED ATROPHIC WITH SUBFOVEAL INVOLVEMENT: ICD-10-CM

## 2022-01-07 DIAGNOSIS — H35.721 SEROUS DETACHMENT OF RETINAL PIGMENT EPITHELIUM OF RIGHT EYE: ICD-10-CM

## 2022-01-07 DIAGNOSIS — H35.3211 EXUDATIVE AGE-RELATED MACULAR DEGENERATION OF RIGHT EYE WITH ACTIVE CHOROIDAL NEOVASCULARIZATION: Primary | ICD-10-CM

## 2022-01-07 PROCEDURE — 67028 INJECTION EYE DRUG: CPT | Mod: S$PBB,RT,, | Performed by: OPHTHALMOLOGY

## 2022-01-07 PROCEDURE — 92014 PR EYE EXAM, EST PATIENT,COMPREHESV: ICD-10-PCS | Mod: 25,S$PBB,, | Performed by: OPHTHALMOLOGY

## 2022-01-07 PROCEDURE — 92134 POSTERIOR SEGMENT OCT RETINA (OCULAR COHERENCE TOMOGRAPHY)-BOTH EYES: ICD-10-PCS | Mod: 26,S$PBB,, | Performed by: OPHTHALMOLOGY

## 2022-01-07 PROCEDURE — 67028 PR INJECT INTRAVITREAL PHARMCOLOGIC: ICD-10-PCS | Mod: S$PBB,RT,, | Performed by: OPHTHALMOLOGY

## 2022-01-07 PROCEDURE — 92014 COMPRE OPH EXAM EST PT 1/>: CPT | Mod: 25,S$PBB,, | Performed by: OPHTHALMOLOGY

## 2022-01-07 PROCEDURE — 92134 CPTRZ OPH DX IMG PST SGM RTA: CPT | Mod: PBBFAC | Performed by: OPHTHALMOLOGY

## 2022-01-07 PROCEDURE — 67028 INJECTION EYE DRUG: CPT | Mod: PBBFAC,RT | Performed by: OPHTHALMOLOGY

## 2022-01-07 RX ORDER — SOTALOL HYDROCHLORIDE 80 MG/1
80 TABLET ORAL 2 TIMES DAILY
Qty: 180 TABLET | Refills: 3 | OUTPATIENT
Start: 2022-01-07

## 2022-01-07 RX ORDER — AMLODIPINE BESYLATE 5 MG/1
5 TABLET ORAL DAILY
Qty: 90 TABLET | Refills: 3 | OUTPATIENT
Start: 2022-01-07 | End: 2023-01-07

## 2022-01-07 RX ORDER — LISINOPRIL 40 MG/1
TABLET ORAL
Qty: 90 TABLET | Refills: 3 | OUTPATIENT
Start: 2022-01-07

## 2022-01-07 RX ADMIN — AFLIBERCEPT 2 MG: 40 INJECTION, SOLUTION INTRAVITREAL at 10:01

## 2022-01-07 NOTE — TELEPHONE ENCOUNTER
Refill Routing Note   Medication(s) are not appropriate for processing by [facility] for the following reason(s):      - Outside of protocol    [facility] action(s):  Route       Medication Therapy Plan: Patient is requesting Eliquis to go to [facility]; last order was sent to [place].  I see where you have already addressed previous request; please consider refill request for patient; medication outside of scope for the Refill Center   --->Care Gap information included in message below if applicable.   Medication reconciliation completed: No   Automatic Epic Generated Protocol Data:        Requested Prescriptions   Pending Prescriptions Disp Refills    apixaban (ELIQUIS) 2.5 mg Tab 180 tablet 3     Sig: Take 1 tablet (2.5 mg total) by mouth 2 (two) times daily.       There is no refill protocol information for this order      Refused Prescriptions Disp Refills    amLODIPine (NORVASC) 5 MG tablet 90 tablet 3     Sig: Take 1 tablet (5 mg total) by mouth once daily.       Cardiovascular:  Calcium Channel Blockers Passed - 1/7/2022 10:35 AM        Passed - Patient is at least 18 years old        Passed - Last BP in normal range within 360 days     BP Readings from Last 1 Encounters:   11/03/21 132/62               Passed - Valid encounter within last 15 months     Recent Visits  Date Type Provider Dept   11/03/21 Office Visit Wilma Xiong MD [facility] Internal Medicine   07/02/21 Office Visit Wilma Xiong MD [facility] Internal Medicine   09/11/20 Office Visit Wilma Xiong MD [facility] Internal Medicine   09/10/20 Office Visit Wilma Xiong MD [facility] Internal Medicine   08/26/20 Office Visit Wilma Xiong MD [facility] Internal Medicine   Showing recent visits within past 720 days and meeting all other requirements  Future Appointments  No visits were found meeting these conditions.  Showing future appointments within next 150 days and meeting all other requirements      Future Appointments               In 1 week MD [facility] - Cardiology Oncology, Shun Dash    In 2 months MD [facility] - 10th Fl, Shun Dash    In 3 months LAB, [facility] - Lab 3rd Fl, Grzegorz Velázquez                  lisinopriL (PRINIVIL,ZESTRIL) 40 MG tablet 90 tablet 3     Sig: TAKE 1 TABLET(40 MG) BY MOUTH EVERY DAY       Cardiovascular:  ACE Inhibitors Passed - 1/7/2022 10:35 AM        Passed - Patient is at least 18 years old        Passed - Last BP in normal range within 360 days     BP Readings from Last 1 Encounters:   11/03/21 132/62               Passed - Valid encounter within last 15 months     Recent Visits  Date Type Provider Dept   11/03/21 Office Visit Wilma Xiong MD [facility] Internal Medicine   07/02/21 Office Visit Wilma Xiong MD [facility] Internal Medicine   09/11/20 Office Visit Wilma Xiong MD [facility] Internal Medicine   09/10/20 Office Visit Wilma Xiong MD [facility] Internal Medicine   08/26/20 Office Visit Wilma Xiong MD [facility] Internal Medicine   Showing recent visits within past 720 days and meeting all other requirements  Future Appointments  No visits were found meeting these conditions.  Showing future appointments within next 150 days and meeting all other requirements      Future Appointments              In 1 week MD [facility] Rios - Cardiology Oncology, Shun Dash    In 2 months MD [facility] Martini - 10th Fl, Shun Dash    In 3 months LAB, [facility] - Lab 3rd Fl, Grzegorz Velázquez                Passed - Cr is 1.39 or below and within 360 days     Lab Results   Component Value Date    CREATININE 0.7 10/29/2021    CREATININE 0.9 07/20/2021    CREATININE 0.8 03/12/2021              Passed - K is 5.2 or below and within 360 days     Potassium   Date Value Ref Range Status   10/29/2021 4.2 3.5 - 5.1 mmol/L Final   07/20/2021 4.4 3.5 - 5.1 mmol/L Final   03/12/2021 5.0 3.5 - 5.1 mmol/L  Final              Passed - eGFR within 360 days     Lab Results   Component Value Date    EGFRNONAA >60.0 10/29/2021    EGFRNONAA 56.1 (A) 07/20/2021    EGFRNONAA >60.0 03/12/2021                  sotaloL (BETAPACE) 80 MG tablet 180 tablet 3     Sig: Take 1 tablet (80 mg total) by mouth 2 (two) times daily.       Cardiovascular: Antiarrhythmic Agents - sotalol Failed - 1/7/2022 10:35 AM        Failed - ECG completed within last 180 days        Failed - Heart Rate within normal range in past 180 days     Pulse Readings from Last 1 Encounters:   11/03/21 61              Failed - This refill cannot be delegated        Failed - Mg Level in normal range and within 180 days     No results found for: EXTMAGNESIUM, MG           Passed - Valid encounter within last 6 months     Recent Visits  Date Type Provider Dept   11/03/21 Office Visit Wilma Xiong MD [facility] Internal Medicine   07/02/21 Office Visit Wilma Xiong MD [facility] Internal Medicine   09/11/20 Office Visit Wilma Xiong MD [facility] Internal Medicine   09/10/20 Office Visit Wilma Xiong MD [facility] Internal Medicine   08/26/20 Office Visit Wilma iXong MD [facility] Internal Medicine   Showing recent visits within past 720 days and meeting all other requirements  Future Appointments  No visits were found meeting these conditions.  Showing future appointments within next 150 days and meeting all other requirements      Future Appointments              In 1 week MD Sh[facility] Rios - Cardiology Oncology, Shun Dash    In 2 months MD Shun Martini - 10th Fl, Shun Dash    In 3 months LAB, [facility] - Lab 3rd Fl, Grzegorz Velázquez                Passed - BP > 90/50 mmH     BP Readings from Last 1 Encounters:   11/03/21 132/62               Passed - Cr in normal range and within 180 days     Lab Results   Component Value Date    CREATININE 0.7 10/29/2021    CREATININE 0.9 07/20/2021    CREATININE 0.8  03/12/2021              Passed - K in normal range and within 180 days     Potassium   Date Value Ref Range Status   10/29/2021 4.2 3.5 - 5.1 mmol/L Final   07/20/2021 4.4 3.5 - 5.1 mmol/L Final   03/12/2021 5.0 3.5 - 5.1 mmol/L Final              Passed - eGFR in normal range and within 180 days     Lab Results   Component Value Date    EGFRNONAA >60.0 10/29/2021    EGFRNONAA 56.1 (A) 07/20/2021    EGFRNONAA >60.0 03/12/2021                      Appointments  past 12m or future 3m with PCP    Date Provider   Last Visit   11/3/2021 Wilma Xiong MD   Next Visit   Visit date not found Wilma Xiong MD   ED visits in past 90 days: 0        Note composed:10:42 AM 01/07/2022

## 2022-01-07 NOTE — TELEPHONE ENCOUNTER
No new care gaps identified.  Powered by [employer]. Reference number: [id number].   1/07/2022 10:36:04 AM CST

## 2022-01-07 NOTE — TELEPHONE ENCOUNTER
Encounter details require adjustment(s)/ updating by OR Staff  As of this time Protocols and CDM: did populate and displays incorrectly   Adjustment(s) made: Provider  CDM should display. Medication(s) delegated by the OR.  Will resend refill request encounter to  Centralized Refill Staff Pool.   [facility]   Note composed:10:35 AM 01/07/2022

## 2022-01-07 NOTE — TELEPHONE ENCOUNTER
Provider Staff:     Action required for this patient.    Please note Refusal of medication.            Requested Prescriptions     Signed Prescriptions Disp Refills    apixaban (ELIQUIS) 2.5 mg Tab 180 tablet 3     Sig: Take 1 tablet (2.5 mg total) by mouth 2 (two) times daily.     Authorizing Provider: PATRICIA PATEL     Refused Prescriptions Disp Refills    amLODIPine (NORVASC) 5 MG tablet 90 tablet 3     Sig: Take 1 tablet (5 mg total) by mouth once daily.     Refused By: AMNA MARTINEZ     Reason for Refusal: Request already responded to by other means (e.g. phone or fax)    lisinopriL (PRINIVIL,ZESTRIL) 40 MG tablet 90 tablet 3     Sig: TAKE 1 TABLET(40 MG) BY MOUTH EVERY DAY     Refused By: AMNA MARTINEZ     Reason for Refusal: Request already responded to by other means (e.g. phone or fax)    sotaloL (BETAPACE) 80 MG tablet 180 tablet 3     Sig: Take 1 tablet (80 mg total) by mouth 2 (two) times daily.     Refused By: AMNA MARTINEZ     Reason for Refusal: Request already responded to by other means (e.g. phone or fax)      Thanks!  [facility]   Note composed: 01/07/2022 2:16 PM

## 2022-01-07 NOTE — PROGRESS NOTES
HPI     2 mo OCT /Eylea   DLS- 11/09/2021 Dr. Mandujano     Pt states OD vision is getting dimmer unable to see as well especially in   dim lighting. Everything looks dark.     +Floaters OD stable   No Flashes  No Pain  Gtts: At's PRN      Last edited by Annita Eid on 1/7/2022  9:16 AM. (History)            OCT - stable trace SRF  OS - Drusen, no SRF - small HE - ? RAP lesion     Assessment:    1. AMD - Wet OD - post Avastin x 12, post Eylea #46  PED increased prior to 10/12 visit    Had slight increase 5/13/13, 7/14, 5/16 4/20 tx done in [place] during Covid  9/21 had tx in [place] following Shaina    Eylea OD today    Continue q8 week tx    2. 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 PVD OU    4. Pseudophakia - great result    5. Allergic conjunctivitis/sinusitis - try OTC antihistamines  Recent trouble with sinusitis - pain behind eye improved with antihistamines  Ok for Zaditor BID - has improved with topical tx    6. AFib - considering anticoagulation - ok to proceed  On Anti-VEGF therapy, risk of large macular hemorrhage is minimal.         Plan:      2  months OCT no dilate    Risks, benefits, and alternatives to treatment discussed in detail with the patient.  The patient voiced understanding and wished to proceed with the procedure    Injection Procedure Note:  Diagnosis: Wet AMD OD    Patient Identified and Time Out complete  Topical Proparacaine and Betadine. Conj prep only  Inject Eylea OD at 6:00 @ 3.5-4mm posterior to limbus  Post Operative Dx: Same  Complications: None  Follow up as above.

## 2022-01-07 NOTE — PATIENT INSTRUCTIONS

## 2022-01-28 NOTE — PROGRESS NOTES
Subjective:   Patient ID:  Cathy Kearns is a 89 y.o. female who presents for follow-up of No chief complaint on file.      Problem List:  Paroxysmal atrial fibrillation  -on sotolol  HTN  HLD  Mild AI  COPD - mild    HPI:  She presents today to establish care.  She was previously seen by Stanford Mcelroy and Brice.  She has known paroxysmal atrial fibrillation and has been maintaining sinus rhythm on sotalol.  She also has treated hypertension as well as hyperlipidemia.  She currently lives in [facility].  She has been feeling well.  She goes to exercise class 3 days a week. Cathy Kearns denies any chest pain, shortness of breath, PND, orthopnea, palpitations, leg edema, or syncope.     ECG 11-SEP-2020 13:12:27: Personally reviewed by me shows sinus bradycardia 58 bpm    Echo 4/18:  CONCLUSIONS     1 - Normal left ventricular systolic function (EF 60-65%).     2 - No wall motion abnormalities.     3 - Normal left ventricular diastolic function.     4 - Normal right ventricular systolic function .     5 - The estimated PA systolic pressure is 19 mmHg.     6 - Mild aortic regurgitation.     7 - Trivial to mild tricuspid regurgitation.     Arterial US 3/19:  Conclusion    · No significant plaque bilaterally through the inflow  · Triphasic waveforms noted throughout the inflow  · Waveforms suggestive of bilateral infrapopliteal disease  · Normal NELLY values bilaterally        Carotid US 3/19:  Conclusion    · No significant plaque bilaterally through the inflow  · Triphasic waveforms noted throughout the inflow  · Waveforms suggestive of bilateral infrapopliteal disease  · Normal NELLY values bilaterally        Past Medical History:   Diagnosis Date    Aortic valve regurgitation     Arthritis     Diastolic dysfunction     Disorder of kidney and ureter     History of second hand smoke exposure     History of tobacco use     Hyperlipidemia     Hypertension     Osteopenia     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/9/2012    Vasovagal syncope        Past Surgical History:   Procedure Laterality Date    ADENOIDECTOMY      APPENDECTOMY      CATARACT EXTRACTION W/  INTRAOCULAR LENS IMPLANT Bilateral n/a    HYSTERECTOMY      2/2 uterine CA, removed ovaries, no longer f/u with gyn    Intravitreal Injection      Avastin od x's 12 dltx 2-16-12    TONSILLECTOMY         Social History     Socioeconomic History    Marital status:    Tobacco Use    Smoking status: Former Smoker     Packs/day: 0.25     Years: 15.00     Pack years: 3.75    Smokeless tobacco: Never Used   Substance and Sexual Activity    Alcohol use: Yes     Comment: 1-2 drinks at night    Drug use: No   Other Topics Concern    Are you pregnant or think you may be? 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       Family History   Problem Relation Age of Onset    Cancer Mother         ovarian CA    Cancer Father         prostate    Cataracts Father     Cancer Maternal Grandmother         colon    Heart attack Maternal Grandfather     Cancer Paternal Grandmother         colon    Cancer Paternal Grandfather     Strabismus Paternal Aunt     Hypertension Daughter     Cancer Daughter         "cancer of bone"    Hyperlipidemia Daughter     Thyroid disease Grandchild     Heart attack Brother     Cancer Brother         prostate, esophagus, bladder    Hypertension Son     Hyperlipidemia Son     Cancer Brother         Non hodgkins lymphoma    Amblyopia Neg Hx     Blindness Neg Hx     Diabetes Neg Hx     Glaucoma Neg Hx     Macular degeneration Neg Hx     Retinal detachment Neg Hx     Stroke Neg Hx        Patient's Medications   New Prescriptions    No medications on file   Previous Medications    ALBUTEROL " (VENTOLIN HFA) 90 MCG/ACTUATION INHALER    Inhale 2 puffs into the lungs every 6 (six) hours as needed for Wheezing or Shortness of Breath. Rescue    AMLODIPINE (NORVASC) 5 MG TABLET    Take 1 tablet (5 mg total) by mouth once daily.    ANTIOX #8/OM3/DHA/EPA/LUT/ZEAX (PRESERVISION AREDS 2, OMEGA-3, ORAL)    Take 1 tablet by mouth once daily at 6am.     APIXABAN (ELIQUIS) 2.5 MG TAB    Take 1 tablet (2.5 mg total) by mouth 2 (two) times daily.    CALCIUM CARBONATE (CALCIUM 500 ORAL)    Take 500 mg by mouth once daily.     LISINOPRIL (PRINIVIL,ZESTRIL) 40 MG TABLET    TAKE 1 TABLET(40 MG) BY MOUTH EVERY DAY    OMEGA 3-DHA-EPA-FISH -1,000 MG CAP    Take 3 g by mouth.    SOTALOL (BETAPACE) 80 MG TABLET    Take 1 tablet (80 mg total) by mouth 2 (two) times daily.   Modified Medications    No medications on file   Discontinued Medications    No medications on file       Review of Systems   Constitutional: Negative for malaise/fatigue and weight gain.   HENT: Positive for hearing loss.    Eyes: Negative for visual disturbance.   Cardiovascular: Negative for chest pain, claudication, dyspnea on exertion, leg swelling, near-syncope, orthopnea, palpitations, paroxysmal nocturnal dyspnea and syncope.   Respiratory: Negative for cough, shortness of breath, sleep disturbances due to breathing, snoring and wheezing.    Endocrine: Negative for cold intolerance, heat intolerance, polydipsia, polyphagia and polyuria.   Hematologic/Lymphatic: Negative for bleeding problem. Does not bruise/bleed easily.   Skin: Negative for rash and suspicious lesions.   Musculoskeletal: Negative for arthritis, falls, joint pain, muscle weakness and myalgias.   Gastrointestinal: Negative for abdominal pain, change in bowel habit, constipation, diarrhea, heartburn, hematochezia, melena and nausea.   Genitourinary: Negative for hematuria and nocturia.   Neurological: Negative for excessive daytime sleepiness, dizziness, headaches,  light-headedness, loss of balance and weakness.   Psychiatric/Behavioral: Negative for depression. The patient is not nervous/anxious.    Allergic/Immunologic: Negative for environmental allergies.       There were no vitals taken for this visit.    Objective:   Physical Exam  Constitutional:       Appearance: She is well-developed and well-nourished.      Comments:      HENT:      Head: Normocephalic and atraumatic.      Mouth/Throat:      Mouth: Oropharynx is clear and moist.   Eyes:      General: No scleral icterus.     Extraocular Movements: EOM normal.      Conjunctiva/sclera: Conjunctivae normal.      Pupils: Pupils are equal, round, and reactive to light.   Neck:      Thyroid: No thyromegaly.      Vascular: No hepatojugular reflux or JVD.      Trachea: No tracheal deviation.   Cardiovascular:      Rate and Rhythm: Normal rate and regular rhythm.      Chest Wall: PMI is not displaced.      Pulses: Intact distal pulses.           Carotid pulses are 2+ on the right side and 2+ on the left side.       Radial pulses are 2+ on the right side and 2+ on the left side.        Dorsalis pedis pulses are 2+ on the right side and 2+ on the left side.        Posterior tibial pulses are 2+ on the right side and 2+ on the left side.      Heart sounds: Normal heart sounds.   Pulmonary:      Effort: Pulmonary effort is normal.      Breath sounds: Normal breath sounds.   Abdominal:      General: Bowel sounds are normal. There is no distension.      Palpations: Abdomen is soft. There is no hepatosplenomegaly or mass.      Tenderness: There is no abdominal tenderness.   Musculoskeletal:         General: No tenderness or edema.      Cervical back: Normal range of motion and neck supple.   Lymphadenopathy:      Cervical: No cervical adenopathy.   Skin:     General: Skin is warm and dry.      Findings: No erythema or rash.      Nails: There is no clubbing or cyanosis.   Neurological:      Mental Status: She is alert and oriented to  person, place, and time.   Psychiatric:         Mood and Affect: Mood and affect normal.         Speech: Speech normal.         Behavior: Behavior normal.         Lab Results   Component Value Date     (L) 10/29/2021    K 4.2 10/29/2021     10/29/2021    CO2 24 10/29/2021    BUN 14 10/29/2021    CREATININE 0.7 10/29/2021    GLU 93 10/29/2021    AST 15 10/29/2021    ALT 12 10/29/2021    ALBUMIN 4.0 10/29/2021    PROT 7.1 10/29/2021    BILITOT 0.6 10/29/2021    WBC 6.49 10/29/2021    HGB 11.9 (L) 10/29/2021    HCT 36.4 (L) 10/29/2021    MCV 97 10/29/2021     10/29/2021    TSH 0.982 07/20/2021    CHOL 239 (H) 07/20/2021    HDL 59 07/20/2021    LDLCALC 161.4 (H) 07/20/2021    TRIG 93 07/20/2021    BNP 81 05/10/2018       Assessment:     1. Paroxysmal atrial fibrillation :  She is on sotalol for rhythm control and anticoagulated with Eliquis. ECG today. [id number]- Vasc is 4. HAS-BLED score is 2.   2. Primary hypertension : Blood pressure is at goal. I have made no changes. Continue current regimen.   3. Pure hypercholesterolemia : Following a heart health diet such as the Mediterranean Diet is recommended in addition to 150 minutes a week of moderate intensity exercise to lower cholesterol, maintain a healthy body weight, and improve overall cardiovascular health.         Plan:     Diagnoses and all orders for this visit:    Paroxysmal atrial fibrillation    Primary hypertension    Pure hypercholesterolemia        Thank you for allowing me to participate in this patient's care. Please do not hesitate to contact me with any questions or concerns.

## 2022-02-01 ENCOUNTER — OFFICE VISIT (OUTPATIENT)
Dept: CARDIOLOGY | Facility: CLINIC | Age: 87
End: 2022-02-01
Payer: MEDICARE

## 2022-02-01 ENCOUNTER — HOSPITAL ENCOUNTER (OUTPATIENT)
Dept: CARDIOLOGY | Facility: CLINIC | Age: 87
Discharge: HOME | End: 2022-02-01
Payer: MEDICARE

## 2022-02-01 VITALS
HEART RATE: 61 BPM | BODY MASS INDEX: 22.48 KG/M2 | DIASTOLIC BLOOD PRESSURE: 63 MMHG | HEIGHT: 62 IN | WEIGHT: 122.13 LBS | SYSTOLIC BLOOD PRESSURE: 136 MMHG

## 2022-02-01 DIAGNOSIS — I10 PRIMARY HYPERTENSION: ICD-10-CM

## 2022-02-01 DIAGNOSIS — E78.00 PURE HYPERCHOLESTEROLEMIA: ICD-10-CM

## 2022-02-01 DIAGNOSIS — I48.0 PAROXYSMAL ATRIAL FIBRILLATION: Primary | ICD-10-CM

## 2022-02-01 PROCEDURE — 99214 PR OFFICE/OUTPT VISIT, EST, LEVL IV, 30-39 MIN: ICD-10-PCS | Mod: S$PBB,,, | Performed by: INTERNAL MEDICINE

## 2022-02-01 PROCEDURE — 99999 PR PBB SHADOW E&M-EST. PATIENT-LVL IV: ICD-10-PCS | Mod: PBBFAC,,, | Performed by: INTERNAL MEDICINE

## 2022-02-01 PROCEDURE — 93010 ELECTROCARDIOGRAM REPORT: CPT | Mod: S$PBB,,, | Performed by: INTERNAL MEDICINE

## 2022-02-01 PROCEDURE — 99214 OFFICE O/P EST MOD 30 MIN: CPT | Mod: S$PBB,,, | Performed by: INTERNAL MEDICINE

## 2022-02-01 PROCEDURE — 93010 EKG 12-LEAD: ICD-10-PCS | Mod: S$PBB,,, | Performed by: INTERNAL MEDICINE

## 2022-02-01 PROCEDURE — 93005 ELECTROCARDIOGRAM TRACING: CPT | Mod: PBBFAC | Performed by: INTERNAL MEDICINE

## 2022-02-01 PROCEDURE — 99999 PR PBB SHADOW E&M-EST. PATIENT-LVL IV: CPT | Mod: PBBFAC,,, | Performed by: INTERNAL MEDICINE

## 2022-02-01 PROCEDURE — 99214 OFFICE O/P EST MOD 30 MIN: CPT | Mod: PBBFAC | Performed by: INTERNAL MEDICINE

## 2022-03-15 ENCOUNTER — OFFICE VISIT (OUTPATIENT)
Dept: OPHTHALMOLOGY | Facility: CLINIC | Age: 87
End: 2022-03-15
Payer: MEDICARE

## 2022-03-15 DIAGNOSIS — H35.3124 NONEXUDATIVE AGE-RELATED MACULAR DEGENERATION, LEFT EYE, ADVANCED ATROPHIC WITH SUBFOVEAL INVOLVEMENT: ICD-10-CM

## 2022-03-15 DIAGNOSIS — H35.3211 EXUDATIVE AGE-RELATED MACULAR DEGENERATION OF RIGHT EYE WITH ACTIVE CHOROIDAL NEOVASCULARIZATION: Primary | ICD-10-CM

## 2022-03-15 PROCEDURE — 67028 PR INJECT INTRAVITREAL PHARMCOLOGIC: ICD-10-PCS | Mod: S$PBB,RT,, | Performed by: OPHTHALMOLOGY

## 2022-03-15 PROCEDURE — 99999 PR PBB SHADOW E&M-EST. PATIENT-LVL III: CPT | Mod: PBBFAC,,, | Performed by: OPHTHALMOLOGY

## 2022-03-15 PROCEDURE — 92134 CPTRZ OPH DX IMG PST SGM RTA: CPT | Mod: PBBFAC | Performed by: OPHTHALMOLOGY

## 2022-03-15 PROCEDURE — 99213 OFFICE O/P EST LOW 20 MIN: CPT | Mod: PBBFAC,25 | Performed by: OPHTHALMOLOGY

## 2022-03-15 PROCEDURE — 67028 INJECTION EYE DRUG: CPT | Mod: PBBFAC,RT | Performed by: OPHTHALMOLOGY

## 2022-03-15 PROCEDURE — 67028 INJECTION EYE DRUG: CPT | Mod: S$PBB,RT,, | Performed by: OPHTHALMOLOGY

## 2022-03-15 PROCEDURE — 99999 PR PBB SHADOW E&M-EST. PATIENT-LVL III: ICD-10-PCS | Mod: PBBFAC,,, | Performed by: OPHTHALMOLOGY

## 2022-03-15 PROCEDURE — 92012 PR EYE EXAM, EST PATIENT,INTERMED: ICD-10-PCS | Mod: 25,S$PBB,, | Performed by: OPHTHALMOLOGY

## 2022-03-15 PROCEDURE — 92012 INTRM OPH EXAM EST PATIENT: CPT | Mod: 25,S$PBB,, | Performed by: OPHTHALMOLOGY

## 2022-03-15 PROCEDURE — 92134 POSTERIOR SEGMENT OCT RETINA (OCULAR COHERENCE TOMOGRAPHY)-BOTH EYES: ICD-10-PCS | Mod: 26,S$PBB,, | Performed by: OPHTHALMOLOGY

## 2022-03-15 RX ADMIN — AFLIBERCEPT 2 MG: 40 INJECTION, SOLUTION INTRAVITREAL at 10:03

## 2022-03-15 NOTE — PATIENT INSTRUCTIONS

## 2022-03-15 NOTE — PROGRESS NOTES
HPI     DLS 1/07/2022      VA stable ou. No pain. No f/f.    No gtts      Last edited by Annita Miranda on 3/15/2022  9:47 AM. (History)          OCT - stable trace IRF  OS - Drusen, no SRF - small HE - ? RAP lesion     Assessment:    1. AMD - Wet OD - post Avastin x 12, post Eylea #47  PED increased prior to 10/12 visit    Had slight increase 5/13/13, 7/14, 5/16 4/20 tx done in [place] during Covid  9/21 had tx in [place] following Shaina    Eylea OD today    Continue q8 week tx    2. 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 PVD OU    4. Pseudophakia - great result    5. Allergic conjunctivitis/sinusitis - try OTC antihistamines  Recent trouble with sinusitis - pain behind eye improved with antihistamines  Ok for Zaditor BID - has improved with topical tx    6. AFib - considering anticoagulation - ok to proceed  On Anti-VEGF therapy, risk of large macular hemorrhage is minimal.         Plan:      2  months OCT and dilate    Risks, benefits, and alternatives to treatment discussed in detail with the patient.  The patient voiced understanding and wished to proceed with the procedure    Injection Procedure Note:  Diagnosis: Wet AMD OD    Patient Identified and Time Out complete  Topical Proparacaine and Betadine. Conj prep only  Inject Eylea OD at 6:00 @ 3.5-4mm posterior to limbus  Post Operative Dx: Same  Complications: None  Follow up as above.

## 2022-03-22 ENCOUNTER — PES CALL (OUTPATIENT)
Dept: ADMINISTRATIVE | Facility: CLINIC | Age: 87
End: 2022-03-22
Payer: MEDICARE

## 2022-04-29 ENCOUNTER — TELEPHONE (OUTPATIENT)
Dept: HEMATOLOGY/ONCOLOGY | Facility: CLINIC | Age: 87
End: 2022-04-29
Payer: MEDICARE

## 2022-05-24 ENCOUNTER — OFFICE VISIT (OUTPATIENT)
Dept: HEMATOLOGY/ONCOLOGY | Facility: CLINIC | Age: 87
End: 2022-05-24
Payer: MEDICARE

## 2022-05-24 ENCOUNTER — LAB VISIT (OUTPATIENT)
Dept: LAB | Facility: HOSPITAL | Age: 87
End: 2022-05-24
Attending: INTERNAL MEDICINE
Payer: MEDICARE

## 2022-05-24 VITALS
OXYGEN SATURATION: 98 % | RESPIRATION RATE: 16 BRPM | WEIGHT: 120.5 LBS | SYSTOLIC BLOOD PRESSURE: 146 MMHG | DIASTOLIC BLOOD PRESSURE: 67 MMHG | HEIGHT: 62 IN | BODY MASS INDEX: 22.18 KG/M2 | TEMPERATURE: 98 F | HEART RATE: 55 BPM

## 2022-05-24 DIAGNOSIS — I48.0 PAROXYSMAL ATRIAL FIBRILLATION: ICD-10-CM

## 2022-05-24 DIAGNOSIS — E78.00 PURE HYPERCHOLESTEROLEMIA: ICD-10-CM

## 2022-05-24 DIAGNOSIS — D72.821 MONOCYTOSIS: ICD-10-CM

## 2022-05-24 DIAGNOSIS — D72.821 MONOCYTOSES: ICD-10-CM

## 2022-05-24 DIAGNOSIS — J42 CHRONIC BRONCHITIS, UNSPECIFIED CHRONIC BRONCHITIS TYPE: Primary | ICD-10-CM

## 2022-05-24 LAB
ALBUMIN SERPL BCP-MCNC: 4 G/DL (ref 3.5–5.2)
ALP SERPL-CCNC: 52 U/L (ref 55–135)
ALT SERPL W/O P-5'-P-CCNC: 13 U/L (ref 10–44)
ANION GAP SERPL CALC-SCNC: 8 MMOL/L (ref 8–16)
AST SERPL-CCNC: 18 U/L (ref 10–40)
BASOPHILS # BLD AUTO: 0.05 K/UL (ref 0–0.2)
BASOPHILS NFR BLD: 0.8 % (ref 0–1.9)
BILIRUB SERPL-MCNC: 0.7 MG/DL (ref 0.1–1)
BUN SERPL-MCNC: 10 MG/DL (ref 10–30)
CALCIUM SERPL-MCNC: 10 MG/DL (ref 8.7–10.5)
CHLORIDE SERPL-SCNC: 101 MMOL/L (ref 95–110)
CO2 SERPL-SCNC: 26 MMOL/L (ref 23–29)
CREAT SERPL-MCNC: 0.8 MG/DL (ref 0.5–1.4)
DIFFERENTIAL METHOD: ABNORMAL
EOSINOPHIL # BLD AUTO: 0 K/UL (ref 0–0.5)
EOSINOPHIL NFR BLD: 0.6 % (ref 0–8)
ERYTHROCYTE [DISTWIDTH] IN BLOOD BY AUTOMATED COUNT: 13.5 % (ref 11.5–14.5)
ERYTHROCYTE [DISTWIDTH] IN BLOOD BY AUTOMATED COUNT: 13.5 % (ref 11.5–14.5)
EST. GFR  (AFRICAN AMERICAN): >60 ML/MIN/1.73 M^2
EST. GFR  (NON AFRICAN AMERICAN): >60 ML/MIN/1.73 M^2
GLUCOSE SERPL-MCNC: 121 MG/DL (ref 70–110)
HCT VFR BLD AUTO: 37.2 % (ref 37–48.5)
HCT VFR BLD AUTO: 37.2 % (ref 37–48.5)
HGB BLD-MCNC: 12.8 G/DL (ref 12–16)
HGB BLD-MCNC: 12.8 G/DL (ref 12–16)
IMM GRANULOCYTES # BLD AUTO: 0.04 K/UL (ref 0–0.04)
IMM GRANULOCYTES # BLD AUTO: 0.04 K/UL (ref 0–0.04)
IMM GRANULOCYTES NFR BLD AUTO: 0.5 % (ref 0–0.5)
LDH SERPL L TO P-CCNC: 163 U/L (ref 110–260)
LYMPHOCYTES # BLD AUTO: 2.1 K/UL (ref 1–4.8)
LYMPHOCYTES NFR BLD: 31.9 % (ref 18–48)
MCH RBC QN AUTO: 31.1 PG (ref 27–31)
MCH RBC QN AUTO: 31.1 PG (ref 27–31)
MCHC RBC AUTO-ENTMCNC: 34.4 G/DL (ref 32–36)
MCHC RBC AUTO-ENTMCNC: 34.4 G/DL (ref 32–36)
MCV RBC AUTO: 90 FL (ref 82–98)
MCV RBC AUTO: 90 FL (ref 82–98)
MONOCYTES # BLD AUTO: 1.3 K/UL (ref 0.3–1)
MONOCYTES NFR BLD: 20.2 % (ref 4–15)
NEUTROPHILS # BLD AUTO: 3.1 K/UL (ref 1.8–7.7)
NEUTROPHILS # BLD AUTO: 3.1 K/UL (ref 1.8–7.7)
NEUTROPHILS NFR BLD: 46 % (ref 38–73)
NRBC BLD-RTO: 0 /100 WBC
PLATELET # BLD AUTO: 165 K/UL (ref 150–450)
PLATELET # BLD AUTO: 165 K/UL (ref 150–450)
PMV BLD AUTO: 10.3 FL (ref 9.2–12.9)
PMV BLD AUTO: 10.3 FL (ref 9.2–12.9)
POTASSIUM SERPL-SCNC: 4.7 MMOL/L (ref 3.5–5.1)
PROT SERPL-MCNC: 6.8 G/DL (ref 6–8.4)
RBC # BLD AUTO: 4.12 M/UL (ref 4–5.4)
RBC # BLD AUTO: 4.12 M/UL (ref 4–5.4)
SODIUM SERPL-SCNC: 135 MMOL/L (ref 136–145)
WBC # BLD AUTO: 6.59 K/UL (ref 3.9–12.7)
WBC # BLD AUTO: 6.59 K/UL (ref 3.9–12.7)

## 2022-05-24 PROCEDURE — 99215 OFFICE O/P EST HI 40 MIN: CPT | Mod: S$PBB,,, | Performed by: INTERNAL MEDICINE

## 2022-05-24 PROCEDURE — 99215 PR OFFICE/OUTPT VISIT, EST, LEVL V, 40-54 MIN: ICD-10-PCS | Mod: S$PBB,,, | Performed by: INTERNAL MEDICINE

## 2022-05-24 PROCEDURE — 80053 COMPREHEN METABOLIC PANEL: CPT | Performed by: INTERNAL MEDICINE

## 2022-05-24 PROCEDURE — 83615 LACTATE (LD) (LDH) ENZYME: CPT | Performed by: INTERNAL MEDICINE

## 2022-05-24 PROCEDURE — 99999 PR PBB SHADOW E&M-EST. PATIENT-LVL III: ICD-10-PCS | Mod: PBBFAC,,, | Performed by: INTERNAL MEDICINE

## 2022-05-24 PROCEDURE — 85025 COMPLETE CBC W/AUTO DIFF WBC: CPT | Performed by: INTERNAL MEDICINE

## 2022-05-24 PROCEDURE — 99999 PR PBB SHADOW E&M-EST. PATIENT-LVL III: CPT | Mod: PBBFAC,,, | Performed by: INTERNAL MEDICINE

## 2022-05-24 PROCEDURE — 99213 OFFICE O/P EST LOW 20 MIN: CPT | Mod: PBBFAC | Performed by: INTERNAL MEDICINE

## 2022-05-24 PROCEDURE — 36415 COLL VENOUS BLD VENIPUNCTURE: CPT | Performed by: INTERNAL MEDICINE

## 2022-05-24 NOTE — PROGRESS NOTES
CC: Monocytosis, anemia,  hematology follow up        HPI: Ms. Kearns,89, is here for hematology follow up. She was initially seen here for abnormal WBCs. She has HLD, HTn, diastolic dysfunction.  CBC on  8/26/20 showed abnormal appearing white blood cells, and was reviewed by pathology. It showed increased monocyte sand atypical lymphs. No recent change in weight or appetite. No recent fevers, night sweats, or other s/s of infections. She has mild monocytosis from at least 2017.     Interval History: She is here for a follow up visit. She denies any recent infections, fever, weight loss or loss of appetite.       Review of Systems   Constitutional: Positive for malaise/fatigue. Negative for chills and fever.   HENT: Negative for ear discharge, hearing loss, nosebleeds and tinnitus.    Eyes: Negative for blurred vision, double vision, discharge and redness.   Respiratory: Negative for sputum production.    Cardiovascular: Negative for chest pain, palpitations, orthopnea, claudication and leg swelling.   Gastrointestinal: Negative for abdominal pain, blood in stool, heartburn, nausea and vomiting.   Genitourinary: Negative for dysuria, flank pain, frequency and urgency.   Musculoskeletal: Negative for back pain, myalgias and neck pain.   Neurological: Negative for dizziness, tingling, tremors, sensory change, focal weakness and weakness.   Endo/Heme/Allergies: Negative for environmental allergies. Does not bruise/bleed easily.   Psychiatric/Behavioral: The patient is not nervous/anxious.        Current Outpatient Medications   Medication Sig    albuterol (VENTOLIN HFA) 90 mcg/actuation inhaler Inhale 2 puffs into the lungs every 6 (six) hours as needed for Wheezing or Shortness of Breath. Rescue    amLODIPine (NORVASC) 5 MG tablet Take 1 tablet (5 mg total) by mouth once daily.    ANTIOX #8/OM3/DHA/EPA/LUT/ZEAX (PRESERVISION AREDS 2, OMEGA-3, ORAL) Take 1 tablet by mouth once daily at 6am.     apixaban  (ELIQUIS) 2.5 mg Tab Take 1 tablet (2.5 mg total) by mouth 2 (two) times daily.    CALCIUM CARBONATE (CALCIUM 500 ORAL) Take 500 mg by mouth once daily.     lisinopriL (PRINIVIL,ZESTRIL) 40 MG tablet TAKE 1 TABLET(40 MG) BY MOUTH EVERY DAY    omega 3-dha-epa-fish oil 300-1,000 mg Cap Take 3 g by mouth.    sotaloL (BETAPACE) 80 MG tablet Take 1 tablet (80 mg total) by mouth 2 (two) times daily.     No current facility-administered medications for this visit.         Vitals:    05/24/22 1126   BP: (!) 146/67   Pulse: (!) 55   Resp: 16   Temp: 98 °F (36.7 °C)       Physical Exam  HENT:      Head: Normocephalic and atraumatic.      Mouth/Throat:      Pharynx: No posterior oropharyngeal erythema.   Eyes:      General: No scleral icterus.  Cardiovascular:      Rate and Rhythm: Normal rate.      Heart sounds:     No friction rub.   Pulmonary:      Effort: Pulmonary effort is normal.      Breath sounds: Normal breath sounds. No stridor. No rhonchi.   Abdominal:      General: There is no distension.      Palpations: Abdomen is soft. There is no mass.   Neurological:      General: No focal deficit present.      Mental Status: She is alert and oriented to person, place, and time.         8/26/20 pathologist review of peripheral smear:      --RBCs are within normal limits with mild anisopoikiloctosis.   --WBCs are adequate in number with circulating monocytes and atypical and large granular lymphocytes. No blasts. No basophilia.   --Platelets within normal limits.   --Findings in WBCs are suggestive of possible recent viral illness.   Repeat CBC at a clinically designated interval may be useful          Component      Latest Ref Rng & Units 5/24/2022   Sodium      136 - 145 mmol/L 135 (L)   Potassium      3.5 - 5.1 mmol/L 4.7   Chloride      95 - 110 mmol/L 101   CO2      23 - 29 mmol/L 26   Glucose      70 - 110 mg/dL 121 (H)   BUN      10 - 30 mg/dL 10   Creatinine      0.5 - 1.4 mg/dL 0.8   Calcium      8.7 - 10.5 mg/dL  10.0   PROTEIN TOTAL      6.0 - 8.4 g/dL 6.8   Albumin      3.5 - 5.2 g/dL 4.0   BILIRUBIN TOTAL      0.1 - 1.0 mg/dL 0.7   Alkaline Phosphatase      55 - 135 U/L 52 (L)   AST      10 - 40 U/L 18   ALT      10 - 44 U/L 13   Anion Gap      8 - 16 mmol/L 8   eGFR if African American      >60 mL/min/1.73 m:2 >60.0   eGFR if non African American      >60 mL/min/1.73 m:2 >60.0   WBC      3.90 - 12.70 K/uL 6.59   RBC      4.00 - 5.40 M/uL 4.12   Hemoglobin      12.0 - 16.0 g/dL 12.8   Hematocrit      37.0 - 48.5 % 37.2   MCV      82 - 98 fL 90   MCH      27.0 - 31.0 pg 31.1 (H)   MCHC      32.0 - 36.0 g/dL 34.4   RDW      11.5 - 14.5 % 13.5   Platelets      150 - 450 K/uL 165   MPV      9.2 - 12.9 fL 10.3   Gran # (ANC)      1.8 - 7.7 K/uL 3.1   Immature Grans (Abs)      0.00 - 0.04 K/uL 0.04   LD      110 - 260 U/L 163     Assessment:     1. Relative monocytosis  2. Mild absolute monocytosis  3. Dyslipidemia  4. Htn, essential  5. COPD  6. Atrial fibrillation  7. Anemia        Plan:     1,2: Mild, chronic, asymptomatic. 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 Bone marrow biopsy was discussed. Treatment options for either MDS or CMML are limited at her age , and outcomes uncertain. She elected to defer BM biopsy. Monocyte count from today pending. She will have CBC in 6 months, follow up in 1 year. Overall she is doing well, without any pruritus, fever, weight loss, night sweats, early satiety or infections.      4. She is on Amlodipine. Follows with her PCP     5. She uses albuterol inhaler as needed.       6. Rate controlled, on Apixaban.     7. Mild, asymptomatic          BMT Chart Routing      Follow up with physician    Follow up with CRAIG 1 year.   Labs CBC, CMP and uric acid   Lab interval: every 6 months  Cbc every 6 months; cmp, ldh, uric acid, cbc and f/u in 1 \yr   Imaging    Pharmacy appointment    Other referrals

## 2022-05-26 ENCOUNTER — PROCEDURE VISIT (OUTPATIENT)
Dept: OPHTHALMOLOGY | Facility: CLINIC | Age: 87
End: 2022-05-26
Payer: MEDICARE

## 2022-05-26 DIAGNOSIS — H35.3124 NONEXUDATIVE AGE-RELATED MACULAR DEGENERATION, LEFT EYE, ADVANCED ATROPHIC WITH SUBFOVEAL INVOLVEMENT: ICD-10-CM

## 2022-05-26 DIAGNOSIS — H35.721 SEROUS DETACHMENT OF RETINAL PIGMENT EPITHELIUM OF RIGHT EYE: ICD-10-CM

## 2022-05-26 DIAGNOSIS — H35.3211 EXUDATIVE AGE-RELATED MACULAR DEGENERATION OF RIGHT EYE WITH ACTIVE CHOROIDAL NEOVASCULARIZATION: Primary | ICD-10-CM

## 2022-05-26 PROCEDURE — 92014 COMPRE OPH EXAM EST PT 1/>: CPT | Mod: 25,S$PBB,, | Performed by: OPHTHALMOLOGY

## 2022-05-26 PROCEDURE — 92134 CPTRZ OPH DX IMG PST SGM RTA: CPT | Mod: PBBFAC | Performed by: OPHTHALMOLOGY

## 2022-05-26 PROCEDURE — 67028 PR INJECT INTRAVITREAL PHARMCOLOGIC: ICD-10-PCS | Mod: S$PBB,RT,, | Performed by: OPHTHALMOLOGY

## 2022-05-26 PROCEDURE — 67028 INJECTION EYE DRUG: CPT | Mod: PBBFAC,RT | Performed by: OPHTHALMOLOGY

## 2022-05-26 PROCEDURE — 67028 INJECTION EYE DRUG: CPT | Mod: S$PBB,RT,, | Performed by: OPHTHALMOLOGY

## 2022-05-26 PROCEDURE — 92014 PR EYE EXAM, EST PATIENT,COMPREHESV: ICD-10-PCS | Mod: 25,S$PBB,, | Performed by: OPHTHALMOLOGY

## 2022-05-26 PROCEDURE — 92134 POSTERIOR SEGMENT OCT RETINA (OCULAR COHERENCE TOMOGRAPHY)-BOTH EYES: ICD-10-PCS | Mod: 26,S$PBB,, | Performed by: OPHTHALMOLOGY

## 2022-05-26 RX ADMIN — AFLIBERCEPT 2 MG: 40 INJECTION, SOLUTION INTRAVITREAL at 04:05

## 2022-05-26 NOTE — PROGRESS NOTES
HPI     Macular Degeneration      Additional comments: 2 mon amd chk          OCT - stable trace IRF  OS - Drusen, no SRF - small HE - ? RAP lesion     Assessment:    1. AMD - Wet OD - post Avastin x 12, post Eylea #48  PED increased prior to 10/12 visit    Had slight increase 5/13/13, 7/14, 5/16 4/20 tx done in [place] during Covid  9/21 had tx in [place] following Shaina    Eylea OD today    Continue q8 week tx    2. 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 PVD OU    4. Pseudophakia - great result    5. Allergic conjunctivitis/sinusitis - try OTC antihistamines  Recent trouble with sinusitis - pain behind eye improved with antihistamines  Ok for Zaditor BID - has improved with topical tx    6. AFib - considering anticoagulation - ok to proceed  On Anti-VEGF therapy, risk of large macular hemorrhage is minimal.         Plan:      2  months Eylea OD only (last DFE 5/22)    Risks, benefits, and alternatives to treatment discussed in detail with the patient.  The patient voiced understanding and wished to proceed with the procedure    Injection Procedure Note:  Diagnosis: Wet AMD OD    Patient Identified and Time Out complete  Topical Proparacaine and Betadine. Conj prep only  Inject Eylea OD at 6:00 @ 3.5-4mm posterior to limbus  Post Operative Dx: Same  Complications: None  Follow up as above.

## 2022-06-01 ENCOUNTER — PATIENT MESSAGE (OUTPATIENT)
Dept: OPHTHALMOLOGY | Facility: CLINIC | Age: 87
End: 2022-06-01
Payer: MEDICARE

## 2022-06-03 ENCOUNTER — OFFICE VISIT (OUTPATIENT)
Dept: OPHTHALMOLOGY | Facility: CLINIC | Age: 87
End: 2022-06-03
Payer: MEDICARE

## 2022-06-03 DIAGNOSIS — H00.14 CHALAZION OF LEFT UPPER EYELID: Primary | ICD-10-CM

## 2022-06-03 PROCEDURE — 99999 PR PBB SHADOW E&M-EST. PATIENT-LVL III: ICD-10-PCS | Mod: PBBFAC,,, | Performed by: OPHTHALMOLOGY

## 2022-06-03 PROCEDURE — 99999 PR PBB SHADOW E&M-EST. PATIENT-LVL III: CPT | Mod: PBBFAC,,, | Performed by: OPHTHALMOLOGY

## 2022-06-03 PROCEDURE — 99213 OFFICE O/P EST LOW 20 MIN: CPT | Mod: PBBFAC | Performed by: OPHTHALMOLOGY

## 2022-06-03 PROCEDURE — 99212 OFFICE O/P EST SF 10 MIN: CPT | Mod: S$PBB,,, | Performed by: OPHTHALMOLOGY

## 2022-06-03 PROCEDURE — 99212 PR OFFICE/OUTPT VISIT, EST, LEVL II, 10-19 MIN: ICD-10-PCS | Mod: S$PBB,,, | Performed by: OPHTHALMOLOGY

## 2022-06-03 NOTE — PROGRESS NOTES
STANTON LO 05/26/22 with Dr. Mandujano.  Patient was diagnosed with allergic   conjunctivitis/sinusitis at last visit with Dr. Mandujano. Patient was   supposed to have been using Zaditor but patient didn't understand that and   hasn't used any.  Patient had EDWINA swelling and redness that went into her   cheek last Thursday.  Patient has used warm compresses and AT's and   symptoms have improved but she still has some EDWINA swelling.    I have personally interviewed the patient, reviewed the history and   examined the patient and agree with the technician's exam.     Last edited by Rishabh August MD on 6/3/2022 11:16 AM. (History)            Assessment /Plan     For exam results, see Encounter Report.    Chalazion of left upper eyelid      The chalazion has just about resolved. Continue hot compresses. Return as needed.

## 2022-06-28 ENCOUNTER — PES CALL (OUTPATIENT)
Dept: ADMINISTRATIVE | Facility: CLINIC | Age: 87
End: 2022-06-28
Payer: MEDICARE

## 2022-07-26 ENCOUNTER — PROCEDURE VISIT (OUTPATIENT)
Dept: OPHTHALMOLOGY | Facility: CLINIC | Age: 87
End: 2022-07-26
Payer: MEDICARE

## 2022-07-26 DIAGNOSIS — H35.3211 EXUDATIVE AGE-RELATED MACULAR DEGENERATION OF RIGHT EYE WITH ACTIVE CHOROIDAL NEOVASCULARIZATION: Primary | ICD-10-CM

## 2022-07-26 DIAGNOSIS — H35.721 SEROUS DETACHMENT OF RETINAL PIGMENT EPITHELIUM OF RIGHT EYE: ICD-10-CM

## 2022-07-26 DIAGNOSIS — H35.3124 NONEXUDATIVE AGE-RELATED MACULAR DEGENERATION, LEFT EYE, ADVANCED ATROPHIC WITH SUBFOVEAL INVOLVEMENT: ICD-10-CM

## 2022-07-26 PROCEDURE — 99499 NO LOS: ICD-10-PCS | Mod: S$PBB,,, | Performed by: OPHTHALMOLOGY

## 2022-07-26 PROCEDURE — 67028 INJECTION EYE DRUG: CPT | Mod: PBBFAC,RT | Performed by: OPHTHALMOLOGY

## 2022-07-26 PROCEDURE — 67028 INJECTION EYE DRUG: CPT | Mod: S$PBB,RT,, | Performed by: OPHTHALMOLOGY

## 2022-07-26 PROCEDURE — 99499 UNLISTED E&M SERVICE: CPT | Mod: S$PBB,,, | Performed by: OPHTHALMOLOGY

## 2022-07-26 PROCEDURE — 67028 PR INJECT INTRAVITREAL PHARMCOLOGIC: ICD-10-PCS | Mod: S$PBB,RT,, | Performed by: OPHTHALMOLOGY

## 2022-07-26 RX ADMIN — AFLIBERCEPT 2 MG: 40 INJECTION, SOLUTION INTRAVITREAL at 09:07

## 2022-07-26 NOTE — PROGRESS NOTES
HPI     Patient presents for an injection OD        OCT - stable trace IRF  OS - Drusen, no SRF - small HE - ? RAP lesion     Assessment:    1. AMD - Wet OD - post Avastin x 12, post Eylea #49  PED increased prior to 10/12 visit    Had slight increase 5/13/13, 7/14, 5/16 4/20 tx done in [place] during Covid  9/21 had tx in [place] following Shaina    Eylea OD today    Continue q8 week tx    2. 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 PVD OU    4. Pseudophakia - great result    5. Allergic conjunctivitis/sinusitis - try OTC antihistamines  Recent trouble with sinusitis - pain behind eye improved with antihistamines  Ok for Zaditor BID - has improved with topical tx    6. AFib - considering anticoagulation - ok to proceed  On Anti-VEGF therapy, risk of large macular hemorrhage is minimal.         Plan:      2  months OCT No dilate (last DFE 5/22)    Risks, benefits, and alternatives to treatment discussed in detail with the patient.  The patient voiced understanding and wished to proceed with the procedure    Injection Procedure Note:  Diagnosis: Wet AMD OD    Patient Identified and Time Out complete  Topical Proparacaine and Betadine. Conj prep only  Inject Eylea OD at 6:00 @ 3.5-4mm posterior to limbus  Post Operative Dx: Same  Complications: None  Follow up as above.

## 2022-08-04 ENCOUNTER — OFFICE VISIT (OUTPATIENT)
Dept: CARDIOLOGY | Facility: CLINIC | Age: 87
End: 2022-08-04
Payer: MEDICARE

## 2022-08-04 VITALS
SYSTOLIC BLOOD PRESSURE: 159 MMHG | HEART RATE: 59 BPM | HEIGHT: 62 IN | DIASTOLIC BLOOD PRESSURE: 70 MMHG | OXYGEN SATURATION: 99 % | WEIGHT: 119.94 LBS | BODY MASS INDEX: 22.07 KG/M2

## 2022-08-04 DIAGNOSIS — R06.02 SHORTNESS OF BREATH: ICD-10-CM

## 2022-08-04 DIAGNOSIS — I35.1 NONRHEUMATIC AORTIC VALVE INSUFFICIENCY: ICD-10-CM

## 2022-08-04 DIAGNOSIS — I10 PRIMARY HYPERTENSION: ICD-10-CM

## 2022-08-04 DIAGNOSIS — I48.0 PAROXYSMAL ATRIAL FIBRILLATION: Primary | ICD-10-CM

## 2022-08-04 DIAGNOSIS — E78.00 PURE HYPERCHOLESTEROLEMIA: ICD-10-CM

## 2022-08-04 DIAGNOSIS — D72.821 MONOCYTOSES: ICD-10-CM

## 2022-08-04 PROCEDURE — 99999 PR PBB SHADOW E&M-EST. PATIENT-LVL IV: CPT | Mod: PBBFAC,,, | Performed by: INTERNAL MEDICINE

## 2022-08-04 PROCEDURE — 99214 OFFICE O/P EST MOD 30 MIN: CPT | Mod: PBBFAC | Performed by: INTERNAL MEDICINE

## 2022-08-04 PROCEDURE — 99214 OFFICE O/P EST MOD 30 MIN: CPT | Mod: S$PBB,,, | Performed by: INTERNAL MEDICINE

## 2022-08-04 PROCEDURE — 99214 PR OFFICE/OUTPT VISIT, EST, LEVL IV, 30-39 MIN: ICD-10-PCS | Mod: S$PBB,,, | Performed by: INTERNAL MEDICINE

## 2022-08-04 PROCEDURE — 99999 PR PBB SHADOW E&M-EST. PATIENT-LVL IV: ICD-10-PCS | Mod: PBBFAC,,, | Performed by: INTERNAL MEDICINE

## 2022-08-04 NOTE — PATIENT INSTRUCTIONS
We discussed keeping a log of home blood pressures and notifying the office if it is consistently running above 130/80 mmHg. I have asked him/her to send me his/her readings via [facility] in 1-2 weeks.

## 2022-08-04 NOTE — PROGRESS NOTES
Subjective:   Patient ID:  Cathy Kearns is a 89 y.o. 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  She was previously seen by Stanford Mcelroy and Brice.  She has known paroxysmal atrial fibrillation and has been maintaining sinus rhythm on sotalol.  She also has treated hypertension as well as hyperlipidemia.  She currently lives in [facility].  She has been feeling well.  She goes to exercise class 3 days a week. Cathy Kearns denies any chest pain, shortness of breath, PND, orthopnea, palpitations, leg edema, or syncope.    Interval history: She reports a 2-3 month history of WHITEHEAD. It occurs when she does her exercises or pushes herself a little. Cathy Kearns denies any chest pain, PND, orthopnea, palpitations, leg edema, or syncope. Her weight has been stable. She does report ongoing sinus congestion and post nasal drip. No syncope.       ECG 01-FEB-2022 13:27:20: Personally reviewed by me shows NSR with LAD and old anteroseptal infarct. QTc 424 ms  Echo 4/18:  CONCLUSIONS     1 - Normal left ventricular systolic function (EF 60-65%).     2 - No wall motion abnormalities.     3 - Normal left ventricular diastolic function.     4 - Normal right ventricular systolic function .     5 - The estimated PA systolic pressure is 19 mmHg.     6 - Mild aortic regurgitation.     7 - Trivial to mild tricuspid regurgitation.     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    Vasovagal syncope        Past Surgical History:   Procedure Laterality Date    ADENOIDECTOMY      APPENDECTOMY      CATARACT EXTRACTION W/  INTRAOCULAR LENS IMPLANT Bilateral n/a    HYSTERECTOMY      2/2 uterine CA, removed ovaries, no longer f/u with gyn    Intravitreal Injection      Avastin od x's 12 dltx 2-16-12    TONSILLECTOMY         Social History     Socioeconomic History    Marital status:    Tobacco Use    Smoking status: Former Smoker     Packs/day: 0.25     Years: 15.00     Pack years: 3.75    Smokeless tobacco: Never Used   Substance and Sexual Activity    Alcohol use: Yes     Comment: 1-2 drinks at night    Drug use: No   Other Topics Concern    Are you pregnant or think you may be? 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       Family History   Problem Relation Age of Onset    Cancer Mother         ovarian CA    Cancer Father         prostate    Cataracts Father     Cancer Maternal Grandmother         colon    Heart attack Maternal Grandfather     Cancer Paternal Grandmother         colon    Cancer Paternal Grandfather     Strabismus Paternal Aunt     Hypertension Daughter     Cancer Daughter         "cancer of bone"    Hyperlipidemia Daughter     Thyroid disease Grandchild     Heart attack Brother     Cancer Brother         prostate, esophagus, bladder    Hypertension Son     Hyperlipidemia Son     Cancer Brother         Non hodgkins lymphoma    Amblyopia Neg Hx     Blindness Neg Hx     Diabetes " Neg Hx     Glaucoma Neg Hx     Macular degeneration Neg Hx     Retinal detachment Neg Hx     Stroke Neg Hx        Patient's Medications   New Prescriptions    No medications on file   Previous Medications    ALBUTEROL (VENTOLIN HFA) 90 MCG/ACTUATION INHALER    Inhale 2 puffs into the lungs every 6 (six) hours as needed for Wheezing or Shortness of Breath. Rescue    AMLODIPINE (NORVASC) 5 MG TABLET    Take 1 tablet (5 mg total) by mouth once daily.    ANTIOX #8/OM3/DHA/EPA/LUT/ZEAX (PRESERVISION AREDS 2, OMEGA-3, ORAL)    Take 1 tablet by mouth once daily at 6am.     APIXABAN (ELIQUIS) 2.5 MG TAB    Take 1 tablet (2.5 mg total) by mouth 2 (two) times daily.    CALCIUM CARBONATE (CALCIUM 500 ORAL)    Take 500 mg by mouth once daily.     LISINOPRIL (PRINIVIL,ZESTRIL) 40 MG TABLET    TAKE 1 TABLET(40 MG) BY MOUTH EVERY DAY    OMEGA 3-DHA-EPA-FISH -1,000 MG CAP    Take 3 g by mouth.    SOTALOL (BETAPACE) 80 MG TABLET    Take 1 tablet (80 mg total) by mouth 2 (two) times daily.   Modified Medications    No medications on file   Discontinued Medications    No medications on file       Review of Systems   Constitutional: Negative for malaise/fatigue and weight gain.   Eyes: Negative for visual disturbance.   Cardiovascular: Positive for dyspnea on exertion. Negative for chest pain, claudication, leg swelling, near-syncope, orthopnea, palpitations, paroxysmal nocturnal dyspnea and syncope.   Respiratory: Negative for cough, shortness of breath, sleep disturbances due to breathing, snoring and wheezing.    Endocrine: Negative for cold intolerance, heat intolerance, polydipsia, polyphagia and polyuria.   Hematologic/Lymphatic: Negative for bleeding problem. Does not bruise/bleed easily.   Skin: Negative for rash and suspicious lesions.   Musculoskeletal: Negative for arthritis, falls, joint pain, muscle weakness and myalgias.   Gastrointestinal: Negative for abdominal pain, change in bowel habit, constipation,  "diarrhea, heartburn, hematochezia, melena and nausea.   Genitourinary: Negative for hematuria and nocturia.   Neurological: Negative for excessive daytime sleepiness, dizziness, headaches, light-headedness, loss of balance and weakness.   Psychiatric/Behavioral: Negative for depression. The patient is not nervous/anxious.    Allergic/Immunologic: Negative for environmental allergies.       BP (!) 159/70   Pulse (!) 59   Ht 5' 2" (1.575 m)   Wt 54.4 kg (119 lb 14.9 oz)   SpO2 99%   BMI 21.94 kg/m²     Objective:   Physical Exam  Constitutional:       Appearance: She is well-developed.      Comments:      HENT:      Head: Normocephalic and atraumatic.   Eyes:      General: No scleral icterus.     Conjunctiva/sclera: Conjunctivae normal.      Pupils: Pupils are equal, round, and reactive to light.   Neck:      Thyroid: No thyromegaly.      Vascular: No hepatojugular reflux or JVD.      Trachea: No tracheal deviation.   Cardiovascular:      Rate and Rhythm: Normal rate and regular rhythm.      Chest Wall: PMI is not displaced.      Pulses: Intact distal pulses.           Carotid pulses are 2+ on the right side and 2+ on the left side.       Radial pulses are 2+ on the right side and 2+ on the left side.        Dorsalis pedis pulses are 2+ on the right side and 2+ on the left side.        Posterior tibial pulses are 2+ on the right side and 2+ on the left side.      Heart sounds: Normal heart sounds.   Pulmonary:      Effort: Pulmonary effort is normal.      Breath sounds: Normal breath sounds.   Abdominal:      General: Bowel sounds are normal. There is no distension.      Palpations: Abdomen is soft. There is no mass.      Tenderness: There is no abdominal tenderness.   Musculoskeletal:         General: No tenderness.      Cervical back: Normal range of motion and neck supple.   Lymphadenopathy:      Cervical: No cervical adenopathy.   Skin:     General: Skin is warm and dry.      Findings: No erythema or rash.    "   Nails: There is no clubbing.   Neurological:      Mental Status: She is alert and oriented to person, place, and time.   Psychiatric:         Speech: Speech normal.         Behavior: Behavior normal.         Lab Results   Component Value Date     (L) 05/24/2022    K 4.7 05/24/2022     05/24/2022    CO2 26 05/24/2022    BUN 10 05/24/2022    CREATININE 0.8 05/24/2022     (H) 05/24/2022    AST 18 05/24/2022    ALT 13 05/24/2022    ALBUMIN 4.0 05/24/2022    PROT 6.8 05/24/2022    BILITOT 0.7 05/24/2022    WBC 6.59 05/24/2022    WBC 6.59 05/24/2022    HGB 12.8 05/24/2022    HGB 12.8 05/24/2022    HCT 37.2 05/24/2022    HCT 37.2 05/24/2022    MCV 90 05/24/2022    MCV 90 05/24/2022     05/24/2022     05/24/2022    TSH 0.982 07/20/2021    CHOL 239 (H) 07/20/2021    HDL 59 07/20/2021    LDLCALC 161.4 (H) 07/20/2021    TRIG 93 07/20/2021    BNP 81 05/10/2018       Assessment:     1. Paroxysmal atrial fibrillation :  She is on sotalol for rhythm control and anticoagulated with Eliquis. ECG today. [id number]- Vasc is 4. HAS-BLED score is 2.   2. Primary hypertension : Blood pressure is elevated today. BP at home Wednesday 120/70. We discussed keeping a log of home blood pressures and notifying the office if it is consistently running above 130/80 mmHg. I have asked him/her to send me his/her readings via [facility] in 1-2 weeks.   3. Pure hypercholesterolemia : Following a heart health diet such as the Mediterranean Diet is recommended in addition to 150 minutes a week of moderate intensity exercise to lower cholesterol, maintain a healthy body weight, and improve overall cardiovascular health.     4.       Aortic insufficiency: Mild on last exam.  5.      Shortness of breath: She does not appear volume overloaded on exam. Labs, echo, chest xray ordered.    Plan:     Cathy was seen today for hypertension and shortness of breath.    Diagnoses and all orders for this visit:    Paroxysmal atrial  fibrillation  -     BNP; Future  -     CBC Auto Differential; Future  -     Echo; Future  -     X-Ray Chest PA And Lateral; Future    Primary hypertension    Pure hypercholesterolemia    Nonrheumatic aortic valve insufficiency  -     BNP; Future  -     CBC Auto Differential; Future  -     Echo; Future  -     X-Ray Chest PA And Lateral; Future    Monocytoses    Shortness of breath  -     BNP; Future  -     CBC Auto Differential; Future  -     Echo; Future  -     X-Ray Chest PA And Lateral; Future        Thank you for allowing me to participate in this patient's care. Please do not hesitate to contact me with any questions or concerns.

## 2022-08-05 ENCOUNTER — HOSPITAL ENCOUNTER (OUTPATIENT)
Dept: RADIOLOGY | Facility: HOSPITAL | Age: 87
Discharge: HOME | End: 2022-08-05
Attending: INTERNAL MEDICINE
Payer: MEDICARE

## 2022-08-05 ENCOUNTER — HOSPITAL ENCOUNTER (OUTPATIENT)
Dept: CARDIOLOGY | Facility: HOSPITAL | Age: 87
Discharge: HOME | End: 2022-08-05
Attending: INTERNAL MEDICINE
Payer: MEDICARE

## 2022-08-05 VITALS
HEART RATE: 50 BPM | HEIGHT: 62 IN | DIASTOLIC BLOOD PRESSURE: 70 MMHG | BODY MASS INDEX: 21.9 KG/M2 | SYSTOLIC BLOOD PRESSURE: 159 MMHG | WEIGHT: 119 LBS

## 2022-08-05 DIAGNOSIS — R06.02 SHORTNESS OF BREATH: ICD-10-CM

## 2022-08-05 DIAGNOSIS — I35.1 NONRHEUMATIC AORTIC VALVE INSUFFICIENCY: ICD-10-CM

## 2022-08-05 DIAGNOSIS — I48.0 PAROXYSMAL ATRIAL FIBRILLATION: ICD-10-CM

## 2022-08-05 LAB
ASCENDING AORTA: 2.66 CM
AV INDEX (PROSTH): 0.69
AV MEAN GRADIENT: 6 MMHG
AV PEAK GRADIENT: 10 MMHG
AV VALVE AREA: 2.51 CM2
AV VELOCITY RATIO: 0.62
BSA FOR ECHO PROCEDURE: 1.54 M2
CV ECHO LV RWT: 0.36 CM
DOP CALC AO PEAK VEL: 1.6 M/S
DOP CALC AO VTI: 39.6 CM
DOP CALC LVOT AREA: 3.7 CM2
DOP CALC LVOT DIAMETER: 2.16 CM
DOP CALC LVOT PEAK VEL: 0.99 M/S
DOP CALC LVOT STROKE VOLUME: 99.36 CM3
DOP CALCLVOT PEAK VEL VTI: 27.13 CM
E WAVE DECELERATION TIME: 374.1 MSEC
E/A RATIO: 0.67
E/E' RATIO: 11.38 M/S
ECHO LV POSTERIOR WALL: 0.7 CM (ref 0.6–1.1)
EJECTION FRACTION: 65 %
FRACTIONAL SHORTENING: 38 % (ref 28–44)
INTERVENTRICULAR SEPTUM: 0.68 CM (ref 0.6–1.1)
LA MAJOR: 3.59 CM
LA MINOR: 3.69 CM
LA WIDTH: 2.58 CM
LEFT ATRIUM SIZE: 3.29 CM
LEFT ATRIUM VOLUME INDEX MOD: 13.3 ML/M2
LEFT ATRIUM VOLUME INDEX: 17.2 ML/M2
LEFT ATRIUM VOLUME MOD: 20.36 CM3
LEFT ATRIUM VOLUME: 26.26 CM3
LEFT INTERNAL DIMENSION IN SYSTOLE: 2.41 CM (ref 2.1–4)
LEFT VENTRICLE DIASTOLIC VOLUME INDEX: 41.95 ML/M2
LEFT VENTRICLE DIASTOLIC VOLUME: 64.19 ML
LEFT VENTRICLE MASS INDEX: 47 G/M2
LEFT VENTRICLE SYSTOLIC VOLUME INDEX: 13.2 ML/M2
LEFT VENTRICLE SYSTOLIC VOLUME: 20.27 ML
LEFT VENTRICULAR INTERNAL DIMENSION IN DIASTOLE: 3.86 CM (ref 3.5–6)
LEFT VENTRICULAR MASS: 72.46 G
LV LATERAL E/E' RATIO: 10.57 M/S
LV SEPTAL E/E' RATIO: 12.33 M/S
MV A" WAVE DURATION": 17.13 MSEC
MV PEAK A VEL: 1.11 M/S
MV PEAK E VEL: 0.74 M/S
MV STENOSIS PRESSURE HALF TIME: 108.49 MS
MV VALVE AREA P 1/2 METHOD: 2.03 CM2
PULM VEIN S/D RATIO: 2.18
PV PEAK D VEL: 0.22 M/S
PV PEAK S VEL: 0.48 M/S
RA MAJOR: 3.65 CM
RA PRESSURE: 3 MMHG
RA WIDTH: 2.25 CM
RIGHT VENTRICULAR END-DIASTOLIC DIMENSION: 2.79 CM
RV TISSUE DOPPLER FREE WALL SYSTOLIC VELOCITY 1 (APICAL 4 CHAMBER VIEW): 14.03 CM/S
SINUS: 2.88 CM
STJ: 2.78 CM
TDI LATERAL: 0.07 M/S
TDI SEPTAL: 0.06 M/S
TDI: 0.07 M/S
TRICUSPID ANNULAR PLANE SYSTOLIC EXCURSION: 2.04 CM

## 2022-08-05 PROCEDURE — 93306 TTE W/DOPPLER COMPLETE: CPT | Mod: 26,,, | Performed by: INTERNAL MEDICINE

## 2022-08-05 PROCEDURE — 93306 ECHO (CUPID ONLY): ICD-10-PCS | Mod: 26,,, | Performed by: INTERNAL MEDICINE

## 2022-08-05 PROCEDURE — 71046 X-RAY EXAM CHEST 2 VIEWS: CPT | Mod: TC,FY

## 2022-08-05 PROCEDURE — 71046 XR CHEST PA AND LATERAL: ICD-10-PCS | Mod: 26,,, | Performed by: RADIOLOGY

## 2022-08-05 PROCEDURE — 71046 X-RAY EXAM CHEST 2 VIEWS: CPT | Mod: 26,,, | Performed by: RADIOLOGY

## 2022-08-05 PROCEDURE — 93306 TTE W/DOPPLER COMPLETE: CPT

## 2022-08-23 ENCOUNTER — PATIENT OUTREACH (OUTPATIENT)
Dept: INTERNAL MEDICINE | Facility: CLINIC | Age: 87
End: 2022-08-23
Payer: MEDICARE

## 2022-08-23 ENCOUNTER — PATIENT MESSAGE (OUTPATIENT)
Dept: INTERNAL MEDICINE | Facility: CLINIC | Age: 87
End: 2022-08-23
Payer: MEDICARE

## 2022-09-27 ENCOUNTER — PROCEDURE VISIT (OUTPATIENT)
Dept: OPHTHALMOLOGY | Facility: CLINIC | Age: 87
End: 2022-09-27
Payer: MEDICARE

## 2022-09-27 DIAGNOSIS — H35.721 SEROUS DETACHMENT OF RETINAL PIGMENT EPITHELIUM OF RIGHT EYE: ICD-10-CM

## 2022-09-27 DIAGNOSIS — H35.3211 EXUDATIVE AGE-RELATED MACULAR DEGENERATION OF RIGHT EYE WITH ACTIVE CHOROIDAL NEOVASCULARIZATION: Primary | ICD-10-CM

## 2022-09-27 DIAGNOSIS — H35.3124 NONEXUDATIVE AGE-RELATED MACULAR DEGENERATION, LEFT EYE, ADVANCED ATROPHIC WITH SUBFOVEAL INVOLVEMENT: ICD-10-CM

## 2022-09-27 PROCEDURE — 67028 PR INJECT INTRAVITREAL PHARMCOLOGIC: ICD-10-PCS | Mod: S$PBB,RT,, | Performed by: OPHTHALMOLOGY

## 2022-09-27 PROCEDURE — 92134 CPTRZ OPH DX IMG PST SGM RTA: CPT | Mod: PBBFAC | Performed by: OPHTHALMOLOGY

## 2022-09-27 PROCEDURE — 67028 INJECTION EYE DRUG: CPT | Mod: S$PBB,RT,, | Performed by: OPHTHALMOLOGY

## 2022-09-27 PROCEDURE — 92012 INTRM OPH EXAM EST PATIENT: CPT | Mod: 25,S$PBB,, | Performed by: OPHTHALMOLOGY

## 2022-09-27 PROCEDURE — 92012 PR EYE EXAM, EST PATIENT,INTERMED: ICD-10-PCS | Mod: 25,S$PBB,, | Performed by: OPHTHALMOLOGY

## 2022-09-27 PROCEDURE — 92134 POSTERIOR SEGMENT OCT RETINA (OCULAR COHERENCE TOMOGRAPHY)-BOTH EYES: ICD-10-PCS | Mod: 26,S$PBB,, | Performed by: OPHTHALMOLOGY

## 2022-09-27 PROCEDURE — 67028 INJECTION EYE DRUG: CPT | Mod: PBBFAC,RT | Performed by: OPHTHALMOLOGY

## 2022-09-27 RX ADMIN — AFLIBERCEPT 2 MG: 40 INJECTION, SOLUTION INTRAVITREAL at 10:09

## 2022-09-27 NOTE — PROGRESS NOTES
HPI     8 wk OCT/ Eylea OD (INJECTION ONLY) --NO DILATION      DLS 07/26/2022 by Dr. JULITO Mandujano MD    CC: pt reports new concerns with my eyes, but I do see shadow over my   vision on the OS. (Black spot)    -blurred Va OD  -diplopia  -headaches  -eye pain  ++sinus flare ups at times (using flonase)  -veils/curtains/shadows    Eye Meds: AT's OU prn       OCT - stable trace IRF  OS - Drusen, no SRF - small HE - ? RAP lesion     Assessment:    1. AMD - Wet OD - post Avastin x 12, post Eylea #50  PED increased prior to 10/12 visit    Had slight increase 5/13/13, 7/14, 5/16 4/20 tx done in [place] during Covid  9/21 had tx in [place] following Shaina    Eylea OD today    Continue q8 week tx    2. 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 PVD OU    4. Pseudophakia - great result    5. Allergic conjunctivitis/sinusitis - try OTC antihistamines  Recent trouble with sinusitis - pain behind eye improved with antihistamines  Ok for Zaditor BID - has improved with topical tx    6. AFib - considering anticoagulation - ok to proceed  On Anti-VEGF therapy, risk of large macular hemorrhage is minimal.         Plan:      2  months OCT and dilate (last DFE 5/22)    Risks, benefits, and alternatives to treatment discussed in detail with the patient.  The patient voiced understanding and wished to proceed with the procedure    Injection Procedure Note:  Diagnosis: Wet AMD OD    Patient Identified and Time Out complete  Topical Proparacaine and Betadine. Conj prep only  Inject Eylea OD at 6:00 @ 3.5-4mm posterior to limbus  Post Operative Dx: Same  Complications: None  Follow up as above.

## 2022-10-03 ENCOUNTER — TELEPHONE (OUTPATIENT)
Dept: INTERNAL MEDICINE | Facility: CLINIC | Age: 87
End: 2022-10-03
Payer: MEDICARE

## 2022-10-03 DIAGNOSIS — Z71.89 COMPLEX CARE COORDINATION: ICD-10-CM

## 2022-10-03 NOTE — TELEPHONE ENCOUNTER
Spoke with pt, she has been having a cold for week and a half. Tried taking antihistamine and coricidin, but just made her groggy. Been using salt water instead. Scheduled her to see Meagan Dunlap tomorrow.

## 2022-10-03 NOTE — TELEPHONE ENCOUNTER
----- Message from Teto Padron sent at 10/3/2022  9:11 AM CDT -----    Name of Who is Calling: KAROLINA SINGH [[id number]]      What is the request in detail: Pt is requesting a call back because she is experiencing a bad cold.       Can the clinic reply by [facility]: No      What Number to Call Back if not in [facility]: [phone]

## 2022-10-04 ENCOUNTER — OFFICE VISIT (OUTPATIENT)
Dept: INTERNAL MEDICINE | Facility: CLINIC | Age: 87
End: 2022-10-04
Payer: MEDICARE

## 2022-10-04 VITALS
DIASTOLIC BLOOD PRESSURE: 74 MMHG | HEART RATE: 67 BPM | OXYGEN SATURATION: 99 % | SYSTOLIC BLOOD PRESSURE: 114 MMHG | BODY MASS INDEX: 21.61 KG/M2 | WEIGHT: 118.19 LBS

## 2022-10-04 DIAGNOSIS — J22 LOWER RESPIRATORY INFECTION: Primary | ICD-10-CM

## 2022-10-04 DIAGNOSIS — R05.1 ACUTE COUGH: ICD-10-CM

## 2022-10-04 PROCEDURE — 99999 PR PBB SHADOW E&M-EST. PATIENT-LVL IV: CPT | Mod: PBBFAC,,, | Performed by: PHYSICIAN ASSISTANT

## 2022-10-04 PROCEDURE — 99214 PR OFFICE/OUTPT VISIT, EST, LEVL IV, 30-39 MIN: ICD-10-PCS | Mod: S$PBB,,, | Performed by: PHYSICIAN ASSISTANT

## 2022-10-04 PROCEDURE — 99214 OFFICE O/P EST MOD 30 MIN: CPT | Mod: S$PBB,,, | Performed by: PHYSICIAN ASSISTANT

## 2022-10-04 PROCEDURE — 99999 PR PBB SHADOW E&M-EST. PATIENT-LVL IV: ICD-10-PCS | Mod: PBBFAC,,, | Performed by: PHYSICIAN ASSISTANT

## 2022-10-04 PROCEDURE — 99214 OFFICE O/P EST MOD 30 MIN: CPT | Mod: PBBFAC | Performed by: PHYSICIAN ASSISTANT

## 2022-10-04 RX ORDER — AZITHROMYCIN 250 MG/1
TABLET, FILM COATED ORAL
Qty: 6 TABLET | Refills: 0 | Status: SHIPPED | OUTPATIENT
Start: 2022-10-04 | End: 2022-10-09

## 2022-10-04 RX ORDER — BENZONATATE 100 MG/1
100 CAPSULE ORAL 3 TIMES DAILY PRN
Qty: 30 CAPSULE | Refills: 0 | Status: SHIPPED | OUTPATIENT
Start: 2022-10-04 | End: 2022-10-14

## 2022-10-04 NOTE — PROGRESS NOTES
INTERNAL MEDICINE URGENT VISIT NOTE    CHIEF COMPLAINT     Chief Complaint   Patient presents with    Nasal Congestion       HPI     Cathy Kearns is a 89 y.o. female who presents for an urgent visit today.    PCP is Wilma Xiong MD, patient is new to me.     Patient presents with complaints of cough and congestion with subjective fever. No sick contacts or recent travel.     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 Authorizing Provider   albuterol (VENTOLIN HFA) 90 mcg/actuation inhaler Inhale 2 puffs into the lungs every 6 (six) hours as needed for Wheezing or Shortness of Breath. Rescue 7/2/21  Yes Wilma Xiong MD   amLODIPine (NORVASC) 5 MG tablet Take 1 tablet (5 mg total) by mouth once daily. 1/5/22 1/5/23 Yes Eriberto Mcelroy MD   ANTIOX #8/OM3/DHA/EPA/LUT/ZEAX (PRESERVISION AREDS 2, OMEGA-3, ORAL) Take 1 tablet by mouth once daily at 6am.    Yes Historical Provider   apixaban (ELIQUIS) 2.5 mg Tab Take 1 tablet (2.5 mg total) by mouth 2 (two) times daily. 1/7/22  Yes Eriberto Mcelroy MD   CALCIUM CARBONATE (CALCIUM 500 ORAL) Take 500 mg by mouth once daily.    Yes Historical Provider   lisinopriL (PRINIVIL,ZESTRIL) 40 MG tablet TAKE 1 TABLET(40 MG) BY MOUTH EVERY DAY 9/30/22  Yes Eriberto Mcelroy MD   omega 3-zhu-biw-fish oil 300-1,000 mg Cap Take 3 g by mouth.   Yes Historical Provider   sotaloL (BETAPACE) 80 MG tablet TAKE 1 TABLET(80 MG) BY MOUTH TWICE DAILY 9/30/22  Yes Eriberto Mcelroy MD       Review of  Systems:  Review of Systems   Constitutional:  Negative for chills and fever.   HENT:  Negative for sore throat and trouble swallowing.    Eyes:  Negative for visual disturbance.   Respiratory:  Negative for cough and shortness of breath.    Cardiovascular:  Negative for chest pain.   Gastrointestinal:  Negative for abdominal pain, constipation, diarrhea, nausea and vomiting.   Genitourinary:  Negative for dysuria and flank pain.   Musculoskeletal:  Negative for back pain, neck pain and neck stiffness.   Skin:  Negative for rash.   Neurological:  Negative for dizziness, syncope, weakness and headaches.   Psychiatric/Behavioral:  Negative for confusion.      Health Maintainence:   Immunizations:  Health Maintenance         Date Due Completion Date    Shingles Vaccine (1 of 2) Never done ---    COVID-19 Vaccine (4 - Booster for Moderna series) 12/31/2021 11/5/2021    Influenza Vaccine (1) 09/01/2022 10/20/2021    Lipid Panel 07/20/2026 7/20/2021    TETANUS VACCINE 08/16/2027 8/16/2017             PHYSICAL EXAM     /74 (BP Location: Right arm, Patient Position: Sitting)   Pulse 67   Wt 53.6 kg (118 lb 2.7 oz)   SpO2 99%   BMI 21.61 kg/m²     Physical Exam  Vitals and nursing note reviewed.   Constitutional:       Appearance: Normal appearance.      Comments: Elderly female in NAD or apparent pain. She makes good eye contact, speaks in clear full sentences and ambulates with ease.    HENT:      Head: Normocephalic and atraumatic.      Nose: Nose normal.      Mouth/Throat:      Pharynx: Oropharynx is clear.   Eyes:      Conjunctiva/sclera: Conjunctivae normal.   Cardiovascular:      Rate and Rhythm: Normal rate and regular rhythm.      Pulses: Normal pulses.   Pulmonary:      Effort: No respiratory distress.   Abdominal:      Tenderness: There is no abdominal tenderness.   Musculoskeletal:         General: Normal range of motion.      Cervical back: No rigidity.   Skin:     General: Skin is warm and dry.       Capillary Refill: Capillary refill takes less than 2 seconds.      Findings: No rash.   Neurological:      General: No focal deficit present.      Mental Status: She is alert.      Gait: Gait normal.   Psychiatric:         Mood and Affect: Mood normal.       LABS     No results found for: LABA1C, HGBA1C  CMP  Sodium   Date Value Ref Range Status   05/24/2022 135 (L) 136 - 145 mmol/L Final     Potassium   Date Value Ref Range Status   05/24/2022 4.7 3.5 - 5.1 mmol/L Final     Chloride   Date Value Ref Range Status   05/24/2022 101 95 - 110 mmol/L Final     CO2   Date Value Ref Range Status   05/24/2022 26 23 - 29 mmol/L Final     Glucose   Date Value Ref Range Status   05/24/2022 121 (H) 70 - 110 mg/dL Final     BUN   Date Value Ref Range Status   05/24/2022 10 10 - 30 mg/dL Final     Creatinine   Date Value Ref Range Status   05/24/2022 0.8 0.5 - 1.4 mg/dL Final     Calcium   Date Value Ref Range Status   05/24/2022 10.0 8.7 - 10.5 mg/dL Final     Total Protein   Date Value Ref Range Status   05/24/2022 6.8 6.0 - 8.4 g/dL Final     Albumin   Date Value Ref Range Status   05/24/2022 4.0 3.5 - 5.2 g/dL Final     Total Bilirubin   Date Value Ref Range Status   05/24/2022 0.7 0.1 - 1.0 mg/dL Final     Comment:     For infants and newborns, interpretation of results should be based  on gestational age, weight and in agreement with clinical  observations.    Premature Infant recommended reference ranges:  Up to 24 hours.............<8.0 mg/dL  Up to 48 hours............<12.0 mg/dL  3-5 days..................<15.0 mg/dL  6-29 days.................<15.0 mg/dL       Alkaline Phosphatase   Date Value Ref Range Status   05/24/2022 52 (L) 55 - 135 U/L Final     AST   Date Value Ref Range Status   05/24/2022 18 10 - 40 U/L Final     ALT   Date Value Ref Range Status   05/24/2022 13 10 - 44 U/L Final     Anion Gap   Date Value Ref Range Status   05/24/2022 8 8 - 16 mmol/L Final     eGFR if    Date Value Ref Range  Status   05/24/2022 >60.0 >60 mL/min/1.73 m^2 Final     eGFR if non    Date Value Ref Range Status   05/24/2022 >60.0 >60 mL/min/1.73 m^2 Final     Comment:     Calculation used to obtain the estimated glomerular filtration  rate (eGFR) is the CKD-EPI equation.        Lab Results   Component Value Date    WBC 6.08 08/05/2022    HGB 13.2 08/05/2022    HCT 37.6 08/05/2022    MCV 93 08/05/2022     08/05/2022     Lab Results   Component Value Date    CHOL 239 (H) 07/20/2021    CHOL 238 (H) 08/26/2020    CHOL 247 (H) 09/04/2019     Lab Results   Component Value Date    HDL 59 07/20/2021    HDL 70 08/26/2020    HDL 79 (H) 09/04/2019     Lab Results   Component Value Date    LDLCALC 161.4 (H) 07/20/2021    LDLCALC 148.8 08/26/2020    LDLCALC 151.0 09/04/2019     Lab Results   Component Value Date    TRIG 93 07/20/2021    TRIG 96 08/26/2020    TRIG 85 09/04/2019     Lab Results   Component Value Date    CHOLHDL 24.7 07/20/2021    CHOLHDL 29.4 08/26/2020    CHOLHDL 32.0 09/04/2019     Lab Results   Component Value Date    TSH 0.982 07/20/2021       ASSESSMENT/PLAN     Cathy Kearns is a 89 y.o. female     Cathy was seen today for nasal congestion and suspected lower respiratory infection, will start abx and treat cough with tessalon perles. Will follow-up in 1-2 weeks for symptom re-check. ED prompts discussed.     Diagnoses and all orders for this visit:    Lower respiratory infection    Acute cough    Other orders  -     azithromycin (Z-SJ) 250 MG tablet; Take 2 tablets by mouth on day 1; Take 1 tablet by mouth on days 2-5  -     benzonatate (TESSALON) 100 MG capsule; Take 1 capsule (100 mg total) by mouth 3 (three) times daily as needed for Cough.         Patient was counseled on when and how to seek emergent care.       Meagan Dunlap PA-C   Department of Internal Medicine - [facility]   12:17 PM

## 2022-10-14 ENCOUNTER — OFFICE VISIT (OUTPATIENT)
Dept: INTERNAL MEDICINE | Facility: CLINIC | Age: 87
End: 2022-10-14
Payer: MEDICARE

## 2022-10-14 VITALS
BODY MASS INDEX: 21.71 KG/M2 | WEIGHT: 118 LBS | HEART RATE: 70 BPM | HEIGHT: 62 IN | DIASTOLIC BLOOD PRESSURE: 81 MMHG | SYSTOLIC BLOOD PRESSURE: 137 MMHG

## 2022-10-14 DIAGNOSIS — R05.9 COUGH, UNSPECIFIED TYPE: Primary | ICD-10-CM

## 2022-10-14 PROCEDURE — 99442 PR PHYSICIAN TELEPHONE EVALUATION 11-20 MIN: ICD-10-PCS | Mod: 95,,, | Performed by: PHYSICIAN ASSISTANT

## 2022-10-14 PROCEDURE — 99442 PR PHYSICIAN TELEPHONE EVALUATION 11-20 MIN: CPT | Mod: 95,,, | Performed by: PHYSICIAN ASSISTANT

## 2022-10-14 NOTE — PROGRESS NOTES
Established Patient - Audio Only Telehealth Visit     The patient location is: home  The chief complaint leading to consultation is: follow-up  Visit type: Virtual visit with audio only (telephone)  Total time spent with patient: 15       The reason for the audio only service rather than synchronous audio and video virtual visit was related to technical difficulties or patient preference/necessity.     Each patient to whom I provide medical services by telemedicine is:  (1) informed of the relationship between the physician and patient and the respective role of any other health care provider with respect to management of the patient; and (2) notified that they may decline to receive medical services by telemedicine and may withdraw from such care at any time. Patient verbally consented to receive this service via voice-only telephone call.       HPI: Feeling much better, still with lingering cough and some mild nasal congestion.   Mild coughing with coughing.      Assessment and plan:  Cathy was seen today for cough.  Educated on symptom management reassured that she is improving appropriately she is aware of ED prompts.  Follow up with primary care provider is plan.    Diagnoses and all orders for this visit:    Cough, unspecified type              This service was not originating from a related E/M service provided within the previous 7 days nor will  to an E/M service or procedure within the next 24 hours or my soonest available appointment.  Prevailing standard of care was able to be met in this audio-only visit.

## 2022-11-04 ENCOUNTER — OFFICE VISIT (OUTPATIENT)
Dept: INTERNAL MEDICINE | Facility: CLINIC | Age: 87
End: 2022-11-04
Attending: INTERNAL MEDICINE
Payer: MEDICARE

## 2022-11-04 VITALS
SYSTOLIC BLOOD PRESSURE: 126 MMHG | OXYGEN SATURATION: 99 % | HEIGHT: 62 IN | DIASTOLIC BLOOD PRESSURE: 77 MMHG | BODY MASS INDEX: 21.94 KG/M2 | WEIGHT: 119.25 LBS | HEART RATE: 62 BPM

## 2022-11-04 DIAGNOSIS — I10 HYPERTENSION, UNSPECIFIED TYPE: Primary | ICD-10-CM

## 2022-11-04 DIAGNOSIS — I48.0 PAROXYSMAL ATRIAL FIBRILLATION: ICD-10-CM

## 2022-11-04 DIAGNOSIS — I51.89 DIASTOLIC DYSFUNCTION: ICD-10-CM

## 2022-11-04 DIAGNOSIS — D72.821 MONOCYTOSIS: ICD-10-CM

## 2022-11-04 DIAGNOSIS — E55.9 VITAMIN D DEFICIENCY: ICD-10-CM

## 2022-11-04 DIAGNOSIS — M85.80 OSTEOPENIA, UNSPECIFIED LOCATION: ICD-10-CM

## 2022-11-04 DIAGNOSIS — N95.9 MENOPAUSAL PROBLEM: ICD-10-CM

## 2022-11-04 DIAGNOSIS — E78.5 HYPERLIPIDEMIA, UNSPECIFIED HYPERLIPIDEMIA TYPE: ICD-10-CM

## 2022-11-04 PROCEDURE — 99999 PR PBB SHADOW E&M-EST. PATIENT-LVL IV: CPT | Mod: PBBFAC,,, | Performed by: INTERNAL MEDICINE

## 2022-11-04 PROCEDURE — 99215 PR OFFICE/OUTPT VISIT, EST, LEVL V, 40-54 MIN: ICD-10-PCS | Mod: S$PBB,,, | Performed by: INTERNAL MEDICINE

## 2022-11-04 PROCEDURE — 99215 OFFICE O/P EST HI 40 MIN: CPT | Mod: S$PBB,,, | Performed by: INTERNAL MEDICINE

## 2022-11-04 PROCEDURE — 99214 OFFICE O/P EST MOD 30 MIN: CPT | Mod: PBBFAC | Performed by: INTERNAL MEDICINE

## 2022-11-04 PROCEDURE — 99999 PR PBB SHADOW E&M-EST. PATIENT-LVL IV: ICD-10-PCS | Mod: PBBFAC,,, | Performed by: INTERNAL MEDICINE

## 2022-11-04 NOTE — PROGRESS NOTES
"Subjective:   Patient ID: Cathy Kearns is a 89 y.o. female  Chief complaint:   Chief Complaint   Patient presents with    Annual Exam       HPI    Here for 3 month follow up of HTN and abnormal CBC :  Just returned from [place] visiting family there     URI:   Did well with zpack and tessalon and feeling markedly better   Seen by colleague for these   Using flonase     Exercising:   Attending exercise class 3 times per week     Hx of syncope  - no recurrence   Previously:   Seen by cards - holter/event monitoring offered - pt declined for now   Neurology: seen for syncope 12/2020 - to return prn and stopped astelin which caused SE  - no recurrence      HTN:   Bp at goal 10/4/2022 and today upon repeat   C/w meds per med card   - taking sotalol 80mg twice daily   - took lisinopril 40mg    - amlodipine 5mg   - exercising reg  - following low salt diet     Abnormal CBC - monocytosis:  - seen by h/o 10/29/2021 and 5/24/2022 - will check cbc q6 months as stable   - bm bx deferred for now due to lack of tx options  - to have cbc in 6 months and f/u with h/o in 1 year    Afib:   On eliquis and sotolol   Followed by cards      Osteopenia:   Taking caltrate supplement and vitamin d  dexa 8/2020 reviewed - due for dexa    Skin cancer:  - had appt recently with derm for bx of nose and right leg     HM:   - shingrix  - [facility]      Review of Systems    Objective:  Vitals:    11/04/22 1002   BP: 126/77   BP Location: Left arm   Patient Position: Sitting   Pulse: 62   SpO2: 99%   Weight: 54.1 kg (119 lb 4.3 oz)   Height: 5' 2" (1.575 m)     Body mass index is 21.81 kg/m².    Physical Exam  Vitals reviewed.   Constitutional:       Appearance: Normal appearance. She is well-developed.   HENT:      Head: Normocephalic and atraumatic.      Right Ear: Tympanic membrane, ear canal and external ear normal.      Left Ear: Tympanic membrane, ear canal and external ear normal.      Nose: Nose " normal. No congestion.      Mouth/Throat:      Mouth: Mucous membranes are moist.      Pharynx: Oropharynx is clear. No oropharyngeal exudate.   Eyes:      Extraocular Movements: Extraocular movements intact.      Conjunctiva/sclera: Conjunctivae normal.   Neck:      Thyroid: No thyromegaly.   Cardiovascular:      Rate and Rhythm: Normal rate and regular rhythm.      Pulses: Normal pulses.      Heart sounds: Normal heart sounds.   Pulmonary:      Effort: Pulmonary effort is normal.      Breath sounds: Normal breath sounds.   Abdominal:      General: Bowel sounds are normal.      Palpations: Abdomen is soft.   Musculoskeletal:         General: No swelling or tenderness.      Cervical back: Neck supple.   Lymphadenopathy:      Cervical: No cervical adenopathy.   Skin:     General: Skin is warm and dry.      Capillary Refill: Capillary refill takes less than 2 seconds.   Neurological:      General: No focal deficit present.      Mental Status: She is alert and oriented to person, place, and time. Mental status is at baseline.   Psychiatric:         Behavior: Behavior normal.         Thought Content: Thought content normal.     Assessment:  1. Hypertension, unspecified type    2. Diastolic dysfunction    3. Hyperlipidemia, unspecified hyperlipidemia type    4. Osteopenia, unspecified location    5. Paroxysmal atrial fibrillation    6. Monocytosis    7. Menopausal problem    8. Vitamin D deficiency        Plan:  Cathy was seen today for annual exam.    Diagnoses and all orders for this visit:    Hypertension, unspecified type  -     Comprehensive Metabolic Panel; Future  Stable   Cont med   Low salt diet     Diastolic dysfunction  Euvolemic   Bp controlled     Hyperlipidemia, unspecified hyperlipidemia type  -     Lipid Panel; Future  -     TSH; Future  -     Comprehensive Metabolic Panel; Future  Stable cont statin     Osteopenia, unspecified location  Cont kwame/vit d   Update dexa    Paroxysmal atrial  fibrillation  Stable   Cont oac and bb     Monocytosis  -     CBC Auto Differential; Standing  Check cbc q6m  Stable   Followed by h/o     Menopausal problem  -     DXA Bone Density Spine And Hip; Future    Vitamin D deficiency  -     Vitamin D; Future    Check approp labs   Cont current meds     Keep appt with specialists    42 min spent in care of patient including chart review, history, orders, physical,  orders and coordination of care    Health Maintenance   Topic Date Due    Lipid Panel  07/20/2026    TETANUS VACCINE  08/16/2027

## 2022-11-10 ENCOUNTER — TELEPHONE (OUTPATIENT)
Dept: PHARMACY | Facility: CLINIC | Age: 87
End: 2022-11-10
Payer: MEDICARE

## 2022-11-10 ENCOUNTER — TELEPHONE (OUTPATIENT)
Dept: INTERNAL MEDICINE | Facility: CLINIC | Age: 87
End: 2022-11-10
Payer: MEDICARE

## 2022-11-10 NOTE — TELEPHONE ENCOUNTER
Please let her know that it was an error - we had discussed changing the pharmacy for the eliquis to [facility] and applying for our pharmacy assistance program to see if cheaper and then changed mind so I cancelled request for med refill to go to [facility]   - added med back to med list   - please thank her for letting me know that med was not added back

## 2022-11-10 NOTE — TELEPHONE ENCOUNTER
----- Message from Keara Pino sent at 11/10/2022 10:42 AM CST -----  Contact: KAROLINA SINGH [[id number]]  Type: Call Back      Who called: KAROLINA SINGH [[id number]]      What is the request in detail: Patient is requesting a call back. Pt states that after reviewing her after visit summary she noticed that there is an error listed and she would like to discuss.      Can the clinic reply by [facility]? No      Would the patient rather a call back or a response via [facility]? Call back       Best call back number: [phone]      Additional Information: pt states that it says she should have discontinued taking her blood thinner. She says that is incorrect.

## 2022-11-10 NOTE — PROGRESS NOTES
I have reached out to Cathy Kearns to inform her of the  application process and whats required to apply.  Cathy Kearns did not answer. I left a voicemail and mailed a letter introducing her to the pharmacy patient assistance program. I will follow up in 5 business days.

## 2022-11-11 NOTE — TELEPHONE ENCOUNTER
Attempted to contact Ms. Som to inform her per Dr. Xiong that Please let her know that it was an error - we had discussed changing the pharmacy for the eliquis to [facility] and applying for our pharmacy assistance program to see if cheaper and then changed mind so I cancelled request for med refill to go to [facility]   - added med back to med list   - please thank her for letting me know that med was not added back     But no answer, LVM to call the office.

## 2022-11-11 NOTE — TELEPHONE ENCOUNTER
----- Message from Verna Mead sent at 11/11/2022  8:27 AM CST -----  Regarding: pt rtn call    Type:  Patient Returning Call    Who Called:KAROLINA SINGH [[id number]]    Who Left Message for Patient: ?    Does the patient know what this is regarding? Re: mistake on after visit summary    Best Call Back Number:[phone]    Additional Information:

## 2022-11-29 ENCOUNTER — PROCEDURE VISIT (OUTPATIENT)
Dept: OPHTHALMOLOGY | Facility: CLINIC | Age: 87
End: 2022-11-29
Payer: MEDICARE

## 2022-11-29 ENCOUNTER — LAB VISIT (OUTPATIENT)
Dept: LAB | Facility: HOSPITAL | Age: 87
End: 2022-11-29
Attending: INTERNAL MEDICINE
Payer: MEDICARE

## 2022-11-29 DIAGNOSIS — E78.5 HYPERLIPIDEMIA, UNSPECIFIED HYPERLIPIDEMIA TYPE: ICD-10-CM

## 2022-11-29 DIAGNOSIS — H35.3211 EXUDATIVE AGE-RELATED MACULAR DEGENERATION OF RIGHT EYE WITH ACTIVE CHOROIDAL NEOVASCULARIZATION: Primary | ICD-10-CM

## 2022-11-29 DIAGNOSIS — H35.3124 NONEXUDATIVE AGE-RELATED MACULAR DEGENERATION, LEFT EYE, ADVANCED ATROPHIC WITH SUBFOVEAL INVOLVEMENT: ICD-10-CM

## 2022-11-29 DIAGNOSIS — H35.721 SEROUS DETACHMENT OF RETINAL PIGMENT EPITHELIUM OF RIGHT EYE: ICD-10-CM

## 2022-11-29 DIAGNOSIS — I10 HYPERTENSION, UNSPECIFIED TYPE: ICD-10-CM

## 2022-11-29 DIAGNOSIS — E55.9 VITAMIN D DEFICIENCY: ICD-10-CM

## 2022-11-29 LAB
25(OH)D3+25(OH)D2 SERPL-MCNC: 41 NG/ML (ref 30–96)
ALBUMIN SERPL BCP-MCNC: 4.4 G/DL (ref 3.5–5.2)
ALP SERPL-CCNC: 57 U/L (ref 55–135)
ALT SERPL W/O P-5'-P-CCNC: 12 U/L (ref 10–44)
ANION GAP SERPL CALC-SCNC: 8 MMOL/L (ref 8–16)
AST SERPL-CCNC: 16 U/L (ref 10–40)
BILIRUB SERPL-MCNC: 0.7 MG/DL (ref 0.1–1)
BUN SERPL-MCNC: 12 MG/DL (ref 10–30)
CALCIUM SERPL-MCNC: 10.6 MG/DL (ref 8.7–10.5)
CHLORIDE SERPL-SCNC: 104 MMOL/L (ref 95–110)
CHOLEST SERPL-MCNC: 238 MG/DL (ref 120–199)
CHOLEST/HDLC SERPL: 4 {RATIO} (ref 2–5)
CO2 SERPL-SCNC: 27 MMOL/L (ref 23–29)
CREAT SERPL-MCNC: 0.7 MG/DL (ref 0.5–1.4)
EST. GFR  (NO RACE VARIABLE): >60 ML/MIN/1.73 M^2
GLUCOSE SERPL-MCNC: 91 MG/DL (ref 70–110)
HDLC SERPL-MCNC: 60 MG/DL (ref 40–75)
HDLC SERPL: 25.2 % (ref 20–50)
LDLC SERPL CALC-MCNC: 159.2 MG/DL (ref 63–159)
NONHDLC SERPL-MCNC: 178 MG/DL
POTASSIUM SERPL-SCNC: 4.5 MMOL/L (ref 3.5–5.1)
PROT SERPL-MCNC: 7.5 G/DL (ref 6–8.4)
SODIUM SERPL-SCNC: 139 MMOL/L (ref 136–145)
TRIGL SERPL-MCNC: 94 MG/DL (ref 30–150)
TSH SERPL DL<=0.005 MIU/L-ACNC: 1.55 UIU/ML (ref 0.4–4)

## 2022-11-29 PROCEDURE — 92014 COMPRE OPH EXAM EST PT 1/>: CPT | Mod: 25,S$PBB,, | Performed by: OPHTHALMOLOGY

## 2022-11-29 PROCEDURE — 92134 POSTERIOR SEGMENT OCT RETINA (OCULAR COHERENCE TOMOGRAPHY)-BOTH EYES: ICD-10-PCS | Mod: 26,S$PBB,, | Performed by: OPHTHALMOLOGY

## 2022-11-29 PROCEDURE — 67028 INJECTION EYE DRUG: CPT | Mod: PBBFAC,RT | Performed by: OPHTHALMOLOGY

## 2022-11-29 PROCEDURE — 67028 INJECTION EYE DRUG: CPT | Mod: S$PBB,RT,, | Performed by: OPHTHALMOLOGY

## 2022-11-29 PROCEDURE — 84443 ASSAY THYROID STIM HORMONE: CPT | Performed by: INTERNAL MEDICINE

## 2022-11-29 PROCEDURE — 36415 COLL VENOUS BLD VENIPUNCTURE: CPT | Performed by: INTERNAL MEDICINE

## 2022-11-29 PROCEDURE — 92134 CPTRZ OPH DX IMG PST SGM RTA: CPT | Mod: PBBFAC | Performed by: OPHTHALMOLOGY

## 2022-11-29 PROCEDURE — 80061 LIPID PANEL: CPT | Performed by: INTERNAL MEDICINE

## 2022-11-29 PROCEDURE — 92014 PR EYE EXAM, EST PATIENT,COMPREHESV: ICD-10-PCS | Mod: 25,S$PBB,, | Performed by: OPHTHALMOLOGY

## 2022-11-29 PROCEDURE — 82306 VITAMIN D 25 HYDROXY: CPT | Performed by: INTERNAL MEDICINE

## 2022-11-29 PROCEDURE — 80053 COMPREHEN METABOLIC PANEL: CPT | Performed by: INTERNAL MEDICINE

## 2022-11-29 PROCEDURE — 67028 PR INJECT INTRAVITREAL PHARMCOLOGIC: ICD-10-PCS | Mod: S$PBB,RT,, | Performed by: OPHTHALMOLOGY

## 2022-11-29 RX ORDER — IMIQUIMOD 12.5 MG/.25G
CREAM TOPICAL
COMMUNITY
Start: 2022-11-02 | End: 2023-02-09

## 2022-11-29 RX ADMIN — Medication 1.25 MG: at 11:11

## 2022-11-29 NOTE — PATIENT INSTRUCTIONS

## 2022-11-29 NOTE — PROGRESS NOTES
HPI    Patient here today for 12 week f/u Eylea OD. VA stable ou, no pain and no   f/f.    Last edited by Annita Miranda on 11/29/2022 10:24 AM.            OCT - stable trace IRF  OS - Drusen, no SRF - small HE - ? RAP lesion     Assessment:    1. AMD - Wet OD - post Avastin x 12, post Eylea #51  PED increased prior to 10/12 visit    Had slight increase 5/13/13, 7/14, 5/16 4/20 tx done in [place] during Covid  9/21 had tx in [place] following Shaina    Avastin OD today    Can resume Eylea when copay assistance available, or if increase in SRF    Continue q8 week tx    2. 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 PVD OU    4. Pseudophakia - great result    5. Allergic conjunctivitis/sinusitis - try OTC antihistamines  Recent trouble with sinusitis - pain behind eye improved with antihistamines  Ok for Zaditor BID - has improved with topical tx    6. AFib - considering anticoagulation - ok to proceed  On Anti-VEGF therapy, risk of large macular hemorrhage is minimal.         Plan:      2  months OCT no dilate (last DFE 11/22)    Risks, benefits, and alternatives to treatment discussed in detail with the patient.  The patient voiced understanding and wished to proceed with the procedure    Injection Procedure Note:  Diagnosis: Wet AMD OD    Patient Identified and Time Out complete  Topical Proparacaine and Betadine. Conj prep only  Inject Avastin OD at 6:00 @ 3.5-4mm posterior to limbus  Post Operative Dx: Same  Complications: None  Follow up as above.

## 2022-12-12 ENCOUNTER — TELEPHONE (OUTPATIENT)
Dept: INTERNAL MEDICINE | Facility: CLINIC | Age: 87
End: 2022-12-12

## 2022-12-12 DIAGNOSIS — E83.52 HYPERCALCEMIA: Primary | ICD-10-CM

## 2022-12-12 NOTE — TELEPHONE ENCOUNTER
----- Message from Wilma Xiong MD sent at 12/12/2022  1:22 PM CST -----  Message sent to pt via my chart with results and updates to plan.     Please arrange repeat bmp in 1-2 weeks at her convenience - nonfasting

## 2022-12-12 NOTE — PROGRESS NOTES
Message sent to pt via my chart with results and updates to plan.     Please arrange repeat bmp in 1-2 weeks at her convenience - nonfasting

## 2022-12-21 ENCOUNTER — TELEPHONE (OUTPATIENT)
Dept: INTERNAL MEDICINE | Facility: CLINIC | Age: 87
End: 2022-12-21
Payer: MEDICARE

## 2022-12-21 NOTE — TELEPHONE ENCOUNTER
Per Dr. Xiong:    Hi, Your labs are stable overall and in good range except:   1. Calcium is elevated at 10.6. For now I recommend repeating your level in 1-2 weeks. Please stay well hydrated.   2. Cholesterol is elevated. For now, I recommend the Mediterranean diet, a graded exercise program and maintaining a healthy weight to optimize cholesterol levels.  My office will contact you to arrange your repeat calcium level

## 2022-12-21 NOTE — TELEPHONE ENCOUNTER
For now ok to continue citracal - however if calcium remains elevated then we will need to stop this and monitor for other causes  Parathyroid glands can increase calcium for some when they overproduce hormone but unclear if this is a lab error or from calcium supplements or another cause like overactive parathyroid gland

## 2022-12-21 NOTE — TELEPHONE ENCOUNTER
Spoke with pt, they had a verbal understanding. She would like to know if she should continue Citracal. She would also like to know if this has anything to do her thyroid levels.

## 2022-12-22 NOTE — TELEPHONE ENCOUNTER
Spoke to Ms. Kearns and informed her per Dr. Xiong that For now ok to continue citracal - however if calcium remains elevated then we will need to stop this and monitor for other causes  Parathyroid glands can increase calcium for some when they overproduce hormone but unclear if this is a lab error or from calcium supplements or another cause like overactive parathyroid gland    Patient states that she will hold off on taking the calcium until she have lab done.  Confirmed lab appt date and time.  Patient states understanding

## 2022-12-30 NOTE — PATIENT INSTRUCTIONS
POST INTRAVITREAL INJECTION PATIENT INSTRUCTIONS   Below are some guidelines for what to expect after your treatment and additional care instructions.   * you may experience mild discomfort in your eye after the treatment. Your eye usually feels better within 24 to 48 hours after the procedure.   * you have been given drops that numb the surface of your eye.   DO NOT rub or touch your eye, DO NOT wear contacts until numbness goes away.   * you may experience small spots that appear in your field of vision, these are usually caused by an air bubble or from the medication. It taked a few hours or days for these to go away.   * use of sunglasses will help reduce light sensitivity and glare.   * DO NOT swim or put sink water ( tap water ) or swin for at least 24 hours after the injection   * If you have a gritty, burning, irritating or stinging feeling in the injected eye. This may be a result of the antiseptic used. Use artifical tears or eye lubricant ( from over- the- counter from your local pharmacy ). If you have some at home already please check the expiration date, so not to use anything contaminated in your eye. A cool pack over your eye brow above the injected eye may also relieve discomfort.   Call us right away or go to the Emergency Department if you have a dramatic decrease in vision or extreme pain in the eye.   [facility] [phone]     done

## 2023-01-05 ENCOUNTER — LAB VISIT (OUTPATIENT)
Dept: LAB | Facility: OTHER | Age: 88
End: 2023-01-05
Attending: INTERNAL MEDICINE
Payer: MEDICARE

## 2023-01-05 DIAGNOSIS — E83.52 HYPERCALCEMIA: ICD-10-CM

## 2023-01-05 LAB
ANION GAP SERPL CALC-SCNC: 5 MMOL/L (ref 8–16)
BUN SERPL-MCNC: 11 MG/DL (ref 10–30)
CALCIUM SERPL-MCNC: 10.3 MG/DL (ref 8.7–10.5)
CHLORIDE SERPL-SCNC: 102 MMOL/L (ref 95–110)
CO2 SERPL-SCNC: 28 MMOL/L (ref 23–29)
CREAT SERPL-MCNC: 0.8 MG/DL (ref 0.5–1.4)
EST. GFR  (NO RACE VARIABLE): >60 ML/MIN/1.73 M^2
GLUCOSE SERPL-MCNC: 105 MG/DL (ref 70–110)
POTASSIUM SERPL-SCNC: 4.3 MMOL/L (ref 3.5–5.1)
SODIUM SERPL-SCNC: 135 MMOL/L (ref 136–145)

## 2023-01-05 PROCEDURE — 36415 COLL VENOUS BLD VENIPUNCTURE: CPT | Performed by: INTERNAL MEDICINE

## 2023-01-05 PROCEDURE — 80048 BASIC METABOLIC PNL TOTAL CA: CPT | Performed by: INTERNAL MEDICINE

## 2023-01-12 ENCOUNTER — HOSPITAL ENCOUNTER (OUTPATIENT)
Dept: RADIOLOGY | Facility: CLINIC | Age: 88
Discharge: HOME | End: 2023-01-12
Attending: INTERNAL MEDICINE
Payer: MEDICARE

## 2023-01-12 DIAGNOSIS — N95.9 MENOPAUSAL PROBLEM: ICD-10-CM

## 2023-01-12 PROCEDURE — 77080 DXA BONE DENSITY AXIAL: CPT | Mod: TC

## 2023-01-12 PROCEDURE — 77080 DXA BONE DENSITY AXIAL: CPT | Mod: 26,,, | Performed by: INTERNAL MEDICINE

## 2023-01-12 PROCEDURE — 77080 DEXA BONE DENSITY SPINE HIP: ICD-10-PCS | Mod: 26,,, | Performed by: INTERNAL MEDICINE

## 2023-01-27 ENCOUNTER — PROCEDURE VISIT (OUTPATIENT)
Dept: OPHTHALMOLOGY | Facility: CLINIC | Age: 88
End: 2023-01-27
Attending: OPHTHALMOLOGY
Payer: MEDICARE

## 2023-01-27 DIAGNOSIS — H35.721 SEROUS DETACHMENT OF RETINAL PIGMENT EPITHELIUM OF RIGHT EYE: ICD-10-CM

## 2023-01-27 DIAGNOSIS — H35.3124 NONEXUDATIVE AGE-RELATED MACULAR DEGENERATION, LEFT EYE, ADVANCED ATROPHIC WITH SUBFOVEAL INVOLVEMENT: ICD-10-CM

## 2023-01-27 DIAGNOSIS — H35.3211 EXUDATIVE AGE-RELATED MACULAR DEGENERATION OF RIGHT EYE WITH ACTIVE CHOROIDAL NEOVASCULARIZATION: Primary | ICD-10-CM

## 2023-01-27 PROCEDURE — 92012 PR EYE EXAM, EST PATIENT,INTERMED: ICD-10-PCS | Mod: 25,S$PBB,, | Performed by: OPHTHALMOLOGY

## 2023-01-27 PROCEDURE — 67028 PR INJECT INTRAVITREAL PHARMCOLOGIC: ICD-10-PCS | Mod: S$PBB,RT,, | Performed by: OPHTHALMOLOGY

## 2023-01-27 PROCEDURE — 67028 INJECTION EYE DRUG: CPT | Mod: PBBFAC,RT | Performed by: OPHTHALMOLOGY

## 2023-01-27 PROCEDURE — 92134 CPTRZ OPH DX IMG PST SGM RTA: CPT | Mod: PBBFAC | Performed by: OPHTHALMOLOGY

## 2023-01-27 PROCEDURE — 92134 POSTERIOR SEGMENT OCT RETINA (OCULAR COHERENCE TOMOGRAPHY)-BOTH EYES: ICD-10-PCS | Mod: 26,S$PBB,, | Performed by: OPHTHALMOLOGY

## 2023-01-27 PROCEDURE — 67028 INJECTION EYE DRUG: CPT | Mod: S$PBB,RT,, | Performed by: OPHTHALMOLOGY

## 2023-01-27 PROCEDURE — 92012 INTRM OPH EXAM EST PATIENT: CPT | Mod: 25,S$PBB,, | Performed by: OPHTHALMOLOGY

## 2023-01-27 RX ADMIN — AFLIBERCEPT 2 MG: 40 INJECTION, SOLUTION INTRAVITREAL at 09:01

## 2023-01-27 NOTE — PROGRESS NOTES
HPI    2m OCT/RON OD only    Pt states her va may be slightly worse OS. Occasional floaters. No new   symptoms today.    No flashes  No floaters today  No pain  Gtts: AT's PRN OU  Ocuvite Qday        OCT - stable trace IRF  OS - Drusen, no SRF - small HE - ? RAP lesion     Assessment:    1. AMD - Wet OD - post Avastin x 13, post Eylea #51  PED increased prior to 10/12 visit    Had slight increase 5/13/13, 7/14, 5/16 4/20 tx done in [place] during Covid  9/21 had tx in [place] following Shaina    Eylea OD today    Continue q8 week tx    2. 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 PVD OU    4. Pseudophakia - great result    5. Allergic conjunctivitis/sinusitis - try OTC antihistamines  Recent trouble with sinusitis - pain behind eye improved with antihistamines  Ok for Zaditor BID - has improved with topical tx    6. AFib - considering anticoagulation - ok to proceed  On Anti-VEGF therapy, risk of large macular hemorrhage is minimal.         Plan:      2  months OCT no dilate (last DFE 11/22)    Risks, benefits, and alternatives to treatment discussed in detail with the patient.  The patient voiced understanding and wished to proceed with the procedure    Injection Procedure Note:  Diagnosis: Wet AMD OD    Patient Identified and Time Out complete  Topical Proparacaine and Betadine. Conj prep only  Inject Eylea OD at 6:00 @ 3.5-4mm posterior to limbus  Post Operative Dx: Same  Complications: None  Follow up as above.

## 2023-01-27 NOTE — PATIENT INSTRUCTIONS

## 2023-02-09 ENCOUNTER — LAB VISIT (OUTPATIENT)
Dept: LAB | Facility: HOSPITAL | Age: 88
End: 2023-02-09
Attending: INTERNAL MEDICINE
Payer: MEDICARE

## 2023-02-09 ENCOUNTER — OFFICE VISIT (OUTPATIENT)
Dept: CARDIOLOGY | Facility: CLINIC | Age: 88
End: 2023-02-09
Payer: MEDICARE

## 2023-02-09 VITALS
DIASTOLIC BLOOD PRESSURE: 68 MMHG | WEIGHT: 119.69 LBS | HEIGHT: 62 IN | HEART RATE: 86 BPM | OXYGEN SATURATION: 96 % | SYSTOLIC BLOOD PRESSURE: 133 MMHG | BODY MASS INDEX: 22.03 KG/M2

## 2023-02-09 DIAGNOSIS — R07.9 CHEST PAIN, UNSPECIFIED TYPE: ICD-10-CM

## 2023-02-09 DIAGNOSIS — I10 ESSENTIAL HYPERTENSION: ICD-10-CM

## 2023-02-09 DIAGNOSIS — D72.821 MONOCYTOSES: Primary | ICD-10-CM

## 2023-02-09 DIAGNOSIS — R42 DIZZINESS: ICD-10-CM

## 2023-02-09 DIAGNOSIS — I35.1 NONRHEUMATIC AORTIC VALVE INSUFFICIENCY: ICD-10-CM

## 2023-02-09 DIAGNOSIS — I48.0 PAROXYSMAL ATRIAL FIBRILLATION: ICD-10-CM

## 2023-02-09 DIAGNOSIS — E78.00 PURE HYPERCHOLESTEROLEMIA: ICD-10-CM

## 2023-02-09 DIAGNOSIS — J42 CHRONIC BRONCHITIS, UNSPECIFIED CHRONIC BRONCHITIS TYPE: ICD-10-CM

## 2023-02-09 DIAGNOSIS — I10 PRIMARY HYPERTENSION: ICD-10-CM

## 2023-02-09 LAB
BASOPHILS # BLD AUTO: 0.05 K/UL (ref 0–0.2)
BASOPHILS NFR BLD: 0.9 % (ref 0–1.9)
DIFFERENTIAL METHOD: ABNORMAL
EOSINOPHIL # BLD AUTO: 0 K/UL (ref 0–0.5)
EOSINOPHIL NFR BLD: 0.5 % (ref 0–8)
ERYTHROCYTE [DISTWIDTH] IN BLOOD BY AUTOMATED COUNT: 13.8 % (ref 11.5–14.5)
HCT VFR BLD AUTO: 39.1 % (ref 37–48.5)
HGB BLD-MCNC: 12.7 G/DL (ref 12–16)
IMM GRANULOCYTES # BLD AUTO: 0.03 K/UL (ref 0–0.04)
IMM GRANULOCYTES NFR BLD AUTO: 0.5 % (ref 0–0.5)
LYMPHOCYTES # BLD AUTO: 2 K/UL (ref 1–4.8)
LYMPHOCYTES NFR BLD: 36.3 % (ref 18–48)
MCH RBC QN AUTO: 30.7 PG (ref 27–31)
MCHC RBC AUTO-ENTMCNC: 32.5 G/DL (ref 32–36)
MCV RBC AUTO: 94 FL (ref 82–98)
MONOCYTES # BLD AUTO: 1.3 K/UL (ref 0.3–1)
MONOCYTES NFR BLD: 22.8 % (ref 4–15)
NEUTROPHILS # BLD AUTO: 2.1 K/UL (ref 1.8–7.7)
NEUTROPHILS NFR BLD: 39 % (ref 38–73)
NRBC BLD-RTO: 0 /100 WBC
PLATELET # BLD AUTO: 173 K/UL (ref 150–450)
PMV BLD AUTO: 10.3 FL (ref 9.2–12.9)
RBC # BLD AUTO: 4.14 M/UL (ref 4–5.4)
WBC # BLD AUTO: 5.48 K/UL (ref 3.9–12.7)

## 2023-02-09 PROCEDURE — 99999 PR PBB SHADOW E&M-EST. PATIENT-LVL III: ICD-10-PCS | Mod: PBBFAC,,, | Performed by: INTERNAL MEDICINE

## 2023-02-09 PROCEDURE — 99214 PR OFFICE/OUTPT VISIT, EST, LEVL IV, 30-39 MIN: ICD-10-PCS | Mod: S$PBB,,, | Performed by: INTERNAL MEDICINE

## 2023-02-09 PROCEDURE — 99213 OFFICE O/P EST LOW 20 MIN: CPT | Mod: PBBFAC | Performed by: INTERNAL MEDICINE

## 2023-02-09 PROCEDURE — 99214 OFFICE O/P EST MOD 30 MIN: CPT | Mod: S$PBB,,, | Performed by: INTERNAL MEDICINE

## 2023-02-09 PROCEDURE — 85025 COMPLETE CBC W/AUTO DIFF WBC: CPT | Performed by: INTERNAL MEDICINE

## 2023-02-09 PROCEDURE — 99999 PR PBB SHADOW E&M-EST. PATIENT-LVL III: CPT | Mod: PBBFAC,,, | Performed by: INTERNAL MEDICINE

## 2023-02-09 PROCEDURE — 36415 COLL VENOUS BLD VENIPUNCTURE: CPT | Performed by: INTERNAL MEDICINE

## 2023-02-09 RX ORDER — LISINOPRIL 40 MG/1
40 TABLET ORAL DAILY
Qty: 90 TABLET | Refills: 3 | Status: ON HOLD | OUTPATIENT
Start: 2023-02-09 | End: 2023-07-29 | Stop reason: HOSPADM

## 2023-02-09 RX ORDER — SOTALOL HYDROCHLORIDE 80 MG/1
80 TABLET ORAL 2 TIMES DAILY
Qty: 180 TABLET | Refills: 3 | Status: SHIPPED | OUTPATIENT
Start: 2023-02-09 | End: 2024-02-20 | Stop reason: SDUPTHER

## 2023-02-09 RX ORDER — AMLODIPINE BESYLATE 5 MG/1
5 TABLET ORAL DAILY
Qty: 90 TABLET | Refills: 3 | Status: ON HOLD | OUTPATIENT
Start: 2023-02-09 | End: 2023-07-29 | Stop reason: HOSPADM

## 2023-03-07 ENCOUNTER — TELEPHONE (OUTPATIENT)
Dept: CARDIOLOGY | Facility: HOSPITAL | Age: 88
End: 2023-03-07
Payer: MEDICARE

## 2023-03-09 ENCOUNTER — HOSPITAL ENCOUNTER (OUTPATIENT)
Dept: CARDIOLOGY | Facility: HOSPITAL | Age: 88
Discharge: HOME | End: 2023-03-09
Attending: INTERNAL MEDICINE
Payer: MEDICARE

## 2023-03-09 VITALS
BODY MASS INDEX: 21.9 KG/M2 | SYSTOLIC BLOOD PRESSURE: 154 MMHG | HEIGHT: 62 IN | DIASTOLIC BLOOD PRESSURE: 77 MMHG | WEIGHT: 119 LBS | HEART RATE: 60 BPM

## 2023-03-09 DIAGNOSIS — I48.0 PAROXYSMAL ATRIAL FIBRILLATION: ICD-10-CM

## 2023-03-09 DIAGNOSIS — R07.9 CHEST PAIN, UNSPECIFIED TYPE: ICD-10-CM

## 2023-03-09 DIAGNOSIS — E78.00 PURE HYPERCHOLESTEROLEMIA: ICD-10-CM

## 2023-03-09 LAB
CV PHARM DOSE: 0.4 MG
CV STRESS BASE HR: 60 BPM
DIASTOLIC BLOOD PRESSURE: 88 MMHG
EJECTION FRACTION- HIGH: 59 %
END DIASTOLIC INDEX-HIGH: 155 ML/M2
END DIASTOLIC INDEX-LOW: 91 ML/M2
END SYSTOLIC INDEX-HIGH: 78 ML/M2
END SYSTOLIC INDEX-LOW: 40 ML/M2
OHS CV CPX 1 MINUTE RECOVERY HEART RATE: 84 BPM
OHS CV CPX 85 PERCENT MAX PREDICTED HEART RATE MALE: 106
OHS CV CPX MAX PREDICTED HEART RATE: 124
OHS CV CPX PATIENT IS FEMALE: 1
OHS CV CPX PATIENT IS MALE: 0
OHS CV CPX PEAK DIASTOLIC BLOOD PRESSURE: 88 MMHG
OHS CV CPX PEAK HEAR RATE: 64 BPM
OHS CV CPX PEAK RATE PRESSURE PRODUCT: NORMAL
OHS CV CPX PEAK SYSTOLIC BLOOD PRESSURE: 159 MMHG
OHS CV CPX PERCENT MAX PREDICTED HEART RATE ACHIEVED: 52
OHS CV CPX RATE PRESSURE PRODUCT PRESENTING: 9060
RETIRED EF AND QEF - SEE NOTES: 47 %
SYSTOLIC BLOOD PRESSURE: 151 MMHG

## 2023-03-09 PROCEDURE — 93018 NUCLEAR STRESS - CARDIOLOGY INTERPRETED (CUPID ONLY): ICD-10-PCS | Mod: ,,, | Performed by: INTERNAL MEDICINE

## 2023-03-09 PROCEDURE — 78452 NUCLEAR STRESS - CARDIOLOGY INTERPRETED (CUPID ONLY): ICD-10-PCS | Mod: 26,,, | Performed by: INTERNAL MEDICINE

## 2023-03-09 PROCEDURE — 63600175 PHARM REV CODE 636 W HCPCS: Performed by: INTERNAL MEDICINE

## 2023-03-09 PROCEDURE — 93016 CV STRESS TEST SUPVJ ONLY: CPT | Mod: ,,, | Performed by: INTERNAL MEDICINE

## 2023-03-09 PROCEDURE — 93018 CV STRESS TEST I&R ONLY: CPT | Mod: ,,, | Performed by: INTERNAL MEDICINE

## 2023-03-09 PROCEDURE — 78452 HT MUSCLE IMAGE SPECT MULT: CPT

## 2023-03-09 PROCEDURE — 93016 NUCLEAR STRESS - CARDIOLOGY INTERPRETED (CUPID ONLY): ICD-10-PCS | Mod: ,,, | Performed by: INTERNAL MEDICINE

## 2023-03-09 PROCEDURE — 78452 HT MUSCLE IMAGE SPECT MULT: CPT | Mod: 26,,, | Performed by: INTERNAL MEDICINE

## 2023-03-09 PROCEDURE — A9502 TC99M TETROFOSMIN: HCPCS

## 2023-03-09 RX ORDER — AMINOPHYLLINE 25 MG/ML
75 INJECTION, SOLUTION INTRAVENOUS
Status: COMPLETED | OUTPATIENT
Start: 2023-03-09 | End: 2023-03-09

## 2023-03-09 RX ORDER — REGADENOSON 0.08 MG/ML
0.4 INJECTION, SOLUTION INTRAVENOUS
Status: COMPLETED | OUTPATIENT
Start: 2023-03-09 | End: 2023-03-09

## 2023-03-09 RX ADMIN — REGADENOSON 0.4 MG: 0.08 INJECTION, SOLUTION INTRAVENOUS at 10:03

## 2023-03-09 RX ADMIN — AMINOPHYLLINE 75 MG: 25 INJECTION, SOLUTION INTRAVENOUS at 10:03

## 2023-03-10 ENCOUNTER — OFFICE VISIT (OUTPATIENT)
Dept: INTERNAL MEDICINE | Facility: CLINIC | Age: 88
End: 2023-03-10
Attending: INTERNAL MEDICINE
Payer: MEDICARE

## 2023-03-10 ENCOUNTER — LAB VISIT (OUTPATIENT)
Dept: LAB | Facility: OTHER | Age: 88
End: 2023-03-10
Attending: INTERNAL MEDICINE
Payer: MEDICARE

## 2023-03-10 VITALS
HEART RATE: 61 BPM | SYSTOLIC BLOOD PRESSURE: 128 MMHG | DIASTOLIC BLOOD PRESSURE: 75 MMHG | HEIGHT: 62 IN | BODY MASS INDEX: 22.31 KG/M2 | OXYGEN SATURATION: 97 % | WEIGHT: 121.25 LBS

## 2023-03-10 DIAGNOSIS — E78.5 HYPERLIPIDEMIA, UNSPECIFIED HYPERLIPIDEMIA TYPE: ICD-10-CM

## 2023-03-10 DIAGNOSIS — M85.80 OSTEOPENIA WITH HIGH RISK OF FRACTURE: ICD-10-CM

## 2023-03-10 DIAGNOSIS — I48.0 PAROXYSMAL ATRIAL FIBRILLATION: ICD-10-CM

## 2023-03-10 DIAGNOSIS — J44.9 CHRONIC OBSTRUCTIVE PULMONARY DISEASE, UNSPECIFIED COPD TYPE: Primary | ICD-10-CM

## 2023-03-10 DIAGNOSIS — C44.329 SQUAMOUS CELL CANCER OF SKIN OF EYEBROW: ICD-10-CM

## 2023-03-10 DIAGNOSIS — I10 HYPERTENSION, UNSPECIFIED TYPE: ICD-10-CM

## 2023-03-10 LAB — PTH-INTACT SERPL-MCNC: 55.7 PG/ML (ref 9–77)

## 2023-03-10 PROCEDURE — 36415 COLL VENOUS BLD VENIPUNCTURE: CPT | Performed by: INTERNAL MEDICINE

## 2023-03-10 PROCEDURE — 99999 PR PBB SHADOW E&M-EST. PATIENT-LVL V: ICD-10-PCS | Mod: PBBFAC,,, | Performed by: INTERNAL MEDICINE

## 2023-03-10 PROCEDURE — 83970 ASSAY OF PARATHORMONE: CPT | Performed by: INTERNAL MEDICINE

## 2023-03-10 PROCEDURE — 82306 VITAMIN D 25 HYDROXY: CPT | Performed by: INTERNAL MEDICINE

## 2023-03-10 PROCEDURE — 99215 OFFICE O/P EST HI 40 MIN: CPT | Mod: PBBFAC | Performed by: INTERNAL MEDICINE

## 2023-03-10 PROCEDURE — 99999 PR PBB SHADOW E&M-EST. PATIENT-LVL V: CPT | Mod: PBBFAC,,, | Performed by: INTERNAL MEDICINE

## 2023-03-10 PROCEDURE — 99215 OFFICE O/P EST HI 40 MIN: CPT | Mod: S$PBB,,, | Performed by: INTERNAL MEDICINE

## 2023-03-10 PROCEDURE — 99215 PR OFFICE/OUTPT VISIT, EST, LEVL V, 40-54 MIN: ICD-10-PCS | Mod: S$PBB,,, | Performed by: INTERNAL MEDICINE

## 2023-03-10 RX ORDER — ALENDRONATE SODIUM 70 MG/1
70 TABLET ORAL
Qty: 4 TABLET | Refills: 11 | Status: ON HOLD | OUTPATIENT
Start: 2023-03-10 | End: 2023-07-29 | Stop reason: HOSPADM

## 2023-03-10 NOTE — PROGRESS NOTES
Subjective:   Patient ID: Cathy Kearns is a 89 y.o. female  Chief complaint:   Chief Complaint   Patient presents with    Hypertension     F/u    Shortness of Breath       HPI    Here for 3 month follow up of HTN and abnormal CBC :    [place]:   Had neg stress echo   Seen by cards  Sob over past few months   No wheezing   Resolves with rest     Copd:   Reaction to breo and started on trial of albuterol   Seen by pulm 6/2018   Smoked at night with  when had a drink - stopped after  had MI now over 40 years ago   + 2nd hand exposure: 3 ppd  smoked   Prev:  F/u with pulm Dr. Tiffany Medrano for asthma per her. Reports breathing markedly improved since moving.   Hx of Asthma - took self off of Breo after made lips swell - reports no wheezing or sob - not taking albuterol inh and no nighttime awakenings. She would like to hold off on establishing care with a new pulmonologist since moving to [place] since her symptoms are stable off medication at this time.     Exercising:   Attending exercise class 3 times per week  and able to maintain this     HTN:   Bp at goal  C/w meds per med card   - taking sotalol 80mg twice daily   - took lisinopril 40mg    - amlodipine 5mg   - exercising reg  - following low salt diet     Abnormal CBC - monocytosis:  - seen by h/o 10/29/2021 and 5/24/2022 - will check cbc q6 months as stable   - bm bx deferred for now due to lack of tx options  - to have cbc in 6 months and f/u with h/o in 1 year    Afib:   On eliquis and sotolol   Followed by cards      Osteopenia, high risk of fracture:   Taking caltrate supplement and vitamin d  dexa 1/2023 reviewed - discussed tx options and endo referral  Skin cancer:  - followed by derm      HM:   - shingrix  - covid booster     Review of Systems    Objective:  Vitals:    03/10/23 1005 03/10/23 1102   BP: (!) 140/64 128/75   BP Location: Left arm    Patient Position: Sitting    Pulse: 61    SpO2: 97%    Weight: 55 kg (121 lb 4.1  "oz)    Height: 5' 2" (1.575 m)      Body mass index is 22.18 kg/m².    Physical Exam  Vitals reviewed.   Constitutional:       Appearance: Normal appearance. She is well-developed.   HENT:      Head: Normocephalic and atraumatic.      Right Ear: Tympanic membrane, ear canal and external ear normal.      Left Ear: Tympanic membrane, ear canal and external ear normal.      Nose: Nose normal. No congestion.      Mouth/Throat:      Mouth: Mucous membranes are moist.      Pharynx: Oropharynx is clear. No oropharyngeal exudate.   Eyes:      Extraocular Movements: Extraocular movements intact.      Conjunctiva/sclera: Conjunctivae normal.   Neck:      Thyroid: No thyromegaly.   Cardiovascular:      Rate and Rhythm: Normal rate and regular rhythm.      Pulses: Normal pulses.      Heart sounds: Normal heart sounds.   Pulmonary:      Effort: Pulmonary effort is normal.      Breath sounds: Normal breath sounds.   Abdominal:      General: Bowel sounds are normal.      Palpations: Abdomen is soft.   Musculoskeletal:         General: No swelling or tenderness.      Cervical back: Neck supple.   Lymphadenopathy:      Cervical: No cervical adenopathy.   Skin:     General: Skin is warm and dry.      Capillary Refill: Capillary refill takes less than 2 seconds.   Neurological:      General: No focal deficit present.      Mental Status: She is alert and oriented to person, place, and time. Mental status is at baseline.   Psychiatric:         Behavior: Behavior normal.         Thought Content: Thought content normal.     Assessment:  1. Chronic obstructive pulmonary disease, unspecified COPD type    2. Paroxysmal atrial fibrillation    3. Osteopenia with high risk of fracture    4. Hypertension, unspecified type    5. Hyperlipidemia, unspecified hyperlipidemia type    6. 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    Diagnoses and all orders for this " visit:    Chronic obstructive pulmonary disease, unspecified COPD type  -     Ambulatory referral/consult to Pulmonology; Future  -     Complete PFT with bronchodilator; Future  -     CT Chest Without Contrast; Future  Check approp studies   See below   Cardiac testing neg   Never tried albuterol - prefers to not take medication   Discussed takign this prior to exercise - she will think about it and prefers to f/u with pulm   F/u with pulm - update imaging and pfts     Paroxysmal atrial fibrillation  Stable   Cont bb and oac   F/u with cards     Osteopenia with high risk of fracture  -     PTH, intact; Future  -     Vitamin D; Future  -     alendronate (FOSAMAX) 70 MG tablet; Take 1 tablet (70 mg total) by mouth every 7 days.  Cont kwame and ivt d   Start bisphos  Potential side effects of medication discussed with patient. If the patient has any issues with medication, patient  understands to let me know. Return to clinic and ER prompts given.     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

## 2023-03-11 LAB — 25(OH)D3+25(OH)D2 SERPL-MCNC: 44 NG/ML (ref 30–96)

## 2023-03-27 PROBLEM — R07.9 CHEST PAIN: Status: RESOLVED | Noted: 2023-02-09 | Resolved: 2023-03-27

## 2023-03-27 PROBLEM — R42 DIZZINESS: Status: RESOLVED | Noted: 2023-02-09 | Resolved: 2023-03-27

## 2023-03-28 ENCOUNTER — PROCEDURE VISIT (OUTPATIENT)
Dept: OPHTHALMOLOGY | Facility: CLINIC | Age: 88
End: 2023-03-28
Attending: OPHTHALMOLOGY
Payer: MEDICARE

## 2023-03-28 DIAGNOSIS — H35.3211 EXUDATIVE AGE-RELATED MACULAR DEGENERATION OF RIGHT EYE WITH ACTIVE CHOROIDAL NEOVASCULARIZATION: Primary | ICD-10-CM

## 2023-03-28 DIAGNOSIS — H35.3124 NONEXUDATIVE AGE-RELATED MACULAR DEGENERATION, LEFT EYE, ADVANCED ATROPHIC WITH SUBFOVEAL INVOLVEMENT: ICD-10-CM

## 2023-03-28 DIAGNOSIS — H35.721 SEROUS DETACHMENT OF RETINAL PIGMENT EPITHELIUM OF RIGHT EYE: ICD-10-CM

## 2023-03-28 PROCEDURE — 67028 INJECTION EYE DRUG: CPT | Mod: S$PBB,RT,, | Performed by: OPHTHALMOLOGY

## 2023-03-28 PROCEDURE — 92012 INTRM OPH EXAM EST PATIENT: CPT | Mod: 25,S$PBB,, | Performed by: OPHTHALMOLOGY

## 2023-03-28 PROCEDURE — 92134 POSTERIOR SEGMENT OCT RETINA (OCULAR COHERENCE TOMOGRAPHY)-BOTH EYES: ICD-10-PCS | Mod: 26,S$PBB,, | Performed by: OPHTHALMOLOGY

## 2023-03-28 PROCEDURE — 67028 PR INJECT INTRAVITREAL PHARMCOLOGIC: ICD-10-PCS | Mod: S$PBB,RT,, | Performed by: OPHTHALMOLOGY

## 2023-03-28 PROCEDURE — 67028 INJECTION EYE DRUG: CPT | Mod: PBBFAC,RT | Performed by: OPHTHALMOLOGY

## 2023-03-28 PROCEDURE — 92134 CPTRZ OPH DX IMG PST SGM RTA: CPT | Mod: PBBFAC | Performed by: OPHTHALMOLOGY

## 2023-03-28 PROCEDURE — 92012 PR EYE EXAM, EST PATIENT,INTERMED: ICD-10-PCS | Mod: 25,S$PBB,, | Performed by: OPHTHALMOLOGY

## 2023-03-28 RX ADMIN — AFLIBERCEPT 2 MG: 40 INJECTION, SOLUTION INTRAVITREAL at 10:03

## 2023-03-28 NOTE — PROGRESS NOTES
HPI    Patient here today for 8 week f/u Eylea OD. VA stable ou, no pain and no   f/f.'    Last edited by Annita Miranda on 3/28/2023  9:53 AM.            OCT - stable trace IRF  OS - Drusen, no SRF - small HE - ? RAP lesion     Assessment:    1. AMD - Wet OD - post Avastin x 13, post Eylea #52  PED increased prior to 10/12 visit    Had slight increase 5/13/13, 7/14, 5/16 4/20 tx done in [place] during Covid  9/21 had tx in [place] following Shaina    Eylea OD today    Continue q8 week tx    2. 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 PVD OU    4. Pseudophakia - great result    5. Allergic conjunctivitis/sinusitis - try OTC antihistamines  Recent trouble with sinusitis - pain behind eye improved with antihistamines  Ok for Zaditor BID - has improved with topical tx    6. AFib - considering anticoagulation - ok to proceed  On Anti-VEGF therapy, risk of large macular hemorrhage is minimal.         Plan:      2  months OCT and dilate (last DFE 11/22)    Risks, benefits, and alternatives to treatment discussed in detail with the patient.  The patient voiced understanding and wished to proceed with the procedure    Injection Procedure Note:  Diagnosis: Wet AMD OD    Patient Identified and Time Out complete  Topical Proparacaine and Betadine. Conj prep only  Inject Eylea OD at 6:00 @ 3.5-4mm posterior to limbus  Post Operative Dx: Same  Complications: None  Follow up as above.

## 2023-05-03 DIAGNOSIS — Z71.89 COMPLEX CARE COORDINATION: ICD-10-CM

## 2023-05-23 ENCOUNTER — PROCEDURE VISIT (OUTPATIENT)
Dept: OPHTHALMOLOGY | Facility: CLINIC | Age: 88
End: 2023-05-23
Payer: MEDICARE

## 2023-05-23 DIAGNOSIS — H35.3124 NONEXUDATIVE AGE-RELATED MACULAR DEGENERATION, LEFT EYE, ADVANCED ATROPHIC WITH SUBFOVEAL INVOLVEMENT: ICD-10-CM

## 2023-05-23 DIAGNOSIS — H35.3211 EXUDATIVE AGE-RELATED MACULAR DEGENERATION OF RIGHT EYE WITH ACTIVE CHOROIDAL NEOVASCULARIZATION: Primary | ICD-10-CM

## 2023-05-23 DIAGNOSIS — H35.721 SEROUS DETACHMENT OF RETINAL PIGMENT EPITHELIUM OF RIGHT EYE: ICD-10-CM

## 2023-05-23 PROCEDURE — 67028 PR INJECT INTRAVITREAL PHARMCOLOGIC: ICD-10-PCS | Mod: S$PBB,RT,, | Performed by: OPHTHALMOLOGY

## 2023-05-23 PROCEDURE — 92014 COMPRE OPH EXAM EST PT 1/>: CPT | Mod: 25,S$PBB,, | Performed by: OPHTHALMOLOGY

## 2023-05-23 PROCEDURE — 67028 INJECTION EYE DRUG: CPT | Mod: S$PBB,RT,, | Performed by: OPHTHALMOLOGY

## 2023-05-23 PROCEDURE — 67028 INJECTION EYE DRUG: CPT | Mod: PBBFAC,RT | Performed by: OPHTHALMOLOGY

## 2023-05-23 PROCEDURE — 92134 POSTERIOR SEGMENT OCT RETINA (OCULAR COHERENCE TOMOGRAPHY)-BOTH EYES: ICD-10-PCS | Mod: 26,S$PBB,, | Performed by: OPHTHALMOLOGY

## 2023-05-23 PROCEDURE — 92134 CPTRZ OPH DX IMG PST SGM RTA: CPT | Mod: PBBFAC | Performed by: OPHTHALMOLOGY

## 2023-05-23 PROCEDURE — 92014 PR EYE EXAM, EST PATIENT,COMPREHESV: ICD-10-PCS | Mod: 25,S$PBB,, | Performed by: OPHTHALMOLOGY

## 2023-05-23 RX ADMIN — AFLIBERCEPT 2 MG: 40 INJECTION, SOLUTION INTRAVITREAL at 10:05

## 2023-05-23 NOTE — PROGRESS NOTES
HPI    2m OCTm/DFE and Eylea OD    Pt states  : my vision seems to be stable w/o change.    Eye Meds: Mositurizing Eye Gtts     No flashes/floaters  No headaches  No Diplopia  No eye pain   No curtain/shadows/veils        OCT - stable trace IRF  OS - Drusen, no SRF - small HE - ? RAP lesion     Assessment:    1. AMD - Wet OD - post Avastin x 13, post Eylea #52  PED increased prior to 10/12 visit    Had slight increase 5/13/13, 7/14, 5/16 4/20 tx done in [place] during Covid  9/21 had tx in [place] following Shaina    Eylea OD today    Continue q8 week tx    2. 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 PVD OU    4. Pseudophakia - great result    5. Allergic conjunctivitis/sinusitis - try OTC antihistamines  Recent trouble with sinusitis - pain behind eye improved with antihistamines  Ok for Zaditor BID - has improved with topical tx    6. AFib - considering anticoagulation - ok to proceed  On Anti-VEGF therapy, risk of large macular hemorrhage is minimal.         Plan:      2  months OCT no dilate (last DFE  5/23)    Risks, benefits, and alternatives to treatment discussed in detail with the patient.  The patient voiced understanding and wished to proceed with the procedure    Injection Procedure Note:  Diagnosis: Wet AMD OD    Patient Identified and Time Out complete  Topical Proparacaine and Betadine. Conj prep only  Inject Eylea OD at 6:00 @ 3.5-4mm posterior to limbus  Post Operative Dx: Same  Complications: None  Follow up as above.

## 2023-06-07 ENCOUNTER — PES CALL (OUTPATIENT)
Dept: ADMINISTRATIVE | Facility: CLINIC | Age: 88
End: 2023-06-07
Payer: MEDICARE

## 2023-07-11 NOTE — PROGRESS NOTES
[facility] - NEUROLOGY 7TH [place] [facility], [place]    Date: December 2, 2020   Patient Name: Cathy Kearns   MRN: [id number]   PCP: Wilma Xiogn  Referring Provider: Wilma Xiong MD    Assessment:      This is Cathy Kearns, 89 y.o. female with AF on eliquis and isolated episode of post-prandial syncope, images CT head reviewed     Plan:      -  Follow up as needed       I discussed side effects of the medications. I asked the patient to stop the medication if she notices serious adverse effects as we discussed and to seek immediate medical attention at an ER.     Zeb Coombs MD  [facility]   Department of Neurology    Subjective:        HPI:   Ms. Cathy Kearns is a 89 y.o. female who presents with a chief complaint of syncope    Three months ago patient finished eating dinner on her balcony when she began to feel somewhat lightheaded.  She got up and walked to the kitchen where she put her dish in the sink then had collapse with loss of consciousness, unsure of duration but likely brief and no ensuing confusion.  Event was unwitnessed and she went to the bed on arousing and noted significant pain due to rib fractures the next day.  She notes that BP is often low after meals.    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 90 tablet 3    ANTIOX #8/OM3/DHA/EPA/LUT/ZEAX (PRESERVISION AREDS 2, OMEGA-3, ORAL) Take 1 tablet by mouth once daily at 6am.       apixaban (ELIQUIS) 2.5 mg Tab Take 1 tablet (2.5 mg total) by mouth 2 (two) times daily. 180 tablet 3    azelastine (ASTELIN) 137 mcg (0.1 %) nasal spray 1 spray (137 mcg total) by Nasal route 2 (two) times daily. 30 mL 11    CALCIUM CARBONATE (CALCIUM 500 ORAL) Take 500 mg by mouth once daily.       fluticasone propionate (FLONASE) 50 mcg/actuation nasal spray 2 sprays (100 mcg total) by Each Nostril route once daily. 16 g 3    lisinopril (PRINIVIL,ZESTRIL) 40 MG tablet TAKE 1 TABLET(40 MG) BY MOUTH EVERY DAY 90 tablet 3    mupirocin (BACTROBAN) 2 % ointment CRAIG AFTER SALT WATER COMPRESSES BID  0    omega 3-dha-epa-fish oil 300-1,000 mg Cap Take 3 g by mouth.      sotalol (BETAPACE) 80 MG tablet Take 1 tablet (80 mg total) by mouth 2 (two) times daily. 180 tablet 3     Current Facility-Administered Medications   Medication Dose Route Frequency Provider Last Rate Last Dose    aflibercept Syrg 2 mg  2 mg Intravitreal Once D. Alec Mandujano MD           ALLERGIES:  Review of patient's allergies indicates:   Allergen Reactions    Penicillins Swelling    Pseudoephedrine hcl Palpitations    Sulfa (sulfonamide antibiotics) Palpitations       FAMILY HISTORY:  Family History   Problem Relation Age of Onset    Cancer Mother         ovarian CA    Cancer Father         prostate    Cataracts Father     Cancer Maternal Grandmother         colon    Heart attack Maternal Grandfather     Cancer Paternal Grandmother         colon    Cancer Paternal Grandfather     Strabismus Paternal Aunt     Hypertension Daughter     Cancer Daughter         "cancer of " "bone"    Hyperlipidemia Daughter     Thyroid disease Grandchild     Heart attack Brother     Cancer Brother         prostate, esophagus, bladder    Hypertension Son     Hyperlipidemia Son     Cancer Brother         Non hodgkins lymphoma    Amblyopia Neg Hx     Blindness Neg Hx     Diabetes Neg Hx     Glaucoma Neg Hx     Macular degeneration Neg Hx     Retinal detachment Neg Hx     Stroke Neg Hx        SOCIAL HISTORY:  Social History     Tobacco Use    Smoking status: Former Smoker     Packs/day: 0.25     Years: 15.00     Pack years: 3.75    Smokeless tobacco: Never Used   Substance Use Topics    Alcohol use: Yes     Comment: 1-2 drinks at night    Drug use: No       Review of Systems:  12 review of systems is negative except for the symptoms mentioned in HPI.        Objective:     Vitals:    12/02/20 1327   BP: 132/66   Pulse: (!) 53   Weight: 55.5 kg (122 lb 5.7 oz)   Height: 5' 3" (1.6 m)       General: NAD, well nourished   Eyes: no tearing, discharge, no erythema   ENT: moist mucous membranes of the oral cavity, nares patent    Neck: Supple, full range of motion  Cardiovascular: Warm and well perfused, pulses equal and symmetrical  Lungs: Normal work of breathing, normal chest wall excursions  Skin: No rash, lesions, or breakdown on exposed skin  Psychiatry: Mood and affect are appropriate   Abdomen: soft, non tender, non distended  Extremeties: No cyanosis, clubbing or edema.    Neurological   MENTAL STATUS: Alert and oriented to person, place, and time. Speech without dysarthria, able to name and repeat without difficulty. Recent and remote memory within normal limits   CRANIAL NERVES: Visual fields intact. PERRL. EOMI. Facial sensation intact. Face symmetrical. Hearing grossly intact. Full shoulder shrug bilaterally. Tongue protrudes midline   SENSORY: Sensation is intact to light touch throughout.  Negative Romberg.   MOTOR: Normal bulk and tone. No pronator drift.    Head tremor, no upper " extremity tremor on exam or by history  REFLEXES: UE 2+, LE mute   CEREBELLAR/COORDINATION/GAIT: Gait steady with normal arm swing and stride length.  Finger to nose intact. Normal rapid alternating movements.        GOAL: Pt will ambulate 50' independently with RW in 4 wks.

## 2023-07-24 ENCOUNTER — ANESTHESIA EVENT (OUTPATIENT)
Dept: SURGERY | Facility: HOSPITAL | Age: 88
DRG: 481 | End: 2023-07-24
Payer: MEDICARE

## 2023-07-24 ENCOUNTER — HOSPITAL ENCOUNTER (INPATIENT)
Facility: HOSPITAL | Age: 88
LOS: 5 days | Discharge: NURSING, REHAB OR LONG-TERM CARE | DRG: 481 | End: 2023-07-29
Attending: EMERGENCY MEDICINE | Admitting: EMERGENCY MEDICINE
Payer: MEDICARE

## 2023-07-24 DIAGNOSIS — S72.002A CLOSED FRACTURE OF LEFT HIP, INITIAL ENCOUNTER: ICD-10-CM

## 2023-07-24 DIAGNOSIS — S79.919A HIP INJURY, INITIAL ENCOUNTER: ICD-10-CM

## 2023-07-24 DIAGNOSIS — S72.142A CLOSED INTERTROCHANTERIC FRACTURE, LEFT, INITIAL ENCOUNTER: Primary | ICD-10-CM

## 2023-07-24 PROBLEM — E87.1 HYPONATREMIA: Status: ACTIVE | Noted: 2023-07-24

## 2023-07-24 LAB
25(OH)D3+25(OH)D2 SERPL-MCNC: 46 NG/ML (ref 30–96)
ABO + RH BLD: NORMAL
ALBUMIN SERPL BCP-MCNC: 4.1 G/DL (ref 3.5–5.2)
ALP SERPL-CCNC: 51 U/L (ref 55–135)
ALT SERPL W/O P-5'-P-CCNC: 16 U/L (ref 10–44)
ANION GAP SERPL CALC-SCNC: 7 MMOL/L (ref 8–16)
APTT PPP: 25.6 SEC (ref 21–32)
AST SERPL-CCNC: 22 U/L (ref 10–40)
BASOPHILS # BLD AUTO: 0.04 K/UL (ref 0–0.2)
BASOPHILS NFR BLD: 0.4 % (ref 0–1.9)
BILIRUB SERPL-MCNC: 0.7 MG/DL (ref 0.1–1)
BILIRUB UR QL STRIP: NEGATIVE
BLD GP AB SCN CELLS X3 SERPL QL: NORMAL
BUN SERPL-MCNC: 12 MG/DL (ref 10–30)
CALCIUM SERPL-MCNC: 10 MG/DL (ref 8.7–10.5)
CHLORIDE SERPL-SCNC: 99 MMOL/L (ref 95–110)
CLARITY UR REFRACT.AUTO: CLEAR
CO2 SERPL-SCNC: 24 MMOL/L (ref 23–29)
COLOR UR AUTO: YELLOW
CREAT SERPL-MCNC: 0.7 MG/DL (ref 0.5–1.4)
DIFFERENTIAL METHOD: ABNORMAL
EOSINOPHIL # BLD AUTO: 0 K/UL (ref 0–0.5)
EOSINOPHIL NFR BLD: 0.3 % (ref 0–8)
ERYTHROCYTE [DISTWIDTH] IN BLOOD BY AUTOMATED COUNT: 14 % (ref 11.5–14.5)
EST. GFR  (NO RACE VARIABLE): >60 ML/MIN/1.73 M^2
ESTIMATED AVG GLUCOSE: 108 MG/DL (ref 68–131)
GLUCOSE SERPL-MCNC: 124 MG/DL (ref 70–110)
GLUCOSE UR QL STRIP: NEGATIVE
HBA1C MFR BLD: 5.4 % (ref 4–5.6)
HCT VFR BLD AUTO: 37.3 % (ref 37–48.5)
HGB BLD-MCNC: 13 G/DL (ref 12–16)
HGB UR QL STRIP: NEGATIVE
IMM GRANULOCYTES # BLD AUTO: 0.22 K/UL (ref 0–0.04)
IMM GRANULOCYTES NFR BLD AUTO: 2.5 % (ref 0–0.5)
INR PPP: 1 (ref 0.8–1.2)
KETONES UR QL STRIP: ABNORMAL
LEUKOCYTE ESTERASE UR QL STRIP: NEGATIVE
LYMPHOCYTES # BLD AUTO: 2.1 K/UL (ref 1–4.8)
LYMPHOCYTES NFR BLD: 23.1 % (ref 18–48)
MAGNESIUM SERPL-MCNC: 1.9 MG/DL (ref 1.6–2.6)
MCH RBC QN AUTO: 31.7 PG (ref 27–31)
MCHC RBC AUTO-ENTMCNC: 34.9 G/DL (ref 32–36)
MCV RBC AUTO: 91 FL (ref 82–98)
MONOCYTES # BLD AUTO: 1.7 K/UL (ref 0.3–1)
MONOCYTES NFR BLD: 19.3 % (ref 4–15)
NEUTROPHILS # BLD AUTO: 4.8 K/UL (ref 1.8–7.7)
NEUTROPHILS NFR BLD: 54.4 % (ref 38–73)
NITRITE UR QL STRIP: NEGATIVE
NRBC BLD-RTO: 0 /100 WBC
PH UR STRIP: 7 [PH] (ref 5–8)
PHOSPHATE SERPL-MCNC: 2.8 MG/DL (ref 2.7–4.5)
PLATELET # BLD AUTO: 197 K/UL (ref 150–450)
PMV BLD AUTO: 10.8 FL (ref 9.2–12.9)
POTASSIUM SERPL-SCNC: 4.1 MMOL/L (ref 3.5–5.1)
PREALB SERPL-MCNC: 23 MG/DL (ref 20–43)
PROT SERPL-MCNC: 7.1 G/DL (ref 6–8.4)
PROT UR QL STRIP: NEGATIVE
PROTHROMBIN TIME: 10.9 SEC (ref 9–12.5)
RBC # BLD AUTO: 4.1 M/UL (ref 4–5.4)
SODIUM SERPL-SCNC: 130 MMOL/L (ref 136–145)
SP GR UR STRIP: 1.01 (ref 1–1.03)
SPECIMEN OUTDATE: NORMAL
TRANSFERRIN SERPL-MCNC: 237 MG/DL (ref 200–375)
URN SPEC COLLECT METH UR: ABNORMAL
WBC # BLD AUTO: 8.91 K/UL (ref 3.9–12.7)

## 2023-07-24 PROCEDURE — 85730 THROMBOPLASTIN TIME PARTIAL: CPT | Performed by: EMERGENCY MEDICINE

## 2023-07-24 PROCEDURE — 83036 HEMOGLOBIN GLYCOSYLATED A1C: CPT | Performed by: STUDENT IN AN ORGANIZED HEALTH CARE EDUCATION/TRAINING PROGRAM

## 2023-07-24 PROCEDURE — 93010 EKG 12-LEAD: ICD-10-PCS | Mod: ,,, | Performed by: INTERNAL MEDICINE

## 2023-07-24 PROCEDURE — 85610 PROTHROMBIN TIME: CPT | Performed by: EMERGENCY MEDICINE

## 2023-07-24 PROCEDURE — 84134 ASSAY OF PREALBUMIN: CPT | Performed by: STUDENT IN AN ORGANIZED HEALTH CARE EDUCATION/TRAINING PROGRAM

## 2023-07-24 PROCEDURE — 82306 VITAMIN D 25 HYDROXY: CPT | Performed by: STUDENT IN AN ORGANIZED HEALTH CARE EDUCATION/TRAINING PROGRAM

## 2023-07-24 PROCEDURE — 86900 BLOOD TYPING SEROLOGIC ABO: CPT | Performed by: EMERGENCY MEDICINE

## 2023-07-24 PROCEDURE — 93005 ELECTROCARDIOGRAM TRACING: CPT

## 2023-07-24 PROCEDURE — 99223 1ST HOSP IP/OBS HIGH 75: CPT | Mod: ,,, | Performed by: HOSPITALIST

## 2023-07-24 PROCEDURE — 84100 ASSAY OF PHOSPHORUS: CPT | Performed by: STUDENT IN AN ORGANIZED HEALTH CARE EDUCATION/TRAINING PROGRAM

## 2023-07-24 PROCEDURE — 25000003 PHARM REV CODE 250: Performed by: HOSPITALIST

## 2023-07-24 PROCEDURE — 83735 ASSAY OF MAGNESIUM: CPT | Performed by: STUDENT IN AN ORGANIZED HEALTH CARE EDUCATION/TRAINING PROGRAM

## 2023-07-24 PROCEDURE — 86920 COMPATIBILITY TEST SPIN: CPT

## 2023-07-24 PROCEDURE — 63600175 PHARM REV CODE 636 W HCPCS: Performed by: EMERGENCY MEDICINE

## 2023-07-24 PROCEDURE — 85025 COMPLETE CBC W/AUTO DIFF WBC: CPT | Performed by: EMERGENCY MEDICINE

## 2023-07-24 PROCEDURE — 51702 INSERT TEMP BLADDER CATH: CPT

## 2023-07-24 PROCEDURE — 99285 EMERGENCY DEPT VISIT HI MDM: CPT | Mod: 25

## 2023-07-24 PROCEDURE — 84466 ASSAY OF TRANSFERRIN: CPT | Performed by: STUDENT IN AN ORGANIZED HEALTH CARE EDUCATION/TRAINING PROGRAM

## 2023-07-24 PROCEDURE — 81003 URINALYSIS AUTO W/O SCOPE: CPT | Performed by: EMERGENCY MEDICINE

## 2023-07-24 PROCEDURE — 80053 COMPREHEN METABOLIC PANEL: CPT | Performed by: EMERGENCY MEDICINE

## 2023-07-24 PROCEDURE — 93010 ELECTROCARDIOGRAM REPORT: CPT | Mod: ,,, | Performed by: INTERNAL MEDICINE

## 2023-07-24 PROCEDURE — 11000001 HC ACUTE MED/SURG PRIVATE ROOM

## 2023-07-24 PROCEDURE — 99223 PR INITIAL HOSPITAL CARE,LEVL III: ICD-10-PCS | Mod: ,,, | Performed by: HOSPITALIST

## 2023-07-24 PROCEDURE — 96374 THER/PROPH/DIAG INJ IV PUSH: CPT

## 2023-07-24 PROCEDURE — 94761 N-INVAS EAR/PLS OXIMETRY MLT: CPT

## 2023-07-24 PROCEDURE — 96375 TX/PRO/DX INJ NEW DRUG ADDON: CPT

## 2023-07-24 RX ORDER — OXYCODONE HYDROCHLORIDE 5 MG/1
5 TABLET ORAL
Status: DISCONTINUED | OUTPATIENT
Start: 2023-07-24 | End: 2023-07-25

## 2023-07-24 RX ORDER — AMLODIPINE BESYLATE 5 MG/1
5 TABLET ORAL DAILY
Status: DISCONTINUED | OUTPATIENT
Start: 2023-07-25 | End: 2023-07-27

## 2023-07-24 RX ORDER — SODIUM CHLORIDE 0.9 % (FLUSH) 0.9 %
10 SYRINGE (ML) INJECTION
Status: DISCONTINUED | OUTPATIENT
Start: 2023-07-24 | End: 2023-07-29 | Stop reason: HOSPADM

## 2023-07-24 RX ORDER — POLYETHYLENE GLYCOL 3350 17 G/17G
17 POWDER, FOR SOLUTION ORAL DAILY
Status: DISCONTINUED | OUTPATIENT
Start: 2023-07-24 | End: 2023-07-29 | Stop reason: HOSPADM

## 2023-07-24 RX ORDER — ONDANSETRON 2 MG/ML
4 INJECTION INTRAMUSCULAR; INTRAVENOUS EVERY 12 HOURS PRN
Status: DISCONTINUED | OUTPATIENT
Start: 2023-07-24 | End: 2023-07-29 | Stop reason: HOSPADM

## 2023-07-24 RX ORDER — BISACODYL 10 MG
10 SUPPOSITORY, RECTAL RECTAL DAILY PRN
Status: DISCONTINUED | OUTPATIENT
Start: 2023-07-24 | End: 2023-07-29 | Stop reason: HOSPADM

## 2023-07-24 RX ORDER — ONDANSETRON 2 MG/ML
4 INJECTION INTRAMUSCULAR; INTRAVENOUS
Status: COMPLETED | OUTPATIENT
Start: 2023-07-24 | End: 2023-07-24

## 2023-07-24 RX ORDER — MORPHINE SULFATE 2 MG/ML
6 INJECTION, SOLUTION INTRAMUSCULAR; INTRAVENOUS
Status: COMPLETED | OUTPATIENT
Start: 2023-07-24 | End: 2023-07-24

## 2023-07-24 RX ORDER — TALC
6 POWDER (GRAM) TOPICAL NIGHTLY PRN
Status: DISCONTINUED | OUTPATIENT
Start: 2023-07-24 | End: 2023-07-29 | Stop reason: HOSPADM

## 2023-07-24 RX ORDER — HYDROMORPHONE HYDROCHLORIDE 1 MG/ML
0.5 INJECTION, SOLUTION INTRAMUSCULAR; INTRAVENOUS; SUBCUTANEOUS
Status: COMPLETED | OUTPATIENT
Start: 2023-07-24 | End: 2023-07-24

## 2023-07-24 RX ORDER — METHOCARBAMOL 500 MG/1
500 TABLET, FILM COATED ORAL EVERY 6 HOURS PRN
Status: DISCONTINUED | OUTPATIENT
Start: 2023-07-24 | End: 2023-07-29 | Stop reason: HOSPADM

## 2023-07-24 RX ORDER — SOTALOL HYDROCHLORIDE 80 MG/1
80 TABLET ORAL 2 TIMES DAILY
Status: DISCONTINUED | OUTPATIENT
Start: 2023-07-24 | End: 2023-07-29 | Stop reason: HOSPADM

## 2023-07-24 RX ORDER — SODIUM CHLORIDE 9 MG/ML
INJECTION, SOLUTION INTRAVENOUS CONTINUOUS
Status: ACTIVE | OUTPATIENT
Start: 2023-07-24 | End: 2023-07-25

## 2023-07-24 RX ORDER — ACETAMINOPHEN 500 MG
1000 TABLET ORAL EVERY 8 HOURS
Status: DISCONTINUED | OUTPATIENT
Start: 2023-07-24 | End: 2023-07-25

## 2023-07-24 RX ORDER — LISINOPRIL 20 MG/1
40 TABLET ORAL DAILY
Status: DISCONTINUED | OUTPATIENT
Start: 2023-07-25 | End: 2023-07-27

## 2023-07-24 RX ORDER — MORPHINE SULFATE 2 MG/ML
2 INJECTION, SOLUTION INTRAMUSCULAR; INTRAVENOUS
Status: DISCONTINUED | OUTPATIENT
Start: 2023-07-24 | End: 2023-07-25

## 2023-07-24 RX ADMIN — OXYCODONE HYDROCHLORIDE 5 MG: 5 TABLET ORAL at 11:07

## 2023-07-24 RX ADMIN — OXYCODONE HYDROCHLORIDE 5 MG: 5 TABLET ORAL at 05:07

## 2023-07-24 RX ADMIN — ACETAMINOPHEN 1000 MG: 500 TABLET ORAL at 09:07

## 2023-07-24 RX ADMIN — MORPHINE SULFATE 6 MG: 2 INJECTION, SOLUTION INTRAMUSCULAR; INTRAVENOUS at 12:07

## 2023-07-24 RX ADMIN — SODIUM CHLORIDE: 9 INJECTION, SOLUTION INTRAVENOUS at 05:07

## 2023-07-24 RX ADMIN — SOTALOL HYDROCHLORIDE 80 MG: 80 TABLET ORAL at 09:07

## 2023-07-24 RX ADMIN — ACETAMINOPHEN 1000 MG: 500 TABLET ORAL at 04:07

## 2023-07-24 RX ADMIN — ONDANSETRON 4 MG: 2 INJECTION INTRAMUSCULAR; INTRAVENOUS at 12:07

## 2023-07-24 RX ADMIN — HYDROMORPHONE HYDROCHLORIDE 0.5 MG: 1 INJECTION, SOLUTION INTRAMUSCULAR; INTRAVENOUS; SUBCUTANEOUS at 01:07

## 2023-07-24 NOTE — H&P
History and Physical  Hospital Medicine       Patient Name: Cathy Kearns  MRN:  [id number]  Hospital Medicine Team: [facility] Ludmila Granado MD  Date of Admission:  7/24/2023     Principal Problem:  Closed intertrochanteric fracture of left femur   Primary Care Physician: Wilma Xiong MD      History of Present Illness:     Ms. Cathy Kearns is a 89 y.o. female with past medical history of HTN, COPD, paroxysmal atrial fibrillation, HLD, osteopenia, monocytosis, who was in her usual state of health until earlier today when she was at at exercises class when she was side stepping and felt some weakness in her right hip as it has been weak lately so she said she probably put more weight into her left hip and then lost balance and fell onto her left hip/buttocks and had immediate pain in that area. She was brought to the hospital via ambulance ad found to have a left IT fx with shortening of the LLE seen as wel. She reports has had 2 rare falls in the past secondary to sinus med side effect and she no longer takes those medications. She denies dizziness, chest pain, dyspnea. She can feel when she goes into afib as has flutters but has not happened anytime recnetly and is in NSR on admit. She reports pain to the hip on exam now and with movement of the LLE. She does not use a walker or a cane at baseline.     She had a stress test earlier this year that was negative for ischemia.  She had a dexa that showed osteoperosis and reported she stopped taking bisphosphonate as it gave her sinus/nasal bone pain. She was told to stop taking calcium by her heart doctor as she had hypercalcemia on her labs in the past.       Home meds- norvasc, eliquis, ergo, lisinopril, sotalol    Ortho at bedside as well and planning for OR today vs tomorrow        Review of Systems   Constitutional: Negative for chills, fatigue, fever.   HENT: Negative for sore throat, trouble swallowing.    Eyes: Negative for photophobia, visual  disturbance.   Respiratory: Negative for cough, shortness of breath.    Cardiovascular: Negative for chest pain, palpitations, leg swelling.   Gastrointestinal: Negative for abdominal pain, constipation, diarrhea, nausea, vomiting.   Endocrine: Negative for cold intolerance, heat intolerance.   Genitourinary: Negative for dysuria, frequency.   Musculoskeletal: + for arthralgias, myalgias.   Skin: Negative for rash, wound, erythema   Neurological: Negative for dizziness, syncope, weakness, light-headedness.   Psychiatric/Behavioral: Negative for confusion, hallucinations, anxiety  All other systems reviewed and are negative.      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    Past Surgical History: Patient has a past surgical history that includes Appendectomy; Intravitreal Injection; Adenoidectomy; Tonsillectomy; Hysterectomy; and Cataract extraction w/  intraocular lens implant (Bilateral, n/a).    Social History: Patient reports that she has quit smoking. Her smoking use included cigarettes. She has a 3.75 pack-year smoking history. She has never used smokeless tobacco. She reports current alcohol use. She reports that she does not use drugs.    Family History: family history includes Cancer in her brother, brother, daughter, father, maternal grandmother, mother, paternal grandfather, and paternal grandmother; Cataracts in her father; Heart attack in her brother and maternal grandfather; Hyperlipidemia in her daughter and son; Hypertension in her daughter and son; Strabismus in her paternal aunt; Thyroid disease in her grandchild.    Medications: Scheduled Meds:    acetaminophen  1,000 mg Oral Q8H    [START ON 7/25/2023] amLODIPine  5 mg Oral Daily    [START ON 7/25/2023] lisinopriL  40 mg Oral Daily    polyethylene glycol  17 g Oral Daily    sotaloL  80 mg Oral BID     Continuous Infusions:   sodium chloride 0.9%       PRN Meds:.bisacodyL, melatonin, methocarbamoL, morphine, ondansetron, oxyCODONE, sodium chloride 0.9%    Allergies: Patient is allergic to penicillins, astelin [azelastine], pseudoephedrine hcl, and sulfa (sulfonamide antibiotics).    Physical Exam:     Vital Signs (Most Recent):  Temp: 97.9 °F (36.6 °C) (07/24/23 1218)  Pulse: (!) 54 (07/24/23 1417)  Resp: 16 (07/24/23 1417)  BP: 126/60 (07/24/23 1417)  SpO2: 97 % (07/24/23 1417) Vital Signs Range (Last 24H):  Temp:  [97.9 °F (36.6 °C)]   Pulse:  [50-54]   Resp:  [15-20]   BP: (115-175)/(56-76)   SpO2:  [96 %-100 %]    There is no height or weight on file to calculate BMI.     Physical Exam:  Constitutional: Appears well-developed and well-nourished. Hard of hearing. Pleasant, good historian. Family at bedside.  Head: Normocephalic and atraumatic.   Mouth/Throat: Oropharynx is clear and moist.   Eyes: EOM are normal. Pupils are equal, round, and reactive to light. No scleral icterus.   Neck: Normal range of motion. Neck supple.   Cardiovascular: Normal rate and regular rhythm.  No murmur heard.  Pulmonary/Chest: Effort normal and breath sounds normal. No respiratory distress. No wheezes, rales, or rhonchi  Abdominal: Soft. Bowel sounds are normal.  No distension or tenderness  Musculoskeletal: LLE shortened and rotated.   Neurological: Alert and oriented to person, place, and time.   Skin: Skin is warm and dry.   Psychiatric: Normal mood and affect. Behavior is normal.   Vitals reviewed.    Recent Labs   Lab 07/24/23  1208   WBC 8.91   HGB 13.0   HCT 37.3          Recent Labs   Lab 07/24/23  1208 07/24/23  1323   *  --    K 4.1  --    CL 99  --    CO2 24  --    BUN 12  --    CREATININE 0.7  --     *  --    CALCIUM 10.0  --    MG  --  1.9   PHOS  --  2.8     Recent Labs   Lab 07/24/23  1208   ALKPHOS 51*   ALT 16   AST 22   ALBUMIN 4.1   PROT 7.1   BILITOT 0.7   INR 1.0      No results for input(s): POCTGLUCOSE in the last 168 hours.      Assessment and Plan:     Ms. Cathy Kearns is a 89 y.o. female who presented to [facility] on 7/24/2023 with     Left intertrochanteric fracture  -sustained in mechanical fall with left IT fx seen on imaging and shortened and rotated LLE  -pain on exam with multimodal pain meds on board with hip fx pathway initiated  -CXR normal on admit, eKG with sinus bradycardia with recent stress echo 2023 negative for ischemia  -does not use DME at baseline no hx of  cardiac events or strokes in the past. rCRI of 0 with 3.9% 30 day risk of cardiac arrest, MI or death and rec proceeding with sugery as benefit outweights risk  -hg 13 on admit  -baseline osteoperosis seen on DEXA scan and could not tolerate fosamax due to nasal bone pain and should consider prolia long term or another anabolic as another option  -reports not on calcium due to hypercalcemia whe on it in the past  -plan for IM nail on today vs tomorrow with ortho  -UA pending  -Vit D 44 in the past  -Anticoag- resume home eliquis post op    COPD  -does not use home albuterol ever as controlled symptoms    HTN  -continue home meds    Paroxysmal atrial fibrillation  -in NSR on admit, reports can feel when in afib as has palpitations and has been awhile since known to be in afib she said  -cont sotalol, resume eliquis post op  -telemetry  -says stress puts her in afib, watch post op    HLD  -not on meds    Hyponatremia  -possibly from dehydration from exercise class she was in prior to admit  -IVF today pre op    Monocytosis  -has workup in past and being monitored outpatient  -19% similar to previous baseline    Osteoperosis  -DEXA with T score -1 to -2.5, could not tolerate fosamax  -consider prolia or another anabolic  long term given now with associated fracture    Vitamin D deficiency  -improved as 44 on recent check    HX of Macular degeneration of right eye  -receives eyelea with Dr Mandujano, last done on 5/23, she reports she was due for an appointment this wee, per notes she was due around 7/18 for injection, I cant see an appointment scheduled in [facility] however with optho this week that she is referring to, she asked to reach out to optho as she is very worried about missing her injections that are due  -will touch base with optho tues-wed regarding this             Diet:  regular post op  DVT PPx:  eliquis post op    Disposition:  OR today vs tomorrow    Discharge Planning   ANGELA:      Code Status: Not on file   Is the patient medically ready for discharge?:     Reason for patient still in hospital (select all that apply): Patient trending condition

## 2023-07-24 NOTE — PHARMACY MED REC
"Admission Medication History     The home medication history was taken by Patrick Peterson.    You may go to "Admission" then "Reconcile Home Medications" tabs to review and/or act upon these items.     The home medication list has been updated by the Pharmacy department.   Please read ALL comments highlighted in yellow.   Please address this information as you see fit.    Feel free to contact us if you have any questions or require assistance.        Current Outpatient Medications on File Prior to Encounter   Medication Sig    amLODIPine (NORVASC) 5 MG tablet   Take 1 tablet (5 mg total) by mouth once daily.    ANTIOX #8/OM3/DHA/EPA/LUT/ZEAX (PRESERVISION AREDS 2, OMEGA-3, ORAL)   Take 1 tablet by mouth once daily at 6am.     apixaban (ELIQUIS) 2.5 mg Tab   Take 1 tablet (2.5 mg total) by mouth 2 (two) times daily.    ergocalciferol, vitamin D2, (VITAMIN D ORAL)   Take 1 capsule by mouth Daily.    lisinopriL (PRINIVIL,ZESTRIL) 40 MG tablet   Take 1 tablet (40 mg total) by mouth once daily.    omega 3-dha-epa-fish oil 300-1,000 mg Cap   Take 3 capsules by mouth once daily.    sotaloL (BETAPACE) 80 MG tablet   Take 1 tablet (80 mg total) by mouth 2 (two) times daily.    albuterol (VENTOLIN HFA) 90 mcg/actuation inhaler     Inhale 2 puffs into the lungs every 6 (six) hours as needed for Wheezing or Shortness of Breath. Rescue (Patient not taking: Reported on 7/24/2023)    alendronate (FOSAMAX) 70 MG tablet   Take 1 tablet (70 mg total) by mouth every 7 days. (Patient not taking: Reported on 7/24/2023)       Patrick Peterson  EXT [id number]                  .        "

## 2023-07-24 NOTE — NURSING
Nurses Note -- 4 Eyes      7/24/2023   6:42 PM      Skin assessed during: Admit      [x] No Altered Skin Integrity Present    []Prevention Measures Documented      [] Yes- Altered Skin Integrity Present or Discovered   [] LDA Added if Not in Epic (Describe Wound)   [] New Altered Skin Integrity was Present on Admit and Documented in LDA   [] Wound Image Taken    Wound Care Consulted? No    Attending Nurse:  Meagan Chapin RN     Second RN/Staff Member:  Thor Lindsey RN

## 2023-07-24 NOTE — HPI
Cathy Kearns is a 89 y.o. female  with history of HTN, COPD, paroxysmal atrial fibrillation (on long term eliquis), HLD, osteopenia, who presents with a left intertrochanteric femur fx after fall from standing. Patient states she tripped while at one of her exercise classes and fell onto her left side; reported immediate pain and inability to bear weight. the patient denies head trauma. Patient denies numbness or tingling in extremitiy. She denies history of prior fractures and denies pain anywhere else. Patient lives at home alone, makes her own medical decisions, and ambulates without assistance at baseline.

## 2023-07-24 NOTE — ANESTHESIA PREPROCEDURE EVALUATION
[facility]  Anesthesia Pre-Operative Evaluation         Patient Name: Cathy Kearns  YOB: 1929  MRN: [id number]    SUBJECTIVE:     Pre-operative Evaluation for Procedure(s) (LRB):  INSERTION, INTRAMEDULLARY DAPHNEY, LEFT FEMUR; [place] table, c-arm clock side, [place] (Left)     07/24/2023    Cathy Kearns is a 89 y.o. female with a PMHx significant for HTN, paroxysmal AFib (on Eliquis, last dose 7/24 AM), COPD (no home O2), and osteopenia admitted with a left intertrochanteric femur fracture following development of left hip weakness during an exercise class. Surgical intervention was recommended.     She now presents for the above procedure(s) with Orthopedic Surgery - Dr. Chand.    Previous Airway: None documented.    LDA:        Peripheral IV - Single Lumen 07/24/23 1203 20 G Anterior;Proximal;Right Forearm (Active)   Number of days: 0            Urethral Catheter 07/24/23 1555 16 Fr. 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    Exudative age-related macular degeneration of right eye with active choroidal neovascularization    Paroxysmal atrial fibrillation    Degeneration macular    Osteopenia    Hypertension    Liver hemangioma    Diastolic dysfunction    Chronic obstructive pulmonary disease    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    Long term current use of antiarrhythmic drug- Sotolol    Hyperlipidemia    Monocytoses    Wears hearing aid    Neuropathy    Aortic valve regurgitation    Shortness of breath    Closed intertrochanteric fracture of left femur    Hyponatremia       Review of patient's allergies indicates:   Allergen Reactions    Penicillins Swelling     Astelin [azelastine] Other (See Comments)     Dizziness    Pseudoephedrine hcl Palpitations    Sulfa (sulfonamide antibiotics) Palpitations       Current Inpatient Medications:   acetaminophen  1,000 mg Oral Q8H    [START ON 7/25/2023] amLODIPine  5 mg Oral Daily    [START ON 7/25/2023] lisinopriL  40 mg Oral Daily    polyethylene glycol  17 g Oral Daily    sotaloL  80 mg Oral BID       Current Outpatient Medications   Medication Instructions    albuterol (VENTOLIN HFA) 90 mcg/actuation inhaler 2 puffs, Inhalation, Every 6 hours PRN, Rescue    alendronate (FOSAMAX) 70 mg, Oral, Every 7 days    amLODIPine (NORVASC) 5 mg, Oral, Daily    ANTIOX #8/OM3/DHA/EPA/LUT/ZEAX (PRESERVISION AREDS 2, OMEGA-3, ORAL) 1 tablet, Oral, Daily    apixaban (ELIQUIS) 2.5 mg, Oral, 2 times daily    ergocalciferol, vitamin D2, (VITAMIN D ORAL) 1 capsule, Oral, Daily    lisinopriL (PRINIVIL,ZESTRIL) 40 mg, Oral, Daily    omega 3-dha-epa-fish oil 300-1,000 mg Cap 3 capsules, Oral, Daily    sotaloL (BETAPACE) 80 mg, Oral, 2 times daily       Past Surgical History:   Procedure Laterality Date    ADENOIDECTOMY      APPENDECTOMY      CATARACT EXTRACTION W/  INTRAOCULAR LENS IMPLANT Bilateral n/a    HYSTERECTOMY      2/2 uterine CA, removed ovaries, no longer f/u with gyn    Intravitreal Injection      Avastin od x's 12 dltx 2-16-12    TONSILLECTOMY         Social History     Substance and Sexual Activity   Drug Use No     Tobacco Use: Medium Risk    Smoking Tobacco Use: Former    Smokeless Tobacco Use: Never    Passive Exposure: Not on file     Alcohol Use: Not on file       OBJECTIVE:     Vital Signs Range (Last 24H):  Temp:  [35.6 °C (96.1 °F)-36.6 °C (97.9 °F)]   Pulse:  [50-61]   Resp:  [15-20]   BP: (115-175)/(56-76)   SpO2:  [94 %-100 %]       Significant Labs    Heme Profile  Lab Results   Component Value Date    WBC 8.91 07/24/2023    HGB 13.0 07/24/2023    HCT 37.3 07/24/2023     07/24/2023        Coagulation Studies  Lab Results   Component Value Date    LABPROT 10.9 07/24/2023    INR 1.0 07/24/2023    APTT 25.6 07/24/2023       BMP  Lab Results   Component Value Date     (L) 07/24/2023    K 4.1 07/24/2023    CL 99 07/24/2023    CO2 24 07/24/2023    BUN 12 07/24/2023    CREATININE 0.7 07/24/2023    MG 1.9 07/24/2023    PHOS 2.8 07/24/2023       Liver Function Tests  Lab Results   Component Value Date    AST 22 07/24/2023    ALT 16 07/24/2023    ALKPHOS 51 (L) 07/24/2023    BILITOT 0.7 07/24/2023    PROT 7.1 07/24/2023    ALBUMIN 4.1 07/24/2023       Lipid Profile  Lab Results   Component Value Date    CHOL 238 (H) 11/29/2022    HDL 60 11/29/2022    TRIG 94 11/29/2022       Endocrine Profile  Lab Results   Component Value Date    HGBA1C 5.4 07/24/2023    TSH 1.551 11/29/2022       Diagnostic Studies    CT Pelvis without Contrast (07/24/2023)  There is a comminuted intertrochanteric fracture of the left hip with displacement and shortening.  No associated lytic or blastic lesion.  Note made of mild left hip cartilage space loss.     There is mild right hip cartilage space loss with osteophyte production.  There are degenerative changes of the lower lumbar spine.  Pubic symphysis and sacroiliac joints are unremarkable.  No pelvic fracture.     Soft tissue edema noted about the left hip fracture with expansion of the quadriceps muscle.     No pelvic ascites.  There is a Blanco catheter demonstrating redundant coiling within the urinary bladder.  Vascular calcifications noted.      Cardiac Studies    EKG:   Results for orders placed or performed during the hospital encounter of 07/24/23   EKG 12-lead    Collection Time: 07/24/23 12:14 PM    Narrative    Test Reason : S79.919A,    Vent. Rate : 056 BPM     Atrial Rate : 056 BPM     P-R Int : 178 ms          QRS Dur : 112 ms      QT Int : 484 ms       P-R-T Axes : 069 048 077 degrees     QTc Int : 467 ms    Sinus bradycardia  Probable  Anteroseptal  infarct (cited on or before 24-JUL-2023)  Abnormal ECG  When compared with ECG of 09-MAR-2023 10:05,  No major changes  Confirmed by Santiago JIMENEZ MD ([id number]) on 7/24/2023 2:21:06 PM    Referred By: JEANNINE   SELF           Confirmed By:Santiago JIMENEZ MD       TTE (08/05/2022):  Interpretation Summary  · The left ventricle is normal in size with normal systolic function. The estimated ejection fraction is 65%.  · Normal left ventricular diastolic function.  · Normal right ventricular size with normal right ventricular systolic function.  · Normal central venous pressure (3 mmHg).      Nuclear Stress Echo (03/09/2023)  Interpretation Summary    Normal myocardial perfusion scan. There is no evidence of myocardial ischemia or infarction.    There is a mild intensity perfusion abnormality in the anteroseptal wall of the left ventricle, secondary to breast attenuation.    There is a  trivial intensity fixed perfusion abnormality in the inferoapical wall of the left ventricle secondary to diaphragm attenuation.    The visually estimated ejection fraction is normal at rest and normal during stress.    There is normal wall motion at rest and post stress.    LV cavity size is normal at rest and normal at stress.    The ECG portion of the study is negative for ischemia.    The patient reported no chest pain during the stress test.    During stress, rare PACs are noted. , During stress, occasional PVCs are noted.    There are no prior studies for comparison.      ASSESSMENT/PLAN:     Cathy Kearns is a 89 y.o. female with left femur fracture presenting for IM huy insertion.      Pre-op Assessment    I have reviewed the Patient Summary Reports.     I have reviewed the Nursing Notes. I have reviewed the NPO Status.   I have reviewed the Medications.     Review of Systems  Anesthesia Hx:  No problems with previous Anesthesia  History of prior surgery of interest to airway management or planning:  Denies Personal Hx of  Anesthesia complications.   Social:  Former Smoker    Hematology/Oncology:         -- Denies Anemia: -- Denies Thrombocytopenia:   -- Coag Disorders: Denies Bleeding Disorder:    Cardiovascular:  Denies Cardiovascular Symptoms.  Denies Coronary Artery Disease.  Denies Cardiomyopathy  Hypertension, Essential Hypertension  Disorder of Cardiac Rhythm, Atrial Fibrillation, Paroxysmal Atrial Fibrillation    Pulmonary:  Chronic Obstructive Pulmonary Disease (COPD): Inhaler use is rescue inhaler PRN.    Renal/:  Denies Kidney Function/Disease    Hepatic/GI:  Denies Esophageal / Stomach Disorders  Denies Liver Disease    Musculoskeletal:  Bone Disorders: Fracture , location of femur.  Osteopenia    Neurological:  Denies Seizure Disorder  Denies CVA - Cerebrovasular Accident    Endocrine:  Denies Diabetes  Denies Thyroid Disease        Physical Exam  General: Well nourished, Cooperative and Alert    Airway:  Mallampati: II / I  Mouth Opening: Normal  TM Distance: Normal  Tongue: Normal    Dental:  Intact, Caps / Implants        Anesthesia Plan  Type of Anesthesia, risks & benefits discussed:    Anesthesia Type: Gen ETT, Regional  Intra-op Monitoring Plan: Standard ASA Monitors  Post Op Pain Control Plan: multimodal analgesia, IV/PO Opioids PRN and peripheral nerve block  Induction:  IV  Airway Plan: Direct, Post-Induction  Informed Consent: Informed consent signed with the Patient and all parties understand the risks and agree with anesthesia plan.  All questions answered.   ASA Score: 3  Day of Surgery Review of History & Physical: H&P Update referred to the surgeon/provider.    Ready For Surgery From Anesthesia Perspective.     .

## 2023-07-24 NOTE — ED NOTES
Pt awake and alert; resting quietly on stretcher. Changed into gown, family remains at bedside. Pt remains on continuous cardiac and pulse ox monitoring with non-invasive blood pressure to cycle every 30 minutes.  VS stable; NSR noted. Pt denies pain at this time; no acute distress or discomfort reported or observed.  Pt denies restroom needs at this time; is able to reposition self on stretcher. Pt instructed to alert RN if they need to get out of bed.

## 2023-07-24 NOTE — ASSESSMENT & PLAN NOTE
Cathy Kearns is a 89 y.o. female with a comminuted intertrochanteric fracture of the left femur, closed, NVI.  Patient was explained in detail the severity of the injury that was suffered. Patient was explained the risks/benefits/and alternatives to operative management in detail including infection, bleeding, pain, nerve and vascular damage, heterotopic ossification, leg length discrepancies, rotational deformities and they express full understanding.  Also, the patient was explained the high mortality, over 30 percent,  associated with these fractures. She expresses full understanding of the condition and expresses that they want to proceed with surgery. The patient is admitted to the medicine hip fracture service for optimization of medical comorbidities. Will plan for OR tomorrow. No Guarantees were made, informed consent was obtained. All questions were answered to patient's and family's satisfaction.     -Admitted to medicine hip fracture service  -NPO midnight  -Pain control: multimodals  -Marked, booked, and consented for surgery  -PT/OT: Bed rest  -DVT PPx: Hold anticoagulation  -Abx: Preop abx ordered  -Labs: Hb 13, , WBC 8.91, CR 0.7, UA negative  -Campos: In place, campos care  -Iv: ordered for contralateral arm

## 2023-07-24 NOTE — SUBJECTIVE & OBJECTIVE
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 7/9/2012    Vasovagal syncope        Past Surgical History:   Procedure Laterality Date    ADENOIDECTOMY      APPENDECTOMY      CATARACT EXTRACTION W/  INTRAOCULAR LENS IMPLANT Bilateral n/a    HYSTERECTOMY      2/2 uterine CA, removed ovaries, no longer f/u with gyn    Intravitreal Injection      Avastin od x's 12 dltx 2-16-12    TONSILLECTOMY         Review of patient's allergies indicates:   Allergen Reactions    Penicillins Swelling    Astelin [azelastine] Other (See Comments)     Dizziness    Pseudoephedrine hcl Palpitations    Sulfa (sulfonamide antibiotics) Palpitations       Current Facility-Administered Medications   Medication    0.9%  NaCl infusion    acetaminophen tablet 1,000 mg    [START ON 7/25/2023] amLODIPine tablet 5 mg    bisacodyL suppository 10 mg    [START ON 7/25/2023] lisinopriL tablet 40 mg    melatonin tablet 6 mg    methocarbamoL tablet 500 mg    morphine injection 2 mg    ondansetron injection 4 mg    oxyCODONE immediate release tablet 5 mg    polyethylene glycol packet 17 g    sodium chloride 0.9% flush 10 mL    sotaloL tablet 80 mg     Current Outpatient Medications   Medication Sig    albuterol (VENTOLIN HFA) 90 mcg/actuation inhaler Inhale 2 puffs into the lungs every 6 (six) hours as needed for Wheezing or Shortness of Breath. Rescue    alendronate (FOSAMAX) 70 MG tablet Take 1 tablet (70 mg total) by mouth every 7 days.    amLODIPine (NORVASC) 5 MG tablet Take 1 tablet (5 mg total) by mouth once daily.    ANTIOX #8/OM3/DHA/EPA/LUT/ZEAX (PRESERVISION AREDS 2, OMEGA-3, ORAL) Take 1 tablet by mouth once daily at  6am.     apixaban (ELIQUIS) 2.5 mg Tab Take 1 tablet (2.5 mg total) by mouth 2 (two) times daily.    ergocalciferol, vitamin D2, (VITAMIN D ORAL) Take by mouth.    lisinopriL (PRINIVIL,ZESTRIL) 40 MG tablet Take 1 tablet (40 mg total) by mouth once daily.    omega 3-dha-epa-fish oil 300-1,000 mg Cap Take 3 g by mouth.    sotaloL (BETAPACE) 80 MG tablet Take 1 tablet (80 mg total) by mouth 2 (two) times daily.     Family History       Problem Relation (Age of Onset)    Cancer Mother, Father, Maternal Grandmother, Paternal Grandmother, Paternal Grandfather, Daughter, Brother, Brother    Cataracts Father    Heart attack Maternal Grandfather, Brother    Hyperlipidemia Daughter, Son    Hypertension Daughter, Son    Strabismus Paternal Aunt    Thyroid disease Grandchild          Tobacco Use    Smoking status: Former     Packs/day: 0.25     Years: 15.00     Pack years: 3.75     Types: Cigarettes    Smokeless tobacco: Never   Substance and Sexual Activity    Alcohol use: Yes     Comment: 1-2 drinks at night    Drug use: No    Sexual activity: Not on file     ROS  Constitutional: Denies fever/chills  Eyes: Denies change in vision  ENT: Denies sore throat or rhinorrhea   Respiratory: Denies shortness of breath or cough  Cardiovascular: Denies chest pain or palpitations  Gastrointestinal: Denies abdominal pain, nausea, or vomiting  Genitourinary: Denies dysuria and flank pain  Skin: Denies new rash or skin lesions   Allergic/Immunologic: Denies adverse reactions to current medications  Neurological: Denies headaches or dizziness  Musculoskeletal: see HPI    Objective:     Vital Signs (Most Recent):  Temp: 97.9 °F (36.6 °C) (07/24/23 1528)  Pulse: (!) 54 (07/24/23 1528)  Resp: 18 (07/24/23 1528)  BP: 126/60 (07/24/23 1528)  SpO2: 97 % (07/24/23 1528) Vital Signs (24h Range):  Temp:  [97.9 °F (36.6 °C)] 97.9 °F (36.6 °C)  Pulse:  [50-54] 54  Resp:  [15-20] 18  SpO2:  [96 %-100 %] 97 %  BP: (115-175)/(56-76) 126/60            There is no height or weight on file to calculate BMI.    No intake or output data in the 24 hours ending 07/24/23 1553     Ortho/SPM Exam  LLE  Shortened, skin intact with mod swelling over lateral hip  Pain with Log roll of leg  No bony TTP throughout  Compartments soft  Painless ROM ankle   SILT Sa/Siu/DP/SP/T  Motor intact TA/SP/DP  1+ DP and PT     RLE:  Skin intact, no deformity  No TTP  Compartments soft  Full painless ROM throughout lower extremity  SILT Sa/Siu/DP/SP/T  Motor intact TA/SP/DP  1+ DP/PT     BUE:  Skin intact, no deformity noted  No open wounds/abrasions/crepitus  No bony TTP  FROM shoulder, elbow and wrist  SILT M/U/R  Motor intact AIN/PIN/M/U/R   Cap refill < 2s  2+ RP      Spine: No TTP along spine, no step offs palpated. No sacral decubitus ulcers      Significant Labs: BMP:   Recent Labs   Lab 07/24/23  1208 07/24/23  1323   *  --    *  --    K 4.1  --    CL 99  --    CO2 24  --    BUN 12  --    CREATININE 0.7  --    CALCIUM 10.0  --    MG  --  1.9     CBC:   Recent Labs   Lab 07/24/23  1208   WBC 8.91   HGB 13.0   HCT 37.3        All pertinent labs within the past 24 hours have been reviewed.    Significant Imaging: I have reviewed and interpreted all pertinent imaging results/findings.   Xray of the pelvis showing a comminuted left-sided intertrochanteric femur fracture

## 2023-07-24 NOTE — ED NOTES
Telemetry Verification   Patient placed on Telemetry Box  Verified with War Room  Box # [id number]   Monitor Tech    Rate 59   Rhythm Ns

## 2023-07-24 NOTE — ED PROVIDER NOTES
"Encounter Date: 7/24/2023       History     Chief Complaint   Patient presents with    Fall     Fall during exercise class. Left hip pain, +deformity     89-year-old female with history of AFib on Eliquis presents with a fall and left hip injury.  She was doing an exercise class and injured her left hip.  Paramedics noted shortening and deformity.  She denies other injuries.  No head injury.  No injury to her arms legs or back.    Review of patient's allergies indicates:   Allergen Reactions    Penicillins Swelling    Astelin [azelastine] Other (See Comments)     Dizziness    Pseudoephedrine hcl Palpitations    Sulfa (sulfonamide antibiotics) Palpitations     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 7/9/2012    Vasovagal syncope      Past Surgical History:   Procedure Laterality Date    ADENOIDECTOMY      APPENDECTOMY      CATARACT EXTRACTION W/  INTRAOCULAR LENS IMPLANT Bilateral n/a    HYSTERECTOMY      2/2 uterine CA, removed ovaries, no longer f/u with gyn    Intravitreal Injection      Avastin od x's 12 dltx 2-16-12    TONSILLECTOMY       Family History   Problem Relation Age of Onset    Cancer Mother         ovarian CA    Cancer Father         prostate    Cataracts Father     Cancer Maternal Grandmother         colon    Heart attack Maternal Grandfather     Cancer Paternal Grandmother         colon    Cancer Paternal Grandfather     Strabismus Paternal Aunt     Hypertension Daughter     Cancer Daughter         "cancer of bone"    Hyperlipidemia Daughter     Thyroid disease Grandchild     Heart attack Brother     Cancer Brother         prostate, esophagus, bladder    Hypertension " Son     Hyperlipidemia Son     Cancer Brother         Non hodgkins lymphoma    Amblyopia Neg Hx     Blindness Neg Hx     Diabetes Neg Hx     Glaucoma Neg Hx     Macular degeneration Neg Hx     Retinal detachment Neg Hx     Stroke Neg Hx      Social History     Tobacco Use    Smoking status: Former     Packs/day: 0.25     Years: 15.00     Pack years: 3.75     Types: Cigarettes    Smokeless tobacco: Never   Substance Use Topics    Alcohol use: Yes     Comment: 1-2 drinks at night    Drug use: No     Review of Systems    Physical Exam     Initial Vitals [07/24/23 1112]   BP Pulse Resp Temp SpO2   (!) 162/62 (!) 50 18 97.9 °F (36.6 °C) 99 %      MAP       --         Physical Exam    Constitutional: She appears well-developed and well-nourished. She is not diaphoretic. No distress.   Patient is uncomfortable with movement but tolerating it when lying still.   HENT:   Head: Normocephalic and atraumatic.   Eyes: Conjunctivae are normal.   Neck: Neck supple. No tracheal deviation present. No JVD present.   Normal range of motion.  Cardiovascular:  Normal rate, regular rhythm, normal heart sounds and intact distal pulses.           Pulmonary/Chest: Breath sounds normal. No respiratory distress. She has no wheezes. She has no rhonchi. She has no rales.   Abdominal: Abdomen is soft. Bowel sounds are normal. She exhibits no distension. There is no abdominal tenderness. There is no rebound.   Musculoskeletal:         General: No edema.      Cervical back: Normal range of motion and neck supple.      Comments: Tenderness to the left hip.  Shortening to the left leg.  Dorsalis pedis pulses are palpable bilaterally.     Neurological: She is alert. She has normal strength. No sensory deficit. GCS score is 15. GCS eye subscore is 4. GCS verbal subscore is 5. GCS motor subscore is 6.   Skin: Skin is warm. No rash noted.   Psychiatric: She has a normal mood and affect.       ED Course   Procedures  Labs Reviewed   CBC W/ AUTO  DIFFERENTIAL - Abnormal; Notable for the following components:       Result Value    MCH 31.7 (*)     Immature Granulocytes 2.5 (*)     Immature Grans (Abs) 0.22 (*)     Mono # 1.7 (*)     Mono % 19.3 (*)     All other components within normal limits   COMPREHENSIVE METABOLIC PANEL - Abnormal; Notable for the following components:    Sodium 130 (*)     Glucose 124 (*)     Alkaline Phosphatase 51 (*)     Anion Gap 7 (*)     All other components within normal limits   URINALYSIS, REFLEX TO URINE CULTURE - Abnormal; Notable for the following components:    Ketones, UA 1+ (*)     All other components within normal limits    Narrative:     Specimen Source->Urine   PROTIME-INR   APTT   HEMOGLOBIN A1C   MAGNESIUM   PHOSPHORUS   TRANSFERRIN   PREALBUMIN   VITAMIN D   URINALYSIS, REFLEX TO URINE CULTURE   TYPE & SCREEN        ECG Results              EKG 12-lead (Final result)  Result time 07/24/23 14:21:18      Final result by Interface, Lab In [facility] (07/24/23 14:21:18)                   Narrative:    Test Reason : S79.919A,    Vent. Rate : 056 BPM     Atrial Rate : 056 BPM     P-R Int : 178 ms          QRS Dur : 112 ms      QT Int : 484 ms       P-R-T Axes : 069 048 077 degrees     QTc Int : 467 ms    Sinus bradycardia  Probable  Anteroseptal infarct (cited on or before 24-JUL-2023)  Abnormal ECG  When compared with ECG of 09-MAR-2023 10:05,  No major changes  Confirmed by Santiago JIMENEZ MD ([id number]) on 7/24/2023 2:21:06 PM    Referred By: [email or web address]   SELF           Confirmed By:Santiago JIMENEZ MD                                  Imaging Results              CT Pelvis Without Contrast (Final result)  Result time 07/24/23 16:48:04      Final result by Ifeanyi Young MD (07/24/23 16:48:04)                   Impression:      1. Comminuted intertrochanteric fracture of the left hip with displacement and shortening.  2. Additional findings above.      Electronically signed by: Ifeanyi Young MD  Date:    07/24/2023  Time:    16:48                Narrative:    EXAMINATION:  CT PELVIS WITHOUT CONTRAST    CLINICAL HISTORY:  left intertrochanteric femur fx;    TECHNIQUE:  CT pelvis performed without contrast.  Coronal and sagittal reformats and 3D reconstructions provided.    COMPARISON:  Radiographs 07/24/2023    FINDINGS:  There is a comminuted intertrochanteric fracture of the left hip with displacement and shortening.  No associated lytic or blastic lesion.  Note made of mild left hip cartilage space loss.    There is mild right hip cartilage space loss with osteophyte production.  There are degenerative changes of the lower lumbar spine.  Pubic symphysis and sacroiliac joints are unremarkable.  No pelvic fracture.    Soft tissue edema noted about the left hip fracture with expansion of the quadriceps muscle.    No pelvic ascites.  There is a Blanco catheter demonstrating redundant coiling within the urinary bladder.  Vascular calcifications noted.                                       CT 3D RECON WITH INDEPENDENT WS (In process)                      X-Ray Pelvis Routine AP (Final result)  Result time 07/24/23 13:56:40      Final result by Deniz Cruz MD (07/24/23 13:56:40)                   Impression:      See above      Electronically signed by: Deniz Cruz MD  Date:    07/24/2023  Time:    13:56               Narrative:    EXAMINATION:  XR PELVIS ROUTINE AP    CLINICAL HISTORY:  hip injury;    TECHNIQUE:  AP view of the pelvis was performed.    COMPARISON:  None.    FINDINGS:  There is a comminuted inter trochanteric fracture identified involving the left hip with varus positioning at the fracture site.  No other definite fractures noted.                                       X-Ray Femur Ap/Lat Left (Final result)  Result time 07/24/23 14:04:26      Final result by Deniz Cruz MD (07/24/23 14:04:26)                   Impression:      See above      Electronically signed by: Deniz Cruz MD  Date:    07/24/2023  Time:    14:04                Narrative:    EXAMINATION:  XR FEMUR 2 VIEW LEFT    CLINICAL HISTORY:  Unspecified injury of unspecified hip, initial encounter    TECHNIQUE:  AP and lateral views of the left femur were performed.    COMPARISON:  None}    FINDINGS:  There is a comminuted inter trochanteric fracture identified involving the left hip with varus positioning at the fracture site.  Fracture fragment from the area of the lesser trochanter.                                       X-Ray Chest AP Portable (Final result)  Result time 07/24/23 13:47:51      Final result by Gabriele Mora III, MD (07/24/23 13:47:51)                   Impression:      No acute process seen.      Electronically signed by: Gabriele Mora MD  Date:    07/24/2023  Time:    13:47               Narrative:    EXAMINATION:  XR CHEST AP PORTABLE    CLINICAL HISTORY:  hip injury;    FINDINGS:  Heart size is normal.  There is aortic plaque.  Lungs are clear.  The bones are noncontributory.                                       Medications   amLODIPine tablet 5 mg (has no administration in time range)   lisinopriL tablet 40 mg (has no administration in time range)   sotaloL tablet 80 mg (has no administration in time range)   0.9%  NaCl infusion ( Intravenous New Bag 7/24/23 1720)   sodium chloride 0.9% flush 10 mL (has no administration in time range)   acetaminophen tablet 1,000 mg (1,000 mg Oral Given 7/24/23 1626)   melatonin tablet 6 mg (has no administration in time range)   bisacodyL suppository 10 mg (has no administration in time range)   ondansetron injection 4 mg (has no administration in time range)   methocarbamoL tablet 500 mg (has no administration in time range)   oxyCODONE immediate release tablet 5 mg (5 mg Oral Given 7/24/23 1738)   morphine injection 2 mg (has no administration in time range)   polyethylene glycol packet 17 g (17 g Oral Not Given 7/24/23 1545)   morphine injection 6 mg (6 mg Intravenous Given 7/24/23 1203)   ondansetron injection 4 mg  (4 mg Intravenous Given 7/24/23 1203)   HYDROmorphone injection 0.5 mg (0.5 mg Intravenous Given 7/24/23 1303)     Medical Decision Making:   Initial Assessment:   89-year-old female with injury to her left hip.  Findings concerning for hip fracture.  Will control pain with morphine and Zofran.  Will obtain x-rays.  Doubt head injury spinal injury.  This does not seem like it cardiac event.  Clinical Tests:   Lab Tests: Ordered and Reviewed  Radiological Study: Ordered and Reviewed  Medical Tests: Reviewed and Ordered  ED Management:  Patient given morphine which did not seem to help.  Patient given 0.5 of Dilaudid and doing better.  I reviewed the x-ray films.  She has a left intertroch fracture.  She has not had solid food since last night.  She had coffee this morning around 9 a.m..  I consult discussed with [facility] medicine orthopedics.                        Clinical Impression:   Final diagnoses:  [S79.919A] Hip injury, initial encounter  [S72.002A] Closed fracture of left hip, initial encounter        ED Disposition Condition    Admit Stable                Howard Charles MD  07/24/23 1141

## 2023-07-24 NOTE — NURSING
Pt admitted to unit via stretcher. Transferred to bed with staff assist. Oriented to room and call light system. VSS and pt resting comfortably at this time. Will continue to monitor.

## 2023-07-24 NOTE — Clinical Note
Diagnosis: Hip injury, initial encounter [[id number]]   Admitting Provider:: EVARISTO ABBOTT [[id number]]   Future Attending Provider: JENNY GARCIA [[id number]]   Reason for IP Medical Treatment  (Clinical interventions that can only be accomplished in the IP setting? ) :: ORIF   I certify that Inpatient services for greater than or equal to 2 midnights are medically necessary:: Yes   Plans for Post-Acute care--if anticipated (pick the single best option):: F. IP Rehabilitation Unit Placement

## 2023-07-24 NOTE — CONSULTS
[facility] - [facility]  Orthopedics  Consult Note    Patient Name: Cathy Kearns  MRN: [id number]  Admission Date: 7/24/2023  Hospital Length of Stay: 0 days  Attending Provider: Ludmila Granado MD  Primary Care Provider: Wilma Xiong MD    Patient information was obtained from patient and ER records.     Inpatient consult to Orthopedic Surgery  Consult performed by: LADY Medina MD  Consult ordered by: Howard Charles MD        Subjective:     Principal Problem:Closed intertrochanteric fracture of left femur    Chief Complaint:   Chief Complaint   Patient presents with    Fall     Fall during exercise class. Left hip pain, +deformity        HPI: Cathy Kearns is a 89 y.o. female  with history of HTN, COPD, paroxysmal atrial fibrillation (on long term eliquis), HLD, osteopenia, who presents with a left intertrochanteric femur fx after fall from standing. Patient states she tripped while at one of her exercise classes and fell onto her left side; reported immediate pain and inability to bear weight. the patient denies head trauma. Patient denies numbness or tingling in extremitiy. She denies history of prior fractures and denies pain anywhere else. Patient lives at home alone, makes her own medical decisions, and ambulates without assistance at baseline.       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    Vasovagal syncope        Past Surgical History:   Procedure Laterality Date    ADENOIDECTOMY      APPENDECTOMY      CATARACT EXTRACTION W/  INTRAOCULAR LENS IMPLANT Bilateral n/a    HYSTERECTOMY      2/2 uterine CA, removed ovaries, no longer f/u with  gyn    Intravitreal Injection      Avastin od x's 12 dltx 2-16-12    TONSILLECTOMY         Review of patient's allergies indicates:   Allergen Reactions    Penicillins Swelling    Astelin [azelastine] Other (See Comments)     Dizziness    Pseudoephedrine hcl Palpitations    Sulfa (sulfonamide antibiotics) Palpitations       Current Facility-Administered Medications   Medication    0.9%  NaCl infusion    acetaminophen tablet 1,000 mg    [START ON 7/25/2023] amLODIPine tablet 5 mg    bisacodyL suppository 10 mg    [START ON 7/25/2023] lisinopriL tablet 40 mg    melatonin tablet 6 mg    methocarbamoL tablet 500 mg    morphine injection 2 mg    ondansetron injection 4 mg    oxyCODONE immediate release tablet 5 mg    polyethylene glycol packet 17 g    sodium chloride 0.9% flush 10 mL    sotaloL tablet 80 mg     Current Outpatient Medications   Medication Sig    albuterol (VENTOLIN HFA) 90 mcg/actuation inhaler Inhale 2 puffs into the lungs every 6 (six) hours as needed for Wheezing or Shortness of Breath. Rescue    alendronate (FOSAMAX) 70 MG tablet Take 1 tablet (70 mg total) by mouth every 7 days.    amLODIPine (NORVASC) 5 MG tablet Take 1 tablet (5 mg total) by mouth once daily.    ANTIOX #8/OM3/DHA/EPA/LUT/ZEAX (PRESERVISION AREDS 2, OMEGA-3, ORAL) Take 1 tablet by mouth once daily at 6am.     apixaban (ELIQUIS) 2.5 mg Tab Take 1 tablet (2.5 mg total) by mouth 2 (two) times daily.    ergocalciferol, vitamin D2, (VITAMIN D ORAL) Take by mouth.    lisinopriL (PRINIVIL,ZESTRIL) 40 MG tablet Take 1 tablet (40 mg total) by mouth once daily.    omega 3-dha-epa-fish oil 300-1,000 mg Cap Take 3 g by mouth.    sotaloL (BETAPACE) 80 MG tablet Take 1 tablet (80 mg total) by mouth 2 (two) times daily.     Family History       Problem Relation (Age of Onset)    Cancer Mother, Father, Maternal Grandmother, Paternal Grandmother, Paternal Grandfather, Daughter, Brother, Brother    Cataracts Father     Heart attack Maternal Grandfather, Brother    Hyperlipidemia Daughter, Son    Hypertension Daughter, Son    Strabismus Paternal Aunt    Thyroid disease Grandchild          Tobacco Use    Smoking status: Former     Packs/day: 0.25     Years: 15.00     Pack years: 3.75     Types: Cigarettes    Smokeless tobacco: Never   Substance and Sexual Activity    Alcohol use: Yes     Comment: 1-2 drinks at night    Drug use: No    Sexual activity: Not on file     ROS  Constitutional: Denies fever/chills  Eyes: Denies change in vision  ENT: Denies sore throat or rhinorrhea   Respiratory: Denies shortness of breath or cough  Cardiovascular: Denies chest pain or palpitations  Gastrointestinal: Denies abdominal pain, nausea, or vomiting  Genitourinary: Denies dysuria and flank pain  Skin: Denies new rash or skin lesions   Allergic/Immunologic: Denies adverse reactions to current medications  Neurological: Denies headaches or dizziness  Musculoskeletal: see HPI    Objective:     Vital Signs (Most Recent):  Temp: 97.9 °F (36.6 °C) (07/24/23 1528)  Pulse: (!) 54 (07/24/23 1528)  Resp: 18 (07/24/23 1528)  BP: 126/60 (07/24/23 1528)  SpO2: 97 % (07/24/23 1528) Vital Signs (24h Range):  Temp:  [97.9 °F (36.6 °C)] 97.9 °F (36.6 °C)  Pulse:  [50-54] 54  Resp:  [15-20] 18  SpO2:  [96 %-100 %] 97 %  BP: (115-175)/(56-76) 126/60           There is no height or weight on file to calculate BMI.    No intake or output data in the 24 hours ending 07/24/23 1553     Ortho/SPM Exam  LLE  Shortened, skin intact with mod swelling over lateral hip  Pain with Log roll of leg  No bony TTP throughout  Compartments soft  Painless ROM ankle   SILT Sa/Siu/DP/SP/T  Motor intact TA/SP/DP  1+ DP and PT     RLE:  Skin intact, no deformity  No TTP  Compartments soft  Full painless ROM throughout lower extremity  SILT Sa/Siu/DP/SP/T  Motor intact TA/SP/DP  1+ DP/PT     BUE:  Skin intact, no deformity noted  No open wounds/abrasions/crepitus  No bony TTP  FROM  shoulder, elbow and wrist  SILT M/U/R  Motor intact AIN/PIN/M/U/R   Cap refill < 2s  2+ RP      Spine: No TTP along spine, no step offs palpated. No sacral decubitus ulcers      Significant Labs: BMP:   Recent Labs   Lab 07/24/23  1208 07/24/23  1323   *  --    *  --    K 4.1  --    CL 99  --    CO2 24  --    BUN 12  --    CREATININE 0.7  --    CALCIUM 10.0  --    MG  --  1.9     CBC:   Recent Labs   Lab 07/24/23  1208   WBC 8.91   HGB 13.0   HCT 37.3        All pertinent labs within the past 24 hours have been reviewed.    Significant Imaging: I have reviewed and interpreted all pertinent imaging results/findings.   Xray of the pelvis showing a comminuted left-sided intertrochanteric femur fracture      Assessment/Plan:     * Closed intertrochanteric fracture of left femur  Cathy Kearns is a 89 y.o. female with a comminuted intertrochanteric fracture of the left femur, closed, NVI.  Patient was explained in detail the severity of the injury that was suffered. Patient was explained the risks/benefits/and alternatives to operative management in detail including infection, bleeding, pain, nerve and vascular damage, heterotopic ossification, leg length discrepancies, rotational deformities and they express full understanding.  Also, the patient was explained the high mortality, over 30 percent,  associated with these fractures. She expresses full understanding of the condition and expresses that they want to proceed with surgery. The patient is admitted to the medicine hip fracture service for optimization of medical comorbidities. Will plan for OR tomorrow. No Guarantees were made, informed consent was obtained. All questions were answered to patient's and family's satisfaction.     -Admitted to medicine hip fracture service  -NPO midnight  -Pain control: multimodals  -Marked, booked, and consented for surgery  -PT/OT: Bed rest  -DVT PPx: Hold anticoagulation  -Abx: Preop abx ordered  -Labs: Hb  13, , WBC 8.91, CR 0.7, UA negative  -Campos: In place, campos care  -Iv: ordered for contralateral arm          LADY Medina MD  Orthopedics  [facility] - Surgery

## 2023-07-25 ENCOUNTER — ANESTHESIA (OUTPATIENT)
Dept: SURGERY | Facility: HOSPITAL | Age: 88
DRG: 481 | End: 2023-07-25
Payer: MEDICARE

## 2023-07-25 PROBLEM — D62 ACUTE BLOOD LOSS ANEMIA: Status: ACTIVE | Noted: 2023-07-25

## 2023-07-25 LAB
ANION GAP SERPL CALC-SCNC: 8 MMOL/L (ref 8–16)
ANISOCYTOSIS BLD QL SMEAR: SLIGHT
BASOPHILS # BLD AUTO: 0.03 K/UL (ref 0–0.2)
BASOPHILS # BLD AUTO: 0.03 K/UL (ref 0–0.2)
BASOPHILS NFR BLD: 0.3 % (ref 0–1.9)
BASOPHILS NFR BLD: 0.3 % (ref 0–1.9)
BUN SERPL-MCNC: 13 MG/DL (ref 10–30)
CALCIUM SERPL-MCNC: 9.1 MG/DL (ref 8.7–10.5)
CHLORIDE SERPL-SCNC: 101 MMOL/L (ref 95–110)
CO2 SERPL-SCNC: 22 MMOL/L (ref 23–29)
CREAT SERPL-MCNC: 0.6 MG/DL (ref 0.5–1.4)
DIFFERENTIAL METHOD: ABNORMAL
DIFFERENTIAL METHOD: ABNORMAL
EOSINOPHIL # BLD AUTO: 0 K/UL (ref 0–0.5)
EOSINOPHIL # BLD AUTO: 0 K/UL (ref 0–0.5)
EOSINOPHIL NFR BLD: 0.2 % (ref 0–8)
EOSINOPHIL NFR BLD: 0.3 % (ref 0–8)
ERYTHROCYTE [DISTWIDTH] IN BLOOD BY AUTOMATED COUNT: 13.7 % (ref 11.5–14.5)
ERYTHROCYTE [DISTWIDTH] IN BLOOD BY AUTOMATED COUNT: 13.8 % (ref 11.5–14.5)
EST. GFR  (NO RACE VARIABLE): >60 ML/MIN/1.73 M^2
GLUCOSE SERPL-MCNC: 106 MG/DL (ref 70–110)
HCT VFR BLD AUTO: 28.2 % (ref 37–48.5)
HCT VFR BLD AUTO: 29.2 % (ref 37–48.5)
HGB BLD-MCNC: 9.4 G/DL (ref 12–16)
HGB BLD-MCNC: 9.7 G/DL (ref 12–16)
IMM GRANULOCYTES # BLD AUTO: 0.09 K/UL (ref 0–0.04)
IMM GRANULOCYTES # BLD AUTO: 0.14 K/UL (ref 0–0.04)
IMM GRANULOCYTES NFR BLD AUTO: 0.8 % (ref 0–0.5)
IMM GRANULOCYTES NFR BLD AUTO: 1.3 % (ref 0–0.5)
LYMPHOCYTES # BLD AUTO: 1.5 K/UL (ref 1–4.8)
LYMPHOCYTES # BLD AUTO: 1.9 K/UL (ref 1–4.8)
LYMPHOCYTES NFR BLD: 14.5 % (ref 18–48)
LYMPHOCYTES NFR BLD: 17.4 % (ref 18–48)
MAGNESIUM SERPL-MCNC: 1.8 MG/DL (ref 1.6–2.6)
MCH RBC QN AUTO: 31.2 PG (ref 27–31)
MCH RBC QN AUTO: 31.5 PG (ref 27–31)
MCHC RBC AUTO-ENTMCNC: 33.2 G/DL (ref 32–36)
MCHC RBC AUTO-ENTMCNC: 33.3 G/DL (ref 32–36)
MCV RBC AUTO: 94 FL (ref 82–98)
MCV RBC AUTO: 95 FL (ref 82–98)
MONOCYTES # BLD AUTO: 2.5 K/UL (ref 0.3–1)
MONOCYTES # BLD AUTO: 2.9 K/UL (ref 0.3–1)
MONOCYTES NFR BLD: 24.3 % (ref 4–15)
MONOCYTES NFR BLD: 27.5 % (ref 4–15)
NEUTROPHILS # BLD AUTO: 5.7 K/UL (ref 1.8–7.7)
NEUTROPHILS # BLD AUTO: 6.2 K/UL (ref 1.8–7.7)
NEUTROPHILS NFR BLD: 53.8 % (ref 38–73)
NEUTROPHILS NFR BLD: 59.3 % (ref 38–73)
NRBC BLD-RTO: 0 /100 WBC
NRBC BLD-RTO: 0 /100 WBC
PHOSPHATE SERPL-MCNC: 3 MG/DL (ref 2.7–4.5)
PLATELET # BLD AUTO: 130 K/UL (ref 150–450)
PLATELET # BLD AUTO: 141 K/UL (ref 150–450)
PLATELET BLD QL SMEAR: ABNORMAL
PMV BLD AUTO: 10.5 FL (ref 9.2–12.9)
PMV BLD AUTO: 11.2 FL (ref 9.2–12.9)
POTASSIUM SERPL-SCNC: 4 MMOL/L (ref 3.5–5.1)
RBC # BLD AUTO: 2.98 M/UL (ref 4–5.4)
RBC # BLD AUTO: 3.11 M/UL (ref 4–5.4)
SODIUM SERPL-SCNC: 131 MMOL/L (ref 136–145)
WBC # BLD AUTO: 10.46 K/UL (ref 3.9–12.7)
WBC # BLD AUTO: 10.66 K/UL (ref 3.9–12.7)

## 2023-07-25 PROCEDURE — D9220A PRA ANESTHESIA: ICD-10-PCS | Mod: CRNA,,, | Performed by: NURSE ANESTHETIST, CERTIFIED REGISTERED

## 2023-07-25 PROCEDURE — 71000016 HC POSTOP RECOV ADDL HR: Performed by: ORTHOPAEDIC SURGERY

## 2023-07-25 PROCEDURE — 63600175 PHARM REV CODE 636 W HCPCS: Performed by: NURSE ANESTHETIST, CERTIFIED REGISTERED

## 2023-07-25 PROCEDURE — 21400001 HC TELEMETRY ROOM

## 2023-07-25 PROCEDURE — 63600175 PHARM REV CODE 636 W HCPCS: Performed by: HOSPITALIST

## 2023-07-25 PROCEDURE — 85025 COMPLETE CBC W/AUTO DIFF WBC: CPT | Mod: 91 | Performed by: HOSPITALIST

## 2023-07-25 PROCEDURE — 36415 COLL VENOUS BLD VENIPUNCTURE: CPT | Performed by: HOSPITALIST

## 2023-07-25 PROCEDURE — 64448 NJX AA&/STRD FEM NRV NFS IMG: CPT

## 2023-07-25 PROCEDURE — D9220A PRA ANESTHESIA: ICD-10-PCS | Mod: ANES,,, | Performed by: ANESTHESIOLOGY

## 2023-07-25 PROCEDURE — 99223 1ST HOSP IP/OBS HIGH 75: CPT | Mod: 57,ICN,, | Performed by: ORTHOPAEDIC SURGERY

## 2023-07-25 PROCEDURE — 83735 ASSAY OF MAGNESIUM: CPT | Performed by: HOSPITALIST

## 2023-07-25 PROCEDURE — 80048 BASIC METABOLIC PNL TOTAL CA: CPT | Performed by: HOSPITALIST

## 2023-07-25 PROCEDURE — 36000711: Performed by: ORTHOPAEDIC SURGERY

## 2023-07-25 PROCEDURE — 25000003 PHARM REV CODE 250

## 2023-07-25 PROCEDURE — 63600175 PHARM REV CODE 636 W HCPCS

## 2023-07-25 PROCEDURE — 27245 PR OPEN FIX INTER/SUBTROCH FX,IMPLNT: ICD-10-PCS | Mod: LT,,, | Performed by: ORTHOPAEDIC SURGERY

## 2023-07-25 PROCEDURE — 25000003 PHARM REV CODE 250: Performed by: HOSPITALIST

## 2023-07-25 PROCEDURE — 37000009 HC ANESTHESIA EA ADD 15 MINS: Performed by: ORTHOPAEDIC SURGERY

## 2023-07-25 PROCEDURE — D9220A PRA ANESTHESIA: Mod: CRNA,,, | Performed by: NURSE ANESTHETIST, CERTIFIED REGISTERED

## 2023-07-25 PROCEDURE — C1769 GUIDE WIRE: HCPCS | Performed by: ORTHOPAEDIC SURGERY

## 2023-07-25 PROCEDURE — 99233 PR SUBSEQUENT HOSPITAL CARE,LEVL III: ICD-10-PCS | Mod: ,,, | Performed by: HOSPITALIST

## 2023-07-25 PROCEDURE — 71000015 HC POSTOP RECOV 1ST HR: Performed by: ORTHOPAEDIC SURGERY

## 2023-07-25 PROCEDURE — 25000003 PHARM REV CODE 250: Performed by: NURSE ANESTHETIST, CERTIFIED REGISTERED

## 2023-07-25 PROCEDURE — 99233 SBSQ HOSP IP/OBS HIGH 50: CPT | Mod: ,,, | Performed by: HOSPITALIST

## 2023-07-25 PROCEDURE — 25000003 PHARM REV CODE 250: Performed by: ANESTHESIOLOGY

## 2023-07-25 PROCEDURE — 63600175 PHARM REV CODE 636 W HCPCS: Performed by: ANESTHESIOLOGY

## 2023-07-25 PROCEDURE — 63600175 PHARM REV CODE 636 W HCPCS: Performed by: STUDENT IN AN ORGANIZED HEALTH CARE EDUCATION/TRAINING PROGRAM

## 2023-07-25 PROCEDURE — 27245 TREAT THIGH FRACTURE: CPT | Mod: LT,,, | Performed by: ORTHOPAEDIC SURGERY

## 2023-07-25 PROCEDURE — 64999 UNLISTED PX NERVOUS SYSTEM: CPT | Mod: ,,,

## 2023-07-25 PROCEDURE — 37000008 HC ANESTHESIA 1ST 15 MINUTES: Performed by: ORTHOPAEDIC SURGERY

## 2023-07-25 PROCEDURE — 64999 PR BLOCK, ERECTOR SPINAE PLANE, CONTINUOUS: ICD-10-PCS | Mod: ,,,

## 2023-07-25 PROCEDURE — 71000033 HC RECOVERY, INTIAL HOUR: Performed by: ORTHOPAEDIC SURGERY

## 2023-07-25 PROCEDURE — 94761 N-INVAS EAR/PLS OXIMETRY MLT: CPT

## 2023-07-25 PROCEDURE — D9220A PRA ANESTHESIA: Mod: ANES,,, | Performed by: ANESTHESIOLOGY

## 2023-07-25 PROCEDURE — 84100 ASSAY OF PHOSPHORUS: CPT | Performed by: HOSPITALIST

## 2023-07-25 PROCEDURE — 99900035 HC TECH TIME PER 15 MIN (STAT)

## 2023-07-25 PROCEDURE — 27201423 OPTIME MED/SURG SUP & DEVICES STERILE SUPPLY: Performed by: ORTHOPAEDIC SURGERY

## 2023-07-25 PROCEDURE — 36000710: Performed by: ORTHOPAEDIC SURGERY

## 2023-07-25 PROCEDURE — 99223 PR INITIAL HOSPITAL CARE,LEVL III: ICD-10-PCS | Mod: 57,ICN,, | Performed by: ORTHOPAEDIC SURGERY

## 2023-07-25 PROCEDURE — C1713 ANCHOR/SCREW BN/BN,TIS/BN: HCPCS | Performed by: ORTHOPAEDIC SURGERY

## 2023-07-25 DEVICE — SCREW LOCKING BONE T2 5X47MM: Type: IMPLANTABLE DEVICE | Site: FEMUR | Status: FUNCTIONAL

## 2023-07-25 DEVICE — IMPLANTABLE DEVICE: Type: IMPLANTABLE DEVICE | Site: FEMUR | Status: FUNCTIONAL

## 2023-07-25 RX ORDER — LIDOCAINE HYDROCHLORIDE 10 MG/ML
1 INJECTION, SOLUTION EPIDURAL; INFILTRATION; INTRACAUDAL; PERINEURAL
Status: DISCONTINUED | OUTPATIENT
Start: 2023-07-25 | End: 2023-07-25 | Stop reason: HOSPADM

## 2023-07-25 RX ORDER — ONDANSETRON 2 MG/ML
4 INJECTION INTRAMUSCULAR; INTRAVENOUS EVERY 12 HOURS PRN
Status: DISCONTINUED | OUTPATIENT
Start: 2023-07-25 | End: 2023-07-25

## 2023-07-25 RX ORDER — MUPIROCIN 20 MG/G
1 OINTMENT TOPICAL
Status: COMPLETED | OUTPATIENT
Start: 2023-07-25 | End: 2023-07-25

## 2023-07-25 RX ORDER — CHOLECALCIFEROL (VITAMIN D3) 25 MCG
1000 TABLET ORAL DAILY
Status: DISCONTINUED | OUTPATIENT
Start: 2023-07-25 | End: 2023-07-29 | Stop reason: HOSPADM

## 2023-07-25 RX ORDER — ACETAMINOPHEN 500 MG
1000 TABLET ORAL EVERY 6 HOURS
Status: COMPLETED | OUTPATIENT
Start: 2023-07-25 | End: 2023-07-27

## 2023-07-25 RX ORDER — CALCIUM CHLORIDE INJECTION 100 MG/ML
INJECTION, SOLUTION INTRAVENOUS
Status: DISCONTINUED | OUTPATIENT
Start: 2023-07-25 | End: 2023-07-25

## 2023-07-25 RX ORDER — FENTANYL CITRATE 50 UG/ML
25-200 INJECTION, SOLUTION INTRAMUSCULAR; INTRAVENOUS
Status: DISCONTINUED | OUTPATIENT
Start: 2023-07-25 | End: 2023-07-25 | Stop reason: HOSPADM

## 2023-07-25 RX ORDER — ROPIVACAINE HYDROCHLORIDE 2 MG/ML
0.1 INJECTION, SOLUTION EPIDURAL; INFILTRATION; PERINEURAL CONTINUOUS
Status: DISCONTINUED | OUTPATIENT
Start: 2023-07-25 | End: 2023-07-25

## 2023-07-25 RX ORDER — FENTANYL CITRATE 50 UG/ML
25 INJECTION, SOLUTION INTRAMUSCULAR; INTRAVENOUS EVERY 5 MIN PRN
Status: DISCONTINUED | OUTPATIENT
Start: 2023-07-25 | End: 2023-07-25 | Stop reason: HOSPADM

## 2023-07-25 RX ORDER — POLYETHYLENE GLYCOL 3350 17 G/17G
17 POWDER, FOR SOLUTION ORAL DAILY
Status: DISCONTINUED | OUTPATIENT
Start: 2023-07-25 | End: 2023-07-25

## 2023-07-25 RX ORDER — METHOCARBAMOL 500 MG/1
500 TABLET, FILM COATED ORAL EVERY 6 HOURS PRN
Status: DISCONTINUED | OUTPATIENT
Start: 2023-07-25 | End: 2023-07-25

## 2023-07-25 RX ORDER — MUPIROCIN 20 MG/G
1 OINTMENT TOPICAL 2 TIMES DAILY
Status: DISCONTINUED | OUTPATIENT
Start: 2023-07-25 | End: 2023-07-29 | Stop reason: HOSPADM

## 2023-07-25 RX ORDER — EPHEDRINE SULFATE 50 MG/ML
INJECTION, SOLUTION INTRAVENOUS
Status: DISCONTINUED | OUTPATIENT
Start: 2023-07-25 | End: 2023-07-25

## 2023-07-25 RX ORDER — MIDAZOLAM HYDROCHLORIDE 1 MG/ML
.5-4 INJECTION INTRAMUSCULAR; INTRAVENOUS
Status: DISCONTINUED | OUTPATIENT
Start: 2023-07-25 | End: 2023-07-25 | Stop reason: HOSPADM

## 2023-07-25 RX ORDER — BUPIVACAINE HYDROCHLORIDE AND EPINEPHRINE 2.5; 5 MG/ML; UG/ML
INJECTION, SOLUTION EPIDURAL; INFILTRATION; INTRACAUDAL; PERINEURAL
Status: COMPLETED | OUTPATIENT
Start: 2023-07-25 | End: 2023-07-25

## 2023-07-25 RX ORDER — AMOXICILLIN 250 MG
1 CAPSULE ORAL 2 TIMES DAILY
Status: DISCONTINUED | OUTPATIENT
Start: 2023-07-25 | End: 2023-07-29 | Stop reason: HOSPADM

## 2023-07-25 RX ORDER — CEFAZOLIN SODIUM 1 G/3ML
INJECTION, POWDER, FOR SOLUTION INTRAMUSCULAR; INTRAVENOUS
Status: DISCONTINUED | OUTPATIENT
Start: 2023-07-25 | End: 2023-07-25

## 2023-07-25 RX ORDER — ONDANSETRON 2 MG/ML
INJECTION INTRAMUSCULAR; INTRAVENOUS
Status: DISCONTINUED | OUTPATIENT
Start: 2023-07-25 | End: 2023-07-25

## 2023-07-25 RX ORDER — ROPIVACAINE HYDROCHLORIDE 2 MG/ML
15 INJECTION, SOLUTION EPIDURAL; INFILTRATION; PERINEURAL
Status: DISCONTINUED | OUTPATIENT
Start: 2023-07-25 | End: 2023-07-25

## 2023-07-25 RX ORDER — ROPIVACAINE HYDROCHLORIDE 2 MG/ML
0.1 INJECTION, SOLUTION EPIDURAL; INFILTRATION; PERINEURAL CONTINUOUS
Status: DISCONTINUED | OUTPATIENT
Start: 2023-07-25 | End: 2023-07-29

## 2023-07-25 RX ORDER — PROPOFOL 10 MG/ML
VIAL (ML) INTRAVENOUS
Status: DISCONTINUED | OUTPATIENT
Start: 2023-07-25 | End: 2023-07-25

## 2023-07-25 RX ORDER — LIDOCAINE HYDROCHLORIDE 20 MG/ML
INJECTION, SOLUTION EPIDURAL; INFILTRATION; INTRACAUDAL; PERINEURAL
Status: DISCONTINUED | OUTPATIENT
Start: 2023-07-25 | End: 2023-07-25

## 2023-07-25 RX ORDER — SODIUM CHLORIDE 9 MG/ML
INJECTION, SOLUTION INTRAVENOUS CONTINUOUS
Status: DISCONTINUED | OUTPATIENT
Start: 2023-07-25 | End: 2023-07-26

## 2023-07-25 RX ORDER — TRANEXAMIC ACID 100 MG/ML
INJECTION, SOLUTION INTRAVENOUS
Status: DISCONTINUED | OUTPATIENT
Start: 2023-07-25 | End: 2023-07-25

## 2023-07-25 RX ORDER — PHENYLEPHRINE HCL IN 0.9% NACL 1 MG/10 ML
SYRINGE (ML) INTRAVENOUS
Status: DISCONTINUED | OUTPATIENT
Start: 2023-07-25 | End: 2023-07-25

## 2023-07-25 RX ORDER — DEXAMETHASONE SODIUM PHOSPHATE 4 MG/ML
INJECTION, SOLUTION INTRA-ARTICULAR; INTRALESIONAL; INTRAMUSCULAR; INTRAVENOUS; SOFT TISSUE
Status: DISCONTINUED | OUTPATIENT
Start: 2023-07-25 | End: 2023-07-25

## 2023-07-25 RX ORDER — SODIUM CHLORIDE 0.9 % (FLUSH) 0.9 %
3 SYRINGE (ML) INJECTION
Status: DISCONTINUED | OUTPATIENT
Start: 2023-07-25 | End: 2023-07-25 | Stop reason: HOSPADM

## 2023-07-25 RX ORDER — FENTANYL CITRATE 50 UG/ML
INJECTION, SOLUTION INTRAMUSCULAR; INTRAVENOUS
Status: DISCONTINUED | OUTPATIENT
Start: 2023-07-25 | End: 2023-07-25

## 2023-07-25 RX ORDER — ROCURONIUM BROMIDE 10 MG/ML
INJECTION, SOLUTION INTRAVENOUS
Status: DISCONTINUED | OUTPATIENT
Start: 2023-07-25 | End: 2023-07-25

## 2023-07-25 RX ADMIN — LIDOCAINE HYDROCHLORIDE 50 MG: 20 INJECTION, SOLUTION EPIDURAL; INFILTRATION; INTRACAUDAL; PERINEURAL at 07:07

## 2023-07-25 RX ADMIN — CALCIUM CHLORIDE 500 MG: 100 INJECTION, SOLUTION INTRAVENOUS at 08:07

## 2023-07-25 RX ADMIN — SENNOSIDES AND DOCUSATE SODIUM 1 TABLET: 50; 8.6 TABLET ORAL at 08:07

## 2023-07-25 RX ADMIN — MUPIROCIN 1 G: 20 OINTMENT TOPICAL at 08:07

## 2023-07-25 RX ADMIN — BUPIVACAINE HYDROCHLORIDE AND EPINEPHRINE BITARTRATE 40 ML: 2.5; .0091 INJECTION, SOLUTION EPIDURAL; INFILTRATION; INTRACAUDAL; PERINEURAL at 06:07

## 2023-07-25 RX ADMIN — Medication 200 MCG: at 07:07

## 2023-07-25 RX ADMIN — GLYCOPYRROLATE 0.2 MG: 0.2 INJECTION INTRAMUSCULAR; INTRAVENOUS at 07:07

## 2023-07-25 RX ADMIN — MUPIROCIN 1 G: 20 OINTMENT TOPICAL at 11:07

## 2023-07-25 RX ADMIN — CEFAZOLIN 2 G: 330 INJECTION, POWDER, FOR SOLUTION INTRAMUSCULAR; INTRAVENOUS at 07:07

## 2023-07-25 RX ADMIN — MORPHINE SULFATE 2 MG: 2 INJECTION, SOLUTION INTRAMUSCULAR; INTRAVENOUS at 01:07

## 2023-07-25 RX ADMIN — DEXAMETHASONE SODIUM PHOSPHATE 4 MG: 4 INJECTION INTRA-ARTICULAR; INTRALESIONAL; INTRAMUSCULAR; INTRAVENOUS; SOFT TISSUE at 07:07

## 2023-07-25 RX ADMIN — FENTANYL CITRATE 50 MCG: 50 INJECTION INTRAMUSCULAR; INTRAVENOUS at 07:07

## 2023-07-25 RX ADMIN — ROCURONIUM BROMIDE 40 MG: 10 INJECTION, SOLUTION INTRAVENOUS at 07:07

## 2023-07-25 RX ADMIN — ROPIVACAINE HYDROCHLORIDE 0.1 ML/HR: 2 INJECTION, SOLUTION EPIDURAL; INFILTRATION at 03:07

## 2023-07-25 RX ADMIN — TRANEXAMIC ACID 1000 MG: 100 INJECTION INTRAVENOUS at 07:07

## 2023-07-25 RX ADMIN — SODIUM CHLORIDE: 9 INJECTION, SOLUTION INTRAVENOUS at 07:07

## 2023-07-25 RX ADMIN — CHOLECALCIFEROL TAB 25 MCG (1000 UNIT) 1000 UNITS: 25 TAB at 11:07

## 2023-07-25 RX ADMIN — EPHEDRINE SULFATE 10 MG: 50 INJECTION INTRAVENOUS at 08:07

## 2023-07-25 RX ADMIN — ONDANSETRON 4 MG: 2 INJECTION INTRAMUSCULAR; INTRAVENOUS at 07:07

## 2023-07-25 RX ADMIN — SUGAMMADEX 200 MG: 100 INJECTION, SOLUTION INTRAVENOUS at 08:07

## 2023-07-25 RX ADMIN — EPHEDRINE SULFATE 5 MG: 50 INJECTION INTRAVENOUS at 08:07

## 2023-07-25 RX ADMIN — FENTANYL CITRATE 25 MCG: 50 INJECTION INTRAMUSCULAR; INTRAVENOUS at 08:07

## 2023-07-25 RX ADMIN — CEFAZOLIN 2 G: 2 INJECTION, POWDER, FOR SOLUTION INTRAMUSCULAR; INTRAVENOUS at 03:07

## 2023-07-25 RX ADMIN — Medication 100 MCG: at 08:07

## 2023-07-25 RX ADMIN — EPHEDRINE SULFATE 10 MG: 50 INJECTION INTRAVENOUS at 07:07

## 2023-07-25 RX ADMIN — LISINOPRIL 40 MG: 20 TABLET ORAL at 10:07

## 2023-07-25 RX ADMIN — FENTANYL CITRATE 25 MCG: 50 INJECTION INTRAMUSCULAR; INTRAVENOUS at 10:07

## 2023-07-25 RX ADMIN — ROPIVACAINE HYDROCHLORIDE 0.1 ML/HR: 2 INJECTION, SOLUTION EPIDURAL; INFILTRATION at 09:07

## 2023-07-25 RX ADMIN — SOTALOL HYDROCHLORIDE 80 MG: 80 TABLET ORAL at 08:07

## 2023-07-25 RX ADMIN — VANCOMYCIN HYDROCHLORIDE 1000 MG: 1 INJECTION, POWDER, LYOPHILIZED, FOR SOLUTION INTRAVENOUS at 06:07

## 2023-07-25 RX ADMIN — ACETAMINOPHEN 1000 MG: 500 TABLET ORAL at 05:07

## 2023-07-25 RX ADMIN — METHOCARBAMOL 500 MG: 500 TABLET ORAL at 11:07

## 2023-07-25 RX ADMIN — MUPIROCIN 1 G: 20 OINTMENT TOPICAL at 06:07

## 2023-07-25 RX ADMIN — EPHEDRINE SULFATE 5 MG: 50 INJECTION INTRAVENOUS at 07:07

## 2023-07-25 RX ADMIN — PROPOFOL 100 MG: 10 INJECTION, EMULSION INTRAVENOUS at 07:07

## 2023-07-25 RX ADMIN — ACETAMINOPHEN 1000 MG: 500 TABLET ORAL at 11:07

## 2023-07-25 RX ADMIN — Medication 100 MCG: at 07:07

## 2023-07-25 RX ADMIN — FENTANYL CITRATE 25 MCG: 0.05 INJECTION, SOLUTION INTRAMUSCULAR; INTRAVENOUS at 06:07

## 2023-07-25 RX ADMIN — AMLODIPINE BESYLATE 5 MG: 5 TABLET ORAL at 10:07

## 2023-07-25 RX ADMIN — SENNOSIDES AND DOCUSATE SODIUM 1 TABLET: 50; 8.6 TABLET ORAL at 10:07

## 2023-07-25 NOTE — ANESTHESIA POSTPROCEDURE EVALUATION
Anesthesia Post Evaluation    Patient: Cathy Kearns    Procedure(s) Performed: Procedure(s) (LRB):  INSERTION, INTRAMEDULLARY DAPHNEY, LEFT FEMUR (Left)    Final Anesthesia Type: general      Patient location during evaluation: PACU  Patient participation: Yes- Able to Participate  Level of consciousness: awake and alert and oriented  Post-procedure vital signs: reviewed and stable  Pain management: adequate  Airway patency: patent    PONV status at discharge: No PONV  Anesthetic complications: no      Cardiovascular status: hemodynamically stable  Respiratory status: unassisted and spontaneous ventilation  Hydration status: euvolemic  Follow-up not needed.          Vitals Value Taken Time   /71 07/25/23 1002   Temp 36.1 °C (97 °F) 07/25/23 0908   Pulse 70 07/25/23 1004   Resp 21 07/25/23 1004   SpO2 96 % 07/25/23 1004   Vitals shown include unvalidated device data.      No case tracking events are documented in the log.      Pain/Kami Score: Pain Rating Prior to Med Admin: 0 (7/25/2023  9:24 AM)  Pain Rating Post Med Admin: 0 (7/25/2023  6:55 AM)  Kami Score: 8 (7/25/2023  9:08 AM)

## 2023-07-25 NOTE — ANESTHESIA PROCEDURE NOTES
Intubation    Date/Time: 7/25/2023 7:11 AM  Performed by: Sim Leslie CRNA  Authorized by: Pam Parker MD     Intubation:     Induction:  Intravenous    Intubated:  Postinduction    Mask Ventilation:  Easy mask    Attempts:  1    Attempted By:  CRNA    Method of Intubation:  Video laryngoscopy    Blade:  Topete 3    Laryngeal View Grade: Grade I - full view of cords      Difficult Airway Encountered?: No      Complications:  None    Airway Device:  Oral endotracheal tube    Airway Device Size:  7.0    Style/Cuff Inflation:  Cuffed (inflated to minimal occlusive pressure)    Inflation Amount (mL):  8    Tube secured:  22    Secured at:  The teeth    Placement Verified By:  Capnometry    Complicating Factors:  None    Findings Post-Intubation:  BS equal bilateral and atraumatic/condition of teeth unchanged

## 2023-07-25 NOTE — PROGRESS NOTES
Hand off report obtained from Rcoio (inpatient nurse). Patient will travel via bed to [facility] preOp [facility]C 23.

## 2023-07-25 NOTE — NURSING TRANSFER
Nursing Transfer Note      7/25/2023   10:48 AM    Nurse giving handoff:Eren  Nurse receiving handoff:Meagan     Reason patient is being transferred: Post op    Transfer To: [id number]    Transfer via bed    Transfer with cardiac monitoring, ropivicane infusion    Transported by Pt transport     Transfer Vital Signs:  Blood Pressure:134/72  Heart Rate:64  O2:95  Temperature:97.2  Respirations:16    Telemetry: Box Number    Order for Tele Monitor? Yes    Additional Lines: Blanco Catheter    4eyes on Skin: yes    Medicines sent: None    Any special needs or follow-up needed: Sotalol not given reported to nurse Meagan medication has to come from pharmacy    Patient belongings transferred with patient: Yes    Chart send with patient: Yes    Notified: daughter    Patient reassessed at: 7/25/2023 1000  1  Upon arrival to floor: cardiac monitor applied, patient oriented to room, call bell in reach, and bed in lowest position

## 2023-07-25 NOTE — ADDENDUM NOTE
Addendum  created 07/25/23 1258 by Myriam Patten, RN    Order list changed, Pharmacy for encounter modified

## 2023-07-25 NOTE — PLAN OF CARE
Pt resting in bed comfortably. PIV line intact and free of infection and irritation. Fall precautions maintained, no falls noted. Call light within reach, bed locked and in lowest position. Non-skid socks on while out of bed. Patient instructed to call for assistance. Skin integrity maintained as patient is independent with frequent repositioning. Blanco catheter in place, draining to urimeter. C/o mild pain, managed with PRN meds, no other complaints or concerns. PNC in place, infusing Ropivacaine. Progressing towards goals. Will continue to monitor and follow plan of care.

## 2023-07-25 NOTE — PROGRESS NOTES
Progress Note  Hospital Medicine       Patient Name: Cathy Kearns  MRN:  [id number]  [facility] Medicine Team: [facility] Ludmila Granado MD  Date of Admission:  7/24/2023     Principal Problem:  Closed intertrochanteric fracture, left, initial encounter   Primary Care Physician: Wilma Xiong MD      Interval history     Sen in PACU and she reported feeling well so far post op. She reported starting to feel a little sensation of pain in her leg now post surgery. She feels tired and would like to rest today and discussed that able to rest today and will have PT/OT tomorrow with WBAT. Resume home eliquis tonight with afib history and for DVT ppx. BP a little higher so nursing to give home bP Meds now but to hold sotalol for now as HRS in lower 50s on return to the floor to allow to equlibrate and cont tele to ensure no return to afib as hasnt been in for a long time she said but she reports she has been in in the past when she had stress. Family and children at bedside and discussed planning with them with monitoring int  hospital a few days post op for lab trends and pain control and for thearpy and likely end of the week if staying on track would be ready for a SNF vs rehab for further rehabilitation and they prefer the [facility] facilities as their first choice for this. Doing well and no other issues to report now on exam. Will touch base with optho tomorrow regarding her eye injections that are due about now.        Review of Systems   Constitutional: Negative for chills, fatigue, fever.   HENT: Negative for sore throat, trouble swallowing.    Eyes: Negative for photophobia, visual disturbance.   Respiratory: Negative for cough, shortness of breath.    Cardiovascular: Negative for chest pain, palpitations, leg swelling.   Gastrointestinal: Negative for abdominal pain, constipation, diarrhea, nausea, vomiting.   Endocrine: Negative for cold intolerance, heat intolerance.   Genitourinary: Negative for  dysuria, frequency.   Musculoskeletal: + for arthralgias, myalgias.   Skin: Negative for rash, wound, erythema   Neurological: Negative for dizziness, syncope, weakness, light-headedness.   Psychiatric/Behavioral: Negative for confusion, hallucinations, anxiety  All other systems reviewed and are negative.      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    Past Surgical History: Patient has a past surgical history that includes Appendectomy; Intravitreal Injection; Adenoidectomy; Tonsillectomy; Hysterectomy; and Cataract extraction w/  intraocular lens implant (Bilateral, n/a).    Social History: Patient reports that she has quit smoking. Her smoking use included cigarettes. She has a 3.75 pack-year smoking history. She has never used smokeless tobacco. She reports current alcohol use. She reports that she does not use drugs.    Family History: family history includes Cancer in her brother, brother, daughter, father, maternal grandmother, mother, paternal grandfather, and paternal grandmother; Cataracts in her father; Heart attack in her brother and maternal grandfather; Hyperlipidemia in her daughter and son; Hypertension in her daughter and son; Strabismus in her paternal aunt; Thyroid disease in her grandchild.    Medications: Scheduled Meds:   acetaminophen  1,000 mg Oral Q6H    amLODIPine  5 mg Oral Daily    [START ON 7/26/2023] apixaban  2.5 mg Oral BID    ceFAZolin (ANCEF) IVPB  2 g Intravenous Q8H    lisinopriL  40 mg Oral Daily    mupirocin  1 g Nasal BID    polyethylene glycol  17 g Oral Daily    senna-docusate 8.6-50 mg  1 tablet Oral BID    sotaloL  80 mg  Oral BID    [START ON 7/26/2023] vancomycin (VANCOCIN) IV (PEDS and ADULTS)  1,000 mg Intravenous Q24H    vitamin D  1,000 Units Oral Daily     Continuous Infusions:   sodium chloride 0.9%      ROPIvacaine (PF) 2 mg/ml (0.2%) 0.1 mL/hr (07/25/23 0924)     PRN Meds:.bisacodyL, melatonin, methocarbamoL, ondansetron, sodium chloride 0.9%    Allergies: Patient is allergic to penicillins, astelin [azelastine], pseudoephedrine hcl, and sulfa (sulfonamide antibiotics).    Physical Exam:     Vital Signs (Most Recent):  Temp: 97.3 °F (36.3 °C) (07/25/23 1057)  Pulse: (!) 53 (07/25/23 1057)  Resp: 14 (07/25/23 1057)  BP: (!) 155/67 (07/25/23 1057)  SpO2: (!) 93 % (07/25/23 1057) Vital Signs Range (Last 24H):  Temp:  [96.1 °F (35.6 °C)-97.9 °F (36.6 °C)]   Pulse:  [50-70]   Resp:  [10-20]   BP: (115-175)/(56-76)   SpO2:  [93 %-100 %]    Body mass index is 21.4 kg/m².     Physical Exam:  Constitutional: Appears well-developed and well-nourished. Hard of hearing. Pleasant, good historian. Family at bedside. In PACU  Head: Normocephalic and atraumatic.   Mouth/Throat: Oropharynx is clear and moist.   Eyes: EOM are normal. Pupils are equal, round, and reactive to light. No scleral icterus.   Neck: Normal range of motion. Neck supple.   Cardiovascular: Normal rate and regular rhythm.  No murmur heard.  Pulmonary/Chest: Effort normal and breath sounds normal. No respiratory distress. No wheezes, rales, or rhonchi  Abdominal: Soft. Bowel sounds are normal.  No distension or tenderness  Musculoskeletal: bandages to LLE. Ropivicaine in place  Neurological: Alert and oriented to person, place, and time.   Skin: Skin is warm and dry.   Psychiatric: Normal mood and affect. Behavior is normal.   Vitals reviewed.    Recent Labs   Lab 07/24/23  1208 07/25/23  0501 07/25/23  0909   WBC 8.91 10.66 10.46   HGB 13.0 9.7* 9.4*   HCT 37.3 29.2* 28.2*    141* 130*       Recent Labs   Lab 07/24/23  1208 07/24/23  1323 07/25/23  0501   *   --  131*   K 4.1  --  4.0   CL 99  --  101   CO2 24  --  22*   BUN 12  --  13   CREATININE 0.7  --  0.6   *  --  106   CALCIUM 10.0  --  9.1   MG  --  1.9 1.8   PHOS  --  2.8 3.0     Recent Labs   Lab 07/24/23  1208   ALKPHOS 51*   ALT 16   AST 22   ALBUMIN 4.1   PROT 7.1   BILITOT 0.7   INR 1.0      No results for input(s): POCTGLUCOSE in the last 168 hours.      Assessment and Plan:     Ms. Cathy Kearns is a 89 y.o. female who presented to [facility] on 7/24/2023 with     Left intertrochanteric fracture  -sustained in mechanical fall with left IT fx seen on imaging and shortened and rotated LLE  -pain on exam with multimodal pain meds on board with hip fx pathway initiated  -CXR normal on admit, eKG with sinus bradycardia with recent stress echo 2023 negative for ischemia  -hg 13 on admit  -baseline osteoperosis seen on DEXA scan and could not tolerate fosamax due to nasal bone pain and should consider prolia long term or another anabolic as another option  -reports not on calcium due to hypercalcemia when on it in the past  -IM nail on 7/25 with Dr Chand with recs for WBAT post op. PT/OT on POD 1    -Vit D 44 in the past  -Anticoag- resume home eliquis post op later today    COPD  -does not use home albuterol ever as controlled symptoms    HTN  -continue home meds    Paroxysmal atrial fibrillation  -in NSR on admit, reports can feel when in afib as has palpitations and has been awhile since known to be in afib she said  -cont sotalol (hold this AM as HRs in lower 50s), resume eliquis post op  -telemetry  -says stress puts her in afib, watch post op    HLD  -not on meds    Hyponatremia  -possibly from dehydration from exercise class she was in prior to admit  -cont daily CMP    Monocytosis  -has workup in past and being monitored outpatient  -19% similar to previous baseline    Osteoperosis  -DEXA with T score -1 to -2.5, could not tolerate fosamax  -consider prolia or another anabolic long term given  now with associated fracture    Vitamin D deficiency  -improved as 44 on recent check    HX of Macular degeneration of right eye  -receives eyelea with Dr Mandujano, last done on 5/23, she reports she was due for an appointment this wee, per notes she was due around 7/18 for injection, I cant see an appointment scheduled in [facility] however with optho this week that she is referring to, she asked to reach out to optho as she is very worried about missing her injections that are due  -will touch base with optho tues-wed regarding this    Acute Blood Loss Anemia  -expected with fracture and associated inflammation with HG 13 on admit -- 9.7 now and continue to closely trend in post op period with expected losses from surgery             Diet:  regular post op  DVT PPx:  eliquis post op    Disposition:  PT/OT tomorrow    Discharge Planning   ANGELA: 7/28/2023     Code Status: Full Code   Is the patient medically ready for discharge?: No    Reason for patient still in hospital (select all that apply): Patient trending condition

## 2023-07-25 NOTE — ADDENDUM NOTE
Addendum  created 07/25/23 1203 by Myriam Patten, RN    Order list changed, Pharmacy for encounter modified

## 2023-07-25 NOTE — TRANSFER OF CARE
"Anesthesia Transfer of Care Note    Patient: Cathy Kearns    Procedure(s) Performed: Procedure(s) (LRB):  INSERTION, INTRAMEDULLARY DAPHNEY, LEFT FEMUR (Left)    Patient location: PACU    Anesthesia Type: general    Transport from OR: Transported from OR on room air with adequate spontaneous ventilation    Post pain: adequate analgesia    Post assessment: no apparent anesthetic complications    Post vital signs: stable    Level of consciousness: awake    Nausea/Vomiting: no nausea/vomiting    Complications: none    Transfer of care protocol was followed      Last vitals:   Visit Vitals  BP (!) 129/58 (BP Location: Left arm, Patient Position: Lying)   Pulse 70   Temp 36.1 °C (97 °F) (Temporal)   Resp 16   Ht 5' 2" (1.575 m)   Wt 53.1 kg (117 lb)   SpO2 100%   Breastfeeding No   BMI 21.40 kg/m²     "

## 2023-07-25 NOTE — OP NOTE
OPERATIVE NOTE     DATE OF PROCEDURE:  07/25/2023     PREOPERATIVE DIAGNOSIS:           Left intertrochanteric hip fracture, closed, displaced, initial encounter  Fall from standing     POSTOPERATIVE DIAGNOSIS:         Left intertrochanteric hip fracture, closed, displaced, initial encounter  Fall from standing     PROCEDURE:              Open reduction internal fixation left intertrochanteric hip fracture with intramedullary nail     SURGEON:       Rogerio Chand MD     ASSISTANT:                 Sergio Louise MD     ANESTHESIA:              General     EBL:                  200 mL     COMPLICATIONS:  none     IMPLANTS:       Desi Gamma 4, 11 x 380 mm  Compression screw, 85 mm  Distal interlocking screw, 5 mm, x1    SPECIMENS:    None     INDICATIONS FOR PROCEDURE:  89-year-old female  Mechanical fall 07/24/2023  Immediate left hip pain.  Inability to bear weight.  Brought to the emergency room.  Evaluated by orthopedic resident on-call team, hospitalist team.  Physical exam x-rays indicated an intertrochanteric hip fracture.  Admitted to the hospital for further care     At the time of my evaluation, patient complained of isolated pain in left hip, 7/10, sharp, stabbing, worse with motion, improved with rest.  No numbness and tingling.     Lives home alone  Independent community ambulator  AFib, on Eliquis  Denies prior heart attack, stroke, blood clot, cancer  Osteoporosis, previously on bisphosphonates      Discussed diagnosis of intertrochanteric hip fracture with both patient and family members.  Discussed both non operative and operative management options.  Non operative management would consist of bed rest, protected weight bearing and would likely result in a malunion/nonunion, prolonged pain, debility, and the medical comorbidities associated with prolonged bed rest/immobility.  Discussed operative intervention the form of open reduction internal fixation with intramedullary nail.  Hopefully  this would improve pain, alignment, mobility, healing potential, overall outcome.     The risks, benefits, and alternatives to surgery were discussed with the patient and/or family.    Specific risks discussed included, but were not limited to:  Limb length discrepancy, malrotation, head perforation, avascular necrosis, hip arthritis, abductor pain, limp, gait difficulty, failure of hardware, damage to nearby structures, including neurovascular structures leading to loss of function or loss of limb, bleeding, need for blood transfusion, pain, stiffness, scarring, numbness, tingling, weakness, compartment syndrome, malunion/nonunion, hardware failure, hardware prominence, infection, need for multiple staged procedures, prolonged antibiotics, iatrogenic fracture, heterotopic ossification, arthritis, a variety of medical complications including but not limited to heart attack, stroke, deep venous thrombosis, pulmonary embolism, prolonged hospitalization, prolonged intubation, and death.   Patient and/or family expressed an understanding and desires to proceed with surgery.   All questions were answered.  No guarantees were implied or stated.  Informed consent was obtained.        OPERATIVE PROCEDURE:  Patient met in the preop hold area, the correct site and side of surgery being the Left hip were marked and verified.  Regional block placed by Anesthesia.  Patient brought back to the operative suite.     General anesthesia smoothly induced    Fracture boots were placed.  Patient was transferred over to operative table and placed in supine position.  Peroneal post was placed. Operative side arm was draped across the chest and well padded. All bony prominences were appropriately padded.  We performed traction and reduction maneuvers, and were able to obtain a closed reduction of the fracture.  left lower extremity was prepped and draped in normal sterile fashion. Preoperative antibiotics of Ancef 2g were given. 1g IV TXA  was given to aid in hemostasis     Time-out was performed verifying the correct patient, site/side of surgery, surgical consent, radiographs as applicable, preop antibiotics, necessary equipment, anticipated blood loss, length of procedure, postoperative disposition.     We began the case by marking anatomic landmarks on the patient. Incision was made proximal to the greater trochanter.  Fascia was incised. Awl was utilized. Guide pin was entered in a center center position confirmed on both AP and lateral fluoroscopic imaging.  It was advanced to the level of the lesser trochanter. Opening Reamer with appropriate soft tissue guide was utilized to open the canal.  Ball-tipped guidewire was inserted in an intramedullary location confirmed on fluoroscopic imaging.  This was advanced to the superior pole of patella. Nail was measured. We reamed once with a 12.5mm reamer to confirm that a 11 mm nail would fit.  Appropriate size/length nail was placed on the insertion jig and inserted into the intramedullary canal, confirmed on multiplanar fluoroscopic imaging.  This was inserted to the appropriate depth and ensured to not perforate anterior femoral cortex on lateral imaging.       We then used the outrigger guide and triple sleeve inserted through a lateral incision on the thigh to place a guide pin in appropriate center center position through the femoral neck and head.  We used fluoroscopic imaging to confirm our  pin placement.  This was appropriate tip apex distance.  We confirmed that the tip of the wire did not perforate femoral head.  We then used the lateral opening Reamer.  The appropriate size compression screw was then placed with the appropriate tip apex distance on AP and lateral views. Traction removed.  Fracture was compressed through the guide.  Set screw tightened.  Set screw loosened quarter turn to allow compression.    We turned our attention to placement of distal interlocking screws.  We chose to  place 1 distal interlocking screw to provide stability in axial loading and rotation.  1 screw was placed from lateral to medial using percutaneous stab incision and perfect [place] IRA technique. We confirmed appropriate placement of the screws on both AP and lateral fluoroscopic imaging.      Final fluoroscopic imaging of the entire femur was taken in AP and lateral views confirming appropriate hardware placement fracture reduction.     Wounds were thoroughly irrigated with saline. Fascia was closed with 0 Vicryl.  Subcutaneous tissue closed with 2 O Vicryl. Skin closed with 3 Monocryl. Dermabond applied.  Aquacel dressing applied.     Prior to final closure all counts were confirmed to be correct.  Patient tolerated procedure well, was extubated, transferred to PACU for further recovery.     At the conclusion of the procedure the patient had soft and compressible compartments, brisk cap refill, palpable DP and PT pulse in the operative extremity.     POSTOPERATIVE PLAN:  89F, fall 7.24.23  L intertrochanteric hip fracture     7.25.23 - IM nail L hip IT fracture     Antibiotics x 24 hr  Resume baseline eliquis POD1     Hospitalist comanagement  Multimodal pain management  Calcium, vitamin-D, boost  PT     Fragility fracture Clinic referral     WBAT L lower extremity  L lower extremity, range of motion as tolerated     X-ray AP pelvis and L femur AP lateral views at subsequent followups     Follow-up postop 2 weeks, 6 weeks, 3 months, 6 months, 1 year           =====================  Rogerio Chand MD  Orthopaedic Surgery

## 2023-07-25 NOTE — PROGRESS NOTES
PreOp complete. Telebox sent to  PACU. IV to right FA #20 leaking. IV removed and new IV placed #20 to right AC. Patient tolerated well.

## 2023-07-25 NOTE — NURSING
Pt returned from PACU via hospital bed. Oriented to room and call light system. VSS and pt resting comfortably at this time. Will continue to monitor.

## 2023-07-25 NOTE — ANESTHESIA PROCEDURE NOTES
Left SIFI catheter    Patient location during procedure: pre-op   Block not for primary anesthetic.  Reason for block: at surgeon's request and post-op pain management   Post-op Pain Location: Left hip pain   Start time: 7/25/2023 6:34 AM  Timeout: 7/25/2023 6:32 AM   End time: 7/25/2023 6:47 AM    Staffing  Authorizing Provider: Adrienne Lerma MD  Performing Provider: Petr Hackett DO    Preanesthetic Checklist  Completed: patient identified, IV checked, site marked, risks and benefits discussed, surgical consent, monitors and equipment checked, pre-op evaluation and timeout performed  Peripheral Block  Patient position: supine  Prep: ChloraPrep and site prepped and draped  Patient monitoring: heart rate, cardiac monitor, continuous pulse ox, continuous capnometry and frequent blood pressure checks  Block type: fascia iliaca  Laterality: left  Injection technique: continuous  Needle  Needle type: Tuohy   Needle gauge: 17 G  Needle length: 3.5 in  Needle localization: anatomical landmarks and ultrasound guidance  Catheter type: spring wound  Catheter size: 19 G  Test dose: lidocaine 1.5% with Epi 1-to-200,000 and negative   -ultrasound image captured on disc.  Assessment  Injection assessment: negative aspiration, negative parasthesia and local visualized surrounding nerve  Paresthesia pain: none  Heart rate change: no  Slow fractionated injection: yes  Pain Tolerance: comfortable throughout block and no complaints  Medications:    Medications: bupivacaine 0.25%-EPINEPHrine (PF) 1:200,000 injection - Other   40 mL - 7/25/2023 6:48:00 AM    Additional Notes  VSS.  [facility] RN monitoring vitals throughout procedure.  Patient tolerated procedure well.

## 2023-07-26 PROBLEM — J43.1 PANLOBULAR EMPHYSEMA: Status: ACTIVE | Noted: 2019-01-29

## 2023-07-26 PROBLEM — D72.821 MONOCYTOSES: Status: RESOLVED | Noted: 2020-09-02 | Resolved: 2023-07-26

## 2023-07-26 PROBLEM — R06.02 SHORTNESS OF BREATH: Status: RESOLVED | Noted: 2022-08-04 | Resolved: 2023-07-26

## 2023-07-26 PROBLEM — M80.052D: Status: ACTIVE | Noted: 2017-02-22

## 2023-07-26 PROBLEM — S72.142D CLOSED DISPLACED INTERTROCHANTERIC FRACTURE OF LEFT FEMUR WITH ROUTINE HEALING: Status: ACTIVE | Noted: 2023-07-24

## 2023-07-26 LAB
ACANTHOCYTES BLD QL SMEAR: PRESENT
ANION GAP SERPL CALC-SCNC: 8 MMOL/L (ref 8–16)
ANISOCYTOSIS BLD QL SMEAR: SLIGHT
BASOPHILS NFR BLD: 0 % (ref 0–1.9)
BLD PROD TYP BPU: NORMAL
BLOOD UNIT EXPIRATION DATE: NORMAL
BLOOD UNIT TYPE CODE: 6200
BLOOD UNIT TYPE: NORMAL
BUN SERPL-MCNC: 16 MG/DL (ref 10–30)
BURR CELLS BLD QL SMEAR: ABNORMAL
CALCIUM SERPL-MCNC: 9.4 MG/DL (ref 8.7–10.5)
CHLORIDE SERPL-SCNC: 99 MMOL/L (ref 95–110)
CO2 SERPL-SCNC: 22 MMOL/L (ref 23–29)
CODING SYSTEM: NORMAL
CREAT SERPL-MCNC: 0.8 MG/DL (ref 0.5–1.4)
CROSSMATCH INTERPRETATION: NORMAL
DACRYOCYTES BLD QL SMEAR: ABNORMAL
DIFFERENTIAL METHOD: ABNORMAL
DISPENSE STATUS: NORMAL
EOSINOPHIL NFR BLD: 0 % (ref 0–8)
ERYTHROCYTE [DISTWIDTH] IN BLOOD BY AUTOMATED COUNT: 14.1 % (ref 11.5–14.5)
EST. GFR  (NO RACE VARIABLE): >60 ML/MIN/1.73 M^2
GLUCOSE SERPL-MCNC: 121 MG/DL (ref 70–110)
HCT VFR BLD AUTO: 22 % (ref 37–48.5)
HGB BLD-MCNC: 7.7 G/DL (ref 12–16)
HYPOCHROMIA BLD QL SMEAR: ABNORMAL
IMM GRANULOCYTES # BLD AUTO: ABNORMAL K/UL (ref 0–0.04)
IMM GRANULOCYTES NFR BLD AUTO: ABNORMAL % (ref 0–0.5)
LYMPHOCYTES NFR BLD: 6 % (ref 18–48)
MAGNESIUM SERPL-MCNC: 1.8 MG/DL (ref 1.6–2.6)
MCH RBC QN AUTO: 32.4 PG (ref 27–31)
MCHC RBC AUTO-ENTMCNC: 35 G/DL (ref 32–36)
MCV RBC AUTO: 92 FL (ref 82–98)
METAMYELOCYTES NFR BLD MANUAL: 5 %
MONOCYTES NFR BLD: 8 % (ref 4–15)
MYELOCYTES NFR BLD MANUAL: 1 %
NEUTROPHILS NFR BLD: 78 % (ref 38–73)
NEUTS BAND NFR BLD MANUAL: 2 %
NRBC BLD-RTO: 0 /100 WBC
OSMOLALITY SERPL: 275 MOSM/KG (ref 275–295)
OSMOLALITY UR: 189 MOSM/KG (ref 50–1200)
PHOSPHATE SERPL-MCNC: 3.7 MG/DL (ref 2.7–4.5)
PLATELET # BLD AUTO: 124 K/UL (ref 150–450)
PLATELET BLD QL SMEAR: ABNORMAL
PMV BLD AUTO: 11 FL (ref 9.2–12.9)
POIKILOCYTOSIS BLD QL SMEAR: SLIGHT
POLYCHROMASIA BLD QL SMEAR: ABNORMAL
POTASSIUM SERPL-SCNC: 4.4 MMOL/L (ref 3.5–5.1)
RBC # BLD AUTO: 2.38 M/UL (ref 4–5.4)
SCHISTOCYTES BLD QL SMEAR: ABNORMAL
SCHISTOCYTES BLD QL SMEAR: PRESENT
SODIUM SERPL-SCNC: 129 MMOL/L (ref 136–145)
SODIUM UR-SCNC: 17 MMOL/L (ref 20–250)
SPHEROCYTES BLD QL SMEAR: ABNORMAL
TRANS ERYTHROCYTES VOL PATIENT: NORMAL ML
WBC # BLD AUTO: 13.48 K/UL (ref 3.9–12.7)

## 2023-07-26 PROCEDURE — 84300 ASSAY OF URINE SODIUM: CPT | Performed by: HOSPITALIST

## 2023-07-26 PROCEDURE — 85025 COMPLETE CBC W/AUTO DIFF WBC: CPT | Performed by: HOSPITALIST

## 2023-07-26 PROCEDURE — 36415 COLL VENOUS BLD VENIPUNCTURE: CPT | Performed by: HOSPITALIST

## 2023-07-26 PROCEDURE — 63600175 PHARM REV CODE 636 W HCPCS: Performed by: HOSPITALIST

## 2023-07-26 PROCEDURE — 85018 HEMOGLOBIN: CPT

## 2023-07-26 PROCEDURE — 97535 SELF CARE MNGMENT TRAINING: CPT

## 2023-07-26 PROCEDURE — 97161 PT EVAL LOW COMPLEX 20 MIN: CPT

## 2023-07-26 PROCEDURE — 36430 TRANSFUSION BLD/BLD COMPNT: CPT

## 2023-07-26 PROCEDURE — 97112 NEUROMUSCULAR REEDUCATION: CPT

## 2023-07-26 PROCEDURE — 84100 ASSAY OF PHOSPHORUS: CPT | Performed by: HOSPITALIST

## 2023-07-26 PROCEDURE — 99233 PR SUBSEQUENT HOSPITAL CARE,LEVL III: ICD-10-PCS | Mod: ,,, | Performed by: HOSPITALIST

## 2023-07-26 PROCEDURE — 99233 SBSQ HOSP IP/OBS HIGH 50: CPT | Mod: ,,, | Performed by: HOSPITALIST

## 2023-07-26 PROCEDURE — 99231 SBSQ HOSP IP/OBS SF/LOW 25: CPT | Mod: GC,,, | Performed by: ANESTHESIOLOGY

## 2023-07-26 PROCEDURE — 25000003 PHARM REV CODE 250

## 2023-07-26 PROCEDURE — 21400001 HC TELEMETRY ROOM

## 2023-07-26 PROCEDURE — P9021 RED BLOOD CELLS UNIT: HCPCS

## 2023-07-26 PROCEDURE — 83930 ASSAY OF BLOOD OSMOLALITY: CPT | Performed by: HOSPITALIST

## 2023-07-26 PROCEDURE — 25000003 PHARM REV CODE 250: Performed by: HOSPITALIST

## 2023-07-26 PROCEDURE — 83935 ASSAY OF URINE OSMOLALITY: CPT | Performed by: HOSPITALIST

## 2023-07-26 PROCEDURE — 97530 THERAPEUTIC ACTIVITIES: CPT

## 2023-07-26 PROCEDURE — 63600175 PHARM REV CODE 636 W HCPCS: Performed by: STUDENT IN AN ORGANIZED HEALTH CARE EDUCATION/TRAINING PROGRAM

## 2023-07-26 PROCEDURE — 36415 COLL VENOUS BLD VENIPUNCTURE: CPT

## 2023-07-26 PROCEDURE — 83735 ASSAY OF MAGNESIUM: CPT | Performed by: HOSPITALIST

## 2023-07-26 PROCEDURE — 80048 BASIC METABOLIC PNL TOTAL CA: CPT | Performed by: HOSPITALIST

## 2023-07-26 PROCEDURE — 99231 PR SUBSEQUENT HOSPITAL CARE,LEVL I: ICD-10-PCS | Mod: GC,,, | Performed by: ANESTHESIOLOGY

## 2023-07-26 PROCEDURE — 63600175 PHARM REV CODE 636 W HCPCS

## 2023-07-26 PROCEDURE — 97166 OT EVAL MOD COMPLEX 45 MIN: CPT

## 2023-07-26 RX ORDER — SODIUM CHLORIDE 9 MG/ML
INJECTION, SOLUTION INTRAVENOUS CONTINUOUS
Status: DISCONTINUED | OUTPATIENT
Start: 2023-07-26 | End: 2023-07-28

## 2023-07-26 RX ORDER — HYDROCODONE BITARTRATE AND ACETAMINOPHEN 500; 5 MG/1; MG/1
TABLET ORAL
Status: DISCONTINUED | OUTPATIENT
Start: 2023-07-26 | End: 2023-07-29 | Stop reason: HOSPADM

## 2023-07-26 RX ADMIN — ACETAMINOPHEN 1000 MG: 500 TABLET ORAL at 11:07

## 2023-07-26 RX ADMIN — SODIUM CHLORIDE: 9 INJECTION, SOLUTION INTRAVENOUS at 08:07

## 2023-07-26 RX ADMIN — METHOCARBAMOL 500 MG: 500 TABLET ORAL at 05:07

## 2023-07-26 RX ADMIN — ACETAMINOPHEN 1000 MG: 500 TABLET ORAL at 12:07

## 2023-07-26 RX ADMIN — POLYETHYLENE GLYCOL 3350 17 G: 17 POWDER, FOR SOLUTION ORAL at 10:07

## 2023-07-26 RX ADMIN — MUPIROCIN 1 G: 20 OINTMENT TOPICAL at 10:07

## 2023-07-26 RX ADMIN — APIXABAN 2.5 MG: 2.5 TABLET, FILM COATED ORAL at 09:07

## 2023-07-26 RX ADMIN — CHOLECALCIFEROL TAB 25 MCG (1000 UNIT) 1000 UNITS: 25 TAB at 10:07

## 2023-07-26 RX ADMIN — ACETAMINOPHEN 1000 MG: 500 TABLET ORAL at 06:07

## 2023-07-26 RX ADMIN — METHOCARBAMOL 500 MG: 500 TABLET ORAL at 09:07

## 2023-07-26 RX ADMIN — MUPIROCIN 1 G: 20 OINTMENT TOPICAL at 09:07

## 2023-07-26 RX ADMIN — SOTALOL HYDROCHLORIDE 80 MG: 80 TABLET ORAL at 09:07

## 2023-07-26 RX ADMIN — LISINOPRIL 40 MG: 20 TABLET ORAL at 10:07

## 2023-07-26 RX ADMIN — VANCOMYCIN HYDROCHLORIDE 1000 MG: 1 INJECTION, POWDER, LYOPHILIZED, FOR SOLUTION INTRAVENOUS at 05:07

## 2023-07-26 RX ADMIN — SOTALOL HYDROCHLORIDE 80 MG: 80 TABLET ORAL at 10:07

## 2023-07-26 RX ADMIN — ROPIVACAINE HYDROCHLORIDE 0.1 ML/HR: 2 INJECTION, SOLUTION EPIDURAL; INFILTRATION at 02:07

## 2023-07-26 RX ADMIN — SENNOSIDES AND DOCUSATE SODIUM 1 TABLET: 50; 8.6 TABLET ORAL at 10:07

## 2023-07-26 RX ADMIN — ROPIVACAINE HYDROCHLORIDE 0.1 ML/HR: 2 INJECTION, SOLUTION EPIDURAL; INFILTRATION at 11:07

## 2023-07-26 RX ADMIN — SODIUM CHLORIDE: 9 INJECTION, SOLUTION INTRAVENOUS at 02:07

## 2023-07-26 RX ADMIN — APIXABAN 2.5 MG: 2.5 TABLET, FILM COATED ORAL at 10:07

## 2023-07-26 RX ADMIN — CEFAZOLIN 2 G: 2 INJECTION, POWDER, FOR SOLUTION INTRAMUSCULAR; INTRAVENOUS at 12:07

## 2023-07-26 RX ADMIN — ACETAMINOPHEN 1000 MG: 500 TABLET ORAL at 05:07

## 2023-07-26 RX ADMIN — AMLODIPINE BESYLATE 5 MG: 5 TABLET ORAL at 10:07

## 2023-07-26 RX ADMIN — SENNOSIDES AND DOCUSATE SODIUM 1 TABLET: 50; 8.6 TABLET ORAL at 09:07

## 2023-07-26 RX ADMIN — SODIUM CHLORIDE 500 ML: 0.9 INJECTION, SOLUTION INTRAVENOUS at 10:07

## 2023-07-26 NOTE — PLAN OF CARE
Updated [facility] staff that patient will d/c to [facility] rehab when medically stable. Clinicals faxed to Keara at facility and ALEIDA Torres updated via phone.    Ernestina Curtis RNCM  Case Management  [facility]  [phone]

## 2023-07-26 NOTE — PT/OT/SLP EVAL
Physical Therapy Co-Evaluation    Patient Name:  Cathy Kearns   MRN:  [id number]    Recommendations:     Discharge Recommendations: rehabilitation facility   Discharge Equipment Recommendations: to be determined by next level of care   Barriers to discharge: None    Assessment:     Cathy Kearns is a 89 y.o. female admitted with a medical diagnosis of Closed intertrochanteric fracture, left, initial encounter.  She presents with the following impairments/functional limitations: weakness, impaired endurance, impaired self care skills, impaired functional mobility, gait instability, impaired balance, impaired cardiopulmonary response to activity, pain, decreased lower extremity function, orthopedic precautions, decreased safety awareness, decreased coordination   Pt was agreeable to PT co-eval with OT. Pt able to perform bed mobility and work on sitting balance this session. Activity limited due to symptoms worsening symptoms of dizziness once sitting EOB and subsequently hypotension. Pt able to be safely return to supine with VSS returning to normal after nurse Darsha notified and fluid bolus given. Pt demonstrated good potential to be motivated to continue to work with therapy and rehab facility recommended at discharge..    Rehab Prognosis: Good; patient would benefit from acute skilled PT services to address these deficits and reach maximum level of function.    Recent Surgery: Procedure(s) (LRB):  INSERTION, INTRAMEDULLARY DAPHNEY, LEFT FEMUR (Left) 1 Day Post-Op    Plan:     During this hospitalization, patient to be seen daily to address the identified rehab impairments via gait training, therapeutic activities, therapeutic exercises, neuromuscular re-education and progress toward the following goals:    Plan of Care Expires:  08/25/23    Subjective     Chief Complaint: Dizziness  Patient/Family Comments/goals: To get better  Pain/Comfort:  Pain Rating 1: 1/10  Location - Side 1: Left  Location - Orientation 1:  generalized  Location 1: hip  Pain Addressed 1: Pre-medicate for activity, Reposition, Distraction, Cessation of Activity  Pain Rating Post-Intervention 1:  (did not rate)    Patients cultural, spiritual, Pentecostal conflicts given the current situation: no    Patient History:     Living Environment: Pt lives at [facility]  Prior Level of Function: IND with mobility and ADLs  DME owned: none  Caregiver Assistance: NH staff    Objective:     Communicated with RN prior to session.  Patient found HOB elevated with FCD, telemetry, peripheral IV, perineural catheter  upon PT entry to room.    General Precautions: Standard, fall  Orthopedic Precautions:LLE weight bearing as tolerated   Braces: N/A  Respiratory Status: Room air    Exams:  Sensation:    -       Intact  RLE ROM: PROM WFL, unable to activity perform hip flex  RLE Strength: unable to test with resistance due to increasing symptoms of dizziness  LLE ROM: PROM WFL unable to activity perform hip flex  LLE Strength: unable to test with resistance due to increasing symptoms of dizziness    Functional Mobility:    Bed Mobility:   Rolling: to L and R with moderate assistance  Supine > Sit: maximal assistance  Sit > Supine: total assistance and of 2 persons  Scooting: Maximal assistance sitting EOB    Transfers:   Sit <> Stand Transfer: Deferred due to symptoms of dizziness    Balance:   Sitting balance: FAIR+: Maintains balance through MINIMAL excursions of active trunk motion  Pt needed CGA sitting EOB for balance                     AM-PAC 6 CLICK MOBILITY  Total Score:12     OT present for coeval due to pt's multiple medical comorbidities and functional/cognition deficits requiring two skilled therapists to appropriately progress pt's musculoskeletal strength, neuromuscular control, and endurance while taking into consideration medical acuity and pt safety.    Treatment & Education:  Pt educated on LLE WBAT precautions at beginning of treatment. Activity  limited due to worsening symptoms of dizziness and subsequently hypotension.  Pt educated on safety with mobility and using call button for assistance from nursing staff.  Pt educated on tips to reduce fall risk.  All questions answered within the scope of PT.  White board updated accordingly.      Patient left HOB elevated with all lines intact, call button in reach, and RN present.    GOALS:   Multidisciplinary Problems       Physical Therapy Goals          Problem: Physical Therapy    Goal Priority Disciplines Outcome Goal Variances Interventions   Physical Therapy Goal     PT, PT/OT Ongoing, Progressing     Description: Goals to be met by: 23     Patient will increase functional independence with mobility by performin. Supine to sit with Stand-by Assistance  2. Sit to stand transfer with Contact Guard Assistance using RW  3. Bed to chair transfer with Stand by Assistance using Rolling Walker  4. Gait  x 100 feet with Stand by assistance using Rolling Walker.   5. Lower extremity exercise program x30 reps per handout, with independence                         History:     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 2012    Vasovagal syncope        Past Surgical History:   Procedure Laterality Date    ADENOIDECTOMY      APPENDECTOMY      CATARACT EXTRACTION W/  INTRAOCULAR LENS IMPLANT Bilateral n/a    HYSTERECTOMY      2/2 uterine CA, removed ovaries, no longer f/u with gyn    INTRAMEDULLARY RODDING OF FEMUR Left 2023    Procedure: INSERTION, INTRAMEDULLARY DAPHNEY, LEFT FEMUR;  Surgeon: Rogerio Chand MD;  Location: [facility] OR [address] [place] [address];  Service: Orthopedics;   Laterality: Left;    Intravitreal Injection      Avastin od x's 12 dltx 2-16-12    TONSILLECTOMY         Time Tracking:     PT Received On: 07/26/23  PT Start Time: 0953     PT Stop Time: 1029  PT Total Time (min): 36 min     Billable Minutes: Evaluation 16, Therapeutic Activity 8, and Neuromuscular Re-education 15      07/26/2023

## 2023-07-26 NOTE — PT/OT/SLP EVAL
Occupational Therapy  Co-Evaluation and Treatment with PT    Name: Cathy Kearns  MRN: [id number]  Admitting Diagnosis: Closed intertrochanteric fracture, left, initial encounter  Recent Surgery: Procedure(s) (LRB):  INSERTION, INTRAMEDULLARY DAPHNEY, LEFT FEMUR (Left) 1 Day Post-Op    Recommendations:     Discharge Recommendations: rehabilitation facility  Discharge Equipment Recommendations:  to be determined by next level of care  Barriers to discharge:  None    Assessment:     Cathy Kearns is a 89 y.o. female with a medical diagnosis of Closed intertrochanteric fracture, left, initial encounter.  She presents with performance deficits affecting function: weakness, impaired endurance, impaired self care skills, impaired functional mobility, gait instability, impaired balance, visual deficits, decreased upper extremity function, decreased lower extremity function, decreased safety awareness, pain, decreased ROM, impaired cardiopulmonary response to activity, orthopedic precautions.  Pt agreeable to therapy. Pt performed bed mobility, EOB sitting and self care tasks with assistance. Pt worked on sitting tolerance, but was limited by worsening dizziness and subsequently hypotension. Pt returned to supine in bed and VSs returned to normal after fluid bolus. Pt participates well and is motivated to regain functional independence in mobility and self care.      -Co-tx with PT performed due to need for education and assistance from two skilled therapy disciplines at pt's current functional level.     Rehab Prognosis: Good; patient would benefit from acute skilled OT services to address these deficits and reach maximum level of function.       Plan:     Patient to be seen 4 x/week to address the above listed problems via self-care/home management, therapeutic activities, therapeutic exercises, neuromuscular re-education  Plan of Care Expires: 08/26/23  Plan of Care Reviewed with: patient    Subjective     Chief Complaint:  Dizziness, pain with movement  Patient/Family Comments/goals: get well    Occupational Profile:  Living Environment: Pt lives at [facility]  Previous level of function: Independent  Roles and Routines: Watch TV, Go to exercise groups; spend time with friends/family  Equipment Used at Home: none  Assistance upon Discharge: Available    Pain/Comfort:  Pain Rating 1: 1/10  Location - Side 1: Left  Location - Orientation 1: generalized  Location 1: hip  Pain Addressed 1: Pre-medicate for activity, Reposition, Distraction, Cessation of Activity  Pain Rating Post-Intervention 1:  (did not rate)    Patients cultural, spiritual, Jainism conflicts given the current situation: no    Objective:     Communicated with: RN prior to session.  Patient found HOB elevated with telemetry, FCD, perineural catheter, peripheral IV upon OT entry to room.    General Precautions: Standard, fall  Orthopedic Precautions: LLE weight bearing as tolerated  Braces: N/A  Respiratory Status: Room air    Occupational Performance:    Bed Mobility:    Patient completed Rolling/Turning to Left with  moderate assistance  Patient completed Rolling/Turning to Right with moderate assistance  Patient completed Scooting/Bridging with Max A EOB; Total Ax2 to HOB in supine  Patient completed Supine to Sit with maximal assistance  Patient completed Sit to Supine with total assistance and 2 persons    Functional Mobility/Transfers:  Did not occur secondary to worsening dizziness and hypotension seated EOB    Activities of Daily Living:  Upper Body Dressing: maximal assistance gown as robe  Lower Body Dressing: total assistance socks    Cognitive/Visual Perceptual:  Cognitive/Psychosocial Skills:     -       Oriented to: Person, Place, Time, and Situation   -       Follows Commands/attention:Follows multistep  commands  -       Safety awareness/insight to disability: intact   Visual/Perceptual:      -Impaired  acuity ; L eye blind    Physical  Exam:  Balance:    -       CGA EOB with BUE support  Upper Extremity Range of Motion:     -       Right Upper Extremity: WFL  -       Left Upper Extremity: WFL  Upper Extremity Strength:    -       Right Upper Extremity: WFL  -       Left Upper Extremity: WFL   Strength:    -       Right Upper Extremity: WFL  -       Left Upper Extremity: WFL  Fine Motor Coordination:    -       Intact  Gross motor coordination:   WFL    AMPAC 6 Click ADL:  AMPAC Total Score: 14    Treatment & Education:  Pt edu re OT role, POC and safety.  Pt edu re WBAT status.  Pt performed the above ADLs and mobility with assistance.    Patient left HOB elevated with all lines intact, call button in reach, and RN present    GOALS:   Multidisciplinary Problems       Occupational Therapy Goals          Problem: Occupational Therapy    Goal Priority Disciplines Outcome Interventions   Occupational Therapy Goal     OT, PT/OT Ongoing, Progressing    Description: Goals to be met by: 8/16/23     Patient will increase functional independence with ADLs by performing:    UE Dressing with Stand-by Assistance.  LE Dressing with Minimal Assistance and Assistive Devices as needed.  Grooming while standing at sink with Contact Guard Assistance.  Toileting from bedside commode with Minimal Assistance for hygiene and clothing management.   Supine to sit with Stand-by Assistance.  Step transfer with Contact Guard Assistance.                         History:     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 7/9/2012    Vasovagal syncope          Past Surgical History:   Procedure Laterality Date    ADENOIDECTOMY      APPENDECTOMY       CATARACT EXTRACTION W/  INTRAOCULAR LENS IMPLANT Bilateral n/a    HYSTERECTOMY      2/2 uterine CA, removed ovaries, no longer f/u with gyn    INTRAMEDULLARY RODDING OF FEMUR Left 7/25/2023    Procedure: INSERTION, INTRAMEDULLARY DAPHNEY, LEFT FEMUR;  Surgeon: Rogerio Chand MD;  Location: [facility] OR [address] [place] [address];  Service: Orthopedics;  Laterality: Left;    Intravitreal Injection      Avastin od x's 12 dltx 2-16-12    TONSILLECTOMY         Time Tracking:     OT Date of Treatment: 07/26/23  OT Start Time: 0951  OT Stop Time: 1030  OT Total Time (min): 39 min    Billable Minutes:Evaluation 16 minutes  Self Care/Home Management 8 minutes  Therapeutic Activity 15 minutes    EL Bell  7/26/2023  Pager: [phone]

## 2023-07-26 NOTE — PROGRESS NOTES
[facility] - [facility]  Orthopedics  Progress Note    Patient Name: Cathy Kearns  MRN: [id number]  Admission Date: 7/24/2023  Hospital Length of Stay: 2 days  Attending Provider: Ludmila Granado MD  Primary Care Provider: Wilma Xiong MD  Follow-up For: Procedure(s) (LRB):  INSERTION, INTRAMEDULLARY DAPHNEY, LEFT FEMUR (Left)    Post-Operative Day: 1 Day Post-Op  Subjective:     Principal Problem:Closed intertrochanteric fracture, left, initial encounter    Principal Orthopedic Problem: same as above s/p L IMN 7/25    Interval History: NAEO. VSS. In good spirits. Patient will work with PT today, fu recs. D/c campos today    Review of patient's allergies indicates:   Allergen Reactions    Penicillins Swelling    Astelin [azelastine] Other (See Comments)     Dizziness    Pseudoephedrine hcl Palpitations    Sulfa (sulfonamide antibiotics) Palpitations       Current Facility-Administered Medications   Medication    0.9%  NaCl infusion    acetaminophen tablet 1,000 mg    amLODIPine tablet 5 mg    apixaban tablet 2.5 mg    bisacodyL suppository 10 mg    lisinopriL tablet 40 mg    melatonin tablet 6 mg    methocarbamoL tablet 500 mg    mupirocin 2 % ointment 1 g    ondansetron injection 4 mg    polyethylene glycol packet 17 g    ROPIvacaine (PF) 2 mg/ml (0.2%) solution    senna-docusate 8.6-50 mg per tablet 1 tablet    sodium chloride 0.9% flush 10 mL    sotaloL tablet 80 mg    vancomycin (VANCOCIN) 1,000 mg in dextrose 5 % (D5W) 250 mL IVPB (Vial-Mate)    vitamin D 1000 units tablet 1,000 Units     Objective:     Vital Signs (Most Recent):  Temp: 97.6 °F (36.4 °C) (07/26/23 0452)  Pulse: 67 (07/26/23 0452)  Resp: 18 (07/26/23 0452)  BP: (!) 116/58 (07/26/23 0452)  SpO2: (!) 94 % (07/26/23 0452) Vital Signs (24h Range):  Temp:  [96.1 °F (35.6 °C)-97.6 °F (36.4 °C)] 97.6 °F (36.4 °C)  Pulse:  [53-70] 67  Resp:  [10-20] 18  SpO2:  [93 %-100 %] 94 %  BP: (116-165)/(56-74) 116/58     Weight: 53.1 kg (117  "lb)  Height: 5' 2" (157.5 cm)  Body mass index is 21.4 kg/m².      Intake/Output Summary (Last 24 hours) at 7/26/2023 0626  Last data filed at 7/26/2023 0604  Gross per 24 hour   Intake 1300 ml   Output 1600 ml   Net -300 ml        Ortho/SPM Exam  Dressings c/d/I  Swelling over lateral aspect of the hip  NVI       Significant Labs: All pertinent labs within the past 24 hours have been reviewed.    Significant Imaging: I have reviewed and interpreted all pertinent imaging results/findings.    Assessment/Plan:     * Closed intertrochanteric fracture, left, initial encounter  Cathy Kerans is a 89 y.o. female with a comminuted intertrochanteric fracture of the left femur, closed, NVI. S/p L IMN nail 7/25    Pain control: MM  WBAT  DVT PPx: eliquis 2.5 BID, FCDs at all times when not ambulating  Abx: postop Ancef x24h  Labs: hgb 7.7  Drain: no drains  Blanco: dc'd today    Dispo: FU PT/OT recs            Jacobo Luoise MD  Orthopedics  [facility] - Surgery  "

## 2023-07-26 NOTE — PLAN OF CARE
PT eval completed- see note for details, goals and POC established.     Problem: Physical Therapy  Goal: Physical Therapy Goal  Description: Goals to be met by: 23     Patient will increase functional independence with mobility by performin. Supine to sit with Stand-by Assistance  2. Sit to stand transfer with Contact Guard Assistance using RW  3. Bed to chair transfer with Stand by Assistance using Rolling Walker  4. Gait  x 100 feet with Stand by assistance using Rolling Walker.   5. Lower extremity exercise program x30 reps per handout, with independence    Outcome: Ongoing, Progressing   2023

## 2023-07-26 NOTE — ADDENDUM NOTE
Addendum  created 07/26/23 1329 by Adrienne Lerma MD    Charge Capture section accepted, Cosign clinical note with attestation

## 2023-07-26 NOTE — PLAN OF CARE
Problem: Occupational Therapy  Goal: Occupational Therapy Goal  Description: Goals to be met by: 8/16/23     Patient will increase functional independence with ADLs by performing:    UE Dressing with Stand-by Assistance.  LE Dressing with Minimal Assistance and Assistive Devices as needed.  Grooming while standing at sink with Contact Guard Assistance.  Toileting from bedside commode with Minimal Assistance for hygiene and clothing management.   Supine to sit with Stand-by Assistance.  Step transfer with Contact Guard Assistance.    Outcome: Ongoing, Progressing     OT eval completed. The above goals are established to improve functional (I) and mobility.

## 2023-07-26 NOTE — NURSING
Nurse and  at beside with pt. Nurse received verbal order from MD to remove campos catheter. Campos catheter was removed by nurse. Pt tolerated well. Pt was cleaned and a brief was applied.     Pt's vitals stable overnight. No significant events overnight. No complaints of severe pain from pt but pt is complaining of spasms in the left hip. Nurse administered prn methocarbamol x1. SCDs in place. Call light within reach. Bed in lowest position. Pt educated on pressing call light when any assistance is needed. Pt expressed understanding.

## 2023-07-26 NOTE — SUBJECTIVE & OBJECTIVE
"Principal Problem:Closed intertrochanteric fracture, left, initial encounter    Principal Orthopedic Problem: same as above s/p L IMN 7/25    Interval History: NAEO. VSS. In good spirits. Patient will work with PT today, sj jorgensen. D/c beth today    Review of patient's allergies indicates:   Allergen Reactions    Penicillins Swelling    Astelin [azelastine] Other (See Comments)     Dizziness    Pseudoephedrine hcl Palpitations    Sulfa (sulfonamide antibiotics) Palpitations       Current Facility-Administered Medications   Medication    0.9%  NaCl infusion    acetaminophen tablet 1,000 mg    amLODIPine tablet 5 mg    apixaban tablet 2.5 mg    bisacodyL suppository 10 mg    lisinopriL tablet 40 mg    melatonin tablet 6 mg    methocarbamoL tablet 500 mg    mupirocin 2 % ointment 1 g    ondansetron injection 4 mg    polyethylene glycol packet 17 g    ROPIvacaine (PF) 2 mg/ml (0.2%) solution    senna-docusate 8.6-50 mg per tablet 1 tablet    sodium chloride 0.9% flush 10 mL    sotaloL tablet 80 mg    vancomycin (VANCOCIN) 1,000 mg in dextrose 5 % (D5W) 250 mL IVPB (Vial-Mate)    vitamin D 1000 units tablet 1,000 Units     Objective:     Vital Signs (Most Recent):  Temp: 97.6 °F (36.4 °C) (07/26/23 0452)  Pulse: 67 (07/26/23 0452)  Resp: 18 (07/26/23 0452)  BP: (!) 116/58 (07/26/23 0452)  SpO2: (!) 94 % (07/26/23 0452) Vital Signs (24h Range):  Temp:  [96.1 °F (35.6 °C)-97.6 °F (36.4 °C)] 97.6 °F (36.4 °C)  Pulse:  [53-70] 67  Resp:  [10-20] 18  SpO2:  [93 %-100 %] 94 %  BP: (116-165)/(56-74) 116/58     Weight: 53.1 kg (117 lb)  Height: 5' 2" (157.5 cm)  Body mass index is 21.4 kg/m².      Intake/Output Summary (Last 24 hours) at 7/26/2023 0626  Last data filed at 7/26/2023 0604  Gross per 24 hour   Intake 1300 ml   Output 1600 ml   Net -300 ml        Ortho/SPM Exam  Dressings c/d/I  Swelling over lateral aspect of the hip  NVI       Significant Labs: All pertinent labs within the past 24 hours have been " reviewed.    Significant Imaging: I have reviewed and interpreted all pertinent imaging results/findings.

## 2023-07-26 NOTE — PLAN OF CARE
Problem: Infection  Goal: Absence of Infection Signs and Symptoms  Outcome: Ongoing, Progressing     Problem: Adult Inpatient Plan of Care  Goal: Plan of Care Review  Outcome: Ongoing, Progressing  Goal: Absence of Hospital-Acquired Illness or Injury  Outcome: Ongoing, Progressing  Goal: Optimal Comfort and Wellbeing  Outcome: Ongoing, Progressing  Goal: Readiness for Transition of Care  Outcome: Ongoing, Progressing     Problem: Adjustment to Injury (Hip Fracture Medical Management)  Goal: Optimal Coping with Change in Health Status  Outcome: Ongoing, Progressing     Problem: Bleeding (Hip Fracture Medical Management)  Goal: Absence of Bleeding  Outcome: Ongoing, Progressing     Problem: Bowel Elimination Impaired (Hip Fracture Medical Management)  Goal: Effective Bowel Elimination  Outcome: Ongoing, Progressing     Problem: Cognitive Decline Risk (Hip Fracture Medical Management)  Goal: Baseline Cognitive Function Maintained  Outcome: Ongoing, Progressing     Problem: Embolism (Hip Fracture Medical Management)  Goal: Absence of Embolism  Outcome: Ongoing, Progressing     Problem: Fracture Stabilization and Management (Hip Fracture Medical Management)  Goal: Fracture Stability  Outcome: Ongoing, Progressing     Problem: Pain (Hip Fracture Medical Management)  Goal: Acceptable Pain Level  Outcome: Ongoing, Progressing     Problem: Urinary Elimination Impaired (Hip Fracture Medical Management)  Goal: Effective Urinary Elimination  Outcome: Ongoing, Progressing

## 2023-07-26 NOTE — ASSESSMENT & PLAN NOTE
Cathy Kearns is a 89 y.o. female with a comminuted intertrochanteric fracture of the left femur, closed, NVI. S/p L IMN nail 7/25    Pain control: MM  WBAT  DVT PPx: eliquis 2.5 BID, FCDs at all times when not ambulating  Abx: postop Ancef x24h  Labs: hgb 7.7  Drain: no drains  Blanco: dc'd today    Dispo: FU PT/OT recs

## 2023-07-26 NOTE — CONSULTS
Inpatient consult to Physical Medicine Rehab  Consult performed by: Meagan Heredia NP  Consult ordered by: Ludmila Granado MD  Reason for consult: Assess rehab needs    Reviewed patient history and current admission.  Rehab team following.  Full consult to follow. PT & OT evals pending.     ASIF Bahena, FNP-C  Physical Medicine & Rehabilitation   07/26/2023

## 2023-07-26 NOTE — NURSING
Telemetry monitor calls this nurse and updates patient HR is in the 30's and holding. Therapy in the room working with patient and call out for the nurse. I enter room and patient resting in the bed with eyes open, RR even, but not talking to staff at bedside and staring straight ahead. Per therapist, pt was just edge of bed and assisted back. BP assessed with reading of 73/33 with a HR still in the 30's. Patient has NS fluids running to PIV in the Rt ac at 100 ml/hr, rate increased to 999 ml/hr and called for Dr. Granado to come to bedside.     1020: Dr. Granado at bedside and patient now alert and talking to staff.     1025: patient continues alert. BP reading 112/57 with a HR of 62. See new orders per Dr. Granado.     1026: Bolus initiated now.     1041: BP reassessed, reading 139/60 with a HR of 65. Patient continues awake and alert. No s/sx of distress. Family at bedside. Call light in reach, safety ongoing. Will continue to monitor.

## 2023-07-26 NOTE — PLAN OF CARE
[facility]un Dash - Surgery  Initial Discharge Assessment       Primary Care Provider: Wilma Xiong MD    Admission Diagnosis: Closed intertrochanteric fracture, left, initial encounter [S72.142A]  Hip injury, initial encounter [S79.919A]    Admission Date: 7/24/2023  Expected Discharge Date: 7/28/2023         Payor: MEDICARE / Plan: MEDICARE PART A & B / Product Type: Government /     Extended Emergency Contact Information  Primary Emergency Contact: FrancinelonnieDesmond  Mobile Phone: [phone]  Relation: Relative  Secondary Emergency Contact: SomDavid  Mobile Phone: [phone]  Relation: Son    Discharge Plan A: Rehab         [id number] [place], [place] - [address] AT [address]  [address]  [facility] [address]  Phone: [phone] Fax: [phone]    [id number] [place], [place] - [address] AT [facility] & [place] [address]  [facility] [address]  Phone: [phone] Fax: [phone]      Initial Assessment (most recent)       Adult Discharge Assessment - 07/26/23 1324          Discharge Assessment    Assessment Type Discharge Planning Assessment     Confirmed/corrected address, phone number and insurance Yes     Confirmed Demographics Correct on Facesheet     Source of Information patient     Does patient/caregiver understand observation status Yes     Communicated ANGELA with patient/caregiver Yes     People in Home alone     [facility]     Do you expect to return to your current living situation? No     Do you have help at home or someone to help you manage your care at home? No     Prior to hospitilization cognitive status: Alert/Oriented     Current cognitive status: Alert/Oriented     Home Layout Able to live on 1st floor     Equipment Currently Used at Home none     Readmission within 30 days? No     Patient currently being followed by outpatient case management? No     Do you currently  have service(s) that help you manage your care at home? No     Do you take prescription medications? Yes     Do you have prescription coverage? Yes     Do you have any problems affording any of your prescribed medications? No     Is the patient taking medications as prescribed? yes     Who is going to help you get home at discharge? Inpatient rehab recommended     How do you get to doctors appointments? family or friend will provide     Are you on dialysis? No     Do you take coumadin? No     Discharge Plan A Rehab        Physical Activity    On average, how many days per week do you engage in moderate to strenuous exercise (like a brisk walk)? 0 days     On average, how many minutes do you engage in exercise at this level? 0 min        Financial Resource Strain    How hard is it for you to pay for the very basics like food, housing, medical care, and heating? Not hard at all        Housing Stability    In the last 12 months, was there a time when you were not able to pay the mortgage or rent on time? No     In the last 12 months, how many places have you lived? 1     In the last 12 months, was there a time when you did not have a steady place to sleep or slept in a shelter (including now)? No        Transportation Needs    In the past 12 months, has lack of transportation kept you from medical appointments or from getting medications? No     In the past 12 months, has lack of transportation kept you from meetings, work, or from getting things needed for daily living? No        Food Insecurity    Within the past 12 months, you worried that your food would run out before you got the money to buy more. Never true     Within the past 12 months, the food you bought just didn't last and you didn't have money to get more. Never true        Stress    Do you feel stress - tense, restless, nervous, or anxious, or unable to sleep at night because your mind is troubled all the time - these days? Not at all        Social  Connections    In a typical week, how many times do you talk on the phone with family, friends, or neighbors? More than three times a week     How often do you get together with friends or relatives? More than three times a week     How often do you attend Zoroastrianism or Catholic services? More than 4 times per year     Do you belong to any clubs or organizations such as Zoroastrianism groups, unions, fraternal or athletic groups, or school groups? Yes     How often do you attend meetings of the clubs or organizations you belong to? More than 4 times per year     Are you , , , , never , or living with a partner?         Alcohol Use    Q1: How often do you have a drink containing alcohol? 4 or more times a week     Q2: How many drinks containing alcohol do you have on a typical day when you are drinking? 1 or 2     Q3: How often do you have six or more drinks on one occasion? Never                   Spoke with patient and son at bedside to complete d/c planning assessment. Patient lives in Independent living section [facility]. Patient is Independent and has no DME. Verified PCP, Pharmacy and health insurance. PT/OT recommending Inpatient rehab at discharge. Patient and son in agreement. Referral sent to [facility] and patient accepted for admission on 7/28/23 when expected to be medically stable. Will continue to follow.      Ernestina REDDY  Case Management  [facility]  [phone]

## 2023-07-26 NOTE — PROGRESS NOTES
Progress Note  Hospital Medicine       Patient Name: Cathy Kearns  MRN:  [id number]  [facility] Medicine Team: [facility] HOSP MED H Ludmila Granado MD  Date of Admission:  7/24/2023     Principal Problem:  Closed intertrochanteric fracture, left, initial encounter   Primary Care Physician: Wilma Xiong MD      Interval history     She reports that her pain is doing okay so far today, she reports her mouth feels very dry as Na 129 and lower than it was on admit, and Na studies ordered. Discussed with her son and daughter at bedside about planning for PT/OT today and then likely rehab later this week once stable, [facility]. Hg downtick to 7.7 and expected blood loss anemia since admit. HR in 50s yesterday so held sotalol but was better this AM so gave sotalol and BP meds. Had rthostatic hypotensive event with PT/OT and so was laid back in bed by them and nursing at bedside and called to bedside to check her out and by then she was alert and starting to feel better, BP was in 70s systolic and on repeat exam at that time BP was 110s systolic with HRs 60 after nursing had opened up the IVF started this morning at rate of 100 and bolused it and charles do another 500 cc bolus now and then resume the rate as suspect hypovolemic cause with hyyponatremia and reported poor ora intake yesterady and dry mouth this morning.   Will trend vitals closely this afternoon        Review of Systems   Constitutional: Negative for chills, fatigue, fever.   HENT: Negative for sore throat, trouble swallowing.    Eyes: Negative for photophobia, visual disturbance.   Respiratory: Negative for cough, shortness of breath.    Cardiovascular: Negative for chest pain, palpitations, leg swelling.   Gastrointestinal: Negative for abdominal pain, constipation, diarrhea, nausea, vomiting.   Endocrine: Negative for cold intolerance, heat intolerance.   Genitourinary: Negative for dysuria, frequency.   Musculoskeletal: + for  arthralgias, myalgias.   Skin: Negative for rash, wound, erythema   Neurological: Negative for dizziness, syncope, weakness, light-headedness.   Psychiatric/Behavioral: Negative for confusion, hallucinations, anxiety  All other systems reviewed and are negative.      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    Past Surgical History: Patient has a past surgical history that includes Appendectomy; Intravitreal Injection; Adenoidectomy; Tonsillectomy; Hysterectomy; Cataract extraction w/  intraocular lens implant (Bilateral, n/a); and Intramedullary rodding of femur (Left, 7/25/2023).    Social History: Patient reports that she has quit smoking. Her smoking use included cigarettes. She has a 3.75 pack-year smoking history. She has never used smokeless tobacco. She reports current alcohol use. She reports that she does not use drugs.    Family History: family history includes Cancer in her brother, brother, daughter, father, maternal grandmother, mother, paternal grandfather, and paternal grandmother; Cataracts in her father; Heart attack in her brother and maternal grandfather; Hyperlipidemia in her daughter and son; Hypertension in her daughter and son; Strabismus in her paternal aunt; Thyroid disease in her grandchild.    Medications: Scheduled Meds:   acetaminophen  1,000 mg Oral Q6H    amLODIPine  5 mg Oral Daily    apixaban  2.5 mg Oral BID    lisinopriL  40 mg Oral Daily    mupirocin  1 g Nasal BID    polyethylene glycol  17 g Oral Daily    senna-docusate 8.6-50 mg  1 tablet Oral BID    sotaloL  80 mg Oral BID    vitamin D  1,000 Units Oral Daily     Continuous  Infusions:   sodium chloride 0.9%      ROPIvacaine (PF) 2 mg/ml (0.2%) 0.1 mL/hr (07/25/23 1501)     PRN Meds:.bisacodyL, melatonin, methocarbamoL, ondansetron, sodium chloride 0.9%    Allergies: Patient is allergic to penicillins, astelin [azelastine], pseudoephedrine hcl, and sulfa (sulfonamide antibiotics).    Physical Exam:     Vital Signs (Most Recent):  Temp: 97.6 °F (36.4 °C) (07/26/23 0452)  Pulse: 67 (07/26/23 0452)  Resp: 18 (07/26/23 0452)  BP: (!) 116/58 (07/26/23 0452)  SpO2: (!) 94 % (07/26/23 0452) Vital Signs Range (Last 24H):  Temp:  [96.1 °F (35.6 °C)-97.6 °F (36.4 °C)]   Pulse:  [53-70]   Resp:  [10-20]   BP: (116-165)/(56-74)   SpO2:  [93 %-100 %]    Body mass index is 21.4 kg/m².     Physical Exam:  Constitutional: Appears well-developed and well-nourished. Hard of hearing. Pleasant, good historian. Family at bedside.   Head: Normocephalic and atraumatic.   Mouth/Throat: Oropharynx is clear and moist.   Eyes: EOM are normal. Pupils are equal, round, and reactive to light. No scleral icterus.   Neck: Normal range of motion. Neck supple.   Cardiovascular: Normal rate and regular rhythm.  No murmur heard.  Pulmonary/Chest: Effort normal and breath sounds normal. No respiratory distress. No wheezes, rales, or rhonchi  Abdominal: Soft. Bowel sounds are normal.  No distension or tenderness  Musculoskeletal: bandages to LLE. Ropivicaine in place  Neurological: Alert and oriented to person, place, and time.   Skin: Skin is warm and dry.   Psychiatric: Normal mood and affect. Behavior is normal.   Vitals reviewed.    Recent Labs   Lab 07/25/23  0501 07/25/23  0909 07/26/23  0306   WBC 10.66 10.46 13.48*   HGB 9.7* 9.4* 7.7*   HCT 29.2* 28.2* 22.0*   * 130* 124*       Recent Labs   Lab 07/24/23  1208 07/24/23  1323 07/25/23  0501 07/26/23  0306   *  --  131* 129*   K 4.1  --  4.0 4.4   CL 99  --  101 99   CO2 24  --  22* 22*   BUN 12  --  13 16   CREATININE 0.7  --  0.6 0.8   *  --   106 121*   CALCIUM 10.0  --  9.1 9.4   MG  --  1.9 1.8 1.8   PHOS  --  2.8 3.0 3.7     Recent Labs   Lab 07/24/23  1208   ALKPHOS 51*   ALT 16   AST 22   ALBUMIN 4.1   PROT 7.1   BILITOT 0.7   INR 1.0      No results for input(s): POCTGLUCOSE in the last 168 hours.      Assessment and Plan:     Ms. Cathy Kearns is a 89 y.o. female who presented to [facility] on 7/24/2023 with     Left intertrochanteric fracture  -sustained in mechanical fall with left IT fx seen on imaging and shortened and rotated LLE  -pain on exam with multimodal pain meds on board with hip fx pathway initiated  -CXR normal on admit, eKG with sinus bradycardia with recent stress echo 2023 negative for ischemia  -hg 13 on admit  -baseline osteoperosis seen on DEXA scan and could not tolerate fosamax due to nasal bone pain and should consider prolia long term or another anabolic as another option  -reports not on calcium due to hypercalcemia when on it in the past  -IM nail on 7/25 with Dr Chand with recs for WBAT post op. PT/OT on POD 1- today with recs pending, fam prefers [facility]    -Vit D 44 in the past  -Anticoag- resume home eliquis post op later today    COPD  -does not use home albuterol ever as controlled symptoms    HTN  -continue home meds, had orthostatic hypotension event with PT/OT today so cautious with meds if stays lower in next 24 hours    Paroxysmal atrial fibrillation  -in NSR on admit, reports can feel when in afib as has palpitations and has been awhile since known to be in afib she said  -cont sotalol (held on 7/25 AM AM as HRs in lower 50s), resume eliquis post op  -telemetry  -says stress puts her in afib, watch post op    HLD  -not on meds    Hyponatremia  -possibly from dehydration from exercise class she was in prior to admit  -cont daily CMP  -worsening to 129 now, will check urine Na, serum and urine Osm now  Suspect hypovolemic given BP and dry mouth and bolused thsi AM for symptoms and BP and rate of 100  cc/hr started while awiting studies further    Monocytosis  -has workup in past and being monitored outpatient  -19% similar to previous baseline    Osteoperosis  -DEXA with T score -1 to -2.5, could not tolerate fosamax  -consider prolia or another anabolic long term given now with associated fracture    Vitamin D deficiency  -improved as 44 on recent check    HX of Macular degeneration of right eye  -receives eyelea with Dr Mandujano, last done on 5/23, she reports she was due for an appointment this [facility], per notes she was due around 7/18 for injection, I cant see an appointment scheduled in [facility] however with optho this week that she is referring to, she asked to reach out to optho as she is very worried about missing her injections that are due  -will touch base with optho regarding this    Acute Blood Loss Anemia  -expected with fracture and associated inflammation with HG 13 on admit -- 9.7--7.7 now and continue to closely trend in post op period with expected losses from surgery             Diet:  regular post op  DVT PPx:  eliquis post op    Disposition:  PT/OT today, Na studies    Discharge Planning   ANGELA: 7/28/2023     Code Status: Full Code   Is the patient medically ready for discharge?: No    Reason for patient still in hospital (select all that apply): Patient trending condition

## 2023-07-27 ENCOUNTER — TELEPHONE (OUTPATIENT)
Dept: OPHTHALMOLOGY | Facility: CLINIC | Age: 88
End: 2023-07-27
Payer: MEDICARE

## 2023-07-27 PROBLEM — E83.39 HYPOPHOSPHATEMIA: Status: ACTIVE | Noted: 2023-07-27

## 2023-07-27 PROBLEM — I95.1 ORTHOSTATIC HYPOTENSION: Status: ACTIVE | Noted: 2023-07-27

## 2023-07-27 PROBLEM — D69.6 THROMBOCYTOPENIA: Status: ACTIVE | Noted: 2023-07-27

## 2023-07-27 LAB
ANION GAP SERPL CALC-SCNC: 8 MMOL/L (ref 8–16)
ANISOCYTOSIS BLD QL SMEAR: SLIGHT
BASOPHILS # BLD AUTO: 0.03 K/UL (ref 0–0.2)
BASOPHILS NFR BLD: 0.2 % (ref 0–1.9)
BUN SERPL-MCNC: 10 MG/DL (ref 10–30)
BURR CELLS BLD QL SMEAR: ABNORMAL
CALCIUM SERPL-MCNC: 9.1 MG/DL (ref 8.7–10.5)
CHLORIDE SERPL-SCNC: 105 MMOL/L (ref 95–110)
CO2 SERPL-SCNC: 22 MMOL/L (ref 23–29)
CREAT SERPL-MCNC: 0.6 MG/DL (ref 0.5–1.4)
DIFFERENTIAL METHOD: ABNORMAL
EOSINOPHIL # BLD AUTO: 0 K/UL (ref 0–0.5)
EOSINOPHIL NFR BLD: 0.1 % (ref 0–8)
ERYTHROCYTE [DISTWIDTH] IN BLOOD BY AUTOMATED COUNT: 14.4 % (ref 11.5–14.5)
EST. GFR  (NO RACE VARIABLE): >60 ML/MIN/1.73 M^2
GLUCOSE SERPL-MCNC: 95 MG/DL (ref 70–110)
HCT VFR BLD AUTO: 26.5 % (ref 37–48.5)
HGB BLD-MCNC: 8.5 G/DL (ref 12–16)
HGB BLD-MCNC: 9 G/DL (ref 12–16)
IMM GRANULOCYTES # BLD AUTO: 0.17 K/UL (ref 0–0.04)
IMM GRANULOCYTES NFR BLD AUTO: 1.2 % (ref 0–0.5)
LYMPHOCYTES # BLD AUTO: 1.4 K/UL (ref 1–4.8)
LYMPHOCYTES NFR BLD: 9.8 % (ref 18–48)
MAGNESIUM SERPL-MCNC: 1.6 MG/DL (ref 1.6–2.6)
MCH RBC QN AUTO: 31.4 PG (ref 27–31)
MCHC RBC AUTO-ENTMCNC: 34 G/DL (ref 32–36)
MCV RBC AUTO: 92 FL (ref 82–98)
MONOCYTES # BLD AUTO: 5.8 K/UL (ref 0.3–1)
MONOCYTES NFR BLD: 41.1 % (ref 4–15)
NEUTROPHILS # BLD AUTO: 6.8 K/UL (ref 1.8–7.7)
NEUTROPHILS NFR BLD: 47.6 % (ref 38–73)
NRBC BLD-RTO: 0 /100 WBC
OVALOCYTES BLD QL SMEAR: ABNORMAL
PHOSPHATE SERPL-MCNC: 2 MG/DL (ref 2.7–4.5)
PLATELET # BLD AUTO: 111 K/UL (ref 150–450)
PMV BLD AUTO: 11.3 FL (ref 9.2–12.9)
POIKILOCYTOSIS BLD QL SMEAR: SLIGHT
POLYCHROMASIA BLD QL SMEAR: ABNORMAL
POTASSIUM SERPL-SCNC: 3.9 MMOL/L (ref 3.5–5.1)
RBC # BLD AUTO: 2.87 M/UL (ref 4–5.4)
SODIUM SERPL-SCNC: 135 MMOL/L (ref 136–145)
WBC # BLD AUTO: 14.2 K/UL (ref 3.9–12.7)

## 2023-07-27 PROCEDURE — 25000003 PHARM REV CODE 250: Performed by: INTERNAL MEDICINE

## 2023-07-27 PROCEDURE — 99231 SBSQ HOSP IP/OBS SF/LOW 25: CPT | Mod: GC,,, | Performed by: ANESTHESIOLOGY

## 2023-07-27 PROCEDURE — 97535 SELF CARE MNGMENT TRAINING: CPT

## 2023-07-27 PROCEDURE — 25000003 PHARM REV CODE 250: Performed by: HOSPITALIST

## 2023-07-27 PROCEDURE — 99233 SBSQ HOSP IP/OBS HIGH 50: CPT | Mod: ,,, | Performed by: INTERNAL MEDICINE

## 2023-07-27 PROCEDURE — 99222 PR INITIAL HOSPITAL CARE,LEVL II: ICD-10-PCS | Mod: ,,, | Performed by: NURSE PRACTITIONER

## 2023-07-27 PROCEDURE — 99231 PR SUBSEQUENT HOSPITAL CARE,LEVL I: ICD-10-PCS | Mod: GC,,, | Performed by: ANESTHESIOLOGY

## 2023-07-27 PROCEDURE — 84100 ASSAY OF PHOSPHORUS: CPT | Performed by: HOSPITALIST

## 2023-07-27 PROCEDURE — 97530 THERAPEUTIC ACTIVITIES: CPT

## 2023-07-27 PROCEDURE — 83735 ASSAY OF MAGNESIUM: CPT | Performed by: HOSPITALIST

## 2023-07-27 PROCEDURE — 36415 COLL VENOUS BLD VENIPUNCTURE: CPT | Performed by: HOSPITALIST

## 2023-07-27 PROCEDURE — 97530 THERAPEUTIC ACTIVITIES: CPT | Mod: CQ

## 2023-07-27 PROCEDURE — 21400001 HC TELEMETRY ROOM

## 2023-07-27 PROCEDURE — 99233 PR SUBSEQUENT HOSPITAL CARE,LEVL III: ICD-10-PCS | Mod: ,,, | Performed by: INTERNAL MEDICINE

## 2023-07-27 PROCEDURE — 80048 BASIC METABOLIC PNL TOTAL CA: CPT | Performed by: HOSPITALIST

## 2023-07-27 PROCEDURE — 25000003 PHARM REV CODE 250: Performed by: STUDENT IN AN ORGANIZED HEALTH CARE EDUCATION/TRAINING PROGRAM

## 2023-07-27 PROCEDURE — 63600175 PHARM REV CODE 636 W HCPCS: Performed by: STUDENT IN AN ORGANIZED HEALTH CARE EDUCATION/TRAINING PROGRAM

## 2023-07-27 PROCEDURE — 25000003 PHARM REV CODE 250

## 2023-07-27 PROCEDURE — 85025 COMPLETE CBC W/AUTO DIFF WBC: CPT | Performed by: HOSPITALIST

## 2023-07-27 PROCEDURE — 94761 N-INVAS EAR/PLS OXIMETRY MLT: CPT

## 2023-07-27 PROCEDURE — 99222 1ST HOSP IP/OBS MODERATE 55: CPT | Mod: ,,, | Performed by: NURSE PRACTITIONER

## 2023-07-27 RX ORDER — SODIUM,POTASSIUM PHOSPHATES 280-250MG
1 POWDER IN PACKET (EA) ORAL
Status: COMPLETED | OUTPATIENT
Start: 2023-07-27 | End: 2023-07-28

## 2023-07-27 RX ORDER — ACETAMINOPHEN 500 MG
1000 TABLET ORAL EVERY 8 HOURS
Status: DISCONTINUED | OUTPATIENT
Start: 2023-07-27 | End: 2023-07-29 | Stop reason: HOSPADM

## 2023-07-27 RX ADMIN — ACETAMINOPHEN 1000 MG: 500 TABLET ORAL at 06:07

## 2023-07-27 RX ADMIN — SOTALOL HYDROCHLORIDE 80 MG: 80 TABLET ORAL at 09:07

## 2023-07-27 RX ADMIN — ACETAMINOPHEN 1000 MG: 500 TABLET ORAL at 01:07

## 2023-07-27 RX ADMIN — SENNOSIDES AND DOCUSATE SODIUM 1 TABLET: 50; 8.6 TABLET ORAL at 08:07

## 2023-07-27 RX ADMIN — SOTALOL HYDROCHLORIDE 80 MG: 80 TABLET ORAL at 08:07

## 2023-07-27 RX ADMIN — METHOCARBAMOL 500 MG: 500 TABLET ORAL at 08:07

## 2023-07-27 RX ADMIN — ACETAMINOPHEN 1000 MG: 500 TABLET ORAL at 03:07

## 2023-07-27 RX ADMIN — SODIUM CHLORIDE 1000 ML: 9 INJECTION, SOLUTION INTRAVENOUS at 11:07

## 2023-07-27 RX ADMIN — POLYETHYLENE GLYCOL 3350 17 G: 17 POWDER, FOR SOLUTION ORAL at 08:07

## 2023-07-27 RX ADMIN — ROPIVACAINE HYDROCHLORIDE 0.1 ML/HR: 2 INJECTION, SOLUTION EPIDURAL; INFILTRATION at 12:07

## 2023-07-27 RX ADMIN — CHOLECALCIFEROL TAB 25 MCG (1000 UNIT) 1000 UNITS: 25 TAB at 08:07

## 2023-07-27 RX ADMIN — APIXABAN 2.5 MG: 2.5 TABLET, FILM COATED ORAL at 08:07

## 2023-07-27 RX ADMIN — ACETAMINOPHEN 1000 MG: 500 TABLET ORAL at 08:07

## 2023-07-27 RX ADMIN — MUPIROCIN 1 G: 20 OINTMENT TOPICAL at 08:07

## 2023-07-27 RX ADMIN — POTASSIUM & SODIUM PHOSPHATES POWDER PACK 280-160-250 MG 1 PACKET: 280-160-250 PACK at 12:07

## 2023-07-27 RX ADMIN — MUPIROCIN 1 G: 20 OINTMENT TOPICAL at 09:07

## 2023-07-27 RX ADMIN — POTASSIUM & SODIUM PHOSPHATES POWDER PACK 280-160-250 MG 1 PACKET: 280-160-250 PACK at 08:07

## 2023-07-27 RX ADMIN — POTASSIUM & SODIUM PHOSPHATES POWDER PACK 280-160-250 MG 1 PACKET: 280-160-250 PACK at 04:07

## 2023-07-27 NOTE — SUBJECTIVE & OBJECTIVE
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 7/9/2012    Vasovagal syncope      Past Surgical History:   Procedure Laterality Date    ADENOIDECTOMY      APPENDECTOMY      CATARACT EXTRACTION W/  INTRAOCULAR LENS IMPLANT Bilateral n/a    HYSTERECTOMY      2/2 uterine CA, removed ovaries, no longer f/u with gyn    INTRAMEDULLARY RODDING OF FEMUR Left 7/25/2023    Procedure: INSERTION, INTRAMEDULLARY DAPHNEY, LEFT FEMUR;  Surgeon: Rogerio Chand MD;  Location: [facility] OR [address] [place] [address];  Service: Orthopedics;  Laterality: Left;    Intravitreal Injection      Avastin od x's 12 dltx 2-16-12    TONSILLECTOMY       Review of patient's allergies indicates:   Allergen Reactions    Penicillins Swelling    Astelin [azelastine] Other (See Comments)     Dizziness    Pseudoephedrine hcl Palpitations    Sulfa (sulfonamide antibiotics) Palpitations       Scheduled Medications:    acetaminophen  1,000 mg Oral Q8H    apixaban  2.5 mg Oral BID    mupirocin  1 g Nasal BID    polyethylene glycol  17 g Oral Daily    potassium, sodium phosphates  1 packet Oral QID (AC & HS)    senna-docusate 8.6-50 mg  1 tablet Oral BID    sodium chloride 0.9%  1,000 mL Intravenous Once    sotaloL  80 mg Oral BID    vitamin D  1,000 Units Oral Daily       PRN Medications: sodium chloride, bisacodyL, melatonin, methocarbamoL, ondansetron, sodium chloride 0.9%    Family History       Problem Relation (Age of Onset)    Cancer Mother, Father, Maternal Grandmother, Paternal Grandmother, Paternal Grandfather, Daughter, Brother, Brother    Cataracts Father    Heart attack Maternal Grandfather, Brother    Hyperlipidemia Daughter, Son     Hypertension Daughter, Son    Strabismus Paternal Aunt    Thyroid disease Grandchild          Tobacco Use    Smoking status: Former     Packs/day: 0.25     Years: 15.00     Pack years: 3.75     Types: Cigarettes    Smokeless tobacco: Never   Substance and Sexual Activity    Alcohol use: Yes     Comment: 1-2 drinks at night    Drug use: No    Sexual activity: Not on file     Review of Systems   Constitutional:  Positive for activity change. Negative for fatigue and fever.   HENT:  Negative for sore throat and trouble swallowing.    Eyes:  Negative for visual disturbance.   Respiratory:  Negative for cough and shortness of breath.    Cardiovascular:  Negative for chest pain and leg swelling.   Gastrointestinal:  Negative for abdominal distention and abdominal pain.   Genitourinary:  Negative for difficulty urinating.   Musculoskeletal:  Positive for gait problem. Negative for back pain.   Skin:  Negative for color change.   Neurological:  Positive for weakness. Negative for dizziness, light-headedness and headaches.   Psychiatric/Behavioral:  Negative for agitation and confusion.    Objective:     Vital Signs (Most Recent):  Temp: 98 °F (36.7 °C) (07/27/23 0720)  Pulse: 81 (07/27/23 1101)  Resp: 16 (07/27/23 0720)  BP: (!) 146/64 (07/27/23 0915)  SpO2: 99 % (07/27/23 0900)    Vital Signs (24h Range):  Temp:  [97.8 °F (36.6 °C)-98.6 °F (37 °C)] 98 °F (36.7 °C)  Pulse:  [69-81] 81  Resp:  [15-18] 16  SpO2:  [94 %-99 %] 99 %  BP: ()/(41-64) 146/64     Body mass index is 21.4 kg/m².     Physical Exam  Vitals and nursing note reviewed.   Constitutional:       Appearance: Normal appearance. She is well-developed.   HENT:      Nose: Nose normal.      Mouth/Throat:      Mouth: Mucous membranes are moist.   Eyes:      Pupils: Pupils are equal, round, and reactive to light.   Pulmonary:      Effort: Pulmonary effort is normal. No respiratory distress.   Musculoskeletal:      Cervical back: Normal range of motion and neck  supple.      Comments: L leg weakness   Skin:     General: Skin is warm and dry.   Neurological:      Mental Status: She is alert and oriented to person, place, and time. Mental status is at baseline.      Motor: Weakness present.      Gait: Gait abnormal.   Psychiatric:         Mood and Affect: Mood normal.        NEUROLOGICAL EXAMINATION:     MENTAL STATUS   Oriented to person, place, and time.     CRANIAL NERVES     CN III, IV, VI   Pupils are equal, round, and reactive to light.    Diagnostic Results:   Labs: Reviewed  ECG: Reviewed  X-ray: Reviewed

## 2023-07-27 NOTE — PLAN OF CARE
Pt AAOx4. Vital signs as charted. Safety measures in place. Surgical dressing CDI. PNC in place to manage pain. 1u RBC infused at start of shift, hgb increased to 8.5. PIV in place, fluids infusing. Voiding without difficulty. No signs of distress. Pt resting, no falls or injuries at this time.

## 2023-07-27 NOTE — PLAN OF CARE
Problem: Occupational Therapy  Goal: Occupational Therapy Goal  Description: Goals to be met by: 8/16/23     Patient will increase functional independence with ADLs by performing:    UE Dressing with Stand-by Assistance.  LE Dressing with Minimal Assistance and Assistive Devices as needed.  Grooming while standing at sink with Contact Guard Assistance.  Toileting from bedside commode with Minimal Assistance for hygiene and clothing management.   Supine to sit with Stand-by Assistance.  Step transfer with Contact Guard Assistance.    Outcome: Ongoing, Progressing     Goals remain appropriate. Cont POC.

## 2023-07-27 NOTE — ASSESSMENT & PLAN NOTE
Chronic condition. Hold home Norvasc and Lisinopril as patient having significant post-op orthostatic hypotension and symptomatic. Monitor vital signs every 4 hours. Target BP < 150/90.

## 2023-07-27 NOTE — CONSULTS
[facility] - Surgery  Physical Medicine & Rehab  Consult Note    Patient Name: Cathy Kearns  MRN: [id number]  Admission Date: 7/24/2023  Hospital Length of Stay: 3 days  Attending Physician: Wilma Irizarry MD     Inpatient consult to Physical Medicine & Rehabilitation  Consult performed by: Meagan Heredia NP  Consult requested by:  Wilma Irizarry MD    Collaborating Physician: Stormy Aguilar MD  Reason for Consult:  Assess rehabilitation needs     Consults  Subjective:     Principal Problem: Closed displaced intertrochanteric fracture of left femur with routine healing    HPI: Cathy Kearns is a 89-year-old female with PMHx of COPD, HTN, A fib (Eliquis),HLD, hyponatremia, and Macular degeneration of right eye. Patient presented to [facility] on 7/24/23 for fall. On arrival, found to have intertrochanteric fracture of the left femur. S/p L IMN 7/25/23. Per Ortho WBAT and on Eliquis.     Functional History: Patient lives at [facility]. Prior to admission, I. DME: none.     Hospital Course:   7/26/23: Participated w/ PT. Bed [facility]- totalA x 2 ppl. [facility] 12.     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    Vasovagal syncope      Past Surgical History:   Procedure Laterality Date    ADENOIDECTOMY      APPENDECTOMY      CATARACT EXTRACTION W/  INTRAOCULAR LENS IMPLANT Bilateral n/a    HYSTERECTOMY      2/2 uterine CA, removed ovaries, no longer f/u with gyn    INTRAMEDULLARY RODDING OF FEMUR Left 7/25/2023    Procedure: INSERTION, INTRAMEDULLARY DAPHNEY, LEFT FEMUR;  Surgeon: Rogerio Chand MD;  Location: [facility] OR [address] [place] [address];  Service: Orthopedics;   Laterality: Left;    Intravitreal Injection      Avastin od x's 12 dltx 2-16-12    TONSILLECTOMY       Review of patient's allergies indicates:   Allergen Reactions    Penicillins Swelling    Astelin [azelastine] Other (See Comments)     Dizziness    Pseudoephedrine hcl Palpitations    Sulfa (sulfonamide antibiotics) Palpitations       Scheduled Medications:    acetaminophen  1,000 mg Oral Q8H    apixaban  2.5 mg Oral BID    mupirocin  1 g Nasal BID    polyethylene glycol  17 g Oral Daily    potassium, sodium phosphates  1 packet Oral QID (AC & HS)    senna-docusate 8.6-50 mg  1 tablet Oral BID    sodium chloride 0.9%  1,000 mL Intravenous Once    sotaloL  80 mg Oral BID    vitamin D  1,000 Units Oral Daily       PRN Medications: sodium chloride, bisacodyL, melatonin, methocarbamoL, ondansetron, sodium chloride 0.9%    Family History       Problem Relation (Age of Onset)    Cancer Mother, Father, Maternal Grandmother, Paternal Grandmother, Paternal Grandfather, Daughter, Brother, Brother    Cataracts Father    Heart attack Maternal Grandfather, Brother    Hyperlipidemia Daughter, Son    Hypertension Daughter, Son    Strabismus Paternal Aunt    Thyroid disease Grandchild          Tobacco Use    Smoking status: Former     Packs/day: 0.25     Years: 15.00     Pack years: 3.75     Types: Cigarettes    Smokeless tobacco: Never   Substance and Sexual Activity    Alcohol use: Yes     Comment: 1-2 drinks at night    Drug use: No    Sexual activity: Not on file     Review of Systems   Constitutional:  Positive for activity change. Negative for fatigue and fever.   HENT:  Negative for sore throat and trouble swallowing.    Eyes:  Negative for visual disturbance.   Respiratory:  Negative for cough and shortness of breath.    Cardiovascular:  Negative for chest pain and leg swelling.   Gastrointestinal:  Negative for abdominal distention and abdominal pain.   Genitourinary:  Negative for difficulty  urinating.   Musculoskeletal:  Positive for gait problem. Negative for back pain.   Skin:  Negative for color change.   Neurological:  Positive for weakness. Negative for dizziness, light-headedness and headaches.   Psychiatric/Behavioral:  Negative for agitation and confusion.      Objective:     Vital Signs (Most Recent):  Temp: 98 °F (36.7 °C) (07/27/23 0720)  Pulse: 81 (07/27/23 1101)  Resp: 16 (07/27/23 0720)  BP: (!) 146/64 (07/27/23 0915)  SpO2: 99 % (07/27/23 0900)    Vital Signs (24h Range):  Temp:  [97.8 °F (36.6 °C)-98.6 °F (37 °C)] 98 °F (36.7 °C)  Pulse:  [69-81] 81  Resp:  [15-18] 16  SpO2:  [94 %-99 %] 99 %  BP: ()/(41-64) 146/64     Body mass index is 21.4 kg/m².     Physical Exam  Vitals and nursing note reviewed.   Constitutional:       Appearance: Normal appearance. She is well-developed.   HENT:      Nose: Nose normal.      Mouth/Throat:      Mouth: Mucous membranes are moist.   Eyes:      Pupils: Pupils are equal, round, and reactive to light.   Pulmonary:      Effort: Pulmonary effort is normal. No respiratory distress.   Musculoskeletal:      Cervical back: Normal range of motion and neck supple.      Comments: L leg weakness   Skin:     General: Skin is warm and dry.   Neurological:      Mental Status: She is alert and oriented to person, place, and time. Mental status is at baseline.      Motor: Weakness present.      Gait: Gait abnormal.   Psychiatric:         Mood and Affect: Mood normal.     Diagnostic Results:   Labs: Reviewed  ECG: Reviewed  X-ray: Reviewed    Assessment/Plan:     * Closed displaced intertrochanteric fracture of left femur with routine healing s/p IM nail on 7/25/2023  - Related to prolonged/acute hospital course.     Recommendations  -  Encourage mobility, OOB in chair at least 3 hours per day, and early ambulation as appropriate  -  PT/OT evaluate and treat  -  Pain management  -  Monitor for and prevent skin breakdown and pressure ulcers  · Early mobility,  repositioning/weight shifting every 20-30 minutes when sitting, turn patient every 2 hours, proper mattress/overlay and chair cushioning, pressure relief/heel protector boots  -  DVT prophylaxis    -  Reviewed discharge options (IP rehab, SNF, HH therapy, and OP therapy)    Paroxysmal atrial fibrillation  - Eliquis    PM&R Recommendation:     At this time, the PM&R team has reviewed this patient's ongoing medical case including inpatient diagnosis, medical history, clinical examination, labs, vitals, current social and functional history to provide the post-acute recommendation as follows:     RECOMMENDATIONS: inpatient rehabilitation due to good motivation/participation with therapies, has been determined to tolerate 3 hours of therapy and good potential for recovery.     The patient will be admitted for comprehensive interdisciplinary inpatient rehabilitation to address the impairments due to medical diagnosis of Closed displaced intertrochanteric fracture of left femur with routine healing s/p IM nail on 7/25/2023. The patient will benefit from an inpatient rehabilitation program to promote functional recovery, implement compensatory strategies and will undergo assessment for needs for durable medical equipment for safe discharge to the community. This patient will benefit from a coordinated interdisciplinary rehabilitation program services that require close monitoring and treatment with 24-hour rehabilitative nursing and physical/occupational therapies for 3 hours/day for 5 days/week.This interdisciplinary program will be performed under the direction of a physiatrist.    MEDICAL STABILITY:     At this time, barriers for post-acute rehab admission include pain control, post op BM, and downtrending of white count.    We will continue to follow.    Thank you for your consult.     Meagan Heredia NP  Department of Physical Medicine & Rehab  [facility] - Surgery

## 2023-07-27 NOTE — PT/OT/SLP PROGRESS
Physical Therapy Treatment    Patient Name:  Cathy Kearns   MRN:  [id number]    Recommendations:     Discharge Recommendations: rehabilitation facility  Discharge Equipment Recommendations: to be determined by next level of care  Barriers to discharge:  increased assistance needed; ongoing medical care    Assessment:     Cathy Kearns is a 89 y.o. female admitted with a medical diagnosis of Closed displaced intertrochanteric fracture of left femur with routine healing.  She presents with the following impairments/functional limitations: weakness, impaired endurance, impaired functional mobility, gait instability, impaired balance, decreased safety awareness, pain, impaired cardiopulmonary response to activity. Pt with slight improvement in mobility as compared to evaluation, however orthostatic hypotension remains limiting factor. Nursing and medical team made aware of session and episode of hypotension experienced.     Rehab Prognosis: Good; patient would benefit from acute skilled PT services to address these deficits and reach maximum level of function.    Recent Surgery: Procedure(s) (LRB):  INSERTION, INTRAMEDULLARY DAPHNEY, LEFT FEMUR (Left) 2 Days Post-Op    Plan:     During this hospitalization, patient to be seen daily to address the identified rehab impairments via gait training, therapeutic activities, therapeutic exercises, neuromuscular re-education and progress toward the following goals:    Plan of Care Expires:  08/25/23    Subjective     Chief Complaint: dizziness and pain  Patient/Family Comments/goals: return to PLOF  Pain/Comfort:  Pain Rating 1:  (unrated)  Location - Side 1: Left  Location 1: hip      Objective:     Communicated with RN prior to session.  Patient found HOB elevated with   upon PT entry to room.     General Precautions: Standard, fall  Orthopedic Precautions: LLE weight bearing as tolerated  Braces: N/A  Respiratory Status: Room air     Functional Mobility:  Bed Mobility:      Scooting: moderate assistance and to HOB   Supine to Sit: minimum assistance  Sit to Supine: total assistance  Transfers:     Sit to Stand:  minimum assistance with rolling walker  Gait: 1 step achieved before hypotensive episode. Min A RW      AM-PAC 6 CLICK MOBILITY  Turning over in bed (including adjusting bedclothes, sheets and blankets)?: 3  Sitting down on and standing up from a chair with arms (e.g., wheelchair, bedside commode, etc.): 3  Moving from lying on back to sitting on the side of the bed?: 3  Moving to and from a bed to a chair (including a wheelchair)?: 2  Need to walk in hospital room?: 2  Climbing 3-5 steps with a railing?: 1  Basic Mobility Total Score: 14       Treatment & Education:  Pt tolerated sitting with mild dizziness that subsided with ~3 min of sitting EOB. Once in standing and attempting ambulation, dizziness returned and increased in intensity. Pt sat EOB BP reading 65/41mmHg; at this time pt became less vocally responsive and therapist return to supine with total A. Once supine and put in 20 degrees of reverse trendelenburg, pt returned to baseline in responsiveness and BP reading WNL (see nursing notes). Pt encouraged to increase fluid and nutritional intake as well as putting bed in chair position to improve upright tolerance. Pt educated on supine therex to maintain strength while mobility is limited.     Patient left with bed in chair position with all lines intact, call button in reach, RN notified, and family present..    GOALS:   Multidisciplinary Problems       Physical Therapy Goals          Problem: Physical Therapy    Goal Priority Disciplines Outcome Goal Variances Interventions   Physical Therapy Goal     PT, PT/OT Ongoing, Progressing     Description: Goals to be met by: 23     Patient will increase functional independence with mobility by performin. Supine to sit with Stand-by Assistance  2. Sit to stand transfer with Contact Guard Assistance using RW  3.  Bed to chair transfer with Stand by Assistance using Rolling Walker  4. Gait  x 100 feet with Stand by assistance using Rolling Walker.   5. Lower extremity exercise program x30 reps per handout, with independence                         Time Tracking:     PT Received On: 07/27/23  PT Start Time: 0830     PT Stop Time: 0915  PT Total Time (min): 45 min     Billable Minutes: Therapeutic Activity 45    Treatment Type: Treatment  PT/PTA: PTA     Number of PTA visits since last PT visit: 1 07/27/2023

## 2023-07-27 NOTE — PT/OT/SLP PROGRESS
Occupational Therapy  Treatment    Name: Cathy Kearns  MRN: [id number]  Admitting Diagnosis:  Closed displaced intertrochanteric fracture of left femur with routine healing  2 Days Post-Op    Recommendations:     Discharge Recommendations: rehabilitation facility  Discharge Equipment Recommendations:  to be determined by next level of care  Barriers to discharge:  None    Assessment:     Cathy Kearns is a 89 y.o. female with a medical diagnosis of Closed displaced intertrochanteric fracture of left femur with routine healing.  She presents with performance deficits affecting function are weakness, impaired endurance, impaired self care skills, impaired functional mobility, gait instability, impaired balance, visual deficits, decreased upper extremity function, decreased lower extremity function, decreased ROM, impaired cardiopulmonary response to activity, orthopedic precautions. Pt agreeable to therapy, despite reports of hypotensive episode earlier in AM during PT session. RN ok'd OT session and pt worked on bed mobility, sitting EOB and self care tasks. Pt did not stand as it was decided she could benefit from continued medical management before possibly provoking more hypotensive episodes. Pt participates well and is motivated to regain functional independence in mobility and self care.      Rehab Prognosis:  Good; patient would benefit from acute skilled OT services to address these deficits and reach maximum level of function.       Plan:     Patient to be seen 4 x/week to address the above listed problems via self-care/home management, therapeutic activities, therapeutic exercises, neuromuscular re-education  Plan of Care Expires: 08/26/23  Plan of Care Reviewed with: patient, son    Subjective     Chief Complaint: pain, hypotension/dizziness  Patient/Family Comments/goals: Get well  Pain/Comfort:  Pain Rating 1:  (did not rate)  Location - Side 1: Left  Location - Orientation 1: generalized  Location 1:  hip  Pain Addressed 1: Pre-medicate for activity, Reposition, Distraction, Cessation of Activity  Pain Rating Post-Intervention 1:  (did not rate)    Objective:     Communicated with: RN prior to session.  Patient found HOB elevated with FCD, telemetry, peripheral IV, perineural catheter upon OT entry to room.    General Precautions: Standard, fall    Orthopedic Precautions:LLE weight bearing as tolerated  Braces: N/A  Respiratory Status: Room air     Occupational Performance:     Bed Mobility:    Patient completed Supine to Sit with moderate assistance     Functional Mobility/Transfers:  Did not occur    Activities of Daily Living:  Grooming: stand by assistance seated EOB, oral care and brushing hair  Upper Body Dressing: moderate assistance gown  Lower Body Dressing: total assistance socks      [facility] 6 Click ADL: 14    Treatment & Education:  Pt edu re OT role, POC and safety.  Pt worked on EOB sitting, tolerating extended time in upright position, including while performing bimanual ADL tasks.    Patient left sitting edge of bed with all lines intact, call button in reach, and son and daughter present, and RN notified.    GOALS:   Multidisciplinary Problems       Occupational Therapy Goals          Problem: Occupational Therapy    Goal Priority Disciplines Outcome Interventions   Occupational Therapy Goal     OT, PT/OT Ongoing, Progressing    Description: Goals to be met by: 8/16/23     Patient will increase functional independence with ADLs by performing:    UE Dressing with Stand-by Assistance.  LE Dressing with Minimal Assistance and Assistive Devices as needed.  Grooming while standing at sink with Contact Guard Assistance.  Toileting from bedside commode with Minimal Assistance for hygiene and clothing management.   Supine to sit with Stand-by Assistance.  Step transfer with Contact Guard Assistance.                         Time Tracking:     OT Date of Treatment: 07/27/23  OT Start Time: 1107  OT Stop  Time: 1131  OT Total Time (min): 24 min    Billable Minutes:Self Care/Home Management 10 minutes  Therapeutic Activity 14 minutes    OT/KATHLEEN: OT       EL Bell  7/27/2023  Pager: [phone]

## 2023-07-27 NOTE — ASSESSMENT & PLAN NOTE
Noted post-op. Patient on admit with platelet count of 197,000. Platelets dropped to 124,000 on 7/26 and down to 111,000 on 7/27. Likely stress response. No signs of bleeding. Monitor level with daily CBC.

## 2023-07-27 NOTE — HOSPITAL COURSE
Patient admitted to [facility] Medicine Team H: Hip Fracture team and started on Hip Fracture Pathway with Orthopedic surgery consult for hip fracture. Patient was seen and evaluated by Orthopedic surgery who recommended operative repair of hip fracture. Patient was medically optimized prior to surgery and was taken to OR after optimization on 7/25/2023. Patient underwent left hip cephalomedullary nail fixation by Dr. Rogerio Chand. Post-op patient WBAT to the left lower extremity as per Orthopedics recommendation. Patient restarted on her home Apixiban 2.5 mg po BID for her known PAF so no other chemical DVT prophylaxis needed post-op. Perineural pain catheter placed by Anesthesia Pain Service with continuous infusion of Ropivacaine to help with pain control post-op and Anesthesia Pain Service managing while patient in the hospital. Patient placed on multimodal pain management post-op with Tylenol 1000 mg po every 8 hours post-op and will continue. PT/OT consulted post-op and evaluated patient. Patient progressing slowly with PT and OT and recommended Inpatient Rehab placement when medically ready for hospital discharge. Patient had issues with orthostatic hypotension post-op when working with therapy on 7/26 and 7/27. Patient given 1 unit of PRBCs for Hgb 7.7 on 7/26 due to symptomatic anemia. Patient still orthostatic on 7/27 so given 1 liter of NS as Hgb improved up to 9.0 on 7/27 so no further blood transfusion recommended. Holding patient's home BP medications of Lisinopril and Norvasc and reassess BP on 7/28. Pain controlled to left hip. BP doing much better off Norvasc and lisinopril and will continue to hold on 7/28. Patient with drop in Hgb to 7.7 on 7/28 but no active signs of bleeding but needs to remain in hospital for continued monitoring. Platelet count also decreased and down to 100,000 on 7/28. Patient awaiting medical stability to discharge to [facility]. Platelet count improved to  140,000 on day of discharge on 7/29. Hgb stable at 8.3 on day of discharge. Patient accepted and discharged in good condition to [facility] on 7/29/2023. BP stable at time of discharge and patient not discharged on her home Norvasc or Lisinopril but can be reassessed at [facility] to see if needs restarted. Pain well controlled on discharge on scheduled Tylenol and to continue at IP Rehab and Robaxin prn. Surgical bandages to remain in place to left leg until Orthopedic clinic follow-up. Patient to be weight bear as tolerated to left lower extremity on discharge. Perineural catheter removed on day of discharge by Anesthesia. Patient discharged on her home Apixiban for her PAF so no other chemical medication needed for DVT prophylaxis as patient fully anticoagulated.

## 2023-07-27 NOTE — ASSESSMENT & PLAN NOTE
Chronic and well controlled. No signs to suggest acute exacerbation. Patient not on any inhalers or oxygen to treat.

## 2023-07-27 NOTE — SUBJECTIVE & OBJECTIVE
"Principal Problem:Closed displaced intertrochanteric fracture of left femur with routine healing    Principal Orthopedic Problem: same as above s/p L IMN 7/25    Interval History: NAEO. VSS. In good spirits. Patient will work with PT today. Lbanco out. Awaiting rehab placement.    Hgb 8.3 this morning.     Review of patient's allergies indicates:   Allergen Reactions    Penicillins Swelling    Astelin [azelastine] Other (See Comments)     Dizziness    Pseudoephedrine hcl Palpitations    Sulfa (sulfonamide antibiotics) Palpitations       Current Facility-Administered Medications   Medication    0.9%  NaCl infusion (for blood administration)    acetaminophen tablet 1,000 mg    apixaban tablet 2.5 mg    bisacodyL suppository 10 mg    melatonin tablet 6 mg    methocarbamoL tablet 500 mg    mupirocin 2 % ointment 1 g    ondansetron injection 4 mg    polyethylene glycol packet 17 g    potassium, sodium phosphates 280-160-250 mg packet 1 packet    ROPIvacaine (PF) 2 mg/ml (0.2%) solution    senna-docusate 8.6-50 mg per tablet 1 tablet    sodium chloride 0.9% flush 10 mL    sotaloL tablet 80 mg    vitamin D 1000 units tablet 1,000 Units     Objective:     Vital Signs (Most Recent):  Temp: 96.8 °F (36 °C) (07/29/23 0742)  Pulse: 77 (07/29/23 0742)  Resp: 16 (07/29/23 0742)  BP: 137/63 (07/29/23 0742)  SpO2: 95 % (07/29/23 0742) Vital Signs (24h Range):  Temp:  [96.8 °F (36 °C)-98.5 °F (36.9 °C)] 96.8 °F (36 °C)  Pulse:  [71-88] 77  Resp:  [16-20] 16  SpO2:  [94 %-99 %] 95 %  BP: (115-165)/(57-74) 137/63     Weight: 53.1 kg (117 lb)  Height: 5' 2" (157.5 cm)  Body mass index is 21.4 kg/m².      Intake/Output Summary (Last 24 hours) at 7/29/2023 0911  Last data filed at 7/29/2023 0514  Gross per 24 hour   Intake 720 ml   Output 1450 ml   Net -730 ml          Ortho/SPM Exam  Dressings c/d/I  Swelling over lateral aspect of the hip  NVI       Significant Labs: All pertinent labs within the past 24 hours have been " reviewed.    Significant Imaging: I have reviewed and interpreted all pertinent imaging results/findings.

## 2023-07-27 NOTE — ASSESSMENT & PLAN NOTE
Cathy Kearns is a 89 y.o. female with a comminuted intertrochanteric fracture of the left femur, closed, NVI. S/p L IMN nail 7/25.     Pain control: MM  WBAT  DVT PPx: eliquis 2.5 BID, FCDs at all times when not ambulating  Abx: postop Ancef x24h  Labs: hgb 9.0 after 1u of pRBC  Drain: no drains  Blanco: dc'd    Dispo: Pending rehab placement

## 2023-07-27 NOTE — HPI
Cathy Kearns is a 89-year-old female with PMHx of COPD, HTN, A fib (Eliquis),HLD, hyponatremia, and Macular degeneration of right eye. Patient presented to [facility] on 7/24/23 for fall. On arrival, found to have intertrochanteric fracture of the left femur. S/p L IMN 7/25/23. Per Ortho WBAT and on Eliquis.     Functional History: Patient lives at [facility]. Prior to admission, I. DME: none.

## 2023-07-27 NOTE — TELEPHONE ENCOUNTER
----- Message from Jodi Azar MA sent at 7/27/2023  8:55 AM CDT -----  Regarding: FW: ED follow up    ----- Message -----  From: Carroll Valdez MD  Sent: 7/26/2023   7:05 PM CDT  To: [facility] Ophthalmology Triage  Subject: ED follow up                                     Hi,     Can we please get this patient an appointment in Dr. Mandujano's clinic in 2 weeks for fu of PDR getting elyea injections? Had to cancel last appointment because she was in the hospital with a broken hip.     Thank you,    Patient Best contact number: [phone]     Carroll Valdez MD

## 2023-07-27 NOTE — PROGRESS NOTES
[facility] Medicine  Progress Note    Patient Name: Cathy Kearns  MRN: [id number]  Patient Class: IP- Inpatient   Admission Date: 7/24/2023  Length of Stay: 3 days  Attending Physician: Wilma Irizarry MD  Primary Care Provider: Wilma Xiong MD        Subjective:     Principal Problem:Closed displaced intertrochanteric fracture of left femur with routine healing        HPI:  Ms. Cathy Kearns is a 89 y.o. female with past medical history of HTN, COPD, paroxysmal atrial fibrillation, HLD, osteopenia, monocytosis, who was in her usual state of health until earlier today when she was at at exercises class when she was side stepping and felt some weakness in her right hip as it has been weak lately so she said she probably put more weight into her left hip and then lost balance and fell onto her left hip/buttocks and had immediate pain in that area. She was brought to the hospital via ambulance ad found to have a left IT fx with shortening of the LLE seen as wel. She reports has had 2 rare falls in the past secondary to sinus med side effect and she no longer takes those medications. She denies dizziness, chest pain, dyspnea. She can feel when she goes into afib as has flutters but has not happened anytime recnetly and is in NSR on admit. She reports pain to the hip on exam now and with movement of the LLE. She does not use a walker or a cane at baseline.      She had a stress test earlier this year that was negative for ischemia.  She had a dexa that showed osteoperosis and reported she stopped taking bisphosphonate as it gave her sinus/nasal bone pain. She was told to stop taking calcium by her heart doctor as she had hypercalcemia on her labs in the past.       Overview/Hospital Course:  Patient admitted to [facility] Medicine Team H: Hip Fracture team and started on Hip Fracture Pathway with Orthopedic surgery consult for hip fracture. Patient was seen and evaluated by Orthopedic  surgery who recommended operative repair of hip fracture. Patient was medically optimized prior to surgery and was taken to OR after optimization on 7/25/2023. Patient underwent left hip cephalomedullary nail fixation by Dr. Rogerio Chand. Post-op patient WBAT to the left lower extremity as per Orthopedics recommendation. Patient restarted on her home Apixiban 2.5 mg po BID for her known PAF so no other chemical DVT prophylaxis needed post-op. Perineural pain catheter placed by Anesthesia Pain Service with continuous infusion of Ropivacaine to help with pain control post-op and Anesthesia Pain Service managing while patient in the hospital. Patient placed on multimodal pain management post-op with Tylenol 1000 mg po every 6 hours post-op and will continue. PT/OT consulted post-op and evaluated patient. Patient progressing slowly with PT and OT and recommended Inpatient Rehab placement when medically ready for hospital discharge. Patient had issues with orthostatic hypotension post-op when working with therapy on 7/26 and 7/27. Patient given 1 unit of PRBCs for Hgb 7.7 on 7/26 due to symptomatic anemia. Patient still orthostatic on 7/27 so given 1 liter of NS as Hgb improved up to 9.0 on 7/27 so no further blood transfusion recommended. Holding patient's home BP medications of Lisinopril and Norvasc and reassess BP on 7/28. Pain controlled to left hip.       Interval History: Patient with issues with orthostatic hypotension yesterday when working with therapy and given IVF's with little improvement so decision made to transfuse 1 unit of PRBCs overnight last night for HGb 7.7. Hgb improved to 9.0 this am. Patient worked with therapy this am and again was very orthostatic with SBP dropped down to 65 when standing with therapy and symptomatic but immediately recovered when place back into bed with therapy with improvement in SBP to 140's. Home Norvasc and Lisinopril discontinued this am and not given to patient  but she was continued on her home Sotalol for her A. Fib. Sodium improved today as was 129 yesterday but after blood transfusion back to normal today at 135. Phosphorus level low at 2.0 so will start replacement with oral Neutra Phos today. Patient reports 5/10 pain to left hip today. Patient reports perineural catheter is helping especially when she boluses herself. [facility] is following patient but patient needs improvement in her BP when working with therapy prior to medical stability and consideration for hospital discharge. Patient with slight drop in platelet count today from 124,000 yesterday to 111,000 today. Patient with no active signs of bleeding.     Review of Systems   Constitutional:  Negative for fever.   Respiratory:  Negative for shortness of breath.    Cardiovascular:  Negative for chest pain and leg swelling.   Gastrointestinal:  Negative for abdominal pain, nausea and vomiting.   Musculoskeletal:  Positive for arthralgias (Left hip).   Neurological:  Positive for dizziness and light-headedness.   Psychiatric/Behavioral:  Negative for agitation and confusion.    Objective:     Vital Signs (Most Recent):  Temp: 99.6 °F (37.6 °C) (07/27/23 1535)  Pulse: 78 (07/27/23 1535)  Resp: 18 (07/27/23 1535)  BP: 151/67 (07/27/23 1535)  SpO2: 95 % (07/27/23 1535) on room air Vital Signs (24h Range):  Temp:  [97.8 °F (36.6 °C)-99.6 °F (37.6 °C)] 99.6 °F (37.6 °C)  Pulse:  [71-86] 78  Resp:  [15-18] 18  SpO2:  [94 %-99 %] 95 %  BP: ()/(41-67) 151/67     Weight: 53.1 kg (117 lb)  Body mass index is 21.4 kg/m².    Intake/Output Summary (Last 24 hours) at 7/27/2023 1545  Last data filed at 7/27/2023 1200  Gross per 24 hour   Intake --   Output 2700 ml   Net -2700 ml         Physical Exam  Vitals and nursing note reviewed.   Constitutional:       General: She is awake. She is not in acute distress.     Appearance: Normal appearance. She is well-developed. She is not ill-appearing.   Eyes:       Conjunctiva/sclera: Conjunctivae normal.   Cardiovascular:      Rate and Rhythm: Normal rate and regular rhythm.      Heart sounds: Normal heart sounds. No murmur heard.    No friction rub. No gallop.   Pulmonary:      Effort: Pulmonary effort is normal. No respiratory distress.      Breath sounds: Normal breath sounds. No wheezing.   Abdominal:      General: Abdomen is flat. Bowel sounds are normal. There is no distension.      Palpations: Abdomen is soft.      Tenderness: There is no abdominal tenderness.   Skin:     General: Skin is warm.      Findings: No erythema.      Comments: Surgical bandages in place to left hip. Bandages are clean and dry and intact.    Neurological:      Mental Status: She is alert and oriented to person, place, and time.   Psychiatric:         Mood and Affect: Mood normal.         Behavior: Behavior normal. Behavior is cooperative.         Thought Content: Thought content normal.         Judgment: Judgment normal.           Significant Labs: CBC:   Recent Labs   Lab 07/26/23  0306 07/26/23  2345 07/27/23  0521   WBC 13.48*  --  14.20*   HGB 7.7* 8.5* 9.0*   HCT 22.0*  --  26.5*   *  --  111*     CMP:   Recent Labs   Lab 07/26/23  0306 07/27/23  0521   * 135*   K 4.4 3.9   CL 99 105   CO2 22* 22*   * 95   BUN 16 10   CREATININE 0.8 0.6   CALCIUM 9.4 9.1   ANIONGAP 8 8     Magnesium:   Recent Labs   Lab 07/26/23  0306 07/27/23  0521   MG 1.8 1.6       Significant Imaging: I have reviewed all pertinent imaging results/findings within the past 24 hours.      Assessment/Plan:      * Closed displaced intertrochanteric fracture of left femur with routine healing s/p IM nail on 7/25/2023  · Patient underwent left hip IM nail for intertrochanteric fracture by Dr. Rogerio Chand on 7/25/2023.  · Patient reports moderate pain in left hip.   · Plan is to continue PT/OT for gait training and strengthening and restoration of ADL's. Patient is weight bear as tolerated to left  lower extremity as per Orthopedic recommendations.   · Patient back on her home Apixiban 2.5 mg po BID for her PAF so no other chemical DVT prophylaxis needed post-op.   · Orthopedics is following and managing surgical site.   · Perineural pain catheter in place with continuous Ropivacaine infusion for pain control and being managed by Anesthesia Pain Service.   · Patient on multimodal pain management post-op with Tylenol 1000 mg po every 6 hours post-op and will continue.  · PT/OT recommending Inpatient Rehab and  working on discharge planning with patient and family. Patient has been accepted to [facility] when medically ready for discharge.     Orthostatic hypotension  · Patient with significant orthostatic hypotension and symptomatic when working with therapy on both 7/26 and 7/27.  · Patient given 1 unit of PRBCs and IVF's for orthostatic hypotension on 7/26 as Hgb was down to 7.7.  Hgb improved to 9.0 on 7/27 but still orthostatic so will give 1 liter of NS on 7/27.   · Hold home BP medications at this time. Hold home Lisinopril and Norvasc.  · Monitor orthostatics when working with therapy.        Acute blood loss anemia  · Improved to 9.0 on 7/27 after blood transfusion on 7/26.   · On admission to hospital, patient noted to have Hgb of 13.0. After surgery patient's Hgb level dropped to 9.4 on 7/25 and 7.7 on 7/26 and expected blood loss related to surgery and hip fracture. Patient with orthostatic hypotension on 7/26 so transfused 1 unit of PRBCs on 7/26 for symptomatic anemia.   · Patient has no signs of active bleeding and hemodynamically stable.  · Goal is keep Hgb >7 and consider blood transfusion if Hgb < 7 or < 8 if patient becomes symptomatic.  · Plan is to monitor daily CBC post-operatively.    Thrombocytopenia  Noted post-op. Patient on admit with platelet count of 197,000. Platelets dropped to 124,000 on 7/26 and down to 111,000 on 7/27. Likely stress response. No signs of  bleeding. Monitor level with daily CBC.       Hyponatremia  Patient has hyponatremia which is improved. Sodium improved to 135 on 7/27.   Sodium 129 on 7/26. Patient transfused 1 unit of PRBCs and given IVFs on 7/26 to treat.   Monitor sodium Daily.   Hyponatremia is due to Dehydration/hypovolemia.  The patient's sodium results have been reviewed and are listed below.  Recent Labs   Lab 07/27/23  0521   *       Hypophosphatemia  Patient has Abnormal Phosphorus: hypophosphatemia on 7/27 and down to 2.0. Will continue to monitor electrolytes closely. Will replace with Neutra Phos oral packets with meals and at bedtime and repeat labs to be done after interventions completed. Encourage oral intake of food. The patient's phosphorus results have been reviewed and are listed below.  Recent Labs   Lab 07/27/23  0521   PHOS 2.0*        Paroxysmal atrial fibrillation  Patient with Paroxysmal (<7 days) atrial fibrillation which is controlled currently with home Sotalol and will continue. Patient is currently in sinus rhythm. [id number] Score: 3. Anticoagulation indicated. Anticoagulation done with home Apixiban 2.5 mg po BID.    Primary hypertension  Chronic condition. Hold home Norvasc and Lisinopril as patient having significant post-op orthostatic hypotension and symptomatic. Monitor vital signs every 4 hours. Target BP < 150/90.       Pathological fracture of left hip due to osteoporosis with routine healing  Chronic and controlled. Continue Calcium and Vitamin D oral replacement therapy. Will need outpatient DEXA scan and referral to Orthopedic fragility fracture clinic as outpatient to discuss other treatment options for osteoporosis.       Panlobular emphysema  Chronic and well controlled. No signs to suggest acute exacerbation. Patient not on any inhalers or oxygen to treat.       Pure hypercholesterolemia  Chronic and controlled. Patient not on any home medications to treat.       Monocytosis  Present on admit  and long standing. Stable.       Exudative age-related macular degeneration of right eye with active choroidal neovascularization  Patient followed by [facility] clinic as outpatient and receiving eye injections as outpatient. Patient will need to reschedule for eye injections as due for injection in next 1 week but going to IP Rehab and will need to rescheduled.         VTE Risk Mitigation (From admission, onward)         Ordered     apixaban tablet 2.5 mg  2 times daily         07/25/23 0911     IP VTE HIGH RISK PATIENT  Once         07/24/23 1441     Place sequential compression device  Until discontinued         07/24/23 1441     Place SAMIR hose  Until discontinued         07/24/23 1441                Discharge Planning   ANGELA: 7/28/2023     Code Status: Full Code   Is the patient medically ready for discharge?: No    Reason for patient still in hospital (select all that apply): Patient new problem and Patient trending condition  Discharge Plan A: Rehab ([facility]) when medically stable but needs improvement in her orthostatic hypotension.          Wilma Irizarry MD  Department of [facility] Medicine   [facility] - Surgery

## 2023-07-27 NOTE — ANESTHESIA POST-OP PAIN MANAGEMENT
Acute Pain Service Progress Note    Cathy Kearns is a 89 y.o., female, [id number].    Surgery:  INSERTION, INTRAMEDULLARY DAPHNEY, LEFT FEMUR (Left: Leg Upper)    Post Op Day #: 2    Catheter type: perineural  SIFI    Infusion type: Ropivacaine 0.2%  15mL/3h basal    Problem List:    Active Hospital Problems    Diagnosis  POA    *Closed displaced intertrochanteric fracture of left femur with routine healing s/p IM nail on 7/25/2023 [S72.142D]  Not Applicable    Acute blood loss anemia [D62]  No    Hyponatremia [E87.1]  Yes    Monocytosis [D72.821]  Yes    Pure hypercholesterolemia [E78.00]  Yes    Panlobular emphysema [J43.1]  Yes    Pathological fracture of left hip due to osteoporosis with routine healing [M80.052D]  Not Applicable    Primary hypertension [I10]  Yes    Paroxysmal atrial fibrillation [I48.0]  Yes    Exudative age-related macular degeneration of right eye with active choroidal neovascularization [H35.3211]  Yes      Resolved Hospital Problems   No resolved problems to display.       Subjective:     General appearance of alert, oriented, no complaints   Pain with rest: 1    Numbers   Pain with movement: 6    Numbers   Side Effects    1. Pruritis No    2. Nausea No    3. Motor Blockade No, 2=Inability to bend knees    4. Sedation No, 1=awake and alert    Objective:           Vitals   Vitals:    07/27/23 0720   BP: (!) 146/62   Pulse: 74   Resp: 16   Temp: 36.7 °C (98 °F)        Labs    No results displayed because visit has over 200 results.           Meds   Current Facility-Administered Medications   Medication Dose Route Frequency Provider Last Rate Last Admin    0.9%  NaCl infusion (for blood administration)   Intravenous Q24H PRN Jacobo Louise MD        0.9%  NaCl infusion   Intravenous Continuous Ludmila Granado  mL/hr at 07/26/23 1452 New Bag at 07/26/23 1452    acetaminophen tablet 1,000 mg  1,000 mg Oral Q8H Pavel Cerrato MD        amLODIPine tablet 5 mg  5 mg Oral Daily  Ludmila Granado MD   5 mg at 07/26/23 1000    apixaban tablet 2.5 mg  2.5 mg Oral BID Jacobo Louise MD   2.5 mg at 07/27/23 0828    bisacodyL suppository 10 mg  10 mg Rectal Daily PRN Ludmila Granado MD        lisinopriL tablet 40 mg  40 mg Oral Daily Ludmila Granado MD   40 mg at 07/26/23 1000    melatonin tablet 6 mg  6 mg Oral Nightly PRN Ludmila Granado MD        methocarbamoL tablet 500 mg  500 mg Oral Q6H PRN Ludmila Granado MD   500 mg at 07/27/23 0827    mupirocin 2 % ointment 1 g  1 g Nasal BID Ludmila Granado MD   1 g at 07/26/23 2146    ondansetron injection 4 mg  4 mg Intravenous Q12H PRN Ludmila Granado MD        polyethylene glycol packet 17 g  17 g Oral Daily Ludmila Granado MD   17 g at 07/27/23 0827    ROPIvacaine (PF) 2 mg/ml (0.2%) solution  0.1 mL/hr Perineural Continuous Pavel Cerrato MD 0.1 mL/hr at 07/26/23 1456 0.1 mL/hr at 07/26/23 1456    senna-docusate 8.6-50 mg per tablet 1 tablet  1 tablet Oral BID Ludmila Granado MD   1 tablet at 07/27/23 0828    sodium chloride 0.9% flush 10 mL  10 mL Intravenous PRN Ludmila Granado MD        sotaloL tablet 80 mg  80 mg Oral BID Ludmila Granado MD   80 mg at 07/26/23 2144    vitamin D 1000 units tablet 1,000 Units  1,000 Units Oral Daily Jacobo Louise MD   1,000 Units at 07/27/23 0828        Anticoagulant dose Apixaban 2.5 BID    Assessment:     Pain control adequate    Plan:     Patient doing well, continue present treatment.    Plan:  - Continue PNC for one more day - will pause tomorrow and pull if patient is pain controlled   - Continue Tylenol 1000 q6, PRN Robaxin 500 q6 for spasms  - Will follow       Veto Esteves MD  PGY-1

## 2023-07-27 NOTE — HPI
Ms. Cathy Kearns is a 89 y.o. female with past medical history of HTN, COPD, paroxysmal atrial fibrillation, HLD, osteopenia, monocytosis, who was in her usual state of health until earlier today when she was at at exercises class when she was side stepping and felt some weakness in her right hip as it has been weak lately so she said she probably put more weight into her left hip and then lost balance and fell onto her left hip/buttocks and had immediate pain in that area. She was brought to the hospital via ambulance ad found to have a left IT fx with shortening of the LLE seen as wel. She reports has had 2 rare falls in the past secondary to sinus med side effect and she no longer takes those medications. She denies dizziness, chest pain, dyspnea. She can feel when she goes into afib as has flutters but has not happened anytime recnetly and is in NSR on admit. She reports pain to the hip on exam now and with movement of the LLE. She does not use a walker or a cane at baseline.    She had a stress test earlier this year that was negative for ischemia.  She had a dexa that showed osteoperosis and reported she stopped taking bisphosphonate as it gave her sinus/nasal bone pain. She was told to stop taking calcium by her heart doctor as she had hypercalcemia on her labs in the past.

## 2023-07-27 NOTE — ASSESSMENT & PLAN NOTE
Chronic and controlled. Continue Calcium and Vitamin D oral replacement therapy. Will need outpatient DEXA scan and referral to Orthopedic fragility fracture clinic as outpatient to discuss other treatment options for osteoporosis.

## 2023-07-27 NOTE — ASSESSMENT & PLAN NOTE
Patient has hyponatremia which is improved. Sodium improved to 135 on 7/27.   Sodium 129 on 7/26. Patient transfused 1 unit of PRBCs and given IVFs on 7/26 to treat.   Monitor sodium Daily.   Hyponatremia is due to Dehydration/hypovolemia.  The patient's sodium results have been reviewed and are listed below.  Recent Labs   Lab 07/27/23  0521   *

## 2023-07-27 NOTE — ASSESSMENT & PLAN NOTE
· Patient underwent left hip IM nail for intertrochanteric fracture by Dr. Rogerio Chand on 7/25/2023.  · Patient reports moderate pain in left hip.   · Plan is to continue PT/OT for gait training and strengthening and restoration of ADL's. Patient is weight bear as tolerated to left lower extremity as per Orthopedic recommendations.   · Patient back on her home Apixiban 2.5 mg po BID for her PAF so no other chemical DVT prophylaxis needed post-op.   · Orthopedics is following and managing surgical site.   · Perineural pain catheter in place with continuous Ropivacaine infusion for pain control and being managed by Anesthesia Pain Service.   · Patient on multimodal pain management post-op with Tylenol 1000 mg po every 6 hours post-op and will continue.  · PT/OT recommending Inpatient Rehab and  working on discharge planning with patient and family. Patient has been accepted to [facility] when medically ready for discharge.

## 2023-07-27 NOTE — ASSESSMENT & PLAN NOTE
Patient with Paroxysmal (<7 days) atrial fibrillation which is controlled currently with home Sotalol and will continue. Patient is currently in sinus rhythm. [id number] Score: 3. Anticoagulation indicated. Anticoagulation done with home Apixiban 2.5 mg po BID.

## 2023-07-27 NOTE — ASSESSMENT & PLAN NOTE
· Improved to 9.0 on 7/27 after blood transfusion on 7/26.   · On admission to hospital, patient noted to have Hgb of 13.0. After surgery patient's Hgb level dropped to 9.4 on 7/25 and 7.7 on 7/26 and expected blood loss related to surgery and hip fracture. Patient with orthostatic hypotension on 7/26 so transfused 1 unit of PRBCs on 7/26 for symptomatic anemia.   · Patient has no signs of active bleeding and hemodynamically stable.  · Goal is keep Hgb >7 and consider blood transfusion if Hgb < 7 or < 8 if patient becomes symptomatic.  · Plan is to monitor daily CBC post-operatively.

## 2023-07-27 NOTE — PLAN OF CARE
Problem: Infection  Goal: Absence of Infection Signs and Symptoms  Outcome: Ongoing, Progressing     Problem: Adult Inpatient Plan of Care  Goal: Plan of Care Review  Outcome: Ongoing, Progressing  Goal: Absence of Hospital-Acquired Illness or Injury  Outcome: Ongoing, Progressing  Goal: Optimal Comfort and Wellbeing  Outcome: Ongoing, Progressing  Goal: Readiness for Transition of Care  Outcome: Ongoing, Progressing     Problem: Adjustment to Injury (Hip Fracture Medical Management)  Goal: Optimal Coping with Change in Health Status  Outcome: Ongoing, Progressing     Problem: Bleeding (Hip Fracture Medical Management)  Goal: Absence of Bleeding  Outcome: Ongoing, Progressing     Problem: Bowel Elimination Impaired (Hip Fracture Medical Management)  Goal: Effective Bowel Elimination  Outcome: Ongoing, Progressing     Problem: Cognitive Decline Risk (Hip Fracture Medical Management)  Goal: Baseline Cognitive Function Maintained  Outcome: Ongoing, Progressing     Problem: Embolism (Hip Fracture Medical Management)  Goal: Absence of Embolism  Outcome: Ongoing, Progressing     Problem: Fracture Stabilization and Management (Hip Fracture Medical Management)  Goal: Fracture Stability  Outcome: Ongoing, Progressing     Problem: Functional Ability Impaired (Hip Fracture Medical Management)  Goal: Optimal Functional Performance  Outcome: Ongoing, Progressing     Problem: Pain (Hip Fracture Medical Management)  Goal: Acceptable Pain Level  Outcome: Ongoing, Progressing     Problem: Urinary Elimination Impaired (Hip Fracture Medical Management)  Goal: Effective Urinary Elimination  Outcome: Ongoing, Progressing

## 2023-07-27 NOTE — SUBJECTIVE & OBJECTIVE
Interval History: Patient with issues with orthostatic hypotension yesterday when working with therapy and given IVF's with little improvement so decision made to transfuse 1 unit of PRBCs overnight last night for HGb 7.7. Hgb improved to 9.0 this am. Patient worked with therapy this am and again was very orthostatic with SBP dropped down to 65 when standing with therapy and symptomatic but immediately recovered when place back into bed with therapy with improvement in SBP to 140's. Home Norvasc and Lisinopril discontinued this am and not given to patient but she was continued on her home Sotalol for her A. Fib. Sodium improved today as was 129 yesterday but after blood transfusion back to normal today at 135. Phosphorus level low at 2.0 so will start replacement with oral Neutra Phos today. Patient reports 5/10 pain to left hip today. Patient reports perineural catheter is helping especially when she boluses herself. [facility] is following patient but patient needs improvement in her BP when working with therapy prior to medical stability and consideration for hospital discharge. Patient with slight drop in platelet count today from 124,000 yesterday to 111,000 today. Patient with no active signs of bleeding.     Review of Systems   Constitutional:  Negative for fever.   Respiratory:  Negative for shortness of breath.    Cardiovascular:  Negative for chest pain and leg swelling.   Gastrointestinal:  Negative for abdominal pain, nausea and vomiting.   Musculoskeletal:  Positive for arthralgias (Left hip).   Neurological:  Positive for dizziness and light-headedness.   Psychiatric/Behavioral:  Negative for agitation and confusion.    Objective:     Vital Signs (Most Recent):  Temp: 99.6 °F (37.6 °C) (07/27/23 1535)  Pulse: 78 (07/27/23 1535)  Resp: 18 (07/27/23 1535)  BP: 151/67 (07/27/23 1535)  SpO2: 95 % (07/27/23 1535) on room air Vital Signs (24h Range):  Temp:  [97.8 °F (36.6 °C)-99.6 °F (37.6 °C)] 99.6  °F (37.6 °C)  Pulse:  [71-86] 78  Resp:  [15-18] 18  SpO2:  [94 %-99 %] 95 %  BP: ()/(41-67) 151/67     Weight: 53.1 kg (117 lb)  Body mass index is 21.4 kg/m².    Intake/Output Summary (Last 24 hours) at 7/27/2023 1545  Last data filed at 7/27/2023 1200  Gross per 24 hour   Intake --   Output 2700 ml   Net -2700 ml         Physical Exam  Vitals and nursing note reviewed.   Constitutional:       General: She is awake. She is not in acute distress.     Appearance: Normal appearance. She is well-developed. She is not ill-appearing.   Eyes:      Conjunctiva/sclera: Conjunctivae normal.   Cardiovascular:      Rate and Rhythm: Normal rate and regular rhythm.      Heart sounds: Normal heart sounds. No murmur heard.    No friction rub. No gallop.   Pulmonary:      Effort: Pulmonary effort is normal. No respiratory distress.      Breath sounds: Normal breath sounds. No wheezing.   Abdominal:      General: Abdomen is flat. Bowel sounds are normal. There is no distension.      Palpations: Abdomen is soft.      Tenderness: There is no abdominal tenderness.   Skin:     General: Skin is warm.      Findings: No erythema.      Comments: Surgical bandages in place to left hip. Bandages are clean and dry and intact.    Neurological:      Mental Status: She is alert and oriented to person, place, and time.   Psychiatric:         Mood and Affect: Mood normal.         Behavior: Behavior normal. Behavior is cooperative.         Thought Content: Thought content normal.         Judgment: Judgment normal.           Significant Labs: CBC:   Recent Labs   Lab 07/26/23  0306 07/26/23  2345 07/27/23  0521   WBC 13.48*  --  14.20*   HGB 7.7* 8.5* 9.0*   HCT 22.0*  --  26.5*   *  --  111*     CMP:   Recent Labs   Lab 07/26/23  0306 07/27/23  0521   * 135*   K 4.4 3.9   CL 99 105   CO2 22* 22*   * 95   BUN 16 10   CREATININE 0.8 0.6   CALCIUM 9.4 9.1   ANIONGAP 8 8     Magnesium:   Recent Labs   Lab 07/26/23  0306  07/27/23  0521   MG 1.8 1.6       Significant Imaging: I have reviewed all pertinent imaging results/findings within the past 24 hours.

## 2023-07-27 NOTE — ASSESSMENT & PLAN NOTE
Patient followed by [facility] clinic as outpatient and receiving eye injections as outpatient. Patient will need to reschedule for eye injections as due for injection in next 1 week but going to [facility] Rehab and will need to rescheduled.

## 2023-07-27 NOTE — ADDENDUM NOTE
Addendum  created 07/27/23 1151 by Adrienne Lerma MD    Charge Capture section accepted, Cosign clinical note with attestation

## 2023-07-27 NOTE — ADDENDUM NOTE
Addendum  created 07/27/23 0707 by Pavel Cerrato MD    Order list changed, Pharmacy for encounter modified

## 2023-07-27 NOTE — ASSESSMENT & PLAN NOTE
Patient has Abnormal Phosphorus: hypophosphatemia on 7/27 and down to 2.0. Will continue to monitor electrolytes closely. Will replace with Neutra Phos oral packets with meals and at bedtime and repeat labs to be done after interventions completed. Encourage oral intake of food. The patient's phosphorus results have been reviewed and are listed below.  Recent Labs   Lab 07/27/23  0521   PHOS 2.0*

## 2023-07-27 NOTE — ASSESSMENT & PLAN NOTE
· Patient with significant orthostatic hypotension and symptomatic when working with therapy on both 7/26 and 7/27.  · Patient given 1 unit of PRBCs and IVF's for orthostatic hypotension on 7/26 as Hgb was down to 7.7.  Hgb improved to 9.0 on 7/27 but still orthostatic so will give 1 liter of NS on 7/27.   · Hold home BP medications at this time. Hold home Lisinopril and Norvasc.  · Monitor orthostatics when working with therapy.

## 2023-07-27 NOTE — PROGRESS NOTES
"[facility] - Surgery  Orthopedics  Progress Note    Patient Name: Cathy Kearns  MRN: [id number]  Admission Date: 7/24/2023  Hospital Length of Stay: 3 days  Attending Provider: Wilma Irizarry MD  Primary Care Provider: Wilma Xiong MD  Follow-up For: Procedure(s) (LRB):  INSERTION, INTRAMEDULLARY DAPHNEY, LEFT FEMUR (Left)    Post-Operative Day: 2 Days Post-Op  Subjective:     Principal Problem:Closed displaced intertrochanteric fracture of left femur with routine healing    Principal Orthopedic Problem: same as above s/p L IMN 7/25    Interval History: NAEO. VSS.  Patient will work with PT did ok. Rec Rehab     Review of patient's allergies indicates:   Allergen Reactions    Penicillins Swelling    Astelin [azelastine] Other (See Comments)     Dizziness    Pseudoephedrine hcl Palpitations    Sulfa (sulfonamide antibiotics) Palpitations       Current Facility-Administered Medications   Medication    0.9%  NaCl infusion (for blood administration)    0.9%  NaCl infusion    acetaminophen tablet 1,000 mg    amLODIPine tablet 5 mg    apixaban tablet 2.5 mg    bisacodyL suppository 10 mg    lisinopriL tablet 40 mg    melatonin tablet 6 mg    methocarbamoL tablet 500 mg    mupirocin 2 % ointment 1 g    ondansetron injection 4 mg    polyethylene glycol packet 17 g    ROPIvacaine (PF) 2 mg/ml (0.2%) solution    senna-docusate 8.6-50 mg per tablet 1 tablet    sodium chloride 0.9% flush 10 mL    sotaloL tablet 80 mg    vitamin D 1000 units tablet 1,000 Units     Objective:     Vital Signs (Most Recent):  Temp: 98.6 °F (37 °C) (07/27/23 0440)  Pulse: 77 (07/27/23 0440)  Resp: 18 (07/27/23 0440)  BP: 136/63 (07/27/23 0440)  SpO2: (!) 94 % (07/27/23 0440) Vital Signs (24h Range):  Temp:  [97.5 °F (36.4 °C)-98.6 °F (37 °C)] 98.6 °F (37 °C)  Pulse:  [34-80] 77  Resp:  [15-18] 18  SpO2:  [94 %-97 %] 94 %  BP: ()/(33-63) 136/63     Weight: 53.1 kg (117 lb)  Height: 5' 2" (157.5 cm)  Body mass index is 21.4 " kg/m².      Intake/Output Summary (Last 24 hours) at 7/27/2023 0655  Last data filed at 7/27/2023 0615  Gross per 24 hour   Intake 120 ml   Output 2300 ml   Net -2180 ml       Ortho/SPM Exam    Dressings c/d/I  Swelling over lateral aspect of the hip  NVI    Significant Labs: CBC:   Recent Labs   Lab 07/25/23  0909 07/26/23  0306 07/26/23  2345   WBC 10.46 13.48*  --    HGB 9.4* 7.7* 8.5*   HCT 28.2* 22.0*  --    * 124*  --      CMP:   Recent Labs   Lab 07/26/23  0306   *   K 4.4   CL 99   CO2 22*   *   BUN 16   CREATININE 0.8   CALCIUM 9.4   ANIONGAP 8     All pertinent labs within the past 24 hours have been reviewed.    Significant Imaging: I have reviewed all pertinent imaging results/findings.    Assessment/Plan:     * Closed intertrochanteric fracture, left, initial encounter  Cathy Kearns is a 89 y.o. female with a comminuted intertrochanteric fracture of the left femur, closed, NVI. S/p L IMN nail 7/25     Pain control: MM  WBAT  DVT PPx: eliquis 2.5 BID, FCDs at all times when not ambulating  Abx: postop Ancef x24h  Labs: hgb pending this AM   Drain: no drains  Francis: mohan'd today     Dispo: FU PT/OT recs rehab    Lara Munoz PA-C  Orthopedics  [facility] - Surgery

## 2023-07-28 PROBLEM — E87.1 HYPONATREMIA: Status: RESOLVED | Noted: 2023-07-24 | Resolved: 2023-07-28

## 2023-07-28 LAB
ANION GAP SERPL CALC-SCNC: 8 MMOL/L (ref 8–16)
ANISOCYTOSIS BLD QL SMEAR: SLIGHT
BASOPHILS # BLD AUTO: 0.02 K/UL (ref 0–0.2)
BASOPHILS NFR BLD: 0.2 % (ref 0–1.9)
BUN SERPL-MCNC: 11 MG/DL (ref 10–30)
CALCIUM SERPL-MCNC: 8.5 MG/DL (ref 8.7–10.5)
CHLORIDE SERPL-SCNC: 104 MMOL/L (ref 95–110)
CO2 SERPL-SCNC: 23 MMOL/L (ref 23–29)
CREAT SERPL-MCNC: 0.6 MG/DL (ref 0.5–1.4)
DIFFERENTIAL METHOD: ABNORMAL
EOSINOPHIL # BLD AUTO: 0 K/UL (ref 0–0.5)
EOSINOPHIL NFR BLD: 0.2 % (ref 0–8)
ERYTHROCYTE [DISTWIDTH] IN BLOOD BY AUTOMATED COUNT: 14.7 % (ref 11.5–14.5)
EST. GFR  (NO RACE VARIABLE): >60 ML/MIN/1.73 M^2
GLUCOSE SERPL-MCNC: 97 MG/DL (ref 70–110)
HCT VFR BLD AUTO: 22 % (ref 37–48.5)
HGB BLD-MCNC: 7.7 G/DL (ref 12–16)
HYPOCHROMIA BLD QL SMEAR: ABNORMAL
IMM GRANULOCYTES # BLD AUTO: 0.2 K/UL (ref 0–0.04)
IMM GRANULOCYTES NFR BLD AUTO: 1.5 % (ref 0–0.5)
LYMPHOCYTES # BLD AUTO: 1.7 K/UL (ref 1–4.8)
LYMPHOCYTES NFR BLD: 13.5 % (ref 18–48)
MCH RBC QN AUTO: 31.7 PG (ref 27–31)
MCHC RBC AUTO-ENTMCNC: 35 G/DL (ref 32–36)
MCV RBC AUTO: 91 FL (ref 82–98)
MONOCYTES # BLD AUTO: 5.1 K/UL (ref 0.3–1)
MONOCYTES NFR BLD: 39.6 % (ref 4–15)
NEUTROPHILS # BLD AUTO: 5.8 K/UL (ref 1.8–7.7)
NEUTROPHILS NFR BLD: 45 % (ref 38–73)
NRBC BLD-RTO: 0 /100 WBC
OVALOCYTES BLD QL SMEAR: ABNORMAL
PHOSPHATE SERPL-MCNC: 2.2 MG/DL (ref 2.7–4.5)
PLATELET # BLD AUTO: 100 K/UL (ref 150–450)
PLATELET BLD QL SMEAR: ABNORMAL
PMV BLD AUTO: 10.7 FL (ref 9.2–12.9)
POIKILOCYTOSIS BLD QL SMEAR: SLIGHT
POLYCHROMASIA BLD QL SMEAR: ABNORMAL
POTASSIUM SERPL-SCNC: 3.6 MMOL/L (ref 3.5–5.1)
RBC # BLD AUTO: 2.43 M/UL (ref 4–5.4)
SODIUM SERPL-SCNC: 135 MMOL/L (ref 136–145)
WBC # BLD AUTO: 12.91 K/UL (ref 3.9–12.7)

## 2023-07-28 PROCEDURE — 99231 PR SUBSEQUENT HOSPITAL CARE,LEVL I: ICD-10-PCS | Mod: GC,,, | Performed by: ANESTHESIOLOGY

## 2023-07-28 PROCEDURE — 97116 GAIT TRAINING THERAPY: CPT | Mod: CQ

## 2023-07-28 PROCEDURE — 25000003 PHARM REV CODE 250: Performed by: HOSPITALIST

## 2023-07-28 PROCEDURE — 97530 THERAPEUTIC ACTIVITIES: CPT | Mod: CQ

## 2023-07-28 PROCEDURE — 99231 SBSQ HOSP IP/OBS SF/LOW 25: CPT | Mod: GC,,, | Performed by: ANESTHESIOLOGY

## 2023-07-28 PROCEDURE — 99233 PR SUBSEQUENT HOSPITAL CARE,LEVL III: ICD-10-PCS | Mod: ,,, | Performed by: INTERNAL MEDICINE

## 2023-07-28 PROCEDURE — 85025 COMPLETE CBC W/AUTO DIFF WBC: CPT | Performed by: HOSPITALIST

## 2023-07-28 PROCEDURE — 25000003 PHARM REV CODE 250: Performed by: INTERNAL MEDICINE

## 2023-07-28 PROCEDURE — 80048 BASIC METABOLIC PNL TOTAL CA: CPT | Performed by: HOSPITALIST

## 2023-07-28 PROCEDURE — 84100 ASSAY OF PHOSPHORUS: CPT | Performed by: INTERNAL MEDICINE

## 2023-07-28 PROCEDURE — 21400001 HC TELEMETRY ROOM

## 2023-07-28 PROCEDURE — 25000003 PHARM REV CODE 250: Performed by: STUDENT IN AN ORGANIZED HEALTH CARE EDUCATION/TRAINING PROGRAM

## 2023-07-28 PROCEDURE — 99233 SBSQ HOSP IP/OBS HIGH 50: CPT | Mod: ,,, | Performed by: INTERNAL MEDICINE

## 2023-07-28 PROCEDURE — 25000003 PHARM REV CODE 250

## 2023-07-28 RX ORDER — SODIUM,POTASSIUM PHOSPHATES 280-250MG
1 POWDER IN PACKET (EA) ORAL
Status: COMPLETED | OUTPATIENT
Start: 2023-07-28 | End: 2023-07-29

## 2023-07-28 RX ADMIN — ACETAMINOPHEN 1000 MG: 500 TABLET ORAL at 08:07

## 2023-07-28 RX ADMIN — METHOCARBAMOL 500 MG: 500 TABLET ORAL at 10:07

## 2023-07-28 RX ADMIN — ACETAMINOPHEN 1000 MG: 500 TABLET ORAL at 01:07

## 2023-07-28 RX ADMIN — MUPIROCIN 1 G: 20 OINTMENT TOPICAL at 10:07

## 2023-07-28 RX ADMIN — POTASSIUM & SODIUM PHOSPHATES POWDER PACK 280-160-250 MG 1 PACKET: 280-160-250 PACK at 08:07

## 2023-07-28 RX ADMIN — METHOCARBAMOL 500 MG: 500 TABLET ORAL at 05:07

## 2023-07-28 RX ADMIN — BISACODYL 10 MG: 10 SUPPOSITORY RECTAL at 10:07

## 2023-07-28 RX ADMIN — SODIUM CHLORIDE: 9 INJECTION, SOLUTION INTRAVENOUS at 12:07

## 2023-07-28 RX ADMIN — POTASSIUM & SODIUM PHOSPHATES POWDER PACK 280-160-250 MG 1 PACKET: 280-160-250 PACK at 05:07

## 2023-07-28 RX ADMIN — SOTALOL HYDROCHLORIDE 80 MG: 80 TABLET ORAL at 10:07

## 2023-07-28 RX ADMIN — SENNOSIDES AND DOCUSATE SODIUM 1 TABLET: 50; 8.6 TABLET ORAL at 08:07

## 2023-07-28 RX ADMIN — SOTALOL HYDROCHLORIDE 80 MG: 80 TABLET ORAL at 08:07

## 2023-07-28 RX ADMIN — SENNOSIDES AND DOCUSATE SODIUM 1 TABLET: 50; 8.6 TABLET ORAL at 10:07

## 2023-07-28 RX ADMIN — ACETAMINOPHEN 1000 MG: 500 TABLET ORAL at 05:07

## 2023-07-28 RX ADMIN — APIXABAN 2.5 MG: 2.5 TABLET, FILM COATED ORAL at 10:07

## 2023-07-28 RX ADMIN — APIXABAN 2.5 MG: 2.5 TABLET, FILM COATED ORAL at 08:07

## 2023-07-28 RX ADMIN — CHOLECALCIFEROL TAB 25 MCG (1000 UNIT) 1000 UNITS: 25 TAB at 10:07

## 2023-07-28 RX ADMIN — MUPIROCIN 1 G: 20 OINTMENT TOPICAL at 08:07

## 2023-07-28 NOTE — PROGRESS NOTES
[facility] Medicine  Progress Note    Patient Name: Cathy Kearns  MRN: [id number]  Patient Class: IP- Inpatient   Admission Date: 7/24/2023  Length of Stay: 4 days  Attending Physician: Wilma Irizarry MD  Primary Care Provider: Wilma Xiong MD        Subjective:     Principal Problem:Closed displaced intertrochanteric fracture of left femur with routine healing        HPI:  Ms. Cathy Kearns is a 89 y.o. female with past medical history of HTN, COPD, paroxysmal atrial fibrillation, HLD, osteopenia, monocytosis, who was in her usual state of health until earlier today when she was at at exercises class when she was side stepping and felt some weakness in her right hip as it has been weak lately so she said she probably put more weight into her left hip and then lost balance and fell onto her left hip/buttocks and had immediate pain in that area. She was brought to the hospital via ambulance ad found to have a left IT fx with shortening of the LLE seen as wel. She reports has had 2 rare falls in the past secondary to sinus med side effect and she no longer takes those medications. She denies dizziness, chest pain, dyspnea. She can feel when she goes into afib as has flutters but has not happened anytime recnetly and is in NSR on admit. She reports pain to the hip on exam now and with movement of the LLE. She does not use a walker or a cane at baseline.    She had a stress test earlier this year that was negative for ischemia.  She had a dexa that showed osteoperosis and reported she stopped taking bisphosphonate as it gave her sinus/nasal bone pain. She was told to stop taking calcium by her heart doctor as she had hypercalcemia on her labs in the past.       Overview/Hospital Course:  Patient admitted to [facility] Medicine Team H: Hip Fracture team and started on Hip Fracture Pathway with Orthopedic surgery consult for hip fracture. Patient was seen and evaluated by Orthopedic surgery  who recommended operative repair of hip fracture. Patient was medically optimized prior to surgery and was taken to OR after optimization on 7/25/2023. Patient underwent left hip cephalomedullary nail fixation by Dr. Rogerio Chand. Post-op patient WBAT to the left lower extremity as per Orthopedics recommendation. Patient restarted on her home Apixiban 2.5 mg po BID for her known PAF so no other chemical DVT prophylaxis needed post-op. Perineural pain catheter placed by Anesthesia Pain Service with continuous infusion of Ropivacaine to help with pain control post-op and Anesthesia Pain Service managing while patient in the hospital. Patient placed on multimodal pain management post-op with Tylenol 1000 mg po every 6 hours post-op and will continue. PT/OT consulted post-op and evaluated patient. Patient progressing slowly with PT and OT and recommended Inpatient Rehab placement when medically ready for hospital discharge. Patient had issues with orthostatic hypotension post-op when working with therapy on 7/26 and 7/27. Patient given 1 unit of PRBCs for Hgb 7.7 on 7/26 due to symptomatic anemia. Patient still orthostatic on 7/27 so given 1 liter of NS as Hgb improved up to 9.0 on 7/27 so no further blood transfusion recommended. Holding patient's home BP medications of Lisinopril and Norvasc and reassess BP on 7/28. Pain controlled to left hip. BP doing much better off Norvasc and lisinopril and will continue to hold on 7/28. Patient with drop in Hgb to 7.7 on 7/28 but no active signs of bleeding but needs to remain in hospital for continued monitoring. Platelet count also decreased and down to 100,000 on 7/28. Patient awaiting medical stability to discharge to [facility].       Interval History: Patient's BP did better today when working with therapy after stopping her home Norvasc and Lisinopril yesterday. Patient was sitting up in bedside chair this am when I entered room and denied any dizziness or  lightheadedness. IVF's discontinued. Patient noted to have drop in Hgb down to 7.7 today from 9 yesterday. Platelet count also dropping and down to 100,000 from 111,000 yesterday. Phosphorus improved from 2 to 2.2 in past 24 hours. Continue oral Neutra Phos replacement. Patient's left thigh is slightly swollen which is not an unusual post-op finding. Compartments are soft and very compressible to left thigh. Patient with minimal pain to left thigh area. Patient with no abnormal bruising to left thigh area. No concerns at this time for any active bleeding into surgical site but due to drop in platelets and Hgb plan to keep in hospital at least another day for monitoring. [facility] continues to follow patient and plan to take patient once she is medically ready for discharge. [facility] reports they should have beds this weekend and could possibly take patient if she is medically ready. We continue to reassess patient's medical stability daily. Patient's son and daughter also at bedside and patient and family updated. Pain 3/10 to left hip this am.     Review of Systems   Constitutional:  Negative for fever.   Respiratory:  Negative for shortness of breath.    Cardiovascular:  Positive for leg swelling (Left thigh). Negative for chest pain.   Gastrointestinal:  Negative for abdominal pain, nausea and vomiting.   Musculoskeletal:  Positive for arthralgias (Left hip).   Neurological:  Negative for dizziness.   Psychiatric/Behavioral:  Negative for agitation and confusion.    Objective:     Vital Signs (Most Recent):  Temp: 98.5 °F (36.9 °C) (07/28/23 1205)  Pulse: 80 (07/28/23 1205)  Resp: 16 (07/28/23 1205)  BP: 115/59 (07/28/23 1205)  SpO2: 98 % (07/28/23 1205) on room air Vital Signs (24h Range):  Temp:  [97.7 °F (36.5 °C)-99.6 °F (37.6 °C)] 98.5 °F (36.9 °C)  Pulse:  [64-86] 80  Resp:  [16-18] 16  SpO2:  [95 %-98 %] 98 %  BP: (102-151)/(53-67) 115/59     Weight: 53.1 kg (117 lb)  Body mass index is 21.4  kg/m².    Intake/Output Summary (Last 24 hours) at 7/28/2023 1323  Last data filed at 7/28/2023 1000  Gross per 24 hour   Intake 600 ml   Output 1700 ml   Net -1100 ml         Physical Exam  Vitals and nursing note reviewed.   Constitutional:       General: She is awake. She is not in acute distress.     Appearance: Normal appearance. She is well-developed. She is not ill-appearing.   Eyes:      Conjunctiva/sclera: Conjunctivae normal.   Cardiovascular:      Rate and Rhythm: Normal rate and regular rhythm.      Heart sounds: Normal heart sounds. No murmur heard.    No friction rub. No gallop.   Pulmonary:      Effort: Pulmonary effort is normal. No respiratory distress.      Breath sounds: Normal breath sounds. No wheezing.   Abdominal:      General: Abdomen is flat. Bowel sounds are normal. There is no distension.      Palpations: Abdomen is soft.      Tenderness: There is no abdominal tenderness.   Musculoskeletal:         General: Swelling (Mild left thigh swelling.) present.      Comments: Left thigh soft and compressible. No abnormal bruising noted to left thigh.    Skin:     General: Skin is warm.      Findings: No erythema.      Comments: Surgical bandages in place to left hip. Bandages are clean and dry and intact.    Neurological:      Mental Status: She is alert and oriented to person, place, and time.   Psychiatric:         Mood and Affect: Mood normal.         Behavior: Behavior normal. Behavior is cooperative.         Thought Content: Thought content normal.         Judgment: Judgment normal.           Significant Labs: CBC:   Recent Labs   Lab 07/26/23  2345 07/27/23  0521 07/28/23  0434   WBC  --  14.20* 12.91*   HGB 8.5* 9.0* 7.7*   HCT  --  26.5* 22.0*   PLT  --  111* 100*     CMP:   Recent Labs   Lab 07/27/23  0521 07/28/23  0434   * 135*   K 3.9 3.6    104   CO2 22* 23   GLU 95 97   BUN 10 11   CREATININE 0.6 0.6   CALCIUM 9.1 8.5*   ANIONGAP 8 8       Significant Imaging: I have  reviewed all pertinent imaging results/findings within the past 24 hours.      Assessment/Plan:      * Closed displaced intertrochanteric fracture of left femur with routine healing s/p IM nail on 7/25/2023  · Patient underwent left hip IM nail for intertrochanteric fracture by Dr. Rogerio Chand on 7/25/2023.  · Patient reports mild to moderate pain in left hip.   · Continue PT/OT for gait training and strengthening and restoration of ADL's. Patient is weight bear as tolerated to left lower extremity as per Orthopedic recommendations.   · Patient back on her home Apixiban 2.5 mg po BID for her PAF so no other chemical DVT prophylaxis needed post-op.   · Orthopedics is following and managing surgical site.   · Perineural pain catheter in place with continuous Ropivacaine infusion for pain control and being managed by Anesthesia Pain Service.   · Pain controlled. Patient on multimodal pain management post-op with Tylenol 1000 mg po every 6 hours post-op and will continue.  · PT/OT recommending Inpatient Rehab and  working on discharge planning with patient and family. Patient has been accepted to [facility] when medically ready for discharge.     Orthostatic hypotension  · Much improved on 7/28. Continue to hold Norvasc and Lisinopril. Discontinue IVF's today.   · Patient with significant orthostatic hypotension and symptomatic when working with therapy on both 7/26 and 7/27.  · Patient given 1 unit of PRBCs and IVF's for orthostatic hypotension on 7/26 as Hgb was down to 7.7.  Hgb improved to 9.0 on 7/27 but still orthostatic so will give 1 liter of NS on 7/27.   · Hold home BP medications at this time. Hold home Lisinopril and Norvasc.  · Monitor orthostatics when working with therapy.        Acute blood loss anemia  · Hgb worsened and down to 7.7 on 7/28. IVF's discontinued as may just be dilutional change. patient with no signs of bleeding at surgical site or other sites noted.    · Improved to 9.0 on 7/27 after blood transfusion on 7/26.   · On admission to hospital, patient noted to have Hgb of 13.0. After surgery patient's Hgb level dropped to 9.4 on 7/25 and 7.7 on 7/26 and expected blood loss related to surgery and hip fracture. Patient with orthostatic hypotension on 7/26 so transfused 1 unit of PRBCs on 7/26 for symptomatic anemia.   · Patient has no signs of active bleeding and hemodynamically stable.  · Goal is keep Hgb >7 and consider blood transfusion if Hgb < 7 or < 8 if patient becomes symptomatic.  · Plan is to monitor daily CBC post-operatively.    Thrombocytopenia  · Platelets continuing to decrease and down to 100,000 on 7/28. Patient with no signs of bleeding.   · Noted post-op. Patient on admit with platelet count of 197,000. Platelets dropped to 124,000 on 7/26 and down to 111,000 on 7/27. Likely stress response. No signs of bleeding. Monitor level with daily CBC.       Hypophosphatemia  Patient has Abnormal Phosphorus: hypophosphatemia on 7/27 and 7/28 but improving. Will continue to monitor electrolytes closely. Will continue to replace with Neutra Phos oral packets with meals and at bedtime and repeat labs to be done after interventions completed. Encourage oral intake of food. The patient's phosphorus results have been reviewed and are listed below.  Recent Labs   Lab 07/28/23  0433   PHOS 2.2*        Paroxysmal atrial fibrillation  Patient with Paroxysmal (<7 days) atrial fibrillation which is controlled currently with home Sotalol and will continue. Patient is currently in sinus rhythm. [id number] Score: 3. Anticoagulation indicated. Anticoagulation done with home Apixiban 2.5 mg po BID.    Primary hypertension  Chronic condition. Continue to hold home Norvasc and Lisinopril as patient having post-op orthostatic hypotension but BP now improved off BP meds. Monitor vital signs every 4 hours. Target BP < 150/90.       Pathological fracture of left hip due to  osteoporosis with routine healing  Chronic and controlled. Continue Calcium and Vitamin D oral replacement therapy. Will need outpatient DEXA scan and referral to Orthopedic fragility fracture clinic as outpatient to discuss other treatment options for osteoporosis.       Panlobular emphysema  Chronic and well controlled. No signs to suggest acute exacerbation. Patient not on any inhalers or oxygen to treat.       Pure hypercholesterolemia  Chronic and controlled. Patient not on any home medications to treat.       Monocytosis  Present on admit and long standing. Stable.       Exudative age-related macular degeneration of right eye with active choroidal neovascularization  Patient followed by Ret[facility] clinic as outpatient and receiving eye injections as outpatient. Patient will need to reschedule for eye injections as due for injection in next 1 week but going to IP Rehab and will need to rescheduled.         VTE Risk Mitigation (From admission, onward)         Ordered     apixaban tablet 2.5 mg  2 times daily         07/25/23 0911     IP VTE HIGH RISK PATIENT  Once         07/24/23 1441     Place sequential compression device  Until discontinued         07/24/23 1441     Place SAMIR hose  Until discontinued         07/24/23 1441                Discharge Planning   ANGELA: 7/29/2023     Code Status: Full Code   Is the patient medically ready for discharge?: No    Reason for patient still in hospital (select all that apply): Patient trending condition  Discharge Plan A: Rehab          Wilma Irizarry MD  Department of Hospital Medicine   [facility] - Surgery

## 2023-07-28 NOTE — ASSESSMENT & PLAN NOTE
Patient has Abnormal Phosphorus: hypophosphatemia on 7/27 and 7/28 but improving. Will continue to monitor electrolytes closely. Will continue to replace with Neutra Phos oral packets with meals and at bedtime and repeat labs to be done after interventions completed. Encourage oral intake of food. The patient's phosphorus results have been reviewed and are listed below.  Recent Labs   Lab 07/28/23  0433   PHOS 2.2*

## 2023-07-28 NOTE — ASSESSMENT & PLAN NOTE
· Much improved on 7/28. Continue to hold Norvasc and Lisinopril. Discontinue IVF's today.   · Patient with significant orthostatic hypotension and symptomatic when working with therapy on both 7/26 and 7/27.  · Patient given 1 unit of PRBCs and IVF's for orthostatic hypotension on 7/26 as Hgb was down to 7.7.  Hgb improved to 9.0 on 7/27 but still orthostatic so will give 1 liter of NS on 7/27.   · Hold home BP medications at this time. Hold home Lisinopril and Norvasc.  · Monitor orthostatics when working with therapy.

## 2023-07-28 NOTE — ANESTHESIA POST-OP PAIN MANAGEMENT
Acute Pain Service Progress Note    Cathy Kearns is a 89 y.o., female, [id number].    Surgery:  INSERTION, INTRAMEDULLARY DAPHNEY, LEFT FEMUR (Left: Leg Upper)    Post Op Day #: 3    Catheter type: perineural  SIFI    Infusion type: Ropivacaine 0.2%  15mL/3h basal    Problem List:    There are no hospital problems to display for this patient.      Subjective:     General appearance of alert, oriented, no complaints   Pain with rest: 1    Numbers   Pain with movement: 4    Numbers   Side Effects    1. Pruritis No    2. Nausea No    3. Motor Blockade No, 2=Inability to bend knees    4. Sedation No, 1=awake and alert    Objective:           Vitals   There were no vitals filed for this visit.     Labs    No results displayed because visit has over 200 results.           Meds   No current facility-administered medications for this visit.     No current outpatient medications on file.     Facility-Administered Medications Ordered in Other Visits   Medication Dose Route Frequency Provider Last Rate Last Admin    0.9%  NaCl infusion (for blood administration)   Intravenous Q24H PRN Jacobo Louise MD        0.9%  NaCl infusion   Intravenous Continuous Ludmila Granado  mL/hr at 07/28/23 0031 New Bag at 07/28/23 0031    acetaminophen tablet 1,000 mg  1,000 mg Oral Q8H Pavel Cerrato MD   1,000 mg at 07/28/23 0559    apixaban tablet 2.5 mg  2.5 mg Oral BID Jacobo Louise MD   2.5 mg at 07/28/23 1027    bisacodyL suppository 10 mg  10 mg Rectal Daily PRN Ludmila Granado MD   10 mg at 07/28/23 1033    melatonin tablet 6 mg  6 mg Oral Nightly PRN Ludmila Granado MD        methocarbamoL tablet 500 mg  500 mg Oral Q6H PRN Ludmila Granado MD   500 mg at 07/28/23 1026    mupirocin 2 % ointment 1 g  1 g Nasal BID Ludmila Granado MD   1 g at 07/28/23 1027    ondansetron injection 4 mg  4 mg Intravenous Q12H PRN Ludmila Granado MD        polyethylene glycol packet 17 g  17 g Oral Daily Ludmila GARCIA  MD Loy   17 g at 07/27/23 0827    ROPIvacaine (PF) 2 mg/ml (0.2%) solution  0.1 mL/hr Perineural Continuous Pavel Cerrato MD 0.1 mL/hr at 07/27/23 1212 0.1 mL/hr at 07/27/23 1212    senna-docusate 8.6-50 mg per tablet 1 tablet  1 tablet Oral BID Ludmila Granado MD   1 tablet at 07/28/23 1032    sodium chloride 0.9% flush 10 mL  10 mL Intravenous PRN Ludmila Granado MD        sotaloL tablet 80 mg  80 mg Oral BID Ludmila Granado MD   80 mg at 07/28/23 1027    vitamin D 1000 units tablet 1,000 Units  1,000 Units Oral Daily Jacobo Louise MD   1,000 Units at 07/28/23 1026        Anticoagulant dose Apixaban 2.5 BID    Assessment:     Pain control adequate    Plan:     Discontinue present therapy. Analgesia to be provided by the primary team, .    Plan:  - DC PNC today   - Continue Tylenol 1000 q6, PRN Robaxin 500 q6 for spasms  - Will sign off     Pavel Cerrato MD  Anesthesiology Resident PGY-4/CA-3  [email or web address]  Pager: [phone]

## 2023-07-28 NOTE — ASSESSMENT & PLAN NOTE
Chronic condition. Continue to hold home Norvasc and Lisinopril as patient having post-op orthostatic hypotension but BP now improved off BP meds. Monitor vital signs every 4 hours. Target BP < 150/90.

## 2023-07-28 NOTE — ASSESSMENT & PLAN NOTE
· Patient underwent left hip IM nail for intertrochanteric fracture by Dr. Rogerio Chand on 7/25/2023.  · Patient reports mild to moderate pain in left hip.   · Continue PT/OT for gait training and strengthening and restoration of ADL's. Patient is weight bear as tolerated to left lower extremity as per Orthopedic recommendations.   · Patient back on her home Apixiban 2.5 mg po BID for her PAF so no other chemical DVT prophylaxis needed post-op.   · Orthopedics is following and managing surgical site.   · Perineural pain catheter in place with continuous Ropivacaine infusion for pain control and being managed by Anesthesia Pain Service.   · Pain controlled. Patient on multimodal pain management post-op with Tylenol 1000 mg po every 6 hours post-op and will continue.  · PT/OT recommending Inpatient Rehab and  working on discharge planning with patient and family. Patient has been accepted to [facility] when medically ready for discharge.

## 2023-07-28 NOTE — ASSESSMENT & PLAN NOTE
Patient with Paroxysmal (<7 days) atrial fibrillation which is controlled currently with home Sotalol and will continue. Patient is currently in sinus rhythm. [id number] Score: 3. Anticoagulation indicated. Anticoagulation done with home Apixiban 2.5 mg po BID.

## 2023-07-28 NOTE — PT/OT/SLP PROGRESS
Physical Therapy Treatment    Patient Name:  Cathy Kearns   MRN:  [id number]    Recommendations:     Discharge Recommendations: rehabilitation facility  Discharge Equipment Recommendations: to be determined by next level of care  Barriers to discharge: None    Assessment:     Cathy Kearns is a 89 y.o. female admitted with a medical diagnosis of Closed displaced intertrochanteric fracture of left femur with routine healing.  She presents with the following impairments/functional limitations: weakness, impaired endurance, gait instability, impaired functional mobility, impaired balance, pain, impaired cardiopulmonary response to activity, orthopedic precautions. Pt with improvements in mobility and tolerance to positional changes noted today     Rehab Prognosis: Good; patient would benefit from acute skilled PT services to address these deficits and reach maximum level of function.    Recent Surgery: Procedure(s) (LRB):  INSERTION, INTRAMEDULLARY DAPHNEY, LEFT FEMUR (Left) 3 Days Post-Op    Plan:     During this hospitalization, patient to be seen daily to address the identified rehab impairments via gait training, therapeutic activities, therapeutic exercises, neuromuscular re-education and progress toward the following goals:    Plan of Care Expires:  23    Subjective     Chief Complaint: afraid of pain  Patient/Family Comments/goals: return to PLOF  Pain/Comfort:  Pain Rating 1: 0/10      Objective:     Communicated with RN prior to session.  Patient found HOB elevated with FCD, perineural catheter, peripheral IV, telemetry upon PT entry to room.     General Precautions: Standard, fall  Orthopedic Precautions: LLE weight bearing as tolerated  Braces: N/A  Respiratory Status: Room air    ORTHOSTATIC BLOOD PRESSURE READINGS:  Supine HOB elevated: 160/68  Seated /72  Seated after 2 mins: 152/68  Standin/62  Standing 2nd trial after seated rest: 99/51 symptomatic   Seated after: 129/61     Functional  Mobility:  Bed Mobility:     Supine to Sit: minimum assistance  Transfers:     Sit to Stand:  contact guard assistance and minimum assistance with rolling walker and multiple trials  Gait: x2 steps fwd/bkwd RW CGA, decreased josr, decreased L hip flexion for foot clearance;  Gait: x6 steps to chair CGA RW      AM-PAC 6 CLICK MOBILITY  Turning over in bed (including adjusting bedclothes, sheets and blankets)?: 3  Sitting down on and standing up from a chair with arms (e.g., wheelchair, bedside commode, etc.): 3  Moving from lying on back to sitting on the side of the bed?: 3  Moving to and from a bed to a chair (including a wheelchair)?: 3  Need to walk in hospital room?: 3  Climbing 3-5 steps with a railing?: 2  Basic Mobility Total Score: 17       Treatment & Education:  Pt required increased time between each positional change to allow blood pressure readings and to recover from any dizziness. Dizziness was experienced after ~2 mins of static standing resulting in lowest blood pressure reading above. Pt able to recover and continue mobility and gait to chair. Bedside table in front of patient and area set up for function, convenience, and safety. RN aware of patient's mobility needs and status. Questions/concerns addressed within PTA scope of practice; patient  with no further questions. Time was provided for active listening, discussion of health disposition, and discussion of safe discharge.      Patient left up in chair with all lines intact, call button in reach, Alanis RN notified, and family present..    GOALS:   Multidisciplinary Problems       Physical Therapy Goals          Problem: Physical Therapy    Goal Priority Disciplines Outcome Goal Variances Interventions   Physical Therapy Goal     PT, PT/OT Ongoing, Progressing     Description: Goals to be met by: 23     Patient will increase functional independence with mobility by performin. Supine to sit with Stand-by Assistance  2. Sit to  stand transfer with Contact Guard Assistance using RW  3. Bed to chair transfer with Stand by Assistance using Rolling Walker  4. Gait  x 100 feet with Stand by assistance using Rolling Walker.   5. Lower extremity exercise program x30 reps per handout, with independence                         Time Tracking:     PT Received On: 07/28/23  PT Start Time: 0840     PT Stop Time: 0935  PT Total Time (min): 55 min     Billable Minutes: Gait Training 40 and Therapeutic Activity 15    Treatment Type: Treatment  PT/PTA: PTA     Number of PTA visits since last PT visit: 2     07/28/2023

## 2023-07-28 NOTE — SUBJECTIVE & OBJECTIVE
Interval History: Patient's BP did better today when working with therapy after stopping her home Norvasc and Lisinopril yesterday. Patient was sitting up in bedside chair this am when I entered room and denied any dizziness or lightheadedness. IVF's discontinued. Patient noted to have drop in Hgb down to 7.7 today from 9 yesterday. Platelet count also dropping and down to 100,000 from 111,000 yesterday. Phosphorus improved from 2 to 2.2 in past 24 hours. Continue oral Neutra Phos replacement. Patient's left thigh is slightly swollen which is not an unusual post-op finding. Compartments are soft and very compressible to left thigh. Patient with minimal pain to left thigh area. Patient with no abnormal bruising to left thigh area. No concerns at this time for any active bleeding into surgical site but due to drop in platelets and Hgb plan to keep in hospital at least another day for monitoring. [facility] continues to follow patient and plan to take patient once she is medically ready for discharge. [facility] reports they should have beds this weekend and could possibly take patient if she is medically ready. We continue to reassess patient's medical stability daily. Patient's son and daughter also at bedside and patient and family updated. Pain 3/10 to left hip this am.     Review of Systems   Constitutional:  Negative for fever.   Respiratory:  Negative for shortness of breath.    Cardiovascular:  Positive for leg swelling (Left thigh). Negative for chest pain.   Gastrointestinal:  Negative for abdominal pain, nausea and vomiting.   Musculoskeletal:  Positive for arthralgias (Left hip).   Neurological:  Negative for dizziness.   Psychiatric/Behavioral:  Negative for agitation and confusion.    Objective:     Vital Signs (Most Recent):  Temp: 98.5 °F (36.9 °C) (07/28/23 1205)  Pulse: 80 (07/28/23 1205)  Resp: 16 (07/28/23 1205)  BP: 115/59 (07/28/23 1205)  SpO2: 98 % (07/28/23 1205) on room air Vital Signs  (24h Range):  Temp:  [97.7 °F (36.5 °C)-99.6 °F (37.6 °C)] 98.5 °F (36.9 °C)  Pulse:  [64-86] 80  Resp:  [16-18] 16  SpO2:  [95 %-98 %] 98 %  BP: (102-151)/(53-67) 115/59     Weight: 53.1 kg (117 lb)  Body mass index is 21.4 kg/m².    Intake/Output Summary (Last 24 hours) at 7/28/2023 1323  Last data filed at 7/28/2023 1000  Gross per 24 hour   Intake 600 ml   Output 1700 ml   Net -1100 ml         Physical Exam  Vitals and nursing note reviewed.   Constitutional:       General: She is awake. She is not in acute distress.     Appearance: Normal appearance. She is well-developed. She is not ill-appearing.   Eyes:      Conjunctiva/sclera: Conjunctivae normal.   Cardiovascular:      Rate and Rhythm: Normal rate and regular rhythm.      Heart sounds: Normal heart sounds. No murmur heard.    No friction rub. No gallop.   Pulmonary:      Effort: Pulmonary effort is normal. No respiratory distress.      Breath sounds: Normal breath sounds. No wheezing.   Abdominal:      General: Abdomen is flat. Bowel sounds are normal. There is no distension.      Palpations: Abdomen is soft.      Tenderness: There is no abdominal tenderness.   Musculoskeletal:         General: Swelling (Mild left thigh swelling.) present.      Comments: Left thigh soft and compressible. No abnormal bruising noted to left thigh.    Skin:     General: Skin is warm.      Findings: No erythema.      Comments: Surgical bandages in place to left hip. Bandages are clean and dry and intact.    Neurological:      Mental Status: She is alert and oriented to person, place, and time.   Psychiatric:         Mood and Affect: Mood normal.         Behavior: Behavior normal. Behavior is cooperative.         Thought Content: Thought content normal.         Judgment: Judgment normal.           Significant Labs: CBC:   Recent Labs   Lab 07/26/23  2345 07/27/23  0521 07/28/23  0434   WBC  --  14.20* 12.91*   HGB 8.5* 9.0* 7.7*   HCT  --  26.5* 22.0*   PLT  --  111* 100*      CMP:   Recent Labs   Lab 07/27/23  0521 07/28/23  0434   * 135*   K 3.9 3.6    104   CO2 22* 23   GLU 95 97   BUN 10 11   CREATININE 0.6 0.6   CALCIUM 9.1 8.5*   ANIONGAP 8 8       Significant Imaging: I have reviewed all pertinent imaging results/findings within the past 24 hours.

## 2023-07-28 NOTE — PLAN OF CARE
Problem: Adult Inpatient Plan of Care  Goal: Optimal Comfort and Wellbeing  Outcome: Ongoing, Progressing     Problem: Adjustment to Injury (Hip Fracture Medical Management)  Goal: Optimal Coping with Change in Health Status  Outcome: Ongoing, Progressing     Problem: Bleeding (Hip Fracture Medical Management)  Goal: Absence of Bleeding  Outcome: Ongoing, Progressing     Problem: Embolism (Hip Fracture Medical Management)  Goal: Absence of Embolism  Outcome: Ongoing, Progressing     Problem: Pain (Hip Fracture Medical Management)  Goal: Acceptable Pain Level  Outcome: Ongoing, Progressing

## 2023-07-28 NOTE — PT/OT/SLP PROGRESS
Occupational Therapy      Patient Name:  Cathy Kearns   MRN:  [id number]    Patient not seen today secondary to declining 2/2 fatigue and pain. Prior to arrival pt. reports just completing BSC transfer, and transferring to bed. Will follow-up 7/29 per POC.    Adilene Atwood  7/28/2023

## 2023-07-28 NOTE — ANESTHESIA POST-OP PAIN MANAGEMENT
Went to pull catheter around noon but pt complaining of pain and wants to keep the catheter in for another day. She is not being discharged to rehab today. Plans for potential discharge tomorrow. After consulting w/ staff Dr. Lerma, PNC was restarted. Will attempt pause and pull tomorrow.     Pavel Cerrato MD  Anesthesiology Resident PGY-4/CA-3  [email or web address]  Pager: [phone]

## 2023-07-28 NOTE — ASSESSMENT & PLAN NOTE
· Platelets continuing to decrease and down to 100,000 on 7/28. Patient with no signs of bleeding.   · Noted post-op. Patient on admit with platelet count of 197,000. Platelets dropped to 124,000 on 7/26 and down to 111,000 on 7/27. Likely stress response. No signs of bleeding. Monitor level with daily CBC.

## 2023-07-28 NOTE — ASSESSMENT & PLAN NOTE
· Hgb worsened and down to 7.7 on 7/28. IVF's discontinued as may just be dilutional change. patient with no signs of bleeding at surgical site or other sites noted.   · Improved to 9.0 on 7/27 after blood transfusion on 7/26.   · On admission to hospital, patient noted to have Hgb of 13.0. After surgery patient's Hgb level dropped to 9.4 on 7/25 and 7.7 on 7/26 and expected blood loss related to surgery and hip fracture. Patient with orthostatic hypotension on 7/26 so transfused 1 unit of PRBCs on 7/26 for symptomatic anemia.   · Patient has no signs of active bleeding and hemodynamically stable.  · Goal is keep Hgb >7 and consider blood transfusion if Hgb < 7 or < 8 if patient becomes symptomatic.  · Plan is to monitor daily CBC post-operatively.

## 2023-07-29 VITALS
DIASTOLIC BLOOD PRESSURE: 65 MMHG | HEART RATE: 79 BPM | HEIGHT: 62 IN | SYSTOLIC BLOOD PRESSURE: 152 MMHG | WEIGHT: 117 LBS | RESPIRATION RATE: 18 BRPM | OXYGEN SATURATION: 97 % | BODY MASS INDEX: 21.53 KG/M2 | TEMPERATURE: 97 F

## 2023-07-29 LAB
ANION GAP SERPL CALC-SCNC: 8 MMOL/L (ref 8–16)
ANISOCYTOSIS BLD QL SMEAR: SLIGHT
BASOPHILS # BLD AUTO: ABNORMAL K/UL (ref 0–0.2)
BASOPHILS NFR BLD: 0 % (ref 0–1.9)
BUN SERPL-MCNC: 9 MG/DL (ref 10–30)
BURR CELLS BLD QL SMEAR: ABNORMAL
CALCIUM SERPL-MCNC: 9 MG/DL (ref 8.7–10.5)
CHLORIDE SERPL-SCNC: 103 MMOL/L (ref 95–110)
CO2 SERPL-SCNC: 23 MMOL/L (ref 23–29)
CREAT SERPL-MCNC: 0.5 MG/DL (ref 0.5–1.4)
DIFFERENTIAL METHOD: ABNORMAL
EOSINOPHIL # BLD AUTO: ABNORMAL K/UL (ref 0–0.5)
EOSINOPHIL NFR BLD: 0 % (ref 0–8)
ERYTHROCYTE [DISTWIDTH] IN BLOOD BY AUTOMATED COUNT: 14.2 % (ref 11.5–14.5)
EST. GFR  (NO RACE VARIABLE): >60 ML/MIN/1.73 M^2
GLUCOSE SERPL-MCNC: 95 MG/DL (ref 70–110)
HCT VFR BLD AUTO: 24.3 % (ref 37–48.5)
HGB BLD-MCNC: 8.3 G/DL (ref 12–16)
HYPOCHROMIA BLD QL SMEAR: ABNORMAL
IMM GRANULOCYTES # BLD AUTO: ABNORMAL K/UL (ref 0–0.04)
IMM GRANULOCYTES NFR BLD AUTO: ABNORMAL % (ref 0–0.5)
LYMPHOCYTES # BLD AUTO: ABNORMAL K/UL (ref 1–4.8)
LYMPHOCYTES NFR BLD: 9 % (ref 18–48)
MCH RBC QN AUTO: 31.2 PG (ref 27–31)
MCHC RBC AUTO-ENTMCNC: 34.2 G/DL (ref 32–36)
MCV RBC AUTO: 91 FL (ref 82–98)
METAMYELOCYTES NFR BLD MANUAL: 4 %
MONOCYTES # BLD AUTO: ABNORMAL K/UL (ref 0.3–1)
MONOCYTES NFR BLD: 15 % (ref 4–15)
MYELOCYTES NFR BLD MANUAL: 4 %
NEUTROPHILS # BLD AUTO: ABNORMAL K/UL (ref 1.8–7.7)
NEUTROPHILS NFR BLD: 66 % (ref 38–73)
NEUTS BAND NFR BLD MANUAL: 2 %
NRBC BLD-RTO: 0 /100 WBC
OVALOCYTES BLD QL SMEAR: ABNORMAL
PLATELET # BLD AUTO: 140 K/UL (ref 150–450)
PLATELET BLD QL SMEAR: ABNORMAL
PMV BLD AUTO: 10.7 FL (ref 9.2–12.9)
POIKILOCYTOSIS BLD QL SMEAR: SLIGHT
POLYCHROMASIA BLD QL SMEAR: ABNORMAL
POTASSIUM SERPL-SCNC: 3.8 MMOL/L (ref 3.5–5.1)
RBC # BLD AUTO: 2.66 M/UL (ref 4–5.4)
SCHISTOCYTES BLD QL SMEAR: ABNORMAL
SCHISTOCYTES BLD QL SMEAR: PRESENT
SODIUM SERPL-SCNC: 134 MMOL/L (ref 136–145)
SPHEROCYTES BLD QL SMEAR: ABNORMAL
WBC # BLD AUTO: 9.64 K/UL (ref 3.9–12.7)

## 2023-07-29 PROCEDURE — 85007 BL SMEAR W/DIFF WBC COUNT: CPT | Performed by: HOSPITALIST

## 2023-07-29 PROCEDURE — 97112 NEUROMUSCULAR REEDUCATION: CPT

## 2023-07-29 PROCEDURE — 63600175 PHARM REV CODE 636 W HCPCS: Performed by: STUDENT IN AN ORGANIZED HEALTH CARE EDUCATION/TRAINING PROGRAM

## 2023-07-29 PROCEDURE — 99231 SBSQ HOSP IP/OBS SF/LOW 25: CPT | Mod: ,,, | Performed by: SURGERY

## 2023-07-29 PROCEDURE — 97530 THERAPEUTIC ACTIVITIES: CPT

## 2023-07-29 PROCEDURE — 99239 PR HOSPITAL DISCHARGE DAY,>30 MIN: ICD-10-PCS | Mod: ,,, | Performed by: INTERNAL MEDICINE

## 2023-07-29 PROCEDURE — 85027 COMPLETE CBC AUTOMATED: CPT | Performed by: HOSPITALIST

## 2023-07-29 PROCEDURE — 99239 HOSP IP/OBS DSCHRG MGMT >30: CPT | Mod: ,,, | Performed by: INTERNAL MEDICINE

## 2023-07-29 PROCEDURE — 25000003 PHARM REV CODE 250: Performed by: INTERNAL MEDICINE

## 2023-07-29 PROCEDURE — 80048 BASIC METABOLIC PNL TOTAL CA: CPT | Performed by: HOSPITALIST

## 2023-07-29 PROCEDURE — 97535 SELF CARE MNGMENT TRAINING: CPT

## 2023-07-29 PROCEDURE — 97116 GAIT TRAINING THERAPY: CPT

## 2023-07-29 PROCEDURE — 99231 PR SUBSEQUENT HOSPITAL CARE,LEVL I: ICD-10-PCS | Mod: ,,, | Performed by: SURGERY

## 2023-07-29 PROCEDURE — 25000003 PHARM REV CODE 250: Performed by: STUDENT IN AN ORGANIZED HEALTH CARE EDUCATION/TRAINING PROGRAM

## 2023-07-29 PROCEDURE — 25000003 PHARM REV CODE 250

## 2023-07-29 PROCEDURE — 36415 COLL VENOUS BLD VENIPUNCTURE: CPT | Performed by: HOSPITALIST

## 2023-07-29 PROCEDURE — 25000003 PHARM REV CODE 250: Performed by: HOSPITALIST

## 2023-07-29 RX ORDER — ACETAMINOPHEN 500 MG
1000 TABLET ORAL EVERY 8 HOURS
Refills: 0
Start: 2023-07-29

## 2023-07-29 RX ORDER — METHOCARBAMOL 500 MG/1
500 TABLET, FILM COATED ORAL EVERY 6 HOURS PRN
Start: 2023-07-29 | End: 2023-09-05 | Stop reason: SDUPTHER

## 2023-07-29 RX ORDER — CHOLECALCIFEROL (VITAMIN D3) 25 MCG
1000 TABLET ORAL DAILY
Start: 2023-07-30

## 2023-07-29 RX ORDER — METHOCARBAMOL 500 MG/1
500 TABLET, FILM COATED ORAL ONCE
Status: COMPLETED | OUTPATIENT
Start: 2023-07-29 | End: 2023-07-29

## 2023-07-29 RX ORDER — AMOXICILLIN 250 MG
1 CAPSULE ORAL 2 TIMES DAILY
Start: 2023-07-29

## 2023-07-29 RX ADMIN — APIXABAN 2.5 MG: 2.5 TABLET, FILM COATED ORAL at 09:07

## 2023-07-29 RX ADMIN — CHOLECALCIFEROL TAB 25 MCG (1000 UNIT) 1000 UNITS: 25 TAB at 09:07

## 2023-07-29 RX ADMIN — METHOCARBAMOL 500 MG: 500 TABLET ORAL at 12:07

## 2023-07-29 RX ADMIN — ACETAMINOPHEN 1000 MG: 500 TABLET ORAL at 01:07

## 2023-07-29 RX ADMIN — METHOCARBAMOL 500 MG: 500 TABLET ORAL at 09:07

## 2023-07-29 RX ADMIN — POLYETHYLENE GLYCOL 3350 17 G: 17 POWDER, FOR SOLUTION ORAL at 09:07

## 2023-07-29 RX ADMIN — SOTALOL HYDROCHLORIDE 80 MG: 80 TABLET ORAL at 09:07

## 2023-07-29 RX ADMIN — MUPIROCIN 1 G: 20 OINTMENT TOPICAL at 12:07

## 2023-07-29 RX ADMIN — ROPIVACAINE HYDROCHLORIDE 0.1 ML/HR: 2 INJECTION, SOLUTION EPIDURAL; INFILTRATION at 05:07

## 2023-07-29 RX ADMIN — SENNOSIDES AND DOCUSATE SODIUM 1 TABLET: 50; 8.6 TABLET ORAL at 09:07

## 2023-07-29 RX ADMIN — POTASSIUM & SODIUM PHOSPHATES POWDER PACK 280-160-250 MG 1 PACKET: 280-160-250 PACK at 10:07

## 2023-07-29 RX ADMIN — METHOCARBAMOL 500 MG: 500 TABLET ORAL at 01:07

## 2023-07-29 RX ADMIN — POTASSIUM & SODIUM PHOSPHATES POWDER PACK 280-160-250 MG 1 PACKET: 280-160-250 PACK at 05:07

## 2023-07-29 RX ADMIN — ACETAMINOPHEN 1000 MG: 500 TABLET ORAL at 05:07

## 2023-07-29 NOTE — PLAN OF CARE
[facility] - Surgery  Discharge Final Note    Primary Care Provider: Wilma Xiong MD    Expected Discharge Date: 7/29/2023    Final Discharge Note (most recent)       Final Note - 07/29/23 1202          Final Note    Assessment Type Final Discharge Note     Anticipated Discharge Disposition Rehab Facility     Hospital Resources/Appts/Education Provided Provided patient/caregiver with written discharge plan information        Post-Acute Status    Post-Acute Authorization Placement     Post-Acute Placement Status Set-up Complete/Auth obtained     Discharge Delays None known at this time                     Important Message from Medicare  Important Message from Medicare regarding Discharge Appeal Rights: Other (comments) (pt. going to Rehab)     Date IMM was signed: 07/29/23  Time IMM was signed: 1019    Roxann Singer RN  Weekend  - [facility]  Spectralink: [phone]

## 2023-07-29 NOTE — TREATMENT PLAN
Per Primary team, plan to discharge patient to inpatient rehab this afternoon. Left SIFI PNC paused. Will pull prior to discharge.     Seth Olivera MD, PGY3  Department of Anesthesiology  [facility]

## 2023-07-29 NOTE — ADDENDUM NOTE
Addendum  created 07/29/23 1107 by Jayy Perez MD    Charge Capture section accepted, Clinical Note Signed, Flowsheet accepted, LDA removed

## 2023-07-29 NOTE — CARE UPDATE
Patient worked with PT and OT this am and they reported no drops in patient's BP when working with them and patient able to ambulate with her walker to doorway and out to hallway today. Patient denies any dizziness or lightheadedness when working with therapy. Hgb stable at 8.3 this am and improved from 7.7 yesterday. Sodium stable at 134. Vitals reviewed and stable. Patient is medically ready to discharge to [facility] and arrangements being made for discharge today. Discussed with patient and her son and daughter and all are agreeable to discharge plan. Case management and [facility] notified and Rehab orders completed and patient to discharge to [facility] today.    AMI HASSAN MD  Attending Staff Physician   Department of Hospital Medicine, [facility] on [facility]  Pager: [phone]  Spectralink: [phone]

## 2023-07-29 NOTE — ANESTHESIA POST-OP PAIN MANAGEMENT
Acute Pain Service Progress Note    Cathy Kearns is a 89 y.o., female, [id number].    Surgery:  INSERTION, INTRAMEDULLARY DAPHNEY, LEFT FEMUR (Left: Leg Upper)    Post Op Day #: 4    Catheter type: perineural  SIFI    Infusion type: Ropivacaine 0.2%  15mL/3h basal    Problem List:    Active Hospital Problems    Diagnosis  POA    *Closed displaced intertrochanteric fracture of left femur with routine healing s/p IM nail on 7/25/2023 [S72.142D]  Not Applicable    Orthostatic hypotension [I95.1]  No    Hypophosphatemia [E83.39]  No    Thrombocytopenia [D69.6]  No    Acute blood loss anemia [D62]  No    Monocytosis [D72.821]  Yes    Pure hypercholesterolemia [E78.00]  Yes    Panlobular emphysema [J43.1]  Yes    Pathological fracture of left hip due to osteoporosis with routine healing [M80.052D]  Not Applicable    Primary hypertension [I10]  Yes    Paroxysmal atrial fibrillation [I48.0]  Yes    Exudative age-related macular degeneration of right eye with active choroidal neovascularization [H35.3211]  Yes      Resolved Hospital Problems    Diagnosis Date Resolved POA    Hyponatremia [E87.1] 07/28/2023 Yes       Subjective:     General appearance of alert, oriented, no complaints   Pain with rest: 1    Numbers   Pain with movement: 4    Numbers   Side Effects    1. Pruritis No    2. Nausea No    3. Motor Blockade No, 2=Inability to bend knees    4. Sedation No, 1=awake and alert    Objective:           Vitals   Vitals:    07/29/23 0742   BP: 137/63   Pulse: 77   Resp: 16   Temp: 36 °C (96.8 °F)        Labs    No results displayed because visit has over 200 results.           Meds   Current Facility-Administered Medications   Medication Dose Route Frequency Provider Last Rate Last Admin    0.9%  NaCl infusion (for blood administration)   Intravenous Q24H PRN Jacobo Louise MD        acetaminophen tablet 1,000 mg  1,000 mg Oral Q8H Pavel Cerrato MD   1,000 mg at 07/29/23 0502    apixaban tablet 2.5 mg  2.5 mg  Oral BID Jacobo Louise MD   2.5 mg at 07/28/23 2014    bisacodyL suppository 10 mg  10 mg Rectal Daily PRN Ludmila Granado MD   10 mg at 07/28/23 1033    melatonin tablet 6 mg  6 mg Oral Nightly PRN Ludmila Granado MD        methocarbamoL tablet 500 mg  500 mg Oral Q6H PRN Ludmila Granado MD   500 mg at 07/28/23 2210    mupirocin 2 % ointment 1 g  1 g Nasal BID Ludmila Granado MD   1 g at 07/28/23 2017    ondansetron injection 4 mg  4 mg Intravenous Q12H PRN Ludmila Granado MD        polyethylene glycol packet 17 g  17 g Oral Daily Ludmila Granado MD   17 g at 07/27/23 0827    potassium, sodium phosphates 280-160-250 mg packet 1 packet  1 packet Oral QID (AC & HS) Wilma Irizarry MD   1 packet at 07/29/23 0502    ROPIvacaine (PF) 2 mg/ml (0.2%) solution  0.1 mL/hr Perineural Continuous Pavel Cerrato MD 0.1 mL/hr at 07/29/23 0502 0.1 mL/hr at 07/29/23 0502    senna-docusate 8.6-50 mg per tablet 1 tablet  1 tablet Oral BID Ludmila Granado MD   1 tablet at 07/28/23 2016    sodium chloride 0.9% flush 10 mL  10 mL Intravenous PRN Ludmila Granado MD        sotaloL tablet 80 mg  80 mg Oral BID Ludmila Granado MD   80 mg at 07/28/23 2016    vitamin D 1000 units tablet 1,000 Units  1,000 Units Oral Daily Jacobo Louise MD   1,000 Units at 07/28/23 1026        Anticoagulant dose Apixaban 2.5 BID    Assessment:  Cathy Kearns is a 89 y.o. female who is 4 Days Post-Op status post IM huy insertion in the left femur for a left intertrochanteric femur fracture.  Pain appears to be well controlled with the current regimen including SIFI PNC and oral multimodals.  Patient accepted to [facility].          Plan:     Discontinue present therapy. Analgesia to be provided by the primary team, .    Plan:  - Paused and removed PNC.  Blue tip of the catheter intact.  All questions answered.    - Continue Tylenol 1000 q6, PRN Robaxin 500 q6 for spasms  -  Discharging today.              Jayy Perez MD   Regional Anesthesiology and Acute Pain Medicine  [facility]

## 2023-07-29 NOTE — NURSING
[facility] called to give report on pt for discharge transfer,  informed that they will get a nurse assigned for pt then call me back, unit phone number given.

## 2023-07-29 NOTE — PT/OT/SLP PROGRESS
Occupational Therapy   Treatment    Name: Cathy Kearns  MRN: [id number]  Admitting Diagnosis:  Closed displaced intertrochanteric fracture of left femur with routine healing  4 Days Post-Op    Recommendations:     Discharge Recommendations: rehabilitation facility  Discharge Equipment Recommendations:  to be determined by next level of care  Barriers to discharge:  None    Assessment:     Cathy Kearns is a 89 y.o. female with a medical diagnosis of Closed displaced intertrochanteric fracture of left femur with routine healing.  She presents with the following performance deficits affecting function are weakness, impaired endurance, impaired self care skills, impaired functional mobility, decreased lower extremity function, orthopedic precautions, decreased ROM, impaired joint extensibility, impaired cardiopulmonary response to activity, gait instability, impaired balance.     Rehab Prognosis:  Good; patient would benefit from acute skilled OT services to address these deficits and reach maximum level of function.       Plan:     Patient to be seen 4 x/week to address the above listed problems via self-care/home management, therapeutic activities, therapeutic exercises, neuromuscular re-education  Plan of Care Expires: 08/26/23  Plan of Care Reviewed with: patient    Subjective     Chief Complaint: None  Patient/Family Comments/goals: Gp to rehab  Pain/Comfort:  Pain Rating 1: 0/10  Location - Side 1: Left  Location - Orientation 1: generalized  Location 1: hip  Pain Addressed 1: Reposition, Distraction, Pre-medicate for activity  Pain Rating Post-Intervention 1: 0/10    Objective:     Communicated with: Nursing prior to session.  Patient found HOB elevated with FCD, perineural catheter, telemetry, PureWick upon OT entry to room.    General Precautions: Standard, fall    Orthopedic Precautions:LLE weight bearing as tolerated  Braces: N/A  Respiratory Status: Room air     Occupational Performance:     Bed Mobility:     Patient completed Scooting/Bridging with minimum assistance  Patient completed Supine to Sit with moderate assistance     Functional Mobility/Transfers:  Patient completed Sit <> Stand Transfer with minimum assistance  with  rolling walker   Patient completed Bed <> Chair Transfer using Step Transfer technique with minimum assistance and moderate assistance with rolling walker  Functional Mobility: CGA with rolling walker x ~15 ft within room    Activities of Daily Living:  Grooming: stand by assistance seated edge o fbed   Upper Body Dressing: minimum assistance seated edge of bed  Lower Body Dressing: moderate assistance seated edge of bed       AMPAC 6 Click ADL: 14    Treatment & Education:  -Patient and family educated on roles/goals of OT and POC.  -White board updated.  -Therapist provided time for questions and answered within scope of practice.  -Patient educated on importance of EOB/OOB activity to maximize recovery.     Patient left up in chair with all lines intact and call button in reach    GOALS:   Multidisciplinary Problems       Occupational Therapy Goals          Problem: Occupational Therapy    Goal Priority Disciplines Outcome Interventions   Occupational Therapy Goal     OT, PT/OT Ongoing, Progressing    Description: Goals to be met by: 8/16/23     Patient will increase functional independence with ADLs by performing:    UE Dressing with Stand-by Assistance.  LE Dressing with Minimal Assistance and Assistive Devices as needed.  Grooming while standing at sink with Contact Guard Assistance.  Toileting from bedside commode with Minimal Assistance for hygiene and clothing management.   Supine to sit with Stand-by Assistance.  Step transfer with Contact Guard Assistance.                         Time Tracking:     OT Date of Treatment: 07/29/23  OT Start Time: 0845  OT Stop Time: 0930  OT Total Time (min): 45 min    Billable Minutes:Self Care/Home Management 15  Therapeutic Activity 30    OT/KATHLEEN:  OT          7/29/2023

## 2023-07-29 NOTE — PLAN OF CARE
Problem: Infection  Goal: Absence of Infection Signs and Symptoms  Outcome: Ongoing, Progressing     Problem: Adult Inpatient Plan of Care  Goal: Plan of Care Review  Outcome: Ongoing, Progressing  Goal: Patient-Specific Goal (Individualized)  Outcome: Ongoing, Progressing  Goal: Optimal Comfort and Wellbeing  Outcome: Ongoing, Progressing     Problem: Bleeding (Hip Fracture Medical Management)  Goal: Absence of Bleeding  Outcome: Ongoing, Progressing     Problem: Embolism (Hip Fracture Medical Management)  Goal: Absence of Embolism  Outcome: Ongoing, Progressing     Problem: Functional Ability Impaired (Hip Fracture Medical Management)  Goal: Optimal Functional Performance  Outcome: Ongoing, Progressing     Problem: Urinary Elimination Impaired (Hip Fracture Medical Management)  Goal: Effective Urinary Elimination  Outcome: Ongoing, Progressing

## 2023-07-29 NOTE — PROGRESS NOTES
[facility] - [facility]  Orthopedics  Progress Note    Patient Name: Cathy Kearns  MRN: [id number]  Admission Date: 7/24/2023  Hospital Length of Stay: 5 days  Attending Provider: Wilma Irizarry MD  Primary Care Provider: Wilma Xiong MD  Follow-up For: Procedure(s) (LRB):  INSERTION, INTRAMEDULLARY DAPHNEY, LEFT FEMUR (Left)    Post-Operative Day: 4 Days Post-Op  Subjective:     Principal Problem:Closed displaced intertrochanteric fracture of left femur with routine healing    Principal Orthopedic Problem: same as above s/p L IMN 7/25    Interval History: NAEO. VSS. In good spirits. Patient will work with PT today, took 2 steps with PT yesterday. Blanco out. Awaiting rehab placement.    Hgb 8.3 this morning.     Review of patient's allergies indicates:   Allergen Reactions    Penicillins Swelling    Astelin [azelastine] Other (See Comments)     Dizziness    Pseudoephedrine hcl Palpitations    Sulfa (sulfonamide antibiotics) Palpitations       Current Facility-Administered Medications   Medication    0.9%  NaCl infusion (for blood administration)    acetaminophen tablet 1,000 mg    apixaban tablet 2.5 mg    bisacodyL suppository 10 mg    melatonin tablet 6 mg    methocarbamoL tablet 500 mg    mupirocin 2 % ointment 1 g    ondansetron injection 4 mg    polyethylene glycol packet 17 g    potassium, sodium phosphates 280-160-250 mg packet 1 packet    ROPIvacaine (PF) 2 mg/ml (0.2%) solution    senna-docusate 8.6-50 mg per tablet 1 tablet    sodium chloride 0.9% flush 10 mL    sotaloL tablet 80 mg    vitamin D 1000 units tablet 1,000 Units     Objective:     Vital Signs (Most Recent):  Temp: 96.8 °F (36 °C) (07/29/23 0742)  Pulse: 77 (07/29/23 0742)  Resp: 16 (07/29/23 0742)  BP: 137/63 (07/29/23 0742)  SpO2: 95 % (07/29/23 0742) Vital Signs (24h Range):  Temp:  [96.8 °F (36 °C)-98.5 °F (36.9 °C)] 96.8 °F (36 °C)  Pulse:  [71-88] 77  Resp:  [16-20] 16  SpO2:  [94 %-99 %] 95 %  BP: (115-165)/(57-74)  "137/63     Weight: 53.1 kg (117 lb)  Height: 5' 2" (157.5 cm)  Body mass index is 21.4 kg/m².      Intake/Output Summary (Last 24 hours) at 7/29/2023 0911  Last data filed at 7/29/2023 0514  Gross per 24 hour   Intake 720 ml   Output 1450 ml   Net -730 ml         Ortho/SPM Exam  Dressings c/d/I  Swelling over lateral aspect of the hip  NVI       Significant Labs: All pertinent labs within the past 24 hours have been reviewed.    Significant Imaging: I have reviewed and interpreted all pertinent imaging results/findings.    Assessment/Plan:     * Closed displaced intertrochanteric fracture of left femur with routine healing s/p IM nail on 7/25/2023  Cathy Kearns is a 89 y.o. female with a comminuted intertrochanteric fracture of the left femur, closed, NVI. S/p L IMN nail 7/25.     Pain control: MM  WBAT  DVT PPx: eliquis 2.5 BID, FCDs at all times when not ambulating  Abx: postop Ancef x24h  Labs: hgb 9.0 after 1u of pRBC  Drain: no drains  Blanco: dc'd    Dispo: Pending rehab placement            LISA ORTIZ MD  Orthopedics  [facility] - Surgery  "

## 2023-07-29 NOTE — PT/OT/SLP PROGRESS
Physical Therapy Co-Treatment  Co-treatment with OT for maximal pt participation, safety, and activity tolerance    Patient Name:  Cathy Kearns   MRN:  [id number]  Admitting Diagnosis:  Closed displaced intertrochanteric fracture of left femur with routine healing   Recent Surgery: Procedure(s) (LRB):  INSERTION, INTRAMEDULLARY DAPHNEY, LEFT FEMUR (Left) 4 Days Post-Op  Admit Date: 7/24/2023  Length of Stay: 5 days    Recommendations:     Discharge Recommendations:  rehabilitation facility   Discharge Equipment Recommendations: to be determined by next level of care   Barriers to discharge: None    Assessment:     Cathy Kearns is a 89 y.o. female admitted with a medical diagnosis of Closed displaced intertrochanteric fracture of left femur with routine healing.  She presents with the following impairments/functional limitations:  weakness, impaired endurance, impaired balance, impaired self care skills, decreased lower extremity function, gait instability, impaired functional mobility, pain, decreased safety awareness, orthopedic precautions.     Pt agreeable to therapy, reports feeling better today with decreased pain at rest. Pt with much better BP response to positional changes and exercise today with no drop at all when sitting up or standing. Pt sits EOB from supine with mod A, stands from EOB to RW with mod A, ambulates with RW and min A. Very minor dizziness reported during gait. Continue to increase gait distance as tolerated to promote return to PLOF.     Rehab Prognosis: Good; patient would benefit from acute skilled PT services to address these deficits and reach maximum level of function.    Recent Surgery: Procedure(s) (LRB):  INSERTION, INTRAMEDULLARY DAPHNEY, LEFT FEMUR (Left) 4 Days Post-Op    Treatment Tolerated: Well    Highest level of mobility achieved this visit: ambulates 15ft w/ RW and min A    Activity with RN/PCT: transfer with 1 person assist    Plan:     During this hospitalization, patient to be  "seen daily to address the identified rehab impairments via gait training, therapeutic activities, therapeutic exercises, neuromuscular re-education and progress toward the following goals:    Plan of Care Expires:  08/25/23    Subjective     RN Kyra ZELAYA notified prior to session. Pt's daughter and son present upon PT entrance into room.    Chief Complaint: pain with movement  Patient/Family Comments/goals: "I'm a stinky old lady"  Pain/Comfort:  Pain Rating 1:  (no pain at rest, 4/10 with movement)  Location - Side 1: Left  Location - Orientation 1: generalized  Location 1: hip  Pain Addressed 1: Pre-medicate for activity, Reposition, Distraction      Objective:     Additional staff present: OT Annelise    Patient found HOB elevated with: FCD, perineural catheter, telemetry   Cognition:   Alert and Cooperative  Command following: Follows two-step verbal commands  Fluency: clear/fluent  General Precautions: Standard, Cardiac fall   Orthopedic Precautions:LLE weight bearing as tolerated   Braces: N/A   Body mass index is 21.4 kg/m².  Oxygen Device: Room Air    Vitals: /63   Pulse 77   Temp 96.8 °F (36 °C)   Resp 16   Ht 5' 2" (1.575 m)   Wt 53.1 kg (117 lb)   SpO2 95%   Breastfeeding No   BMI 21.40 kg/m²     Outcome Measures:  AM-PAC 6 CLICK MOBILITY  Turning over in bed (including adjusting bedclothes, sheets and blankets)?: 2  Sitting down on and standing up from a chair with arms (e.g., wheelchair, bedside commode, etc.): 2  Moving from lying on back to sitting on the side of the bed?: 2  Moving to and from a bed to a chair (including a wheelchair)?: 3  Need to walk in hospital room?: 3  Climbing 3-5 steps with a railing?: 1  Basic Mobility Total Score: 13     Functional Mobility:    Bed Mobility:   Supine to Sit: moderate assistance; from L side of bed  Scooting anteriorly to EOB to have both feet planted on floor: minimum assistance    Sitting Balance at Edge of Bed:  Assistance Level Required: " Stand-by Assistance  Time: 8 min  Postural deviations noted: no deviations noted    Transfers:   Sit <> Stand Transfer: minimum assistance to  moderate assistance with rolling walker   Stand <> Sit Transfer: minimum assistance with rolling walker   [id number] from EOB    Standing Balance:  Assistance Level Required: Contact Guard Assistance  Patient used: rolling walker   Time: 12 min  Postural deviations noted: rounded shoulders and forward head  Encouraged: upright stance      Gait:  Patient ambulated: 15ft   Patient required: minimal assist  Patient used:  rolling walker   Gait Pattern observed: swing through  Gait Deviation(s): decreased step length, decreased weight shift, antalgic gait, and decreased josr  Impairments due to: impaired balance, pain, decreased strength, and decreased endurance  all lines remained intact throughout ambulation trial  Chair follow for patient safety  Gait belt utilized    Therapeutic Exercises:   Static and dynamic sitting balance at EOB  Static and dynamic standing balance at EOB    Education:  Time provided for education, counseling and discussion of health disposition in regards to patient's current status  All questions answered within PT scope of practice and to patient's satisfaction  PT role in POC to address current functional deficits  Pt educated on proper body mechanics, safety techniques, and energy conservation with PT facilitation and cueing throughout session    Patient left up in chair with all lines intact, call button in reach, and son and daughter present.    GOALS:   Multidisciplinary Problems       Physical Therapy Goals          Problem: Physical Therapy    Goal Priority Disciplines Outcome Goal Variances Interventions   Physical Therapy Goal     PT, PT/OT Ongoing, Progressing     Description: Goals to be met by: 23     Patient will increase functional independence with mobility by performin. Supine to sit with Stand-by Assistance  2. Sit to stand  transfer with Contact Guard Assistance using RW  3. Bed to chair transfer with Stand by Assistance using Rolling Walker  4. Gait  x 100 feet with Stand by assistance using Rolling Walker.   5. Lower extremity exercise program x30 reps per handout, with independence                       Time Tracking:     PT Received On: 07/29/23  PT Start Time: 0845     PT Stop Time: 0931  PT Total Time (min): 46 min     Billable Minutes:   Gait Training 15 min, Therapeutic Activity 15 min, and Neuromuscular Re-education 10 min    Treatment Type: Treatment  PT/PTA: PT       Wicho Dunlap, PT, DPT  7/29/2023

## 2023-07-29 NOTE — PLAN OF CARE
[facility]     Department of Hospital Medicine     [facility] [place] [address]     [phone] after hours  [phone] fax                                        FACILITY TRANSFER ORDERS     07/29/2023    Admit to: [facility]    Diagnoses:  Active Hospital Problems    Diagnosis  POA    *Closed displaced intertrochanteric fracture of left femur with routine healing s/p IM nail on 7/25/2023 [S72.142D]  Not Applicable     Priority: 1 - High    Orthostatic hypotension [I95.1]  No     Priority: 2     Acute blood loss anemia [D62]  No     Priority: 2     Thrombocytopenia [D69.6]  No     Priority: 3     Hypophosphatemia [E83.39]  No     Priority: 4     Paroxysmal atrial fibrillation [I48.0]  Yes     Priority: 4     Primary hypertension [I10]  Yes     Priority: 5     Pathological fracture of left hip due to osteoporosis with routine healing [M80.052D]  Not Applicable     Priority: 6     Panlobular emphysema [J43.1]  Yes     Priority: 7     Pure hypercholesterolemia [E78.00]  Yes     Priority: 8     Monocytosis [D72.821]  Yes     Priority: 9     Exudative age-related macular degeneration of right eye with active choroidal neovascularization [H35.3211]  Yes     Priority: 10       Resolved Hospital Problems    Diagnosis Date Resolved POA    Hyponatremia [E87.1] 07/28/2023 Yes     Priority: 3        Vital Signs: Routine.    Allergies:  Review of patient's allergies indicates:   Allergen Reactions    Penicillins Swelling    Astelin [azelastine] Other (See Comments)     Dizziness    Pseudoephedrine hcl Palpitations    Sulfa (sulfonamide antibiotics) Palpitations       Code Status: Full Code     Diet: regular diet      Supplement: House supplement one can by mouth with meals                             Activities:   - Activity as tolerated   - Up in a chair each morning as tolerated   - Ambulate with assistance to bathroom   - May use walker, cane, or self-propelled  wheelchair    Weight Bearing Status: Weight bear as tolerated left lower extremity    Nursing: Out of bed BID, Up with assistance  Measure height and weight on admit    Nursing Precautions:             - Fall precautions per nursing home protocol      Labs: Per facility protocol    CONSULTS:      Physical Therapy to evaluate and treat 5 times a week      Occupational Therapy to evaluate and treat 5 times a week       MISCELLANEOUS CARE:  Routine Skin for Bedridden Patients: Instruct patient/caregiver to apply moisture barrier cream to all skin folds and wet areas in perineal area daily and after baths and all bowel movements.    WOUND CARE ORDERS:  Keep surgical bandages in place to left leg and do not remove until Orthopedic clinic follow-up.          Medications:     Current Discharge Medication List        START taking these medications    Details   acetaminophen (TYLENOL) 500 MG tablet Take 2 tablets (1,000 mg total) by mouth every 8 (eight) hours.  Refills: 0      methocarbamoL (ROBAXIN) 500 MG Tab Take 1 tablet (500 mg total) by mouth every 6 (six) hours as needed (Moderate pain or muscle spasms).      senna-docusate 8.6-50 mg (PERICOLACE) 8.6-50 mg per tablet Take 1 tablet by mouth 2 (two) times daily.      vitamin D (VITAMIN D3) 1000 units Tab Take 1 tablet (1,000 Units total) by mouth once daily.           CONTINUE these medications which have NOT CHANGED    Details   apixaban (ELIQUIS) 2.5 mg Tab Take 1 tablet (2.5 mg total) by mouth 2 (two) times daily.  Qty: 180 tablet, Refills: 3    Associated Diagnoses: Paroxysmal atrial fibrillation      sotaloL (BETAPACE) 80 MG tablet Take 1 tablet (80 mg total) by mouth 2 (two) times daily.  Qty: 180 tablet, Refills: 3    Associated Diagnoses: Paroxysmal atrial fibrillation           STOP taking these medications       amLODIPine (NORVASC) 5 MG tablet Comments:   Reason for Stopping:         ANTIOX #8/OM3/DHA/EPA/LUT/ZEAX (PRESERVISION AREDS 2, OMEGA-3, ORAL)  Comments:   Reason for Stopping:         ergocalciferol, vitamin D2, (VITAMIN D ORAL) Comments:   Reason for Stopping:         lisinopriL (PRINIVIL,ZESTRIL) 40 MG tablet Comments:   Reason for Stopping:         omega 3-dha-epa-fish oil 300-1,000 mg Cap Comments:   Reason for Stopping:         albuterol (VENTOLIN HFA) 90 mcg/actuation inhaler Comments:   Reason for Stopping:         alendronate (FOSAMAX) 70 MG tablet Comments:   Reason for Stopping:                Follow-up:   Future Appointments   Date Time Provider Department [place]   8/10/2023  1:30 PM Tim Azar NP [facility] ORTHO [facility]   8/15/2023 12:00 PM PULMONARY FUNCTION [facility] PULMLAB [facility]   8/15/2023  1:00 PM Sami Jerez MD [facility] PULMSVC [facility]   8/29/2023 10:00 AM Wilma Xiong MD [facility]   9/5/2023 10:30 AM Tim Azar NP [facility] ORTHO [facility]        _________________________________  Wilma Irizarry MD  07/29/2023

## 2023-07-29 NOTE — NURSING
Pt's daughter, Janell Hicks is at the bedside and notified of pt's discharge for transport and admission to [facility] for pickup time of 12:30 PM.

## 2023-07-29 NOTE — NURSING
Pt up in the chair and requested to get back in the bed. Pt transferred with minimal assistance by primary RN and charge RN. Pt's left leg elevated on a pillow, SCDs removed, reassessed, reapplied, and on. Pt lying in bed with call light and items within reach.

## 2023-07-29 NOTE — PLAN OF CARE
Update at 1:45 PM  CM received message from bedside nurse, [facility] transport came to pickup patient in [facility]. Followed up with [facility], spoke with Sandra, will contact [facility] and let CM know.    Update at 11:45 AM  CM setup wheelchair van transportation via [facility], requested pickup time 12:30 PM. Updated team. Bedside nurse, Kyra Werner RN, will notify patient/family. Patient is ready to discharge from case management standpoint.    CM received message from Kendra at [facility]. Patient has bed available and can admit today. Discharge plans confirmed and approved by attending physician. Will arrange transportation when facility provides time.      Roxann Singer RN  Weekend  - [facility]  Spectralink: [phone]

## 2023-07-30 NOTE — ASSESSMENT & PLAN NOTE
Chronic and well controlled. No signs to suggest acute exacerbation. Patient not on any inhalers or oxygen to treat and needs none on discharge.

## 2023-07-30 NOTE — DISCHARGE SUMMARY
[facility] - Surgery  Hospital Medicine  Discharge Summary      Patient Name: Cathy Kearns  MRN: [id number]  MERA: [id number]  Patient Class: IP- Inpatient  Admission Date: 7/24/2023  Hospital Length of Stay: 5 days  Discharge Date and Time: 7/29/2023  3:14 PM  Attending Physician: Wilma Irizarry MD   Discharging Provider: Wilma Irizarry MD  Primary Care Provider: Wilma Xiong MD  [facility] Medicine Team: [facility] HOSP MED  Wilma Irizarry MD  Primary Care Team: [facility]    HPI:   Ms. Cathy Kearns is a 89 y.o. female with past medical history of HTN, COPD, paroxysmal atrial fibrillation, HLD, osteopenia, monocytosis, who was in her usual state of health until earlier today when she was at at exercises class when she was side stepping and felt some weakness in her right hip as it has been weak lately so she said she probably put more weight into her left hip and then lost balance and fell onto her left hip/buttocks and had immediate pain in that area. She was brought to the hospital via ambulance ad found to have a left IT fx with shortening of the LLE seen as wel. She reports has had 2 rare falls in the past secondary to sinus med side effect and she no longer takes those medications. She denies dizziness, chest pain, dyspnea. She can feel when she goes into afib as has flutters but has not happened anytime recnetly and is in NSR on admit. She reports pain to the hip on exam now and with movement of the LLE. She does not use a walker or a cane at baseline.    She had a stress test earlier this year that was negative for ischemia.  She had a dexa that showed osteoperosis and reported she stopped taking bisphosphonate as it gave her sinus/nasal bone pain. She was told to stop taking calcium by her heart doctor as she had hypercalcemia on her labs in the past.       7/25/2023  Procedure(s) (LRB):  INSERTION, INTRAMEDULLARY DAPHNEY, LEFT FEMUR (Left)    Surgeon(s):  MD Jacobo Penn  MD Dani      Hospital Course:   Patient admitted to [facility] Medicine Team H: Hip Fracture team and started on Hip Fracture Pathway with Orthopedic surgery consult for hip fracture. Patient was seen and evaluated by Orthopedic surgery who recommended operative repair of hip fracture. Patient was medically optimized prior to surgery and was taken to OR after optimization on 7/25/2023. Patient underwent left hip cephalomedullary nail fixation by Dr. Rogerio Chand. Post-op patient WBAT to the left lower extremity as per Orthopedics recommendation. Patient restarted on her home Apixiban 2.5 mg po BID for her known PAF so no other chemical DVT prophylaxis needed post-op. Perineural pain catheter placed by Anesthesia Pain Service with continuous infusion of Ropivacaine to help with pain control post-op and Anesthesia Pain Service managing while patient in the hospital. Patient placed on multimodal pain management post-op with Tylenol 1000 mg po every 8 hours post-op and will continue. PT/OT consulted post-op and evaluated patient. Patient progressing slowly with PT and OT and recommended Inpatient Rehab placement when medically ready for hospital discharge. Patient had issues with orthostatic hypotension post-op when working with therapy on 7/26 and 7/27. Patient given 1 unit of PRBCs for Hgb 7.7 on 7/26 due to symptomatic anemia. Patient still orthostatic on 7/27 so given 1 liter of NS as Hgb improved up to 9.0 on 7/27 so no further blood transfusion recommended. Holding patient's home BP medications of Lisinopril and Norvasc and reassess BP on 7/28. Pain controlled to left hip. BP doing much better off Norvasc and lisinopril and will continue to hold on 7/28. Patient with drop in Hgb to 7.7 on 7/28 but no active signs of bleeding but needs to remain in hospital for continued monitoring. Platelet count also decreased and down to 100,000 on 7/28. Patient awaiting medical stability to discharge to [facility]. Platelet count improved to 140,000 on day of discharge on 7/29. Hgb stable at 8.3 on day of discharge. Patient accepted and discharged in good condition to [facility] on 7/29/2023. BP stable at time of discharge and patient not discharged on her home Norvasc or Lisinopril but can be reassessed at [facility] to see if needs restarted. Pain well controlled on discharge on scheduled Tylenol and to continue at IP Rehab and Robaxin prn. Surgical bandages to remain in place to left leg until Orthopedic clinic follow-up. Patient to be weight bear as tolerated to left lower extremity on discharge. Perineural catheter removed on day of discharge by Anesthesia. Patient discharged on her home Apixiban for her PAF so no other chemical medication needed for DVT prophylaxis as patient fully anticoagulated.        Goals of Care Treatment Preferences:  Code Status: Full Code      Consults:   Consults (From admission, onward)          Status Ordering Provider     Inpatient consult to Physical Medicine Rehab  Once        Provider:  (Not yet assigned)    Completed JENNY GARCIA     Inpatient consult to Orthopedic Surgery  Once        Provider:  (Not yet assigned)    Completed EVARISTO ABBOTT  Exudative age-related macular degeneration of right eye with active choroidal neovascularization  Patient followed by [facility] clinic as outpatient and receiving eye injections as outpatient. Patient will need to reschedule for eye injections as due for injection in next 1 week but going to IP Rehab and will need to rescheduled.       Pulmonary  Panlobular emphysema  Chronic and well controlled. No signs to suggest acute exacerbation. Patient not on any inhalers or oxygen to treat and needs none on discharge.       Cardiac/Vascular  Pure hypercholesterolemia  Chronic and controlled. Patient not on any home medications to treat and needs none on discharge.      Primary hypertension  Chronic condition.  Continue to hold home Norvasc and Lisinopril as patient having post-op orthostatic hypotension but BP now improved off BP meds. Continue to hold on discharge to [facility] and can be reassessed at Rehab to see if need to be restarted.       Paroxysmal atrial fibrillation  Patient with Paroxysmal (<7 days) atrial fibrillation which is controlled currently with home Sotalol 80 mg po BID and will continue on discharge. Patient is currently in sinus rhythm. [id number] Score: 3. Anticoagulation indicated. Anticoagulation done with home Apixiban 2.5 mg po BID and will continue on discharge.    Orthostatic hypotension  Much improved on 7/28 and resolved on 7/29. Continue to hold Norvasc and Lisinopril on discharge and if BP becomes elevated at [facility] then can be restarted.   Patient with significant orthostatic hypotension and symptomatic when working with therapy on both 7/26 and 7/27.  Patient given 1 unit of PRBCs and IVF's for orthostatic hypotension on 7/26 as Hgb was down to 7.7. Hgb improved to 9.0 on 7/27 but still orthostatic so will give 1 liter of NS on 7/27.   Hold home BP medications at this time. Hold home Lisinopril and Norvasc.  Monitor orthostatics when working with therapy.        Renal/  Hypophosphatemia-resolved as of 7/31/2023  Resolved on discharge.   Patient has Abnormal Phosphorus: hypophosphatemia on 7/27 and 7/28 but improving. Will continue to monitor electrolytes closely. Will continue to replace with Neutra Phos oral packets with meals and at bedtime and repeat labs to be done after interventions completed. Encourage oral intake of food.     Hyponatremia-resolved as of 7/28/2023  Controlled. Patient has hyponatremia which is improved. Sodium improved to 135 on 7/27.   Sodium 129 on 7/26. Patient transfused 1 unit of PRBCs and given IVFs on 7/26 to treat.   Monitor sodium Daily.   Hyponatremia is due to Dehydration/hypovolemia.      Hematology  Thrombocytopenia  Improved.  Platelet count up to 140,000 on 7/29 on day of discharge.   Platelets continuing to decrease and down to 100,000 on 7/28. Patient with no signs of bleeding.   Noted post-op. Patient on admit with platelet count of 197,000. Platelets dropped to 124,000 on 7/26 and down to 111,000 on 7/27. Likely stress response. No signs of bleeding.       Oncology  Monocytosis  Present on admit and long standing. Stable.       Acute blood loss anemia  Hgb improved to 8.3 on day of discharge. No signs of any active bleeding.   Hgb worsened and down to 7.7 on 7/28. IVF's discontinued as may just be dilutional change. patient with no signs of bleeding at surgical site or other sites noted.   Improved to 9.0 on 7/27 after blood transfusion on 7/26.   On admission to hospital, patient noted to have Hgb of 13.0. After surgery patient's Hgb level dropped to 9.4 on 7/25 and 7.7 on 7/26 and expected blood loss related to surgery and hip fracture. Patient with orthostatic hypotension on 7/26 so transfused 1 unit of PRBCs on 7/26 for symptomatic anemia.   Patient has no signs of active bleeding and hemodynamically stable.  Goal is keep Hgb >7 and consider blood transfusion if Hgb < 7 or < 8 if patient becomes symptomatic.      Orthopedic  * Closed displaced intertrochanteric fracture of left femur with routine healing s/p IM nail on 7/25/2023  Patient underwent left hip IM nail for intertrochanteric fracture by Dr. Rogerio Chand on 7/25/2023.  Patient reports mild to moderate pain in left hip on discharge.  Continue PT/OT for gait training and strengthening and restoration of ADL's on discharge to [facility]. Patient is weight bear as tolerated to left lower extremity as per Orthopedic recommendations on discharge.   Patient back on her home Apixiban 2.5 mg po BID for her PAF so no other chemical DVT prophylaxis needed on discharge except her home Apixiban.    Perineural pain catheter removed by Anesthesia on am of 7/29.   Pain  controlled. Patient on multimodal pain management post-op with Tylenol 1000 mg po every 8 hours post-op and Robaxin prn and patient to continue on discharge.   Surgical bandages to remain in place to left leg until Orthopedic clinic follow-up on discharge.     Pathological fracture of left hip due to osteoporosis with routine healing  Chronic and controlled. Continue Calcium and Vitamin D oral replacement therapy on discharge. Will need outpatient DEXA scan and referral to Orthopedic fragility fracture clinic as outpatient to discuss other treatment options for osteoporosis.         Final Active Diagnoses:    Diagnosis Date Noted POA    PRINCIPAL PROBLEM:  Closed displaced intertrochanteric fracture of left femur with routine healing s/p IM nail on 7/25/2023 [S72.142D] 07/24/2023 Not Applicable    Orthostatic hypotension [I95.1] 07/27/2023 No    Acute blood loss anemia [D62] 07/25/2023 No    Thrombocytopenia [D69.6] 07/27/2023 No    Hypophosphatemia [E83.39] 07/27/2023 No    Paroxysmal atrial fibrillation [I48.0] 02/02/2017 Yes    Primary hypertension [I10] 02/22/2017 Yes    Pathological fracture of left hip due to osteoporosis with routine healing [M80.052D] 02/22/2017 Not Applicable    Panlobular emphysema [J43.1] 01/29/2019 Yes    Pure hypercholesterolemia [E78.00]  Yes    Monocytosis [D72.821] 09/02/2020 Yes    Exudative age-related macular degeneration of right eye with active choroidal neovascularization [H35.3211] 12/15/2014 Yes      Problems Resolved During this Admission:    Diagnosis Date Noted Date Resolved POA    Hyponatremia [E87.1] 07/24/2023 07/28/2023 Yes       Discharged Condition: good    Disposition: Rehab Facility([facility])    Follow Up:     Future Appointments   Date Time Provider Department Center   8/10/2023  1:30 PM Tim Azar NP [facility] ORTHO [facility]   8/15/2023 12:00 PM PULMONARY FUNCTION [facility] PULMLAB Sh[facility] Dash   8/15/2023  1:00 PM Sami Jerez MD [facility] PULMSVC [facility]    8/29/2023 10:00 AM Wilma Xiong MD [facility] IM [facility]   9/5/2023 10:30 AM Tim Azar NP [facility] ORTHO [facility] Al[facility]       Patient Instructions:      Ambulatory referral/consult to Orthopedics   Standing Status: Future   Referral Priority: Routine Referral Type: Consultation   Requested Specialty: Orthopedic Surgery   Number of Visits Requested: 1     Ambulatory referral/consult to Orthopedics Fracture Care   Standing Status: Future   Referral Priority: Routine Referral Type: Consultation   Requested Specialty: Orthopedic Surgery   Number of Visits Requested: 1     Diet Adult Regular     Notify your health care provider if you experience any of the following:  temperature >100.4     Notify your health care provider if you experience any of the following:  persistent nausea and vomiting or diarrhea     Notify your health care provider if you experience any of the following:  severe uncontrolled pain     Notify your health care provider if you experience any of the following:  redness, tenderness, or signs of infection (pain, swelling, redness, odor or green/yellow discharge around incision site)     Notify your health care provider if you experience any of the following:  difficulty breathing or increased cough     Notify your health care provider if you experience any of the following:  severe persistent headache     Notify your health care provider if you experience any of the following:  worsening rash     Notify your health care provider if you experience any of the following:  persistent dizziness, light-headedness, or visual disturbances     Notify your health care provider if you experience any of the following:  increased confusion or weakness     Leave dressing on - Keep it clean, dry, and intact until clinic visit     Activity as tolerated     Weight bearing restrictions (specify):   Order Comments: Weight bear as tolerated to left lower extremity       Significant Diagnostic Studies: Labs:    CMP   Recent Labs   Lab 07/29/23  0518   *   K 3.8      CO2 23   GLU 95   BUN 9*   CREATININE 0.5   CALCIUM 9.0   ANIONGAP 8    and CBC   Recent Labs   Lab 07/29/23  0518   WBC 9.64   HGB 8.3*   HCT 24.3*   *       Pending Diagnostic Studies:       None           Medications:  Reconciled Home Medications:      Medication List        START taking these medications      acetaminophen 500 MG tablet  Commonly known as: TYLENOL  Take 2 tablets (1,000 mg total) by mouth every 8 (eight) hours.     methocarbamoL 500 MG Tab  Commonly known as: ROBAXIN  Take 1 tablet (500 mg total) by mouth every 6 (six) hours as needed (Moderate pain or muscle spasms).     senna-docusate 8.6-50 mg 8.6-50 mg per tablet  Commonly known as: PERICOLACE  Take 1 tablet by mouth 2 (two) times daily.     vitamin D 1000 units Tab  Commonly known as: VITAMIN D3  Take 1 tablet (1,000 Units total) by mouth once daily.            CONTINUE taking these medications      apixaban 2.5 mg Tab  Commonly known as: ELIQUIS  Take 1 tablet (2.5 mg total) by mouth 2 (two) times daily.     sotaloL 80 MG tablet  Commonly known as: BETAPACE  Take 1 tablet (80 mg total) by mouth 2 (two) times daily.            STOP taking these medications      albuterol 90 mcg/actuation inhaler  Commonly known as: VENTOLIN HFA     alendronate 70 MG tablet  Commonly known as: FOSAMAX     amLODIPine 5 MG tablet  Commonly known as: NORVASC     lisinopriL 40 MG tablet  Commonly known as: PRINIVIL,ZESTRIL     omega 3-dha-epa-fish oil 300-1,000 mg Cap     PRESERVISION AREDS 2 (OMEGA-3) ORAL     VITAMIN D ORAL              Indwelling Lines/Drains at time of discharge:   Lines/Drains/Airways       None                   Time spent on the discharge of patient: 31 minutes         Wilma Irizarry MD  Department of Hospital Medicine  [facility] - Surgery

## 2023-07-30 NOTE — ASSESSMENT & PLAN NOTE
Chronic and controlled. Continue Calcium and Vitamin D oral replacement therapy on discharge. Will need outpatient DEXA scan and referral to Orthopedic fragility fracture clinic as outpatient to discuss other treatment options for osteoporosis.

## 2023-07-30 NOTE — ASSESSMENT & PLAN NOTE
· Much improved on 7/28 and resolved on 7/29. Continue to hold Norvasc and Lisinopril on discharge and if BP becomes elevated at [facility] then can be restarted.   · Patient with significant orthostatic hypotension and symptomatic when working with therapy on both 7/26 and 7/27.  · Patient given 1 unit of PRBCs and IVF's for orthostatic hypotension on 7/26 as Hgb was down to 7.7. Hgb improved to 9.0 on 7/27 but still orthostatic so will give 1 liter of NS on 7/27.   · Hold home BP medications at this time. Hold home Lisinopril and Norvasc.  · Monitor orthostatics when working with therapy.

## 2023-07-30 NOTE — ASSESSMENT & PLAN NOTE
Patient with Paroxysmal (<7 days) atrial fibrillation which is controlled currently with home Sotalol 80 mg po BID and will continue on discharge. Patient is currently in sinus rhythm. [id number] Score: 3. Anticoagulation indicated. Anticoagulation done with home Apixiban 2.5 mg po BID and will continue on discharge.

## 2023-07-30 NOTE — ASSESSMENT & PLAN NOTE
Resolved on discharge.   Patient has Abnormal Phosphorus: hypophosphatemia on 7/27 and 7/28 but improving. Will continue to monitor electrolytes closely. Will continue to replace with Neutra Phos oral packets with meals and at bedtime and repeat labs to be done after interventions completed. Encourage oral intake of food.

## 2023-07-30 NOTE — ASSESSMENT & PLAN NOTE
Chronic and controlled. Patient not on any home medications to treat and needs none on discharge.

## 2023-07-30 NOTE — ASSESSMENT & PLAN NOTE
Controlled. Patient has hyponatremia which is improved. Sodium improved to 135 on 7/27.   Sodium 129 on 7/26. Patient transfused 1 unit of PRBCs and given IVFs on 7/26 to treat.   Monitor sodium Daily.   Hyponatremia is due to Dehydration/hypovolemia.

## 2023-07-30 NOTE — ASSESSMENT & PLAN NOTE
· Patient underwent left hip IM nail for intertrochanteric fracture by Dr. Rogerio Chand on 7/25/2023.  · Patient reports mild to moderate pain in left hip on discharge.  · Continue PT/OT for gait training and strengthening and restoration of ADL's on discharge to [facility]. Patient is weight bear as tolerated to left lower extremity as per Orthopedic recommendations on discharge.   · Patient back on her home Apixiban 2.5 mg po BID for her PAF so no other chemical DVT prophylaxis needed on discharge except her home Apixiban.    · Perineural pain catheter removed by Anesthesia on am of 7/29.   · Pain controlled. Patient on multimodal pain management post-op with Tylenol 1000 mg po every 8 hours post-op and Robaxin prn and patient to continue on discharge.   · Surgical bandages to remain in place to left leg until Orthopedic clinic follow-up on discharge.

## 2023-07-30 NOTE — ASSESSMENT & PLAN NOTE
· Improved. Platelet count up to 140,000 on 7/29 on day of discharge.   · Platelets continuing to decrease and down to 100,000 on 7/28. Patient with no signs of bleeding.   · Noted post-op. Patient on admit with platelet count of 197,000. Platelets dropped to 124,000 on 7/26 and down to 111,000 on 7/27. Likely stress response. No signs of bleeding.

## 2023-07-30 NOTE — ASSESSMENT & PLAN NOTE
· Hgb improved to 8.3 on day of discharge. No signs of any active bleeding.   · Hgb worsened and down to 7.7 on 7/28. IVF's discontinued as may just be dilutional change. patient with no signs of bleeding at surgical site or other sites noted.   · Improved to 9.0 on 7/27 after blood transfusion on 7/26.   · On admission to hospital, patient noted to have Hgb of 13.0. After surgery patient's Hgb level dropped to 9.4 on 7/25 and 7.7 on 7/26 and expected blood loss related to surgery and hip fracture. Patient with orthostatic hypotension on 7/26 so transfused 1 unit of PRBCs on 7/26 for symptomatic anemia.   · Patient has no signs of active bleeding and hemodynamically stable.  · Goal is keep Hgb >7 and consider blood transfusion if Hgb < 7 or < 8 if patient becomes symptomatic.

## 2023-07-30 NOTE — ASSESSMENT & PLAN NOTE
Chronic condition. Continue to hold home Norvasc and Lisinopril as patient having post-op orthostatic hypotension but BP now improved off BP meds. Continue to hold on discharge to [facility] and can be reassessed at Rehab to see if need to be restarted.

## 2023-07-31 PROBLEM — E83.39 HYPOPHOSPHATEMIA: Status: RESOLVED | Noted: 2023-07-27 | Resolved: 2023-07-31

## 2023-07-31 NOTE — PHYSICIAN QUERY
PT Name: Cathy Kearns  MR #: [id number]    DOCUMENTATION CLARIFICATION     CDS/: JESSICA Damico, RN               Contact information:[email or web address]               WITHDRAWN BGW  7/28 per [facility] Medicine Progress Notes 07/28/2023  Pathological fracture of left hip due to osteoporosis with routine healing    This form is a permanent document in the medical record.     Query Date: July 30, 2023    By submitting this query, we are merely seeking further clarification of documentation. Please utilize your independent clinical judgment when addressing the question(s) below.    Supporting Clinical Findings Location in Medical Record   89 y.o. female with past medical history of HTN, COPD, paroxysmal atrial fibrillation, HLD, osteopenia, monocytosis, who was in her usual state of health until earlier today when she was at at exercises class when she was side stepping and felt some weakness in her right hip as it has been weak lately so she said she probably put more weight into her left hip and then lost balance and fell onto her left hip/buttocks and had immediate pain in that area. She had a dexa that showed osteoperosis and reported she stopped taking bisphosphonate as it gave her sinus/nasal bone pain. She was told to stop taking calcium by her heart doctor as she had hypercalcemia on her labs in the past.   [facility] Medicine H&P 7/24/2023   -baseline osteoperosis seen on DEXA scan and could not tolerate fosamax due to nasal bone pain and should consider prolia long term or another anabolic as another option  -reports not on calcium due to hypercalcemia whe on it in the past  -plan for IM nail on today vs tomorrow with ortho  -Vit D 44 in the past  Osteoperosis  -DEXA with T score -1 to -2.5, could not tolerate fosamax  -consider prolia or another anabolic long term given now with associated fracture     Vitamin D deficiency  -improved as 44 on recent check    Vitamin D 1000 units tablet 1,000 Units  Dose:1,000 Units: Oral: Daily [facility] Medicine H&P 7/24/2023                              MD Order 7/25/2023        Provider, please clarify if there is any clinical correlation between Left femur fracture and Osteoporosis.           Are the conditions:      [  ] Due to or associated with each other   [  ] Unrelated to each other   [  ] Other explanation (Please Specify): ______________   [  ] Clinically Undetermined                                                                               Please document in your progress notes daily for the duration of treatment until resolved and include in your discharge summary.

## 2023-08-03 ENCOUNTER — TELEPHONE (OUTPATIENT)
Dept: OPHTHALMOLOGY | Facility: CLINIC | Age: 88
End: 2023-08-03
Payer: MEDICARE

## 2023-08-03 NOTE — TELEPHONE ENCOUNTER
----- Message from Madisyn Carvalho sent at 8/3/2023  9:28 AM CDT -----  Contact: pt @ [phone]  KAROLINA SINGH calling regarding Appointment Access  (message) for #pt is calling to reschedule appt 7/25 for the end of AUG, asking for call back

## 2023-08-07 ENCOUNTER — TELEPHONE (OUTPATIENT)
Dept: ORTHOPEDICS | Facility: CLINIC | Age: 88
End: 2023-08-07
Payer: MEDICARE

## 2023-08-07 NOTE — TELEPHONE ENCOUNTER
Called and LVM for pt to contact the office to rescheduled her upcoming post-op appt with Tim this Thursday.

## 2023-08-08 ENCOUNTER — HOSPITAL ENCOUNTER (OUTPATIENT)
Dept: RADIOLOGY | Facility: HOSPITAL | Age: 88
Discharge: HOME | End: 2023-08-08
Attending: NURSE PRACTITIONER
Payer: MEDICARE

## 2023-08-08 PROCEDURE — 73552 XR FEMUR 2 VIEW LEFT: ICD-10-PCS | Mod: 26,LT,, | Performed by: RADIOLOGY

## 2023-08-08 PROCEDURE — 72170 X-RAY EXAM OF PELVIS: CPT | Mod: 26,,, | Performed by: RADIOLOGY

## 2023-08-08 PROCEDURE — 72170 XR PELVIS ROUTINE AP: ICD-10-PCS | Mod: 26,,, | Performed by: RADIOLOGY

## 2023-08-08 PROCEDURE — 73552 X-RAY EXAM OF FEMUR 2/>: CPT | Mod: 26,LT,, | Performed by: RADIOLOGY

## 2023-08-14 ENCOUNTER — TELEPHONE (OUTPATIENT)
Dept: INTERNAL MEDICINE | Facility: CLINIC | Age: 88
End: 2023-08-14
Payer: MEDICARE

## 2023-08-14 NOTE — TELEPHONE ENCOUNTER
----- Message from Rachidmiranda Christian sent at 8/14/2023  1:58 PM CDT -----  Name of Who is Calling: Marisa from home health calling on behalf of the pt                What is the request in detail: Calling because is with home health and she wanted to confirmed  if you would sign order for the patient to have services with them.Please call back to further assist.                 Can the clinic reply by [facility]: No                What [facility] to [facility] Back [facility]:[phone]

## 2023-08-25 ENCOUNTER — PROCEDURE VISIT (OUTPATIENT)
Dept: OPHTHALMOLOGY | Facility: CLINIC | Age: 88
End: 2023-08-25
Payer: MEDICARE

## 2023-08-25 DIAGNOSIS — H35.3124 NONEXUDATIVE AGE-RELATED MACULAR DEGENERATION, LEFT EYE, ADVANCED ATROPHIC WITH SUBFOVEAL INVOLVEMENT: ICD-10-CM

## 2023-08-25 DIAGNOSIS — H35.3211 EXUDATIVE AGE-RELATED MACULAR DEGENERATION OF RIGHT EYE WITH ACTIVE CHOROIDAL NEOVASCULARIZATION: Primary | ICD-10-CM

## 2023-08-25 DIAGNOSIS — H35.721 SEROUS DETACHMENT OF RETINAL PIGMENT EPITHELIUM OF RIGHT EYE: ICD-10-CM

## 2023-08-25 PROCEDURE — 67028 INJECTION EYE DRUG: CPT | Mod: PBBFAC,RT | Performed by: OPHTHALMOLOGY

## 2023-08-25 PROCEDURE — 99999PBSHW PR PBB SHADOW TECHNICAL ONLY FILED TO HB: Mod: JZ,PBBFAC,,

## 2023-08-25 PROCEDURE — 92012 INTRM OPH EXAM EST PATIENT: CPT | Mod: 25,S$PBB,, | Performed by: OPHTHALMOLOGY

## 2023-08-25 PROCEDURE — 67028 INJECTION EYE DRUG: CPT | Mod: S$PBB,RT,, | Performed by: OPHTHALMOLOGY

## 2023-08-25 PROCEDURE — 99999PBSHW PR PBB SHADOW TECHNICAL ONLY FILED TO HB: ICD-10-PCS | Mod: JZ,PBBFAC,,

## 2023-08-25 PROCEDURE — 92134 POSTERIOR SEGMENT OCT RETINA (OCULAR COHERENCE TOMOGRAPHY)-BOTH EYES: ICD-10-PCS | Mod: 26,S$PBB,, | Performed by: OPHTHALMOLOGY

## 2023-08-25 PROCEDURE — 92134 CPTRZ OPH DX IMG PST SGM RTA: CPT | Mod: PBBFAC | Performed by: OPHTHALMOLOGY

## 2023-08-25 PROCEDURE — 67028 PR INJECT INTRAVITREAL PHARMCOLOGIC: ICD-10-PCS | Mod: S$PBB,RT,, | Performed by: OPHTHALMOLOGY

## 2023-08-25 PROCEDURE — 92012 PR EYE EXAM, EST PATIENT,INTERMED: ICD-10-PCS | Mod: 25,S$PBB,, | Performed by: OPHTHALMOLOGY

## 2023-08-25 RX ADMIN — AFLIBERCEPT 2 MG: 40 INJECTION, SOLUTION INTRAVITREAL at 11:08

## 2023-08-25 NOTE — PROGRESS NOTES
HPI     3 MONTH  EYELA INJECTION  OD      Additional comments: EYELA  TODAY IN OD   OCT  TODAY   PED INCREASED   10/12  AMD   PVD OU   DRUSEN OU   PCIOL OU            Comments    DLS     05/23    PT BROKE HER HIP  4 WEEKS AGO           Last edited by Bernadine Schaeffer on 8/25/2023 11:06 AM.          OCT - stable trace IRF  OS - Drusen, no SRF - small HE - ? RAP lesion     Assessment:    1. AMD - Wet OD - post Avastin x 13, post Eylea #53  PED increased prior to 10/12 visit    Had slight increase 5/13/13, 7/14, 5/16 4/20 tx done in [place] during Covid  9/21 had tx in [place] following Shaina    Eylea OD today    Continue q12 week tx    2. 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 PVD OU    4. Pseudophakia - great result    5. Allergic conjunctivitis/sinusitis - try OTC antihistamines  Recent trouble with sinusitis - pain behind eye improved with antihistamines  Ok for Zaditor BID - has improved with topical tx    6. AFib - considering anticoagulation - ok to proceed  On Anti-VEGF therapy, risk of large macular hemorrhage is minimal.         Plan:      3  months OCT and dilate (last DFE  5/23)    Risks, benefits, and alternatives to treatment discussed in detail with the patient.  The patient voiced understanding and wished to proceed with the procedure    Injection Procedure Note:  Diagnosis: Wet AMD OD    Patient Identified and Time Out complete  Topical Proparacaine and Betadine. Conj prep only  Inject Eylea OD at 6:00 @ 3.5-4mm posterior to limbus  Post Operative Dx: Same  Complications: None  Follow up as above.

## 2023-08-25 NOTE — PATIENT INSTRUCTIONS

## 2023-08-26 ENCOUNTER — DOCUMENT SCAN (OUTPATIENT)
Dept: HOME HEALTH SERVICES | Facility: HOSPITAL | Age: 88
End: 2023-08-26
Payer: MEDICARE

## 2023-09-05 ENCOUNTER — OFFICE VISIT (OUTPATIENT)
Dept: ORTHOPEDICS | Facility: CLINIC | Age: 88
End: 2023-09-05
Payer: MEDICARE

## 2023-09-05 ENCOUNTER — HOSPITAL ENCOUNTER (OUTPATIENT)
Dept: RADIOLOGY | Facility: HOSPITAL | Age: 88
Discharge: HOME | End: 2023-09-05
Attending: NURSE PRACTITIONER
Payer: MEDICARE

## 2023-09-05 VITALS — BODY MASS INDEX: 21.54 KG/M2 | HEIGHT: 62 IN | WEIGHT: 117.06 LBS

## 2023-09-05 DIAGNOSIS — R52 PAIN: Primary | ICD-10-CM

## 2023-09-05 DIAGNOSIS — R52 PAIN: ICD-10-CM

## 2023-09-05 DIAGNOSIS — S72.142D CLOSED DISPLACED INTERTROCHANTERIC FRACTURE OF LEFT FEMUR WITH ROUTINE HEALING: Primary | ICD-10-CM

## 2023-09-05 DIAGNOSIS — Z98.890 POST-OPERATIVE STATE: ICD-10-CM

## 2023-09-05 PROCEDURE — 72170 XR PELVIS ROUTINE AP: ICD-10-PCS | Mod: 26,,, | Performed by: RADIOLOGY

## 2023-09-05 PROCEDURE — 99999 PR PBB SHADOW E&M-EST. PATIENT-LVL II: ICD-10-PCS | Mod: PBBFAC,,, | Performed by: NURSE PRACTITIONER

## 2023-09-05 PROCEDURE — 73552 X-RAY EXAM OF FEMUR 2/>: CPT | Mod: TC,LT

## 2023-09-05 PROCEDURE — 99024 PR POST-OP FOLLOW-UP VISIT: ICD-10-PCS | Mod: POP,,, | Performed by: NURSE PRACTITIONER

## 2023-09-05 PROCEDURE — 72170 X-RAY EXAM OF PELVIS: CPT | Mod: 26,,, | Performed by: RADIOLOGY

## 2023-09-05 PROCEDURE — 73552 XR FEMUR 2 VIEW LEFT: ICD-10-PCS | Mod: 26,LT,, | Performed by: RADIOLOGY

## 2023-09-05 PROCEDURE — 73552 X-RAY EXAM OF FEMUR 2/>: CPT | Mod: 26,LT,, | Performed by: RADIOLOGY

## 2023-09-05 PROCEDURE — 99999 PR PBB SHADOW E&M-EST. PATIENT-LVL II: CPT | Mod: PBBFAC,,, | Performed by: NURSE PRACTITIONER

## 2023-09-05 PROCEDURE — 72170 X-RAY EXAM OF PELVIS: CPT | Mod: TC

## 2023-09-05 PROCEDURE — 99024 POSTOP FOLLOW-UP VISIT: CPT | Mod: POP,,, | Performed by: NURSE PRACTITIONER

## 2023-09-05 PROCEDURE — 99212 OFFICE O/P EST SF 10 MIN: CPT | Mod: PBBFAC | Performed by: NURSE PRACTITIONER

## 2023-09-05 RX ORDER — METHOCARBAMOL 500 MG/1
500 TABLET, FILM COATED ORAL EVERY 6 HOURS PRN
Qty: 40 TABLET | Refills: 0 | Status: SHIPPED | OUTPATIENT
Start: 2023-09-05 | End: 2023-09-15

## 2023-09-05 NOTE — PROGRESS NOTES
Ms. Kearns is here today for a post-operative visit after undergoing ORIF for her left IT hip fracture with IM nailing by Dr. Chand on 7/25/2023.    Interval History:  She reports that she is doing well.  Pain is well controlled, mostly has muscle spasms after her therapy sessions.  She is not taking pain medication.  She is requesting to go back to therapy to work to get off of the walker.  She reports she uses her walker when ambulating.  She will speak with family and let me know which location she would like to attend.  She denies fever, chills, and sweats since the time of the surgery.     Physical exam:  Incision is open to air and healed.  No signs of infection noted.  Patient is able to stand with min assist.   She has tactile stimulation to their lower leg, she denies calf pain, there is no leg edema and their pedal pulse is palpable x 2.     RADS: AP pelvis and left femur AP and lateral view  xrays were obtained and personally reviewed by me.  IM nail appears to be in good position.  Fracture still seen but remains stable.  No evidence of hardware failure or new fractures seen.      Assessment:  Post-op visit (6 weeks)    Plan:    ICD-10-CM ICD-9-CM   1. Closed displaced intertrochanteric fracture of left femur with routine healing s/p IM nail on 7/25/2023  S72.142D V54.13   2. Post-operative state  Z98.890 V45.89     Current care, treatment plan, precautions, activity level/ modifications, limitations, rehabilitation exercises and proposed future treatment were discussed with the patient. We discussed the need to monitor for changes in symptoms and condition and report them to the physician.  Discussed importance of compliance with all appointments and follow up examinations.       PHYSICAL THERAPY:   - She will let me know which therapy location she would like to attend.    - Weight bearing as tolerated   - Range of motion as tolerated.      PAIN MEDICATION:   - Pain medication: refill was not  needed, but will refill her Robaxin.  - Pain medication refill policy provided to patient for review, yes.    - Patient was informed a multi-modal approach is used to treat their pain.     DVT PROPHYLAXIS:   -  Eliquis long-term, history of A-fib    FRAGILITY:  - Refer to fracture clinic.     FOLLOW UP:   - Patient will follow up in the clinic in 6 weeks.  - X-ray of her AP pelvis and left femur AP and lateral view is needed.    - Future Appointments:   Future Appointments   Date Time Provider Department Center   9/8/2023 11:00 AM Wilma Xiong MD [facility]   10/3/2023 10:30 AM [facility]   10/24/2023 10:45 AM Tim Azar NP [facility] [address]   11/14/2023 10:10 AM KYAW Mandujano MD [facility] [address]           If there are any questions prior to scheduled follow up, the patient was instructed to contact the office

## 2023-09-08 ENCOUNTER — OFFICE VISIT (OUTPATIENT)
Dept: INTERNAL MEDICINE | Facility: CLINIC | Age: 88
End: 2023-09-08
Attending: INTERNAL MEDICINE
Payer: MEDICARE

## 2023-09-08 ENCOUNTER — LAB VISIT (OUTPATIENT)
Dept: LAB | Facility: OTHER | Age: 88
End: 2023-09-08
Attending: INTERNAL MEDICINE
Payer: MEDICARE

## 2023-09-08 VITALS
HEART RATE: 66 BPM | SYSTOLIC BLOOD PRESSURE: 132 MMHG | HEIGHT: 62 IN | DIASTOLIC BLOOD PRESSURE: 68 MMHG | BODY MASS INDEX: 20.56 KG/M2 | OXYGEN SATURATION: 98 % | WEIGHT: 111.75 LBS

## 2023-09-08 DIAGNOSIS — D64.9 ANEMIA, UNSPECIFIED TYPE: ICD-10-CM

## 2023-09-08 DIAGNOSIS — D53.9 NUTRITIONAL ANEMIA, UNSPECIFIED: ICD-10-CM

## 2023-09-08 DIAGNOSIS — M80.052D: ICD-10-CM

## 2023-09-08 DIAGNOSIS — I10 ESSENTIAL HYPERTENSION: ICD-10-CM

## 2023-09-08 DIAGNOSIS — Z09 HOSPITAL DISCHARGE FOLLOW-UP: Primary | ICD-10-CM

## 2023-09-08 DIAGNOSIS — J44.9 CHRONIC OBSTRUCTIVE PULMONARY DISEASE, UNSPECIFIED COPD TYPE: ICD-10-CM

## 2023-09-08 LAB
BASOPHILS # BLD AUTO: 0.03 K/UL (ref 0–0.2)
BASOPHILS NFR BLD: 0.5 % (ref 0–1.9)
DIFFERENTIAL METHOD: ABNORMAL
EOSINOPHIL # BLD AUTO: 0.1 K/UL (ref 0–0.5)
EOSINOPHIL NFR BLD: 0.9 % (ref 0–8)
ERYTHROCYTE [DISTWIDTH] IN BLOOD BY AUTOMATED COUNT: 13.6 % (ref 11.5–14.5)
FERRITIN SERPL-MCNC: 376 NG/ML (ref 20–300)
HCT VFR BLD AUTO: 38.9 % (ref 37–48.5)
HGB BLD-MCNC: 12.7 G/DL (ref 12–16)
IMM GRANULOCYTES # BLD AUTO: 0.03 K/UL (ref 0–0.04)
IMM GRANULOCYTES NFR BLD AUTO: 0.5 % (ref 0–0.5)
IRON SERPL-MCNC: 79 UG/DL (ref 30–160)
LYMPHOCYTES # BLD AUTO: 1.9 K/UL (ref 1–4.8)
LYMPHOCYTES NFR BLD: 34.6 % (ref 18–48)
MCH RBC QN AUTO: 30.5 PG (ref 27–31)
MCHC RBC AUTO-ENTMCNC: 32.6 G/DL (ref 32–36)
MCV RBC AUTO: 93 FL (ref 82–98)
MONOCYTES # BLD AUTO: 1.4 K/UL (ref 0.3–1)
MONOCYTES NFR BLD: 24.6 % (ref 4–15)
NEUTROPHILS # BLD AUTO: 2.1 K/UL (ref 1.8–7.7)
NEUTROPHILS NFR BLD: 38.9 % (ref 38–73)
NRBC BLD-RTO: 0 /100 WBC
PLATELET # BLD AUTO: 232 K/UL (ref 150–450)
PMV BLD AUTO: 9.9 FL (ref 9.2–12.9)
RBC # BLD AUTO: 4.17 M/UL (ref 4–5.4)
SATURATED IRON: 21 % (ref 20–50)
TOTAL IRON BINDING CAPACITY: 370 UG/DL (ref 250–450)
TRANSFERRIN SERPL-MCNC: 250 MG/DL (ref 200–375)
WBC # BLD AUTO: 5.52 K/UL (ref 3.9–12.7)

## 2023-09-08 PROCEDURE — 82728 ASSAY OF FERRITIN: CPT | Performed by: INTERNAL MEDICINE

## 2023-09-08 PROCEDURE — 82746 ASSAY OF FOLIC ACID SERUM: CPT | Performed by: INTERNAL MEDICINE

## 2023-09-08 PROCEDURE — 84466 ASSAY OF TRANSFERRIN: CPT | Performed by: INTERNAL MEDICINE

## 2023-09-08 PROCEDURE — 99215 OFFICE O/P EST HI 40 MIN: CPT | Mod: S$PBB,,, | Performed by: INTERNAL MEDICINE

## 2023-09-08 PROCEDURE — 99999 PR PBB SHADOW E&M-EST. PATIENT-LVL IV: CPT | Mod: PBBFAC,,, | Performed by: INTERNAL MEDICINE

## 2023-09-08 PROCEDURE — 82607 VITAMIN B-12: CPT | Performed by: INTERNAL MEDICINE

## 2023-09-08 PROCEDURE — 99999 PR PBB SHADOW E&M-EST. PATIENT-LVL IV: ICD-10-PCS | Mod: PBBFAC,,, | Performed by: INTERNAL MEDICINE

## 2023-09-08 PROCEDURE — 99215 PR OFFICE/OUTPT VISIT, EST, LEVL V, 40-54 MIN: ICD-10-PCS | Mod: S$PBB,,, | Performed by: INTERNAL MEDICINE

## 2023-09-08 PROCEDURE — 99214 OFFICE O/P EST MOD 30 MIN: CPT | Mod: PBBFAC | Performed by: INTERNAL MEDICINE

## 2023-09-08 PROCEDURE — 85025 COMPLETE CBC W/AUTO DIFF WBC: CPT | Performed by: INTERNAL MEDICINE

## 2023-09-08 PROCEDURE — 36415 COLL VENOUS BLD VENIPUNCTURE: CPT | Performed by: INTERNAL MEDICINE

## 2023-09-08 RX ORDER — AMLODIPINE BESYLATE 5 MG/1
5 TABLET ORAL DAILY
COMMUNITY
End: 2023-10-03 | Stop reason: SDUPTHER

## 2023-09-08 NOTE — PROGRESS NOTES
"Subjective:   Patient ID: Cathy Kearns is a 89 y.o. female  Chief complaint:   Chief Complaint   Patient presents with    Hospital Follow Up       HPI    Here for hospital discharge follow up:   Admitted after fell and had left hip fracture 7/2023 as below     Since last clinic visit seen by:  - Ophthalmology March 2023 or macular degeneration  - ear nose and throat Dr. Mckeon April 2023 due to allergies an otitis externa    Today:   Off of lisinopril, B12 and eye vitamin   Taking vit d, eliquis, stolol, amlodipine      Receiving home PT for 2 weeks post dc from inpt rehab  Home readings: 140/ - not a great interactiion with hoem PT staff    Intertrochanteric fracture left:  Secondary to fall and seen at [facility] ED 7/24/2023  "ED evaluation with evidence of L femur intertrochanteric fracture s/p IMN on 7/25/2023 by Dr. Chand. Patient was deemed medically stable and transferred to [facility] to participate in acute inpatient rehabilitation on 7/29/2023."  - discharge to rehab with PT and OT    Osteoporosis with hx of penia/high risk for fx:   Started on alednronate at [facility] 1/2023 - did not like having to take on empty stomach and sit up for 30 mi to 1 hour  "She had a dexa that showed osteoperosis and reported she stopped taking bisphosphonate as it gave her sinus/nasal bone pain. She was told to stop taking calcium by her heart doctor as she had hypercalcemia on her labs in the past."    Postop anemia:  "given 1 unit of PRBCs for Hgb 7.7 on 7/26 due to symptomatic anemia.  Hgb improved up to 9.0 on 7/27 so no further blood transfusion recommended  Hgb stable at 8.3 on day of discharge to rehab  Will monitor on Mon/Thurs labs  7/31: stable Hgb 8.7  8/3: stable Hgb 8.4  8/7: stable Hgb 9.1  8/10: stable Hgb 9.5"  - not on oral iron  - no gross bleeding     Copd:   - intermittent wheezing - needs to reschedule pulm   - taking: albuterol prn   Previously:   Reaction to breo and started on trial of " "albuterol   Seen by pulm 6/2018   Smoked at night with  when had a drink - stopped after  had MI now over 40 years ago   + 2nd hand exposure: 3 ppd  smoked   Prev:  F/u with pulm Dr. Tiffany Medrano for asthma per her. Reports breathing markedly improved since moving.   Hx of Asthma - took self off of Breo after made lips swell - reports no wheezing or sob - not taking albuterol inh and no nighttime awakenings. She would like to hold off on establishing care with a new pulmonologist since moving to [place] since her symptoms are stable off medication at this time.     HTN:   Bp at goal  C/w meds per med card   - taking sotalol 80mg twice daily   - amlodipine 5mg   - exercising reg  - following low salt diet   No longer taking:   - took lisinopril 40mg      Afib:   On eliquis and sotolol   Followed by cards      Osteoporosis:  Taking vit d   Calcium stopped per hospital records as calcium elevated in past   Prev:    Taking caltrate supplement and vitamin d  dexa 1/2023 reviewed - discussed tx options and endo referral  - prescribed alendronate but stopped as above   - discussed resuming tx and endo referral to discuss other tx options     #### not addressed today ####    Abnormal CBC - monocytosis:  - seen by h/o 10/29/2021 and 5/24/2022 - will check cbc q6 months as stable   - bm bx deferred for now due to lack of tx options  - to have cbc in 6 months and f/u with h/o in 1 year    ############    Skin cancer:  - followed by derm - due for f/u     HM:   - shingrix  - covid booster   - flu    Derm: mario     Review of Systems    Objective:  Vitals:    09/08/23 1127   BP: 132/68   BP Location: Right arm   Patient Position: Sitting   Pulse: 66   SpO2: 98%   Weight: 50.7 kg (111 lb 12.4 oz)   Height: 5' 2" (1.575 m)     Body mass index is 20.44 kg/m².    Physical Exam  Vitals reviewed.   Constitutional:       Appearance: Normal appearance. She is well-developed.   HENT:      Head: " Normocephalic and atraumatic.   Eyes:      Extraocular Movements: Extraocular movements intact.      Conjunctiva/sclera: Conjunctivae normal.   Neck:      Thyroid: No thyromegaly.   Cardiovascular:      Rate and Rhythm: Normal rate and regular rhythm.      Pulses: Normal pulses.      Heart sounds: Normal heart sounds.   Pulmonary:      Effort: Pulmonary effort is normal.      Breath sounds: Normal breath sounds.   Abdominal:      General: Bowel sounds are normal.      Palpations: Abdomen is soft.   Musculoskeletal:         General: No swelling.      Cervical back: Neck supple.      Right lower leg: No edema.      Left lower leg: No edema.      Comments: In wheelchair   Lymphadenopathy:      Cervical: No cervical adenopathy.   Skin:     General: Skin is warm and dry.      Capillary Refill: Capillary refill takes less than 2 seconds.   Neurological:      Mental Status: She is alert and oriented to person, place, and time. Mental status is at baseline.   Psychiatric:         Behavior: Behavior normal.         Thought Content: Thought content normal.       Assessment:  1. Hospital discharge follow-up    2. Anemia, unspecified type    3. Chronic obstructive pulmonary disease, unspecified COPD type    4. Nutritional anemia, unspecified    5. Essential hypertension    6. Pathological fracture of left hip due to osteoporosis with routine healing, unspecified osteoporosis type, subsequent encounter        Plan:  Cathy was seen today for hospital follow up.    Diagnoses and all orders for this visit:    Hospital discharge follow-up  Check labs   Cont current meds     Anemia, unspecified type  -     CBC Auto Differential; Future  -     Vitamin B12; Future  -     Folate; Future  -     Ferritin; Future  -     Iron and TIBC; Future  Post op anemia  Not or oral iron - check labs and will give recs pending results     Chronic obstructive pulmonary disease, unspecified COPD type  -     Ambulatory referral/consult to Pulmonology;  Future  Albuterol prn   Due for pulm f/u     Nutritional anemia, unspecified  -     Vitamin B12; Future  -     Folate; Future    Essential hypertension  Stable   Cont current meds   Rec home bp monitoring     Pathological fracture of left hip due to osteoporosis with routine healing, unspecified osteoporosis type, subsequent encounter  - will keep f/u appt for porosis clinic and ortho  - inc calcium rich foods   Cont suppl   Cont PT     45 min spent in care of patient including chart review, history, orders, physical, orders and coordination of care      Health Maintenance   Topic Date Due    Shingles Vaccine (1 of 2) Never done    DEXA Scan  01/12/2025    TETANUS VACCINE  08/16/2027    Lipid Panel  11/29/2027

## 2023-09-09 LAB
FOLATE SERPL-MCNC: 8.6 NG/ML (ref 4–24)
VIT B12 SERPL-MCNC: 798 PG/ML (ref 210–950)

## 2023-09-12 DIAGNOSIS — S72.142D CLOSED DISPLACED INTERTROCHANTERIC FRACTURE OF LEFT FEMUR WITH ROUTINE HEALING: Primary | ICD-10-CM

## 2023-09-13 ENCOUNTER — DOCUMENT SCAN (OUTPATIENT)
Dept: HOME HEALTH SERVICES | Facility: HOSPITAL | Age: 88
End: 2023-09-13
Payer: MEDICARE

## 2023-09-17 ENCOUNTER — EXTERNAL HOME HEALTH (OUTPATIENT)
Dept: HOME HEALTH SERVICES | Facility: HOSPITAL | Age: 88
End: 2023-09-17
Payer: MEDICARE

## 2023-10-03 ENCOUNTER — OFFICE VISIT (OUTPATIENT)
Dept: INTERNAL MEDICINE | Facility: CLINIC | Age: 88
End: 2023-10-03
Attending: INTERNAL MEDICINE
Payer: MEDICARE

## 2023-10-03 VITALS
HEART RATE: 59 BPM | WEIGHT: 111.56 LBS | DIASTOLIC BLOOD PRESSURE: 68 MMHG | BODY MASS INDEX: 20.53 KG/M2 | SYSTOLIC BLOOD PRESSURE: 132 MMHG | HEIGHT: 62 IN | OXYGEN SATURATION: 97 %

## 2023-10-03 DIAGNOSIS — M54.31 SCIATICA OF RIGHT SIDE: ICD-10-CM

## 2023-10-03 DIAGNOSIS — I48.0 PAROXYSMAL ATRIAL FIBRILLATION: ICD-10-CM

## 2023-10-03 DIAGNOSIS — K59.00 CONSTIPATION, UNSPECIFIED CONSTIPATION TYPE: ICD-10-CM

## 2023-10-03 DIAGNOSIS — D64.9 ANEMIA, UNSPECIFIED TYPE: ICD-10-CM

## 2023-10-03 DIAGNOSIS — M80.052D PATHOLOGICAL FRACTURE OF LEFT HIP DUE TO AGE-RELATED OSTEOPOROSIS WITH ROUTINE HEALING, SUBSEQUENT ENCOUNTER: ICD-10-CM

## 2023-10-03 DIAGNOSIS — I10 HYPERTENSION, UNSPECIFIED TYPE: Primary | ICD-10-CM

## 2023-10-03 PROCEDURE — 99999 PR PBB SHADOW E&M-EST. PATIENT-LVL III: ICD-10-PCS | Mod: PBBFAC,,, | Performed by: INTERNAL MEDICINE

## 2023-10-03 PROCEDURE — 99214 PR OFFICE/OUTPT VISIT, EST, LEVL IV, 30-39 MIN: ICD-10-PCS | Mod: S$PBB,,, | Performed by: INTERNAL MEDICINE

## 2023-10-03 PROCEDURE — 99213 OFFICE O/P EST LOW 20 MIN: CPT | Mod: PBBFAC | Performed by: INTERNAL MEDICINE

## 2023-10-03 PROCEDURE — 99999 PR PBB SHADOW E&M-EST. PATIENT-LVL III: CPT | Mod: PBBFAC,,, | Performed by: INTERNAL MEDICINE

## 2023-10-03 PROCEDURE — 99214 OFFICE O/P EST MOD 30 MIN: CPT | Mod: S$PBB,,, | Performed by: INTERNAL MEDICINE

## 2023-10-03 RX ORDER — AMLODIPINE BESYLATE 5 MG/1
5 TABLET ORAL DAILY
Qty: 90 TABLET | Refills: 3 | Status: SHIPPED | OUTPATIENT
Start: 2023-10-03 | End: 2024-02-20 | Stop reason: SDUPTHER

## 2023-10-03 NOTE — PROGRESS NOTES
Subjective:   Patient ID: Cathy Kearns is a 89 y.o. female  Chief complaint:   Chief Complaint   Patient presents with    Hypertension     F/u       HPI    Here for HTN follow up   Feeling more energy since last     HTN:   Bp at goal: 120-140/70s at home, pulse   C/w meds per med card   - taking sotalol 80mg twice daily   - amlodipine 5mg   - exercising reg  - following low salt diet   No longer taking:   - took lisinopril 40mg      Constipation:   On stool softener and prunes     left hip fracture 7/2023 2/2 fall - see prior clinic note for further details  At [place] for outpt PT   - has upcoming appt with osteoporosis clinic   - given alendronate in past and did not take due to having to take on empty stomach and waiting 1h to eat     Having right sided sciatica - radiates to lower ankle   Bilateral lower back pain   No dysuria   More sciatica pain in am     Post op anemia: now resolved on recent labs   - see details in prior clinic note - s/p transfusion    Afib:   On eliquis and sotolol   Followed by cards      ### not addressed today ####    COPD:   - intermittent wheezing - needs to reschedule pulm   - taking: albuterol prn   Previously:   Reaction to breo and started on trial of albuterol   Seen by pulm 6/2018   Smoked at night with  when had a drink - stopped after  had MI now over 40 years ago   + 2nd hand exposure: 3 ppd  smoked   Prev:  F/u with pulm Dr. Tiffany Medrano for asthma per her. Reports breathing markedly improved since moving.   Hx of Asthma - took self off of Breo after made lips swell - reports no wheezing or sob - not taking albuterol inh and no nighttime awakenings. She would like to hold off on establishing care with a new pulmonologist since moving to [place] since her symptoms are stable off medication at this time.      Osteoporosis:  Taking vit d   Calcium stopped per hospital records as calcium elevated in past   Prev:    Taking caltrate supplement and  "vitamin d  dexa 1/2023 reviewed - discussed tx options and endo referral  - prescribed alendronate but stopped as above   - discussed resuming tx and endo referral to discuss other tx options     Abnormal CBC - monocytosis:  - seen by h/o 10/29/2021 and 5/24/2022 - will check cbc q6 months as stable   - bm bx deferred for now due to lack of tx options  - to have cbc in 6 months and f/u with h/o in 1 year    Skin cancer:  - followed by derm - due for f/u     ######    HM:   - shingrix  - covid booster   - flu  - rsv vaccine    Derm: mario     Review of Systems    Objective:  Vitals:    10/03/23 1020   BP: 132/68   BP Location: Left arm   Patient Position: Sitting   Pulse: (!) 59   SpO2: 97%   Weight: 50.6 kg (111 lb 8.8 oz)   Height: 5' 2" (1.575 m)     Body mass index is 20.4 kg/m².    Physical Exam  Vitals reviewed.   Constitutional:       Appearance: Normal appearance. She is well-developed.   HENT:      Head: Normocephalic and atraumatic.   Eyes:      Extraocular Movements: Extraocular movements intact.      Conjunctiva/sclera: Conjunctivae normal.   Neck:      Thyroid: No thyromegaly.   Cardiovascular:      Rate and Rhythm: Normal rate and regular rhythm.      Pulses: Normal pulses.      Heart sounds: Normal heart sounds.   Pulmonary:      Effort: Pulmonary effort is normal.      Breath sounds: Normal breath sounds.   Abdominal:      General: Bowel sounds are normal.      Palpations: Abdomen is soft.   Musculoskeletal:         General: No swelling.      Cervical back: Neck supple.      Right lower leg: No edema.      Left lower leg: No edema.   Lymphadenopathy:      Cervical: No cervical adenopathy.   Skin:     General: Skin is warm and dry.      Capillary Refill: Capillary refill takes less than 2 seconds.   Neurological:      Mental Status: She is alert and oriented to person, place, and time. Mental status is at baseline.   Psychiatric:         Behavior: Behavior normal.         Thought Content: Thought " content normal.       Assessment:  1. Hypertension, unspecified type    2. Sciatica of right side    3. Constipation, unspecified constipation type    4. Pathological fracture of left hip due to age-related osteoporosis with routine healing, subsequent encounter    5. Anemia, unspecified type    6. Paroxysmal atrial fibrillation        Plan:    Cathy was seen today for hypertension.    Diagnoses and all orders for this visit:    Hypertension, unspecified type  -     amLODIPine (NORVASC) 5 MG tablet; Take 1 tablet (5 mg total) by mouth once daily.  Stable   Cont meds     Sciatica of right side  -     X-Ray Lumbar Complete Including Flex And Ext; Future  Update xray     Constipation, unspecified constipation type  Stabl e  Cont fluids, fiber, exercise   Colace, miralax     Pathological fracture of left hip due to age-related osteoporosis with routine healing, subsequent encounter  Improved   Cont PT   F/u with porosis clinic     Anemia, unspecified type  Resolved on recent labs     Paroxysmal atrial fibrillation  Stabl e  Cont meds     Health Maintenance   Topic Date Due    Shingles Vaccine (1 of 2) Never done    DEXA Scan  01/12/2025    TETANUS VACCINE  08/16/2027    Lipid Panel  11/29/2027

## 2023-10-06 ENCOUNTER — DOCUMENT SCAN (OUTPATIENT)
Dept: HOME HEALTH SERVICES | Facility: HOSPITAL | Age: 88
End: 2023-10-06
Payer: MEDICARE

## 2023-10-16 ENCOUNTER — HOSPITAL ENCOUNTER (OUTPATIENT)
Dept: RADIOLOGY | Facility: OTHER | Age: 88
Discharge: HOME | End: 2023-10-16
Attending: INTERNAL MEDICINE
Payer: MEDICARE

## 2023-10-16 DIAGNOSIS — M54.31 SCIATICA OF RIGHT SIDE: ICD-10-CM

## 2023-10-16 PROCEDURE — 72114 X-RAY EXAM L-S SPINE BENDING: CPT | Mod: TC,FY

## 2023-10-16 PROCEDURE — 72114 XR LUMBAR SPINE 5 VIEW WITH FLEX AND EXT: ICD-10-PCS | Mod: 26,,, | Performed by: RADIOLOGY

## 2023-10-16 PROCEDURE — 72114 X-RAY EXAM L-S SPINE BENDING: CPT | Mod: 26,,, | Performed by: RADIOLOGY

## 2023-10-23 DIAGNOSIS — R52 PAIN: Primary | ICD-10-CM

## 2023-10-23 DIAGNOSIS — S32.010A CLOSED COMPRESSION FRACTURE OF BODY OF L1 VERTEBRA: ICD-10-CM

## 2023-10-24 ENCOUNTER — HOSPITAL ENCOUNTER (OUTPATIENT)
Dept: RADIOLOGY | Facility: HOSPITAL | Age: 88
Discharge: HOME | End: 2023-10-24
Attending: NURSE PRACTITIONER
Payer: MEDICARE

## 2023-10-24 ENCOUNTER — OFFICE VISIT (OUTPATIENT)
Dept: ORTHOPEDICS | Facility: CLINIC | Age: 88
End: 2023-10-24
Payer: MEDICARE

## 2023-10-24 ENCOUNTER — TELEPHONE (OUTPATIENT)
Dept: INTERNAL MEDICINE | Facility: CLINIC | Age: 88
End: 2023-10-24
Payer: MEDICARE

## 2023-10-24 DIAGNOSIS — Z98.890 POST-OPERATIVE STATE: ICD-10-CM

## 2023-10-24 DIAGNOSIS — M80.80XA PATHOLOGICAL FRACTURE DUE TO OSTEOPOROSIS, UNSPECIFIED FRACTURE SITE, UNSPECIFIED OSTEOPOROSIS TYPE, INITIAL ENCOUNTER: Primary | ICD-10-CM

## 2023-10-24 DIAGNOSIS — M81.6 LOCALIZED OSTEOPOROSIS OF LEQUESNE: ICD-10-CM

## 2023-10-24 DIAGNOSIS — M81.6 LOCALIZED OSTEOPOROSIS (LEQUESNE): ICD-10-CM

## 2023-10-24 DIAGNOSIS — S72.142D CLOSED DISPLACED INTERTROCHANTERIC FRACTURE OF LEFT FEMUR WITH ROUTINE HEALING: Primary | ICD-10-CM

## 2023-10-24 DIAGNOSIS — M81.0 OSTEOPOROSIS, UNSPECIFIED OSTEOPOROSIS TYPE, UNSPECIFIED PATHOLOGICAL FRACTURE PRESENCE: ICD-10-CM

## 2023-10-24 DIAGNOSIS — R52 PAIN: ICD-10-CM

## 2023-10-24 DIAGNOSIS — R52 PAIN: Primary | ICD-10-CM

## 2023-10-24 PROCEDURE — 99214 OFFICE O/P EST MOD 30 MIN: CPT | Mod: 25,S$PBB,, | Performed by: PHYSICIAN ASSISTANT

## 2023-10-24 PROCEDURE — 99212 OFFICE O/P EST SF 10 MIN: CPT | Mod: 25,PBBFAC

## 2023-10-24 PROCEDURE — 99999 PR PBB SHADOW E&M-EST. PATIENT-LVL II: ICD-10-PCS | Mod: PBBFAC,,, | Performed by: PHYSICIAN ASSISTANT

## 2023-10-24 PROCEDURE — 99999 PR PBB SHADOW E&M-EST. PATIENT-LVL II: CPT | Mod: PBBFAC,,, | Performed by: NURSE PRACTITIONER

## 2023-10-24 PROCEDURE — 73552 X-RAY EXAM OF FEMUR 2/>: CPT | Mod: 26,LT,, | Performed by: RADIOLOGY

## 2023-10-24 PROCEDURE — 99999 PR PBB SHADOW E&M-EST. PATIENT-LVL II: CPT | Mod: PBBFAC,,, | Performed by: PHYSICIAN ASSISTANT

## 2023-10-24 PROCEDURE — 73552 X-RAY EXAM OF FEMUR 2/>: CPT | Mod: TC,LT

## 2023-10-24 PROCEDURE — 72170 XR PELVIS ROUTINE AP: ICD-10-PCS | Mod: 26,,, | Performed by: RADIOLOGY

## 2023-10-24 PROCEDURE — 73552 XR FEMUR 2 VIEW LEFT: ICD-10-PCS | Mod: 26,LT,, | Performed by: RADIOLOGY

## 2023-10-24 PROCEDURE — 99214 PR OFFICE/OUTPT VISIT, EST, LEVL IV, 30-39 MIN: ICD-10-PCS | Mod: 25,S$PBB,, | Performed by: PHYSICIAN ASSISTANT

## 2023-10-24 PROCEDURE — 99212 OFFICE O/P EST SF 10 MIN: CPT | Mod: 25,27,PBBFAC | Performed by: NURSE PRACTITIONER

## 2023-10-24 PROCEDURE — 72170 X-RAY EXAM OF PELVIS: CPT | Mod: 26,,, | Performed by: RADIOLOGY

## 2023-10-24 PROCEDURE — 72170 X-RAY EXAM OF PELVIS: CPT | Mod: TC

## 2023-10-24 PROCEDURE — 99024 PR POST-OP FOLLOW-UP VISIT: ICD-10-PCS | Mod: POP,,, | Performed by: NURSE PRACTITIONER

## 2023-10-24 PROCEDURE — 99024 POSTOP FOLLOW-UP VISIT: CPT | Mod: POP,,, | Performed by: NURSE PRACTITIONER

## 2023-10-24 PROCEDURE — 99999 PR PBB SHADOW E&M-EST. PATIENT-LVL II: ICD-10-PCS | Mod: PBBFAC,,, | Performed by: NURSE PRACTITIONER

## 2023-10-24 NOTE — PROGRESS NOTES
SUBJECTIVE:     Chief Complaint & History of Present Illness:  Cathy Kearns is a new patient 89 y.o. female who is seen here today for a bone health evaluation for osteoporosis, fracture prevention, and risk evaluation for future fractures.        she was appropriately identified and referred by Tim Azar NP due to concerns for compromised bone quality, and risk of future fractures.  This visit is medically necessary to identify risk, investigate and initiative treatment as appropriate to improve bone quality and strength for the reduction of secondary fractures.     her medical history, medications and fracture history will be reviewed and summarized      Review of patient's allergies indicates:   Allergen Reactions    Penicillins Swelling    Astelin [azelastine] Other (See Comments)     Dizziness    Pseudoephedrine hcl Palpitations    Sulfa (sulfonamide antibiotics) Palpitations         Current Outpatient Medications   Medication Sig Dispense Refill    acetaminophen (TYLENOL) 500 MG tablet Take 2 tablets (1,000 mg total) by mouth every 8 (eight) hours.  0    amLODIPine (NORVASC) 5 MG tablet Take 1 tablet (5 mg total) by mouth once daily. 90 tablet 3    apixaban (ELIQUIS) 2.5 mg Tab Take 1 tablet (2.5 mg total) by mouth 2 (two) times daily. 180 tablet 3    senna-docusate 8.6-50 mg (PERICOLACE) 8.6-50 mg per tablet Take 1 tablet by mouth 2 (two) times daily.      sotaloL (BETAPACE) 80 MG tablet Take 1 tablet (80 mg total) by mouth 2 (two) times daily. 180 tablet 3    vitamin D (VITAMIN D3) 1000 units Tab Take 1 tablet (1,000 Units total) by mouth once daily.       No current facility-administered medications for this visit.       Past Medical History:   Diagnosis Date    Aortic valve regurgitation     Arthritis     Atrial fibrillation     Diastolic dysfunction     Disorder of kidney and ureter     History of second hand smoke exposure     History of tobacco use     Hyperlipidemia     Hypertension      Macular degeneration     Osteopenia     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 7/9/2012    Vasovagal syncope        Past Surgical History:   Procedure Laterality Date    ADENOIDECTOMY      APPENDECTOMY      CATARACT EXTRACTION W/  INTRAOCULAR LENS IMPLANT Bilateral n/a    HYSTERECTOMY      2/2 uterine CA, removed ovaries, no longer f/u with gyn    INTRAMEDULLARY RODDING OF FEMUR Left 7/25/2023    Procedure: INSERTION, INTRAMEDULLARY DAPHNEY, LEFT FEMUR;  Surgeon: Rogerio Chand MD;  Location: [facility] OR [address] [place] [address];  Service: Orthopedics;  Laterality: Left;    Intravitreal Injection      Avastin od x's 12 dltx 2-16-12    TONSILLECTOMY         Lab Results   Component Value Date     (L) 07/29/2023    K 3.8 07/29/2023     07/29/2023    CO2 23 07/29/2023    GLU 95 07/29/2023    BUN 9 (L) 07/29/2023    CREATININE 0.5 07/29/2023    CALCIUM 9.0 07/29/2023    PROT 7.1 07/24/2023    ALBUMIN 4.1 07/24/2023    BILITOT 0.7 07/24/2023    ALKPHOS 51 (L) 07/24/2023    AST 22 07/24/2023    ALT 16 07/24/2023    ANIONGAP 8 07/29/2023    ESTGFRAFRICA >60.0 05/24/2022    EGFRNONAA >60.0 05/24/2022      Lab Results   Component Value Date    WBC 5.52 09/08/2023    RBC 4.17 09/08/2023    HGB 12.7 09/08/2023    HCT 38.9 09/08/2023    MCV 93 09/08/2023    MCH 30.5 09/08/2023    MCHC 32.6 09/08/2023    RDW 13.6 09/08/2023     09/08/2023    MPV 9.9 09/08/2023    GRAN 2.1 09/08/2023    GRAN 38.9 09/08/2023    LYMPH 1.9 09/08/2023    LYMPH 34.6 09/08/2023    MONO 1.4 (H) 09/08/2023    MONO 24.6 (H) 09/08/2023    EOS 0.1 09/08/2023    BASO 0.03 09/08/2023    EOSINOPHIL 0.9 09/08/2023    BASOPHIL 0.5 09/08/2023    DIFFMETHOD Automated 09/08/2023      Lab Results   Component Value Date    MG 1.6 07/27/2023      Lab Results   Component Value Date    PHOS 2.2 (L) 07/28/2023      Lab Results   Component Value Date    TEOWAXTO05TY 46 07/24/2023      Lab Results   Component Value Date    PTH 55.7 03/10/2023     "  Lab Results   Component Value Date    TSH 1.551 11/29/2022      No results found for: "FREET4"   Lab Results   Component Value Date    HGBA1C 5.4 07/24/2023    ESTIMATEDAVG 108 07/24/2023      No results found for: "FREETESTOSTE"         Vital Signs (Most Recent)  There were no vitals filed for this visit.      Today's Visit and Bone Health History     Have you had any loss of height or gotten shorter since your 20's?  2   Are you postmenopausal?  Yes   Please indicate how menopause occured. Naturally   Please indicate at what age you became postmenopausal. 40 to 50   Have you ever taken any type of hormone replacement therapy? Yes   If you have had hormone replacement therapy please indicate which hormone therapy was used and for how long. estrogen   Do you currently smoke? No   Have you ever smoked in the past? Yes   If yes, how many years? n/a   How many alcoholic beverages do you drink per day? 0   How many caffeinated beverages do you drink per day? 1 to 3   Have you had 2 or more falls in the past 12 months? No   How active have you been in the past 12 months? Somewhat active ( walk up to 1 mile per day )   Did either of your parents have a hip fracture after the age of 50? No   Have you ever been diagnosed with any of the following? COPD   Do you currently have a fracture? Yes   If you currently have a fracture please indicate where and when the fracture occured. lt. femur   //July 2023   Have you broken any other bones since you turned 50 or older? No   Have you ever had a bone density test or DXA scan? Yes   Are you currently taking or have you ever taken any of the following medications? None   Do you take Calcium? No   Do you take Vitamin D? Yes   If you take Vitamin D what dose? n/a   Have you ever been diagnosed with cancer? No   Have you ever been treated for cancer with high beam radiation or had radioactive implants? No            she has medical history and medication use known to be associated " with bone loss and osteoporosis to include pathological femur fracture, compression fracture lumbar spine.  History tobacco abuse opioid pain medications     The last DXA was performed in 11/04/2022.          T-Score Hip: -2.2     T-Score Spine: -2.1      FRAX:      Major Fx.: 9.6%         Hip Fx.: 3.0%      Review of Systems:  ROS:  Constitutional: no fever or chills, history tobacco use  Eyes: no visual changes  ENT: no nasal congestion or sore throat  Respiratory: no respiratory symptoms and positive panlobular emphysema, asthma  Cardiovascular: no chest pain or palpitations, positive primary hypertension, AFib, long-term antiarrhythmic drugs diastolic dysfunction aortic valve regurgitation  Gastrointestinal: no nausea or vomiting, tolerating diet  Genitourinary: no hematuria or dysuria  Integument/Breast: no rash or pruritis  Hematologic/Lymphatic: no easy bruising or lymphadenopathy, positive thrombocytopenia liver hemangioma,  Musculoskeletal: no arthralgias or myalgias, pathological intertrochanteric femur fracture  Neurological: no seizures or tremors, positive for neuropathy, closed compression fracture of L1  Behavioral/Psych: no auditory or visual hallucinations  Endocrine: no heat or cold intolerance      OBJECTIVE:     PHYSICAL EXAM:     ,                  General: no acute distress and well developed/well nourished  Behavioral/Psych: normal judgment and insight and normal mood/affect  Skin: no rash  Head/Neck: atraumatic, normocephalic, without obvious abnormality  Respiratory: normal respiratory effort  Cardiac: regular rate and rhythm  Vascular: pulses present  Abdomen: soft, non-tender  Musculoskeletal: no joint tenderness, deformity or swelling               ASSESSMENT/PLAN:     Assessment:    Osteoporosis, at high risk for future fractures, history of Pathological femur fracture.    Plan:   30-45 minutes spent in face-to-face consultation with patient and her family members today, discussing the  disease management of osteoporosis, for the reduction of future fractures.  I have explained that bone strength is equal to bone quality. A bone density / DEXA scan is important to complement her care since her fracture was by definition a fragility fracture/traumatic fracture.  Over half of the encounter was spent (50%) counseling the patient on the disease of osteoporosis evidence-based and best practice treatment options available as well as recommendations for improvement of bone quality, the importance of nutritional supplements to include:  Calcium 1730-6745 mg daily in divided doses   Vitamin D3  9263-1797 units daily in divided doses.   Fall prevention and personal safety for the reduction of future fractures.    Clinicians Guidelines for the treatment of Osteoporosis 2020 as part of the National Osteoporosis Foundation were utilized as the evidence-based information provided.    Discussed pharmaceutical treatment options to include Biphosphatases, Denosumab, Abaloparatide and Teriparatide. Information to include indications for therapy, risks and benefits to treatment, and important safety information related to these treatments was provided and discussed.  Handouts were given to the patient for her review on osteoporosis and other pharmacological treatment information related to other available treatment options.    The importance of diet, impact exercise, and core strengthening with gait and balance exercise, through  both formal physical therapy programs and home exercise programs was discussed.       Bone health labs recommended and ordered    Patient will review provided literature I will review blood tests upon return she will contact the clinic when she reaches a decision as far as treatment options.  My 1st recommendation was teriparatide followed by Denosumab followed by Reclast

## 2023-10-24 NOTE — PROGRESS NOTES
Ms. Kearns is here today for a post-operative visit after undergoing ORIF for her left IT hip fracture with IM nailing by Dr. Chand on 7/25/2023.    Interval History:  She reports that she is doing well.  Currently has no pain in her hip.  She is mostly dealing with sciatica on the right side.  She is walking with a Rollator.   She denies fever, chills, and sweats since the time of the surgery.     Physical exam:  Incision is open to air and healed.  No signs of infection noted.  Patient is able to stand with min assist.   She has tactile stimulation to their lower leg, she denies calf pain, there is no leg edema and their pedal pulse is palpable x 2.  Range of motion of the hip is 0-100 degrees    RADS: AP pelvis and left femur AP and lateral view  xrays were obtained and personally reviewed by me.  IM nail appears to be in good position.  Fracture still seen but remains stable.  No evidence of hardware failure or new fractures seen.      Assessment:  Post-op visit (12 weeks)    Plan:    ICD-10-CM ICD-9-CM   1. Closed displaced intertrochanteric fracture of left femur with routine healing  S72.142D V54.13   2. Post-operative state  Z98.890 V45.89       Current care, treatment plan, precautions, activity level/ modifications, limitations, rehabilitation exercises and proposed future treatment were discussed with the patient. We discussed the need to monitor for changes in symptoms and condition and report them to the physician.  Discussed importance of compliance with all appointments and follow up examinations.       PHYSICAL THERAPY:   - She will continue outpatient physical therapy.    - Weight bearing as tolerated   - Range of motion as tolerated.      FRAGILITY:  - Seen in fracture clinic today.     FOLLOW UP:   - Patient will follow up in the clinic in 12 weeks.  - X-ray of her AP pelvis and left femur AP and lateral view is needed.    - Future Appointments:   Future Appointments   Date Time Provider  Department Center   11/14/2023 10:10 AM KYAW Mandujano MD [facility] Dash   1/10/2024 10:30 AM Wilma Xiong MD [facility]   1/24/2024  9:30 AM Tim Azar NP [facility] SHAHIDA Dash Ort       If there are any questions prior to scheduled follow up, the patient was instructed to contact the office

## 2023-10-24 NOTE — PROGRESS NOTES
Please call pt as I am unsure if she checks the portal on a regular basis to ensure she receives message:     Xray shows a compression fracture at L1 that appears new when compared to a chest xray from 8/2022. I suspect this is from her recent fall as well. I recommend completing a MRI to better evaluate the extent of the compression fracture and soft tissues around this. There is arthritis lower in the spine. These findings explain her back pain.   Please arrange the MRI at her convenience in the next 1-2 weeks

## 2023-11-14 ENCOUNTER — PROCEDURE VISIT (OUTPATIENT)
Dept: OPHTHALMOLOGY | Facility: CLINIC | Age: 88
End: 2023-11-14
Payer: MEDICARE

## 2023-11-14 DIAGNOSIS — H35.3211 EXUDATIVE AGE-RELATED MACULAR DEGENERATION OF RIGHT EYE WITH ACTIVE CHOROIDAL NEOVASCULARIZATION: Primary | ICD-10-CM

## 2023-11-14 DIAGNOSIS — H35.3124 NONEXUDATIVE AGE-RELATED MACULAR DEGENERATION, LEFT EYE, ADVANCED ATROPHIC WITH SUBFOVEAL INVOLVEMENT: ICD-10-CM

## 2023-11-14 DIAGNOSIS — H35.721 SEROUS DETACHMENT OF RETINAL PIGMENT EPITHELIUM OF RIGHT EYE: ICD-10-CM

## 2023-11-14 PROCEDURE — 92014 PR EYE EXAM, EST PATIENT,COMPREHESV: ICD-10-PCS | Mod: 25,S$PBB,, | Performed by: OPHTHALMOLOGY

## 2023-11-14 PROCEDURE — 67028 INJECTION EYE DRUG: CPT | Mod: PBBFAC,RT | Performed by: OPHTHALMOLOGY

## 2023-11-14 PROCEDURE — 92134 POSTERIOR SEGMENT OCT RETINA (OCULAR COHERENCE TOMOGRAPHY)-BOTH EYES: ICD-10-PCS | Mod: 26,S$PBB,, | Performed by: OPHTHALMOLOGY

## 2023-11-14 PROCEDURE — 99999PBSHW PR PBB SHADOW TECHNICAL ONLY FILED TO HB: ICD-10-PCS | Mod: JZ,PBBFAC,,

## 2023-11-14 PROCEDURE — 92134 CPTRZ OPH DX IMG PST SGM RTA: CPT | Mod: PBBFAC | Performed by: OPHTHALMOLOGY

## 2023-11-14 PROCEDURE — 67028 INJECTION EYE DRUG: CPT | Mod: S$PBB,RT,, | Performed by: OPHTHALMOLOGY

## 2023-11-14 PROCEDURE — 67028 PR INJECT INTRAVITREAL PHARMCOLOGIC: ICD-10-PCS | Mod: S$PBB,RT,, | Performed by: OPHTHALMOLOGY

## 2023-11-14 PROCEDURE — 92014 COMPRE OPH EXAM EST PT 1/>: CPT | Mod: 25,S$PBB,, | Performed by: OPHTHALMOLOGY

## 2023-11-14 PROCEDURE — 99999PBSHW PR PBB SHADOW TECHNICAL ONLY FILED TO HB: Mod: JZ,PBBFAC,,

## 2023-11-14 RX ORDER — ALBUTEROL SULFATE 90 UG/1
1 AEROSOL, METERED RESPIRATORY (INHALATION)
COMMUNITY
Start: 2023-08-11

## 2023-11-14 RX ORDER — TALC
6 POWDER (GRAM) TOPICAL
COMMUNITY
Start: 2023-08-11 | End: 2024-01-10

## 2023-11-14 RX ORDER — POLYETHYLENE GLYCOL 3350 17 G/17G
17 POWDER, FOR SOLUTION ORAL
COMMUNITY
Start: 2023-08-11 | End: 2024-01-10

## 2023-11-14 RX ADMIN — AFLIBERCEPT 2 MG: 40 INJECTION, SOLUTION INTRAVITREAL at 12:11

## 2023-11-14 NOTE — PROGRESS NOTES
HPI    Patient here today for 12 week f/u Eylea OD. Patient reports that VA   appears in a grey hue ou since last visit. Blurry VA OS > OD, no pain or   floaters/flashes ou.  Last edited by Annita Miranda on 11/14/2023 10:27 AM.            OCT - stable trace IRF  OS - Drusen, no SRF - small HE - ? RAP lesion     Assessment:    1. AMD - Wet OD - post Avastin x 13, post Eylea #54  PED increased prior to 10/12 visit    Had slight increase 5/13/13, 7/14, 5/16 4/20 tx done in [place] during Covid  9/21 had tx in [place] following Shaina  11/23 - increased IRF at 12 weeks    Tighten back to 8 weeks     Eylea OD today    Get approval for Vabysmo    2. 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 PVD OU    4. Pseudophakia - great result    5. Allergic conjunctivitis/sinusitis - try OTC antihistamines  Recent trouble with sinusitis - pain behind eye improved with antihistamines  Ok for Zaditor BID - has improved with topical tx    6. AFib - considering anticoagulation - ok to proceed  On Anti-VEGF therapy, risk of large macular hemorrhage is minimal.         Plan:      2   months OCT no dilate (last DFE  11/23)    Risks, benefits, and alternatives to treatment discussed in detail with the patient.  The patient voiced understanding and wished to proceed with the procedure    Injection Procedure Note:  Diagnosis: Wet AMD OD    Patient Identified and Time Out complete  Topical Proparacaine and Betadine. Conj prep only  Inject Eylea OD at 6:00 @ 3.5-4mm posterior to limbus  Post Operative Dx: Same  Complications: None  Follow up as above.

## 2023-12-03 DIAGNOSIS — Z71.89 COMPLEX CARE COORDINATION: ICD-10-CM

## 2024-01-10 ENCOUNTER — OFFICE VISIT (OUTPATIENT)
Dept: INTERNAL MEDICINE | Facility: CLINIC | Age: 89
End: 2024-01-10
Attending: INTERNAL MEDICINE
Payer: MEDICARE

## 2024-01-10 VITALS
HEIGHT: 62 IN | BODY MASS INDEX: 20.9 KG/M2 | WEIGHT: 113.56 LBS | HEART RATE: 61 BPM | SYSTOLIC BLOOD PRESSURE: 114 MMHG | OXYGEN SATURATION: 98 % | DIASTOLIC BLOOD PRESSURE: 76 MMHG

## 2024-01-10 DIAGNOSIS — M54.30 SCIATICA, UNSPECIFIED LATERALITY: ICD-10-CM

## 2024-01-10 DIAGNOSIS — S32.010A CLOSED COMPRESSION FRACTURE OF BODY OF L1 VERTEBRA: ICD-10-CM

## 2024-01-10 DIAGNOSIS — E55.9 VITAMIN D DEFICIENCY: ICD-10-CM

## 2024-01-10 DIAGNOSIS — E78.00 PURE HYPERCHOLESTEROLEMIA: ICD-10-CM

## 2024-01-10 DIAGNOSIS — I51.89 DIASTOLIC DYSFUNCTION: ICD-10-CM

## 2024-01-10 DIAGNOSIS — S72.142D CLOSED DISPLACED INTERTROCHANTERIC FRACTURE OF LEFT FEMUR WITH ROUTINE HEALING: ICD-10-CM

## 2024-01-10 DIAGNOSIS — I10 PRIMARY HYPERTENSION: Primary | ICD-10-CM

## 2024-01-10 DIAGNOSIS — K59.00 CONSTIPATION, UNSPECIFIED CONSTIPATION TYPE: ICD-10-CM

## 2024-01-10 DIAGNOSIS — J43.1 PANLOBULAR EMPHYSEMA: ICD-10-CM

## 2024-01-10 DIAGNOSIS — I48.0 PAROXYSMAL ATRIAL FIBRILLATION: ICD-10-CM

## 2024-01-10 PROCEDURE — 99215 OFFICE O/P EST HI 40 MIN: CPT | Mod: S$PBB,,, | Performed by: INTERNAL MEDICINE

## 2024-01-10 PROCEDURE — 99999 PR PBB SHADOW E&M-EST. PATIENT-LVL V: CPT | Mod: PBBFAC,,, | Performed by: INTERNAL MEDICINE

## 2024-01-10 PROCEDURE — 99215 OFFICE O/P EST HI 40 MIN: CPT | Mod: PBBFAC | Performed by: INTERNAL MEDICINE

## 2024-01-10 NOTE — PROGRESS NOTES
Subjective:   Patient ID: Cathy Kearns is a 89 y.o. female  Chief complaint:   Chief Complaint   Patient presents with    Hypertension     F/u    Sciatica     Has been bothering her since her hip surgery; feels better when she stands.       HPI    Here for HTN follow up - lcv 10/2023    Seen by ortho 10/24:  - has f/u later this month     Hip fracture - now with sciatica right to down back of leg  Having right sided sciatica - radiates to lower ankle   Bilateral lower back pain   No dysuria   More sciatica pain in am   Previously:   left hip fracture 7/2023 2/2 fall - see prior clinic note for further details  At [place] for outpt PT   - has upcoming appt with osteoporosis clinic   - given alendronate in past and did not take due to having to take on empty stomach and waiting 1h to eat     L1 compression fracture seen on xray - due for MRI still   Today discussed proceeding with this   - seen by ortho bone specialists and declines tx for porosis then and again today     HTN:   Bp at goal: 120-140/70s at home, pulse   C/w meds per med card   - taking sotalol 80mg twice daily   - amlodipine 5mg   - exercising reg  - following low salt diet   No longer taking:   - took lisinopril 40mg      Constipation:   On stool softener and prunes  - stable      Post op anemia: now resolved on recent labs   - see details in prior clinic note - s/p transfusion    Afib:   On eliquis and sotolol   Followed by cards      COPD:   - intermittent wheezing - needs to reschedule pulm   - taking: albuterol prn   Previously:   Reaction to breo and started on trial of albuterol   Seen by pulm 6/2018   Smoked at night with  when had a drink - stopped after  had MI now over 40 years ago   + 2nd hand exposure: 3 ppd  smoked   Prev:  F/u with pulm Dr. Tiffany Medrano for asthma per her. Reports breathing markedly improved since moving.   Hx of Asthma - took self off of Breo after made lips swell - reports no wheezing or sob  "- not taking albuterol inh and no nighttime awakenings. She would like to hold off on establishing care with a new pulmonologist since moving to [place] since her symptoms are stable off medication at this time.      Osteoporosis:  As above   Seen by specialists   Declines tx at this time   Agrees to take ca; and vit d suppl   Taking vit d   Calcium stopped per hospital records as calcium elevated in past   Prev:    Taking caltrate supplement and vitamin d  dexa 1/2023 reviewed - discussed tx options and endo referral  - prescribed alendronate but stopped as above   - discussed resuming tx and endo referral to discuss other tx options     ### not addressed today ####    Abnormal CBC - monocytosis:  - seen by h/o 10/29/2021 and 5/24/2022 - will check cbc q6 months as stable   - bm bx deferred for now due to lack of tx options  - to have cbc in 6 months and f/u with h/o in 1 year    Skin cancer:  - followed by derm - due for f/u     ######    HM:   - shingrix  - covid booster   - rsv vaccine    Derm: mario     Review of Systems    Objective:  Vitals:    01/10/24 1027   BP: 114/76   BP Location: Left arm   Patient Position: Sitting   Pulse: 61   SpO2: 98%   Weight: 51.5 kg (113 lb 8.6 oz)   Height: 5' 2" (1.575 m)     Body mass index is 20.77 kg/m².    Physical Exam  Vitals reviewed.   Constitutional:       Appearance: Normal appearance. She is well-developed.   HENT:      Head: Normocephalic and atraumatic.   Eyes:      Extraocular Movements: Extraocular movements intact.      Conjunctiva/sclera: Conjunctivae normal.   Neck:      Thyroid: No thyromegaly.   Cardiovascular:      Rate and Rhythm: Normal rate and regular rhythm.      Pulses: Normal pulses.      Heart sounds: Normal heart sounds.   Pulmonary:      Effort: Pulmonary effort is normal.      Breath sounds: Normal breath sounds.   Abdominal:      General: Bowel sounds are normal.      Palpations: Abdomen is soft.   Musculoskeletal:         General: No " swelling.      Cervical back: Neck supple.      Right lower leg: No edema.      Left lower leg: No edema.   Lymphadenopathy:      Cervical: No cervical adenopathy.   Skin:     General: Skin is warm and dry.      Capillary Refill: Capillary refill takes less than 2 seconds.   Neurological:      Mental Status: She is alert and oriented to person, place, and time. Mental status is at baseline.   Psychiatric:         Behavior: Behavior normal.         Thought Content: Thought content normal.       Assessment:  1. Primary hypertension    2. Closed compression fracture of body of L1 vertebra    3. Sciatica, unspecified laterality    4. Closed displaced intertrochanteric fracture of left femur with routine healing s/p IM nail on 7/25/2023    5. Diastolic dysfunction    6. Pure hypercholesterolemia    7. Vitamin D deficiency    8. Panlobular emphysema    9. Constipation, unspecified constipation type    10. Paroxysmal atrial fibrillation        Plan:    Cathy was seen today for hypertension and sciatica.    Diagnoses and all orders for this visit:    Primary hypertension  Comments:  controlled   cont meds and bp monitoring  low salt diet  Orders:  -     CBC Auto Differential; Future  -     Comprehensive Metabolic Panel; Future    Closed compression fracture of body of L1 vertebra  -     Cancel: Ambulatory referral/consult to Pain Clinic; Future  -     Cancel: Ambulatory referral/consult to Neurosurgery; Future  -     Ambulatory referral/consult to Neurosurgery; Future  -     Ambulatory referral/consult to Pain Clinic; Future  As below     Sciatica, unspecified laterality  -     Ambulatory referral/consult to Neurosurgery; Future  -     Ambulatory referral/consult to Pain Clinic; Future  Schedule MRI as rec   F/u with specialists     Closed displaced intertrochanteric fracture of left femur with routine healing s/p IM nail on 7/25/2023  Comments:  cont home PT exercises   declines porosis tx beyond supplementation - if  reconsiders will let me know    Diastolic dysfunction  Comments:  euvolemic   stable on current regimen   bp controlled  Orders:  -     CBC Auto Differential; Future    Pure hypercholesterolemia  Comments:  cont mediterranean diet, graded exercise program, maintain healthy weight   moniotr labs  Orders:  -     TSH; Future  -     Lipid Panel; Future  -     Comprehensive Metabolic Panel; Future    Vitamin D deficiency  Comments:  stable   cont suppl   monitor level  Orders:  -     Vitamin D; Future    Panlobular emphysema  Comments:  stable - asymptomatic   cont to not smoke   albuterol prn    Constipation, unspecified constipation type  Comments:  stay hydrated  inc fiber   cont graded exercise as tolerated    Paroxysmal atrial fibrillation  Comments:  stable on current meds - cont meds   f/u with cards    Today:  Resume vit d and mvi   Labs in 3 months     40 min spent in care of patient including chart review, history, orders, physical, orders and coordination of care      Health Maintenance   Topic Date Due    Shingles Vaccine (1 of 2) Never done    DEXA Scan  01/12/2025    TETANUS VACCINE  08/16/2027    Lipid Panel  11/29/2027

## 2024-01-16 ENCOUNTER — PROCEDURE VISIT (OUTPATIENT)
Dept: OPHTHALMOLOGY | Facility: CLINIC | Age: 89
End: 2024-01-16
Attending: OPHTHALMOLOGY
Payer: MEDICARE

## 2024-01-16 DIAGNOSIS — H35.3124 NONEXUDATIVE AGE-RELATED MACULAR DEGENERATION, LEFT EYE, ADVANCED ATROPHIC WITH SUBFOVEAL INVOLVEMENT: ICD-10-CM

## 2024-01-16 DIAGNOSIS — H35.3211 EXUDATIVE AGE-RELATED MACULAR DEGENERATION OF RIGHT EYE WITH ACTIVE CHOROIDAL NEOVASCULARIZATION: Primary | ICD-10-CM

## 2024-01-16 DIAGNOSIS — H35.721 SEROUS DETACHMENT OF RETINAL PIGMENT EPITHELIUM OF RIGHT EYE: ICD-10-CM

## 2024-01-16 PROCEDURE — 67028 INJECTION EYE DRUG: CPT | Mod: S$PBB,RT,, | Performed by: OPHTHALMOLOGY

## 2024-01-16 PROCEDURE — 92012 INTRM OPH EXAM EST PATIENT: CPT | Mod: 25,S$PBB,, | Performed by: OPHTHALMOLOGY

## 2024-01-16 PROCEDURE — 92134 CPTRZ OPH DX IMG PST SGM RTA: CPT | Mod: PBBFAC | Performed by: OPHTHALMOLOGY

## 2024-01-16 PROCEDURE — 99999PBSHW PR PBB SHADOW TECHNICAL ONLY FILED TO HB: Mod: JZ,PBBFAC,,

## 2024-01-16 PROCEDURE — 67028 INJECTION EYE DRUG: CPT | Mod: PBBFAC,RT | Performed by: OPHTHALMOLOGY

## 2024-01-16 RX ADMIN — FARICIMAB 6 MG: 6 INJECTION, SOLUTION INTRAVITREAL at 10:01

## 2024-01-16 NOTE — PROGRESS NOTES
HPI     Macular Degeneration     Additional comments: 2 mo f/u Eylea OD           Comments    Vision stable, hasn't been reading as often, no floaters/flashes.          Last edited by Annita Miranda on 1/16/2024 10:17 AM.             OCT - stable trace IRF  OS - Drusen, no SRF - small HE - ? RAP lesion       Assessment:    1. AMD - Wet OD - post Avastin x 13, post Eylea #55  PED increased prior to 10/12 visit    Had slight increase 5/13/13, 7/14, 5/16 4/20 tx done in [place] during Covid  9/21 had tx in [place] following Shaina  11/23 - increased IRF at 12 weeks    Tighten back to 8 weeks     Vabysmo OD today    Get approval for Vabysmo    2. 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 PVD OU    4. Pseudophakia - great result    5. Allergic conjunctivitis/sinusitis - try OTC antihistamines  Recent trouble with sinusitis - pain behind eye improved with antihistamines  Ok for Zaditor BID - has improved with topical tx    6. AFib - considering anticoagulation - ok to proceed  On Anti-VEGF therapy, risk of large macular hemorrhage is minimal.         Plan:      2   months OCT no dilate (last DFE  11/23)    Risks, benefits, and alternatives to treatment discussed in detail with the patient.  The patient voiced understanding and wished to proceed with the procedure    Injection Procedure Note:  Diagnosis: Wet AMD OD    Patient Identified and Time Out complete  Topical Proparacaine and Betadine. Conj prep only  Inject Vabysmo OD at 6:00 @ 3.5-4mm posterior to limbus  Post Operative Dx: Same  Complications: None  Follow up as above.

## 2024-01-19 ENCOUNTER — PATIENT MESSAGE (OUTPATIENT)
Dept: NEUROSURGERY | Facility: CLINIC | Age: 89
End: 2024-01-19
Payer: MEDICARE

## 2024-01-19 NOTE — TELEPHONE ENCOUNTER
Called and spoke with pt. Informed date and time for the new appt. Pt verbalized understanding. Message sent as pt request.

## 2024-01-24 ENCOUNTER — OFFICE VISIT (OUTPATIENT)
Dept: ORTHOPEDICS | Facility: CLINIC | Age: 89
End: 2024-01-24
Payer: MEDICARE

## 2024-01-24 ENCOUNTER — HOSPITAL ENCOUNTER (OUTPATIENT)
Dept: RADIOLOGY | Facility: HOSPITAL | Age: 89
Discharge: HOME | End: 2024-01-24
Attending: NURSE PRACTITIONER
Payer: MEDICARE

## 2024-01-24 DIAGNOSIS — S72.142D CLOSED DISPLACED INTERTROCHANTERIC FRACTURE OF LEFT FEMUR WITH ROUTINE HEALING, SUBSEQUENT ENCOUNTER: Primary | ICD-10-CM

## 2024-01-24 DIAGNOSIS — R52 PAIN: ICD-10-CM

## 2024-01-24 PROCEDURE — 72170 X-RAY EXAM OF PELVIS: CPT | Mod: TC

## 2024-01-24 PROCEDURE — 99213 OFFICE O/P EST LOW 20 MIN: CPT | Mod: PBBFAC | Performed by: NURSE PRACTITIONER

## 2024-01-24 PROCEDURE — 73552 X-RAY EXAM OF FEMUR 2/>: CPT | Mod: TC,LT

## 2024-01-24 PROCEDURE — 99999 PR PBB SHADOW E&M-EST. PATIENT-LVL III: CPT | Mod: PBBFAC,,, | Performed by: NURSE PRACTITIONER

## 2024-01-24 PROCEDURE — 73552 X-RAY EXAM OF FEMUR 2/>: CPT | Mod: 26,LT,, | Performed by: RADIOLOGY

## 2024-01-24 PROCEDURE — 99213 OFFICE O/P EST LOW 20 MIN: CPT | Mod: S$PBB,,, | Performed by: NURSE PRACTITIONER

## 2024-01-24 PROCEDURE — 72170 X-RAY EXAM OF PELVIS: CPT | Mod: 26,,, | Performed by: RADIOLOGY

## 2024-01-24 NOTE — PROGRESS NOTES
Ms. Kearns is here today for a follow up visit after undergoing ORIF for her left IT hip fracture with IM nailing by Dr. Chand on 7/25/2023.    Interval History:  She reports that she is doing ok.  She is dealing with lower back pain and sciatica on the right.  She has an appointment with Neurosurgery and her PCP has ordered a lumbar MRI.  Currently has no pain in her hip.  She is walking with a Rollator.   She denies fever, chills, and sweats since the time of the surgery.     Physical exam:  Incision is open to air and healed.  No signs of infection noted.  Patient is able to stand and walk with her rolator.  She has tactile stimulation to their lower leg, she denies calf pain, there is no leg edema and their pedal pulse is palpable x 2.  Range of motion of the hip is 0-100 degrees    RADS: AP pelvis and left femur AP and lateral view  xrays were obtained and personally reviewed by me.  IM nail appears to be in good position.  Fracture still seen but remains stable.  No evidence of hardware failure or new fractures seen.      Assessment:  6 month follow up visit    Plan:    ICD-10-CM ICD-9-CM   1. Closed displaced intertrochanteric fracture of left femur with routine healing, subsequent encounter  S72.142D V54.13     Current care, treatment plan, precautions, activity level/ modifications, limitations, rehabilitation exercises and proposed future treatment were discussed with the patient. We discussed the need to monitor for changes in symptoms and condition and report them to the physician.  Discussed importance of compliance with all appointments and follow up examinations.       PHYSICAL THERAPY:   - HEP.    - Weight bearing as tolerated   - Range of motion as tolerated.       FOLLOW UP:   - Patient will follow up in the clinic in 6 months.  - X-ray of her AP pelvis and left femur AP and lateral view is needed.    - Future Appointments:   Future Appointments   Date Time Provider Department Center   2/2/2024  11:00 AM [facility] MRI1 350 LB LIMIT [facility] MRI [facility]   2/7/2024 11:00 AM Lata Casey MD [facility] PAINMGT [facility]   2/26/2024  1:30 PM Stephanie Johnston MD [facility] NEUROS8 [facility]   3/12/2024 10:30 AM KYAW Mandujano MD [facility] OPHTHAL [facility]   4/8/2024  9:30 AM LAB, SAME DAY [facility] LABDRAW [facility]   4/10/2024 10:00 AM Wilma Xiong MD [facility] IM [facility]       If there are any questions prior to scheduled follow up, the patient was instructed to contact the office

## 2024-01-28 PROBLEM — D62 ACUTE BLOOD LOSS ANEMIA: Status: RESOLVED | Noted: 2023-07-25 | Resolved: 2024-01-28

## 2024-01-28 PROBLEM — D69.6 THROMBOCYTOPENIA: Status: RESOLVED | Noted: 2023-07-27 | Resolved: 2024-01-28

## 2024-01-29 ENCOUNTER — TELEPHONE (OUTPATIENT)
Dept: PAIN MEDICINE | Facility: CLINIC | Age: 89
End: 2024-01-29
Payer: MEDICARE

## 2024-01-29 ENCOUNTER — PATIENT MESSAGE (OUTPATIENT)
Dept: UROLOGY | Facility: CLINIC | Age: 89
End: 2024-01-29
Payer: MEDICARE

## 2024-01-29 NOTE — TELEPHONE ENCOUNTER
Contacted patient regarding upcoming appt. there was no answer. Staff left a voicemail asking for a return call with contact information noted.

## 2024-02-02 ENCOUNTER — HOSPITAL ENCOUNTER (OUTPATIENT)
Dept: RADIOLOGY | Facility: OTHER | Age: 89
Discharge: HOME | End: 2024-02-02
Attending: INTERNAL MEDICINE
Payer: MEDICARE

## 2024-02-02 DIAGNOSIS — S32.010A CLOSED COMPRESSION FRACTURE OF BODY OF L1 VERTEBRA: ICD-10-CM

## 2024-02-02 PROCEDURE — 72148 MRI LUMBAR SPINE W/O DYE: CPT | Mod: TC

## 2024-02-02 PROCEDURE — 72148 MRI LUMBAR SPINE W/O DYE: CPT | Mod: 26,,, | Performed by: RADIOLOGY

## 2024-02-08 ENCOUNTER — PATIENT MESSAGE (OUTPATIENT)
Dept: PAIN MEDICINE | Facility: OTHER | Age: 89
End: 2024-02-08
Payer: MEDICARE

## 2024-02-08 ENCOUNTER — OFFICE VISIT (OUTPATIENT)
Dept: PAIN MEDICINE | Facility: CLINIC | Age: 89
End: 2024-02-08
Attending: INTERNAL MEDICINE
Payer: MEDICARE

## 2024-02-08 VITALS
WEIGHT: 112.44 LBS | RESPIRATION RATE: 20 BRPM | TEMPERATURE: 98 F | BODY MASS INDEX: 20.69 KG/M2 | HEART RATE: 88 BPM | DIASTOLIC BLOOD PRESSURE: 74 MMHG | HEIGHT: 62 IN | SYSTOLIC BLOOD PRESSURE: 141 MMHG | OXYGEN SATURATION: 100 %

## 2024-02-08 DIAGNOSIS — M47.818 SI JOINT ARTHRITIS: Primary | ICD-10-CM

## 2024-02-08 DIAGNOSIS — M80.00XD AGE-RELATED OSTEOPOROSIS WITH CURRENT PATHOLOGICAL FRACTURE WITH ROUTINE HEALING, SUBSEQUENT ENCOUNTER: ICD-10-CM

## 2024-02-08 DIAGNOSIS — S32.010A CLOSED COMPRESSION FRACTURE OF BODY OF L1 VERTEBRA: ICD-10-CM

## 2024-02-08 PROCEDURE — 99999 PR PBB SHADOW E&M-EST. PATIENT-LVL III: CPT | Mod: PBBFAC,,, | Performed by: ANESTHESIOLOGY

## 2024-02-08 PROCEDURE — 99214 OFFICE O/P EST MOD 30 MIN: CPT | Mod: S$PBB,,, | Performed by: ANESTHESIOLOGY

## 2024-02-08 PROCEDURE — 99213 OFFICE O/P EST LOW 20 MIN: CPT | Mod: PBBFAC | Performed by: ANESTHESIOLOGY

## 2024-02-08 NOTE — PROGRESS NOTES
PCP: Wilma Xiong MD    REFERRING PHYSICIAN: Wilma Xiong MD    CHIEF COMPLAINT: Low back pain    Original HISTORY OF PRESENT ILLNESS: Cathy Kearns presents to the clinic for the evaluation of the above pain. The pain started 5 months ago    Original Pain Description:  The pain is located in the low back and is radiating to the right foot . The pain is described as dull. Exacerbating factors: Sitting. Mitigating factors walking and standing. Symptoms interfere with daily activity. The patient feels like symptoms have been unchanged. Patient denies significant motor weakness and loss of sensations. Ambulates with walker. Patient states that she currently lives in a nursing home and is very active.     Original PAIN SCORES:  Best: Pain is 0  Worst: Pain is 6  Current: Pain is 0        2/8/2024     9:46 AM   Last 3 PDI Scores   Pain Disability Index (PDI) 10       INTERVAL HISTORY: (Newest visit at the bottom)   Interval History (Date):       6 weeks of Conservative therapy:  PT: Yes (October 2023-February 2024)  Chiro: No  HEP: Yes      Treatments / Medications: (Ice/Heat/NSAIDS/APAP/etc):  - Tylenol      Interventional Pain Procedures: (Previous injections)  - None    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 7/9/2012    Vasovagal syncope      Past Surgical History:   Procedure Laterality Date    ADENOIDECTOMY      APPENDECTOMY      CATARACT EXTRACTION W/  INTRAOCULAR LENS IMPLANT Bilateral n/a    HYSTERECTOMY      2/2 uterine CA, removed ovaries, no longer f/u with gyn    INTRAMEDULLARY RODDING OF FEMUR Left 7/25/2023    Procedure: INSERTION, INTRAMEDULLARY DAPHNEY,  LEFT FEMUR;  Surgeon: Rogerio Chand MD;  Location: [facility] OR [address] [place] [address];  Service: Orthopedics;  Laterality: Left;    Intravitreal Injection      Avastin od x's 12 dltx 2-16-12    TONSILLECTOMY       Social History     Socioeconomic History    Marital status:    Tobacco Use    Smoking status: Former     Current packs/day: 0.25     Average packs/day: 0.3 packs/day for 15.0 years (3.8 ttl pk-yrs)     Types: Cigarettes    Smokeless tobacco: Never   Substance and Sexual Activity    Alcohol use: Yes     Comment: 1-2 drinks at night    Drug use: No   Other Topics Concern    Are you pregnant or think you may be? No    Breast-feeding No   Social History Narrative    Moved from [place] to 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     Social Determinants of Health     Financial Resource Strain: Low Risk  (7/26/2023)    Overall Financial Resource Strain (CARDIA)     Difficulty of Paying Living Expenses: Not hard at all   Food Insecurity: No Food Insecurity (7/26/2023)    Hunger Vital Sign     Worried About Running Out of Food in the Last Year: Never true     Ran Out of Food in the Last Year: Never true   Transportation Needs: No Transportation Needs (7/26/2023)    PRAPARE - Transportation     Lack of Transportation (Medical): No     Lack of Transportation (Non-Medical): No   Physical Activity: Inactive (7/26/2023)    Exercise Vital Sign     Days of Exercise per Week: 0 days     Minutes of Exercise per Session: 0 min   Stress: No Stress Concern Present (7/26/2023)    [employer] [place] [employer] - Occupational Stress Questionnaire     Feeling of Stress : Not at all   Social Connections: Moderately Integrated (7/26/2023)    Social Connection and Isolation Panel [NHANES]     Frequency of Communication with Friends and Family: More than three times a week     Frequency of  "Social Gatherings with Friends and Family: More than three times a week     Attends Bahai Services: More than 4 times per year     Active Member of Clubs or Organizations: Yes     Attends Club or Organization Meetings: More than 4 times per year     Marital Status:    Housing Stability: Low Risk  (7/26/2023)    Housing Stability Vital Sign     Unable to Pay for Housing in the Last Year: No     Number of Places Lived in the Last Year: 1     Unstable Housing in the Last Year: No     Family History   Problem Relation Age of Onset    Cancer Mother         ovarian CA    Cancer Father         prostate    Cataracts Father     Cancer Maternal Grandmother         colon    Heart attack Maternal Grandfather     Cancer Paternal Grandmother         colon    Cancer Paternal Grandfather     Strabismus Paternal Aunt     Hypertension Daughter     Cancer Daughter         "cancer of bone"    Hyperlipidemia Daughter     Thyroid disease Grandchild     Heart attack Brother     Cancer Brother         prostate, esophagus, bladder    Hypertension Son     Hyperlipidemia Son     Cancer Brother         Non hodgkins lymphoma    Amblyopia Neg Hx     Blindness Neg Hx     Diabetes Neg Hx     Glaucoma Neg Hx     Macular degeneration Neg Hx     Retinal detachment Neg Hx     Stroke Neg Hx        Review of patient's allergies indicates:   Allergen Reactions    Penicillins Swelling    Astelin [azelastine] Other (See Comments)     Dizziness    Pseudoephedrine hcl Palpitations    Sulfa (sulfonamide antibiotics) Palpitations       Current Outpatient Medications   Medication Sig    acetaminophen (TYLENOL) 500 MG tablet Take 2 tablets (1,000 mg total) by mouth every 8 (eight) hours.    albuterol (PROVENTIL/VENTOLIN HFA) 90 mcg/actuation inhaler Inhale 1 puff into the lungs.    amLODIPine (NORVASC) 5 MG tablet Take 1 tablet (5 mg total) by mouth once daily.    apixaban (ELIQUIS) 2.5 mg Tab Take 1 tablet (2.5 mg total) by mouth 2 (two) times daily. " "   senna-docusate 8.6-50 mg (PERICOLACE) 8.6-50 mg per tablet Take 1 tablet by mouth 2 (two) times daily.    sotaloL (BETAPACE) 80 MG tablet Take 1 tablet (80 mg total) by mouth 2 (two) times daily.    vitamin D (VITAMIN D3) 1000 units Tab Take 1 tablet (1,000 Units total) by mouth once daily.     No current facility-administered medications for this visit.       ROS:  GENERAL: No fever. No chills. No fatigue. Denies weight loss. Denies weight gain.  HEENT: Denies headaches. Denies vision change. Denies eye pain. Denies double vision. Denies ear pain.   CV: Denies chest pain.   PULM: Denies of shortness of breath.  GI: Denies constipation. No diarrhea. No abdominal pain. Denies nausea. Denies vomiting. No blood in stool.  HEME: Denies bleeding problems.  : Denies urgency. No painful urination. No blood in urine.  MS: Denies joint stiffness. Denies joint swelling.  Denies back pain.  SKIN: Denies rash.   NEURO: Denies seizures. No weakness.  PSYCH:  Denies difficulty sleeping. No anxiety. Denies depression. No suicidal thoughts.       VITALS:   Vitals:    02/08/24 0945   BP: (!) 141/74   Pulse: 88   Resp: 20   Temp: 98.2 °F (36.8 °C)   SpO2: 100%   Weight: 51 kg (112 lb 7 oz)   Height: 5' 2" (1.575 m)   PainSc:   5         PHYSICAL EXAM:   GENERAL: Well appearing, in no acute distress, alert and oriented x3.  PSYCH:  Mood and affect appropriate.   SKIN: Skin color, texture, turgor normal, no rashes or lesions.  HEENT:  Normocephalic, atraumatic. Cranial nerves grossly intact.  NECK: No pain to palpation over the cervical paraspinous muscles. No pain to palpation over facets. No pain with neck flexion, extension, or lateral flexion.   PULM: No evidence of respiratory difficulty, symmetric chest rise.  GI:  Non-distended  BACK: Normal range of motion. No pain to palpation over the spinous processes. No pain to palpation over facet joints. There is no pain with palpation over the sacroiliac joints bilaterally. "   EXTREMITIES: No deformities, edema, or skin discoloration.   MUSCULOSKELETAL: Right SIJ: +Samm, +FELICITA, +Compression, +Distraction. Shoulder, hip, and knee provocative maneuvers are negative. No atrophy is noted.  NEURO: Sensation is equal and appropriate bilaterally. Bilateral upper and lower extremity strength is normal and symmetric. Bilateral upper and lower extremity coordination and muscle stretch reflexes are physiologic and symmetric. Plantar response are downgoing. Straight leg raising in the supine position is negative to radicular pain.   GAIT: Antalgic with walker      LABS:      IMAGING:    EXAMINATION:  MRI LUMBAR SPINE WITHOUT CONTRAST     CLINICAL HISTORY:  Compression fracture, lumbar; Wedge compression fracture of first lumbar vertebra, initial encounter for closed fracture     TECHNIQUE:  Multiplanar, multisequence MR images were acquired from the thoracolumbar junction to the sacrum without the administration of contrast.     COMPARISON:  X-ray 10/16/2023     FINDINGS:  Alignment: Grade 1 retrolisthesis of L2 on L3.  Grade 1 anterolisthesis of L4 on L5 and L5 on S1.     Vertebrae: Approximately 50% height loss centrally of the L1 vertebral body with extensive marrow edema, suggestive of subacute compression fracture and/or instability.  3 mm osseous retropulsion into the spinal canal.  No spinal canal stenosis.  No aggressive marrow replacement process.     Discs: Prominent degenerative disc disease on the left side at L2-3, noting marked disc height loss with extensive sub endplate marrow edema/Modic 1 changes.  There is prominent right-sided degenerative disc disease at L5-S1 with mild sub endplate marrow edema/Modic 1 changes.     Cord: Normal caliber in signal.  Conus terminates at L1-L2.  Cauda equina nerve roots are unremarkable.     Degenerative findings:     T12-L1:  Osseous retropulsion with encroachment on the ventral thecal sac inferior to the T12-L1 disc space with mild spinal  canal stenosis. No neural foraminal narrowing.     L1-L2: Mild disc bulging asymmetric to the left, contributing to mild left neural foraminal narrowing.  No significant right neural foraminal narrowing or spinal canal stenosis.     L2-L3: Small disc bulge and bilateral facet arthropathy with facet joint effusions.  Mild left neural foraminal.  Mild spinal canal stenosis.     L3-L4: Circumferential disc bulge and bilateral facet arthropathy with small joint effusions.  No neural foraminal narrowing.  Mild spinal canal stenosis.     L4-L5: Circumferential disc bulge, bilateral facet arthropathy, and ligamentum flavum hypertrophy.  Mild bilateral neural foraminal narrowing.  Severe spinal canal stenosis.     L5-S1: Advanced facet joint arthropathy, noting joint hypertrophy with extensive subchondral marrow edema extending into the pedicles with surrounding inflammatory changes.  Slight/grade 1 anterolisthesis noted.  Moderate disc bulging and facet joint hypertrophy, contribute to moderate spinal canal stenosis, moderate right and mild left-sided neural foraminal narrowing.     Paraspinal muscles & soft tissues: Unremarkable.     Impression:     L1 compression fracture with significant marrow edema, suggestive of a subacute injury and/or instability.     Lumbar spondylosis, contributing to severe spinal canal stenosis at L4-5 and moderate spinal canal and neural foraminal stenosis at L5-S1, as above.     Prominent L5-S1 facet joint arthropathy, as above.     Electronically signed by resident: Magui Uriostegui  Date:                                            02/02/2024  Time:                                           13:15     Electronically signed by: Marquis Lynn MD  Date:                                            02/02/2024  Time:                                           14:28    ASSESSMENT: 89 y.o. year old female with pain, consistent with:    Encounter Diagnoses   Name Primary?    Closed compression fracture of  body of L1 vertebra     Sciatica of right side     Age-related osteoporosis with current pathological fracture with routine healing, subsequent encounter Yes    SI joint arthritis        DISCUSSION: Cathy Kearns is a retired  who comes to us from Dr. Xiong. She had a fall in July, 2023 complicated by a left hip intertrochanteric fracture. She presents to us with mid-back pain with sitting and right buttock pain with standing. MRI shows an acute L1 compression fracture, significant DDD at L2/3 and L5/S1 with Modic changes, severe canal stenosis at L4/5, significant facet arthropathy/edema at L5/S1.  Overall, I think her pain is multifactorial. However, her right leg is now longer than the left, which is likely irritating the SIJ. Exam shows pain reproduced with testing of right SI joint.       PLAN:  Encouraged patient to continue PT and HEP  Discussed her Osteoporosis, she has been seen in the fracture clinic and declined anabolic injection therapy  Discussed the risk of additional fractures and to follow up with us if back pain worsens  She declines any surgery, as I would at 89  Advise patient to use her walker when ambulating to prevent further falls  Schedule right SIJ injection to assist with her buttock pain  I will follow up with her and monitor L1 closely and address as needed. At this time she really has minimal symptoms and doesn't want much intervention      I would like to thank Wilma Xiong MD for the opportunity to assist in the care of this patient. We had a very nice visit and I look forward to continuing their care. Please let me know if I can be of further assistance.     Roxann Shah  02/08/2024

## 2024-02-09 ENCOUNTER — TELEPHONE (OUTPATIENT)
Dept: PAIN MEDICINE | Facility: CLINIC | Age: 89
End: 2024-02-09
Payer: MEDICARE

## 2024-02-09 NOTE — TELEPHONE ENCOUNTER
Pt has an upcoming procedure she needs rescheduled. Pt message is being forwarded to the procedure dept for rescheduling.    ----- Message from Radha Walker sent at 2/9/2024 10:23 AM CST -----  Type:  Appointment Request    Name of Caller:KAROLINA SINGH [[id number]]  When is the first available appointment?No access  Symptoms:injection   Would the patient rather a call back or a response via [facility]? Call back   Best Call Back Number: [phone]  Additional Information: Patient idnicates she is scheduled for an appointment for 03/12 and needs to reschedule for a different date. patient indicates she has a conflicting appointment and needs to push it put for the next available. patient idnciates she woudl like a call back with further assistance.

## 2024-02-09 NOTE — TELEPHONE ENCOUNTER
----- Message from Radha Walker sent at 2/9/2024 10:23 AM CST -----  Type:  Appointment Request    Name of Caller:KAROLINA SINGH [[id number]]  When is the first available appointment?No access  Symptoms:injection   Would the patient rather a call back or a response via [facility]? Call back   Best Call Back Number: [phone]  Additional Information: Patient idnicates she is scheduled for an appointment for 03/12 and needs to reschedule for a different date. patient indicates she has a conflicting appointment and needs to push it put for the next available. patient idnciates she woudl like a call back with further assistance.

## 2024-02-12 ENCOUNTER — PATIENT MESSAGE (OUTPATIENT)
Dept: PAIN MEDICINE | Facility: OTHER | Age: 89
End: 2024-02-12
Payer: MEDICARE

## 2024-02-20 DIAGNOSIS — I48.0 PAROXYSMAL ATRIAL FIBRILLATION: ICD-10-CM

## 2024-02-20 DIAGNOSIS — I10 HYPERTENSION, UNSPECIFIED TYPE: ICD-10-CM

## 2024-02-20 RX ORDER — AMLODIPINE BESYLATE 5 MG/1
5 TABLET ORAL DAILY
Qty: 90 TABLET | Refills: 3 | Status: SHIPPED | OUTPATIENT
Start: 2024-02-20 | End: 2024-02-27 | Stop reason: SDUPTHER

## 2024-02-20 RX ORDER — SOTALOL HYDROCHLORIDE 80 MG/1
80 TABLET ORAL 2 TIMES DAILY
Qty: 180 TABLET | Refills: 1 | Status: SHIPPED | OUTPATIENT
Start: 2024-02-20 | End: 2024-02-27 | Stop reason: SDUPTHER

## 2024-02-20 NOTE — TELEPHONE ENCOUNTER
No care due was identified.  [facility] Embedded Care Due Messages. Reference number: [id number].   2/20/2024 10:38:34 AM CST

## 2024-02-20 NOTE — TELEPHONE ENCOUNTER
Refill Routing Note   Medication(s) are not appropriate for processing by [facility] for the following reason(s):        Required vitals abnormal  Outside of protocol    [facility] action(s):  Defer  Route               Appointments  past 12m or future 3m with PCP    Date Provider   Last Visit   1/10/2024 Wilma Xiong MD   Next Visit   4/10/2024 Wilma Xiong MD   ED visits in past 90 days: 0        Note composed:1:15 PM 02/20/2024

## 2024-02-20 NOTE — TELEPHONE ENCOUNTER
----- Message from Jennifer Grey sent at 2/20/2024 10:29 AM CST -----  Regarding: New Pharmacy  Type: RX Refill Request    Who Called: pp/t     Pharmacy Name: [email or web address] - [address]   Phone: [phone]  Fax: [phone]      RX Name and Strength:amLODIPine (NORVASC) 5 MG tablet, sotaloL (BETAPACE) 80 MG tablet, apixaban (ELIQUIS) 2.5 mg Tab    Additional Notes: Pt wants to know does she need a prescription for Vitamin D3 1000 unit tablet or can she just get it from the drug store. [phone]

## 2024-02-23 DIAGNOSIS — I48.0 PAROXYSMAL ATRIAL FIBRILLATION: ICD-10-CM

## 2024-02-23 NOTE — TELEPHONE ENCOUNTER
----- Message from Annita Kemp sent at 2/23/2024  8:09 AM CST -----  Regarding: refill  Name of caller:pt       What is the requesting detail: pt is requesting a refill for apixaban (ELIQUIS) 2.5 mg Tab      [facility], [facility]. [address]        Can the clinic reply by [facility]:       What number to call back:[phone]

## 2024-02-26 ENCOUNTER — OFFICE VISIT (OUTPATIENT)
Dept: NEUROSURGERY | Facility: CLINIC | Age: 89
End: 2024-02-26
Payer: MEDICARE

## 2024-02-26 VITALS — TEMPERATURE: 55 F | SYSTOLIC BLOOD PRESSURE: 136 MMHG | DIASTOLIC BLOOD PRESSURE: 58 MMHG

## 2024-02-26 DIAGNOSIS — S32.010A CLOSED COMPRESSION FRACTURE OF BODY OF L1 VERTEBRA: ICD-10-CM

## 2024-02-26 DIAGNOSIS — M54.30 SCIATICA, UNSPECIFIED LATERALITY: ICD-10-CM

## 2024-02-26 PROCEDURE — 99999 PR PBB SHADOW E&M-EST. PATIENT-LVL III: CPT | Mod: PBBFAC,,, | Performed by: NEUROLOGICAL SURGERY

## 2024-02-26 PROCEDURE — 99213 OFFICE O/P EST LOW 20 MIN: CPT | Mod: PBBFAC | Performed by: NEUROLOGICAL SURGERY

## 2024-02-26 PROCEDURE — 99205 OFFICE O/P NEW HI 60 MIN: CPT | Mod: S$PBB,,, | Performed by: NEUROLOGICAL SURGERY

## 2024-02-26 NOTE — PROGRESS NOTES
Neurosurgery  History & Physical    SUBJECTIVE:     Chief Complaint:  Compression fracture, radiculopathy    History of Present Illness:  89-year-old female, with a history of hypertension, atrial fibrillation, hyperlipidemia osteopenia, arthritis.  Patient underwent a fall back on July 2023 where she was noted to have a left hip fracture.  At that particular time she was also diagnosed with a L1 compression fracture.  She currently complains of pain in her right hip, since the fall which radiates down the lateral aspect of her right thigh anteriorly and stops at the knee.  She says she occasionally has some discomfort which protrudes down to the leg as well.  Currently she notes that her discomfort is primarily in her right side of her back, buttocks, hip, into the lateral anterior portion of the right thigh.  Denies any new bowel or bladder incontinence.  She denies any new gait abnormalities.  She has currently been using a walker since her left hip fracture back in July 2023.  She denies any new bowel or bladder incontinence, however she notes some element of age-related stress incontinence which has improved with coffee entire chocolate.  She also notes that she is intermittent constipation, is on stool softeners as well as prunes as well.    Review of patient's allergies indicates:   Allergen Reactions    Penicillins Swelling    Astelin [azelastine] Other (See Comments)     Dizziness    Pseudoephedrine hcl Palpitations    Sulfa (sulfonamide antibiotics) Palpitations       Current Outpatient Medications   Medication Sig Dispense Refill    amLODIPine (NORVASC) 5 MG tablet Take 1 tablet (5 mg total) by mouth once daily. 90 tablet 3    apixaban (ELIQUIS) 2.5 mg Tab Take 1 tablet (2.5 mg total) by mouth 2 (two) times daily. 180 tablet 3    sotaloL (BETAPACE) 80 MG tablet Take 1 tablet (80 mg total) by mouth 2 (two) times daily. 180 tablet 1    vitamin D (VITAMIN D3) 1000 units Tab Take 1 tablet (1,000 Units  total) by mouth once daily.      acetaminophen (TYLENOL) 500 MG tablet Take 2 tablets (1,000 mg total) by mouth every 8 (eight) hours. (Patient not taking: Reported on 2/26/2024)  0    albuterol (PROVENTIL/VENTOLIN HFA) 90 mcg/actuation inhaler Inhale 1 puff into the lungs.      senna-docusate 8.6-50 mg (PERICOLACE) 8.6-50 mg per tablet Take 1 tablet by mouth 2 (two) times daily. (Patient not taking: Reported on 2/26/2024)       No current facility-administered medications for this visit.       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 7/9/2012    Vasovagal syncope      Past Surgical History:   Procedure Laterality Date    ADENOIDECTOMY      APPENDECTOMY      CATARACT EXTRACTION W/  INTRAOCULAR LENS IMPLANT Bilateral n/a    HYSTERECTOMY      2/2 uterine CA, removed ovaries, no longer f/u with gyn    INTRAMEDULLARY RODDING OF FEMUR Left 7/25/2023    Procedure: INSERTION, INTRAMEDULLARY DAPHNEY, LEFT FEMUR;  Surgeon: Rogerio Chand MD;  Location: [facility] OR [address] [place] [address];  Service: Orthopedics;  Laterality: Left;    Intravitreal Injection      Avastin od x's 12 dltx 2-16-12    TONSILLECTOMY       Family History       Problem Relation (Age of Onset)    Cancer Mother, Father, Maternal Grandmother, Paternal Grandmother, Paternal Grandfather, Daughter, Brother, Brother    Cataracts Father    Heart attack Maternal Grandfather, Brother    Hyperlipidemia Daughter, Son    Hypertension Daughter, Son    Strabismus Paternal Aunt    Thyroid disease Grandchild          Social History     Socioeconomic History    Marital status:    Tobacco Use    Smoking status: Former     Current packs/day: 0.25      Average packs/day: 0.3 packs/day for 15.0 years (3.8 ttl pk-yrs)     Types: Cigarettes    Smokeless tobacco: Never   Substance and Sexual Activity    Alcohol use: Yes     Comment: 1-2 drinks at night    Drug use: No   Other Topics Concern    Are you pregnant or think you may be? 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     Social Determinants of Health     Financial Resource Strain: Low Risk  (7/26/2023)    Overall Financial Resource Strain (CARDIA)     Difficulty of Paying Living Expenses: Not hard at all   Food Insecurity: No Food Insecurity (7/26/2023)    Hunger Vital Sign     Worried About Running Out of Food in the Last Year: Never true     Ran Out of Food in the Last Year: Never true   Transportation Needs: No Transportation Needs (7/26/2023)    PRAPARE - Transportation     Lack of Transportation (Medical): No     Lack of Transportation (Non-Medical): No   Physical Activity: Inactive (7/26/2023)    Exercise Vital Sign     Days of Exercise per Week: 0 days     Minutes of Exercise per Session: 0 min   Stress: No Stress Concern Present (7/26/2023)    [employer] [place] [employer] - Occupational Stress Questionnaire     Feeling of Stress : Not at all   Social Connections: Moderately Integrated (7/26/2023)    Social Connection and Isolation Panel [NHANES]     Frequency of Communication with Friends and Family: More than three times a week     Frequency of Social Gatherings with Friends and Family: More than three times a week     Attends Jehovah's witness Services: More than 4 times per year     Active Member of Clubs or Organizations: Yes     Attends Club or Organization Meetings: More than 4 times per year     Marital Status:    Housing Stability: Low Risk  (7/26/2023)    Housing Stability Vital  Sign     Unable to Pay for Housing in the Last Year: No     Number of Places Lived in the Last Year: 1     Unstable Housing in the Last Year: No       Review of Systems    OBJECTIVE:     Vital Signs  Temp: (!) 55 °F (12.8 °C)  BP: (!) 136/58  Pain Score:   5  There is no height or weight on file to calculate BMI.      Neurosurgery Physical Exam  General: no acute distress  Head: Non-traumatic, normocephalic  Eyes: Pupils equal, EOMI  Neck: Supple, normal ROM, no tenderness to palpation  CVS: Normal rate and rhythm, distal pulses present  Pulm: Symmetric expansion, no respiratory distress  GI: Abdomen nondistended, nontender    MSK: Moves all extremities without restriction, atraumatic  Skin: Dry, intact  Psych: Normal thought content and cognition    Neuro:  Alert, awake, oriented, to self, place, time  Language:  Speech is fluent, goal directed without any noted dysarthria or aphasia    Cranial nerves:    CNII-XII: Intact on fine exam,   Pupils equal round react to light,   Extraocular muscles are intact  V1 to V3 is intact to light touch,   no facial asymmetry,   Hearing is intact to finger rub and voice  tongue/uvula/palate midline,   shoulder shrug equal,     No pronator drift    Extremities:  Motor:  Upper Extremity    Deltoid Triceps Biceps Wrist  Extension Wrist  Flexion Interosseous   R 5/5 5/5 5/5 5/5 5/5 5/5   L 5/5 5/5 5/5 5/5 5/5 5/5       Thumb   Abduction Thumb  ADDuction Finger  Flexion Finger  Extension     R 5/5 5/5 5/5 5/5     L 5/5 5/5 5/5 5/5        Lower Extremity     Iliopsoas Quadriceps Hamstring Plantarflexion Dorsiflexion EHL   R 5/5 5/5 5/5 5/5 5/5 5/5   L 5/5 5/5 5/5 5/5 5/5 5/5       Reflexes:     DTR: 2+ biceps    2+ triceps   2+ brachioradialis   2+ patellar  2+ Achilles     Leon's: Negative     Babinski's: Negative     Clonus: Negative     Sensory:      Sensation intact to light touch, temperature sensation, vibration, pinprick     Coordination:      Coordination intact throughout,  no dysmetria, normal rapid alternating movements, no dysdiadochokinesia  Cerebellar:  Normal finger-to-nose, normal heel-to-shin  Cervical spine:  No midline cervical tenderness, negative Lhermitte, negative Spurling  Thoracic spine:  No midline thoracic tenderness  Lumbar spine:  No midline lumbar tenderness     Diagnostic Results:  I personally reviewed patient's Diagnostic Imaging.  Patient has an L1 compression fracture with some anterior height loss, minimal retropulsion into the canal.  No significant canal stenosis.  There are multilevel degenerative changes in lumbar spine, most pronounced at L2/L3, with some disc height loss and some sclerosis of the endplates of L2 and L3.  There is some grade 1 anterolisthesis of L4 on L5.  With relatively well-maintained disc height, some element of neuroforaminal stenosis bilaterally right more pronounced than the left.      ASSESSMENT/PLAN:     89-year-old female, L1 compression fracture, L5 and S1 radiculopathy.    1. Patient with some provocative pain in the right hip with manipulation.  No midline thoracolumbar tenderness.  No focal motor or sensory deficits     2.Patient has an L1 compression fracture with some anterior height loss, minimal retropulsion into the canal.  No significant canal stenosis.  There are multilevel degenerative changes in lumbar spine, most pronounced at L2/L3, with some disc height loss and some sclerosis of the endplates of L2 and L3.  There is some grade 1 anterolisthesis of L4 on L5.  With relatively well-maintained disc height, some element of neuroforaminal stenosis bilaterally right more pronounced than the left.    3. Had long discussion with the patient.  Patient is already scheduled for epidural steroid injections, would continue to proceed with that.  Given the objective findings of the right hip concern for potential sacroiliac disease if the injections are not fruitful or useful, may be right-sided sacroiliac junction injections  for sacroiliitis may be warranted as well.  We will follow-up as needed.  Answered all patient's questions her satisfaction.      Thank you so very much for allowing me to participate in the care of this patient.  Please feel free to call any questions, comments, concerns.    Stephanie Johnston MD,MSc  Department of Neurosurgery   Department of Radiology  Department of Neurology  [facility]    [facility]   [employer] [facility] [employer] [facility] [employer] [facility]    Total time spent in counseling and discussion about further management options including relevant lab work, treatment,  prognosis, medications and intended side effects was more than 60 minutes. More than 50 % of the time was spent in counseling and coordination of care.  I spent a total of 60 minutes on the day of the visit.This includes face to face time and non-face to face time preparing to see the patient (eg, review of tests), Obtaining and/or reviewing separately obtained history, Documenting clinical information in the electronic or other health record, Independently interpreting resultsand communicating results to the patient/family/caregiver, or Care coordination         Note dictated with voice recognition software, please excuse any grammatical errors.

## 2024-02-27 DIAGNOSIS — I48.0 PAROXYSMAL ATRIAL FIBRILLATION: ICD-10-CM

## 2024-02-27 DIAGNOSIS — I10 HYPERTENSION, UNSPECIFIED TYPE: ICD-10-CM

## 2024-02-27 RX ORDER — SOTALOL HYDROCHLORIDE 80 MG/1
80 TABLET ORAL 2 TIMES DAILY
Qty: 180 TABLET | Refills: 3 | Status: SHIPPED | OUTPATIENT
Start: 2024-02-27

## 2024-02-27 RX ORDER — AMLODIPINE BESYLATE 5 MG/1
5 TABLET ORAL DAILY
Qty: 90 TABLET | Refills: 3 | Status: SHIPPED | OUTPATIENT
Start: 2024-02-27

## 2024-03-06 ENCOUNTER — TELEPHONE (OUTPATIENT)
Dept: PAIN MEDICINE | Facility: CLINIC | Age: 89
End: 2024-03-06
Payer: MEDICARE

## 2024-03-06 NOTE — TELEPHONE ENCOUNTER
----- Message from Kevinjosh Huddlestonond Hernesto sent at 3/6/2024  2:58 PM CST -----  Regarding: Cathy  Type:Patient Callback     Who called: Cathy     What is the request in detail: Pt stated that she did not get any instruction about her appointment. Please reach out to the patient.     Can the clinic reply by [facility]? No     Would the patient rather a call back or a response via [facility]? Callback     Best call back number: [phone]      Additional Information:

## 2024-03-07 ENCOUNTER — PATIENT MESSAGE (OUTPATIENT)
Dept: PAIN MEDICINE | Facility: OTHER | Age: 89
End: 2024-03-07
Payer: MEDICARE

## 2024-03-12 ENCOUNTER — PROCEDURE VISIT (OUTPATIENT)
Dept: OPHTHALMOLOGY | Facility: CLINIC | Age: 89
End: 2024-03-12
Payer: MEDICARE

## 2024-03-12 ENCOUNTER — PATIENT MESSAGE (OUTPATIENT)
Dept: ADMINISTRATIVE | Facility: OTHER | Age: 89
End: 2024-03-12
Payer: MEDICARE

## 2024-03-12 DIAGNOSIS — H35.3124 NONEXUDATIVE AGE-RELATED MACULAR DEGENERATION, LEFT EYE, ADVANCED ATROPHIC WITH SUBFOVEAL INVOLVEMENT: ICD-10-CM

## 2024-03-12 DIAGNOSIS — H35.3211 EXUDATIVE AGE-RELATED MACULAR DEGENERATION OF RIGHT EYE WITH ACTIVE CHOROIDAL NEOVASCULARIZATION: Primary | ICD-10-CM

## 2024-03-12 DIAGNOSIS — H35.721 SEROUS DETACHMENT OF RETINAL PIGMENT EPITHELIUM OF RIGHT EYE: ICD-10-CM

## 2024-03-12 PROCEDURE — 67028 INJECTION EYE DRUG: CPT | Mod: S$PBB,RT,, | Performed by: OPHTHALMOLOGY

## 2024-03-12 PROCEDURE — 99999PBSHW PR PBB SHADOW TECHNICAL ONLY FILED TO HB: Mod: JZ,PBBFAC,,

## 2024-03-12 PROCEDURE — 92012 INTRM OPH EXAM EST PATIENT: CPT | Mod: 25,S$PBB,, | Performed by: OPHTHALMOLOGY

## 2024-03-12 PROCEDURE — 92134 CPTRZ OPH DX IMG PST SGM RTA: CPT | Mod: PBBFAC | Performed by: OPHTHALMOLOGY

## 2024-03-12 PROCEDURE — 67028 INJECTION EYE DRUG: CPT | Mod: PBBFAC,RT | Performed by: OPHTHALMOLOGY

## 2024-03-12 RX ADMIN — FARICIMAB 6 MG: 6 INJECTION, SOLUTION INTRAVITREAL at 11:03

## 2024-03-12 NOTE — PROGRESS NOTES
HPI     VASBYSMO   INJECTION  OD      2 MONTH  FOLLOW UP      Additional comments: INJECTION   OCT   BLURRED VA   TRACE IRF            Comments    Vision stable, hasn't been reading as often, no floaters/flashes.    DLS  01/16/24              OCT -OD -  stable trace IRF - improved  OS - Drusen, no SRF - small HE - ? RAP lesion       Assessment:    1. 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 PVD OU    4. Pseudophakia - great result    5. Allergic conjunctivitis/sinusitis - try OTC antihistamines  Recent trouble with sinusitis - pain behind eye improved with antihistamines  Ok for Zaditor BID - has improved with topical tx    6. AFib - considering anticoagulation - ok to proceed  On Anti-VEGF therapy, risk of large macular hemorrhage is minimal.         Plan:      2   months OCT and dilate (last DFE  11/23)    Risks, benefits, and alternatives to treatment discussed in detail with the patient.  The patient voiced understanding and wished to proceed with the procedure    Injection Procedure Note:  Diagnosis: Wet AMD OD    Patient Identified and Time Out complete  Topical Proparacaine and Betadine. Conj prep only  Inject Vabysmo OD at 6:00 @ 3.5-4mm posterior to limbus  Post Operative Dx: Same  Complications: None  Follow up as above.

## 2024-03-15 ENCOUNTER — TELEPHONE (OUTPATIENT)
Dept: PAIN MEDICINE | Facility: CLINIC | Age: 89
End: 2024-03-15
Payer: MEDICARE

## 2024-03-15 ENCOUNTER — TELEPHONE (OUTPATIENT)
Dept: PAIN MEDICINE | Facility: OTHER | Age: 89
End: 2024-03-15
Payer: MEDICARE

## 2024-03-15 NOTE — TELEPHONE ENCOUNTER
----- Message from KYAW Mandujano MD sent at 3/15/2024  1:39 PM CDT -----  Regarding: RE: Requesting Clearance for Procedure  Not a problem.  Good to go     Alec  ----- Message -----  From: Ernestina Amador LPN  Sent: 3/15/2024   7:45 AM CDT  To: KYAW Mandujano MD  Subject: Requesting Clearance for Procedure               Good morning,    Patient will be scheduled to have a procedure with Dr. Casey on 3/19, Right Sacroiliac Joint Injection . Staff is requesting Ophthalmology clearance to move forward with procedure due to eye injection on 3/12. Please advise.    Thank you,  Ernestina

## 2024-03-15 NOTE — TELEPHONE ENCOUNTER
----- Message from Emmy Gutierrez sent at 3/15/2024  2:28 PM CDT -----      Name of Who is Calling: KAROLINA SINGH [[id number]]      What is the request in detail: Pt called to give information Drug injected to pt eye is Vabysma.Please contact to further discuss and advise.          Can the clinic reply by [facility]: Y      What Number to Call Back if not in [facility]:  [phone]

## 2024-03-15 NOTE — TELEPHONE ENCOUNTER
Staff spoke with pt in regards to her message. Pt states the medication the eye dr injected into her eye  is called Ritchie.    ----- Message from Emilie Fox MA sent at 3/15/2024  2:10 PM CDT -----  Name of Who is Calling:KAROLINA SINGH [[id number]]                What is the request in detail: Pt states that she left a message with Dr Mandujano office concerning eye medication no answer or response, but earlier visit medication was injected into eyeball. Pt does not know the name of the medication. Please assist.              Can the clinic reply by [facility]: No                What Number to Call Back if not in [facility]: [phone]

## 2024-03-15 NOTE — TELEPHONE ENCOUNTER
----- Message from Emilie Fox MA sent at 3/15/2024  2:10 PM CDT -----  Name of Who is Calling:KAROLINA SINGH [[id number]]                What is the request in detail: Pt states that she left a message with Dr Mandujano office concerning eye medication no answer or response, but earlier visit medication was injected into eyeball. Pt does not know the name of the medication. Please assist.              Can the clinic reply by [facility]: No                What Number to Call Back if not in [facility]: [phone]

## 2024-03-15 NOTE — TELEPHONE ENCOUNTER
Staff spoke with pt. See previous message.    ----- Message from Emmy Gutierrez sent at 3/15/2024  2:28 PM CDT -----      Name of Who is Calling: KAROLINA SINGH [[id number]]      What is the request in detail: Pt called to give information Drug injected to pt eye is Vabysma.Please contact to further discuss and advise.          Can the clinic reply by [facility]: Y      What Number to Call Back if not in [facility]:  [phone]

## 2024-03-19 ENCOUNTER — HOSPITAL ENCOUNTER (OUTPATIENT)
Facility: OTHER | Age: 89
Discharge: HOME | End: 2024-03-19
Attending: ANESTHESIOLOGY | Admitting: ANESTHESIOLOGY
Payer: MEDICARE

## 2024-03-19 VITALS
WEIGHT: 115 LBS | HEART RATE: 62 BPM | DIASTOLIC BLOOD PRESSURE: 69 MMHG | TEMPERATURE: 98 F | BODY MASS INDEX: 21.16 KG/M2 | SYSTOLIC BLOOD PRESSURE: 162 MMHG | OXYGEN SATURATION: 98 % | RESPIRATION RATE: 16 BRPM | HEIGHT: 62 IN

## 2024-03-19 DIAGNOSIS — G89.29 CHRONIC PAIN: ICD-10-CM

## 2024-03-19 DIAGNOSIS — M46.1 SACROILIITIS: Primary | ICD-10-CM

## 2024-03-19 PROCEDURE — 25000003 PHARM REV CODE 250: Performed by: ANESTHESIOLOGY

## 2024-03-19 PROCEDURE — 25500020 PHARM REV CODE 255: Performed by: ANESTHESIOLOGY

## 2024-03-19 PROCEDURE — 27096 INJECT SACROILIAC JOINT: CPT | Mod: RT,,, | Performed by: ANESTHESIOLOGY

## 2024-03-19 PROCEDURE — 63600175 PHARM REV CODE 636 W HCPCS: Performed by: ANESTHESIOLOGY

## 2024-03-19 PROCEDURE — 27096 INJECT SACROILIAC JOINT: CPT | Mod: RT | Performed by: ANESTHESIOLOGY

## 2024-03-19 RX ORDER — TRIAMCINOLONE ACETONIDE 40 MG/ML
INJECTION, SUSPENSION INTRA-ARTICULAR; INTRAMUSCULAR
Status: DISCONTINUED | OUTPATIENT
Start: 2024-03-19 | End: 2024-03-19 | Stop reason: HOSPADM

## 2024-03-19 RX ORDER — LIDOCAINE HYDROCHLORIDE 20 MG/ML
INJECTION, SOLUTION INFILTRATION; PERINEURAL
Status: DISCONTINUED | OUTPATIENT
Start: 2024-03-19 | End: 2024-03-19 | Stop reason: HOSPADM

## 2024-03-19 RX ORDER — BUPIVACAINE HYDROCHLORIDE 2.5 MG/ML
INJECTION, SOLUTION EPIDURAL; INFILTRATION; INTRACAUDAL
Status: DISCONTINUED | OUTPATIENT
Start: 2024-03-19 | End: 2024-03-19 | Stop reason: HOSPADM

## 2024-03-19 RX ORDER — SODIUM CHLORIDE 9 MG/ML
INJECTION, SOLUTION INTRAVENOUS CONTINUOUS
Status: DISCONTINUED | OUTPATIENT
Start: 2024-03-19 | End: 2024-03-19 | Stop reason: HOSPADM

## 2024-03-19 NOTE — DISCHARGE SUMMARY
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  Refills: 0      albuterol (PROVENTIL/VENTOLIN HFA) 90 mcg/actuation inhaler Inhale 1 puff into the lungs.      amLODIPine (NORVASC) 5 MG tablet Take 1 tablet (5 mg total) by mouth once daily.  Qty: 90 tablet, Refills: 3    Comments: .  Associated Diagnoses: Hypertension, unspecified type      apixaban (ELIQUIS) 2.5 mg Tab Take 1 tablet (2.5 mg total) by mouth 2 (two) times daily.  Qty: 180 tablet, Refills: 3    Associated Diagnoses: Paroxysmal atrial fibrillation      senna-docusate 8.6-50 mg (PERICOLACE) 8.6-50 mg per tablet Take 1 tablet by mouth 2 (two) times daily.      sotaloL (BETAPACE) 80 MG tablet Take 1 tablet (80 mg total) by mouth 2 (two) times daily.  Qty: 180 tablet, Refills: 3    Associated Diagnoses: Paroxysmal atrial fibrillation      vitamin D (VITAMIN D3) 1000 units Tab Take 1 tablet (1,000 Units total) by mouth once daily.                 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  Activity: as per instruction sheet.  Discharge to: Home with a responsible adult.  Follow up: 2-4 weeks       Please call my office or pager at [phone] if experienced any weakness or loss of sensation, fever > 101.5, pain uncontrolled with oral medications, persistent nausea/vomiting/or diarrhea, redness or  drainage from the incisions, or any other worrisome concerns. If physician on call was not reached or could not communicate with our office for any reason please go to the nearest emergency department      Lata Casey  03/19/2024

## 2024-03-19 NOTE — OP NOTE
Sacroiliac Joint Injection under Fluoroscopic Guidance    The procedure, risks, benefits, and options were discussed with the patient. There are no contraindications to the procedure. The patent expressed understanding and agreed to the procedure. Informed written consent was obtained prior to the start of the procedure and can be found in the patient's chart.    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 With the patient laying in a prone position, the surgical area was prepped and draped in the usual sterile fashion using ChloraPrep and a fenestrated drape. The sacroiliac joint was determined under fluoroscopy guidance. Skin anesthesia was achieved by injecting Lidocaine 2% over the injection site. The sacroiliac joint was  then approached with a 22 gauge, 3.5 inch spinal quinke needle that was introduced under fluoroscopic guidance in the AP and Lateral views. Once the needle tip was in the area of the joint, and there was no blood, contrast dye Omnipaque (300mg/mL) was injected to confirm placement and there was no vascular runoff. Fluoroscopic imaging in the AP and lateral views revealed a clear outline of the joint space. 4 mL of the medication mixture listed above was injected slowly intraarticular and osorio-articular. Displacement of the radio opaque contrast after injection of the medication confirmed that the medication went into the area of the joint. The needles were removed and  bleeding was nil.  A sterile dressing was applied. No specimens collected. The patient tolerated the procedure well.       The patient was monitored after the procedure in the recovery area. They were given post-procedure and discharge instructions to follow at home. The patient was discharged in a stable condition.      Lata Casey MD

## 2024-03-19 NOTE — DISCHARGE INSTRUCTIONS

## 2024-03-19 NOTE — H&P
"HPI  Patient presenting for Procedure(s) (LRB):  INJECTION,SACROILIAC JOINT RIGHT *OPTHALMOLOGY CLEARANCE IN CHART* (Right)     Patient on Anti-coagulation No    No health changes since previous encounter    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 7/9/2012    Vasovagal syncope      Past Surgical History:   Procedure Laterality Date    ADENOIDECTOMY      APPENDECTOMY      CATARACT EXTRACTION W/  INTRAOCULAR LENS IMPLANT Bilateral n/a    HYSTERECTOMY      2/2 uterine CA, removed ovaries, no longer f/u with gyn    INTRAMEDULLARY RODDING OF FEMUR Left 7/25/2023    Procedure: INSERTION, INTRAMEDULLARY DAPHNEY, LEFT FEMUR;  Surgeon: Rogerio Chand MD;  Location: [facility] OR [address] [place] [address];  Service: Orthopedics;  Laterality: Left;    Intravitreal Injection      Avastin od x's 12 dltx 2-16-12    TONSILLECTOMY       Review of patient's allergies indicates:   Allergen Reactions    Penicillins Swelling    Astelin [azelastine] Other (See Comments)     Dizziness    Pseudoephedrine hcl Palpitations    Sulfa (sulfonamide antibiotics) Palpitations      Current Facility-Administered Medications   Medication    0.9%  NaCl infusion    BUPivacaine (PF) 0.25% (2.5 mg/ml) injection    iohexoL (OMNIPAQUE 300) injection    LIDOcaine HCL 20 mg/ml (2%) injection    triamcinolone acetonide injection       PMHx, PSHx, Allergies, Medications reviewed in epic    ROS negative except pain complaints in HPI    OBJECTIVE:    BP (!) 149/67 (BP Location: Right arm, Patient Position: Lying)   Pulse (!) 59   Temp 97.5 °F (36.4 °C) (Oral)   Resp 18   Ht 5' 2" (1.575 m)   Wt 52.2 kg (115 lb)   SpO2 99%   BMI 21.03 kg/m² "     PHYSICAL EXAMINATION:    GENERAL: Well appearing, in no acute distress, alert and oriented x3.  PSYCH:  Mood and affect appropriate.  SKIN: Skin color, texture, turgor normal, no rashes or lesions which will impact the procedure.  CV: RRR with palpation of the radial artery.  PULM: No evidence of respiratory difficulty, symmetric chest rise. Clear to auscultation.  NEURO: Cranial nerves grossly intact.    Plan:    Proceed with procedure as planned Procedure(s) (LRB):  INJECTION,SACROILIAC JOINT RIGHT *OPTHALMOLOGY CLEARANCE IN CHART* (Right)    Rishabh Cohen M.D.  PGY-5  Interventional Pain Management Fellow  [facility]  Pager: [phone]    03/19/2024

## 2024-03-26 ENCOUNTER — OFFICE VISIT (OUTPATIENT)
Dept: PAIN MEDICINE | Facility: CLINIC | Age: 89
End: 2024-03-26
Payer: MEDICARE

## 2024-03-26 VITALS
HEART RATE: 59 BPM | BODY MASS INDEX: 20.77 KG/M2 | SYSTOLIC BLOOD PRESSURE: 143 MMHG | TEMPERATURE: 97 F | WEIGHT: 113.56 LBS | DIASTOLIC BLOOD PRESSURE: 73 MMHG

## 2024-03-26 DIAGNOSIS — M46.1 SACROILIITIS: Primary | ICD-10-CM

## 2024-03-26 DIAGNOSIS — M47.818 SI JOINT ARTHRITIS: ICD-10-CM

## 2024-03-26 DIAGNOSIS — G89.4 CHRONIC PAIN SYNDROME: ICD-10-CM

## 2024-03-26 DIAGNOSIS — S32.010A CLOSED COMPRESSION FRACTURE OF BODY OF L1 VERTEBRA: ICD-10-CM

## 2024-03-26 PROCEDURE — 99999 PR PBB SHADOW E&M-EST. PATIENT-LVL III: CPT | Mod: PBBFAC,,, | Performed by: NURSE PRACTITIONER

## 2024-03-26 PROCEDURE — 99213 OFFICE O/P EST LOW 20 MIN: CPT | Mod: PBBFAC | Performed by: NURSE PRACTITIONER

## 2024-03-26 PROCEDURE — 99213 OFFICE O/P EST LOW 20 MIN: CPT | Mod: S$PBB,,, | Performed by: NURSE PRACTITIONER

## 2024-03-26 NOTE — PROGRESS NOTES
PCP: Wilma Xiong MD    REFERRING PHYSICIAN: No ref. provider found    CHIEF COMPLAINT: Low back pain    Original HISTORY OF PRESENT ILLNESS: Cathy Kearns presents to the clinic for the evaluation of the above pain. The pain started 5 months ago    Original Pain Description:  The pain is located in the low back and is radiating to the right foot . The pain is described as dull. Exacerbating factors: Sitting. Mitigating factors walking and standing. Symptoms interfere with daily activity. The patient feels like symptoms have been unchanged. Patient denies significant motor weakness and loss of sensations. Ambulates with walker. Patient states that she currently lives in a nursing home and is very active.     Original PAIN SCORES:  Best: Pain is 0  Worst: Pain is 6  Current: Pain is 0        3/26/2024    10:38 AM   Last 3 PDI Scores   Pain Disability Index (PDI) 35       INTERVAL HISTORY: (Newest visit at the bottom)   Interval History (Date):     Interval History 3/26/2024:  The patient is here for follow up of back and buttock pain. She is s/p right SI joint injection with 80% relief. She has very minimal pain to this area now. She does have right lower leg pain and numbness. She says this is mild at this time. Her pain today is 5/10.    6 weeks of Conservative therapy:  PT: Yes (October 2023-February 2024)  Chiro: No  HEP: Yes      Treatments / Medications: (Ice/Heat/NSAIDS/APAP/etc):  - Tylenol      Interventional Pain Procedures: (Previous injections)  3/19/24    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 7/9/2012    Vasovagal syncope      Past Surgical History:    Procedure Laterality Date    ADENOIDECTOMY      APPENDECTOMY      CATARACT EXTRACTION W/  INTRAOCULAR LENS IMPLANT Bilateral n/a    HYSTERECTOMY      2/2 uterine CA, removed ovaries, no longer f/u with gyn    INJECTION, SACROILIAC JOINT Right 3/19/2024    Procedure: INJECTION,SACROILIAC JOINT RIGHT *OPTHALMOLOGY CLEARANCE IN CHART*;  Surgeon: Lata Casey MD;  Location: [facility];  Service: Pain Management;  Laterality: Right;  [phone]  2 WK F/U CRAIG    INTRAMEDULLARY RODDING OF FEMUR Left 7/25/2023    Procedure: INSERTION, INTRAMEDULLARY DAPHNEY, LEFT FEMUR;  Surgeon: Rogerio Chand MD;  Location: [facility] OR [address] [place] [address];  Service: Orthopedics;  Laterality: Left;    Intravitreal Injection      Avastin od x's 12 dltx 2-16-12    TONSILLECTOMY       Social History     Socioeconomic History    Marital status:    Tobacco Use    Smoking status: Former     Current packs/day: 0.25     Average packs/day: 0.3 packs/day for 15.0 years (3.8 ttl pk-yrs)     Types: Cigarettes    Smokeless tobacco: Never   Substance and Sexual Activity    Alcohol use: Yes     Comment: 1-2 drinks at night    Drug use: No   Other Topics Concern    Are you pregnant or think you may be? 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     Social Determinants of Health     Financial Resource Strain: Low Risk  (7/26/2023)    Overall Financial Resource Strain (CARDIA)     Difficulty of Paying Living Expenses: Not hard at all   Food Insecurity: No Food Insecurity (7/26/2023)    Hunger Vital Sign     Worried About Running Out of Food in the Last Year: Never true     Ran Out of Food in the Last Year: Never true   Transportation Needs: No Transportation Needs (7/26/2023)    PRAPARE - Transportation     Lack of Transportation (Medical): No  "    Lack of Transportation (Non-Medical): No   Physical Activity: Inactive (7/26/2023)    Exercise Vital Sign     Days of Exercise per Week: 0 days     Minutes of Exercise per Session: 0 min   Stress: No Stress Concern Present (7/26/2023)    [employer] - Occupational Stress Questionnaire     Feeling of Stress : Not at all   Social Connections: Moderately Integrated (7/26/2023)    Social Connection and Isolation Panel [NHANES]     Frequency of Communication with Friends and Family: More than three times a week     Frequency of Social Gatherings with Friends and Family: More than three times a week     Attends Christianity Services: More than 4 times per year     Active Member of Clubs or Organizations: Yes     Attends Club or Organization Meetings: More than 4 times per year     Marital Status:    Housing Stability: Low Risk  (7/26/2023)    Housing Stability Vital Sign     Unable to Pay for Housing in the Last Year: No     Number of Places Lived in the Last Year: 1     Unstable Housing in the Last Year: No     Family History   Problem Relation Age of Onset    Cancer Mother         ovarian CA    Cancer Father         prostate    Cataracts Father     Cancer Maternal Grandmother         colon    Heart attack Maternal Grandfather     Cancer Paternal Grandmother         colon    Cancer Paternal Grandfather     Strabismus Paternal Aunt     Hypertension Daughter     Cancer Daughter         "cancer of bone"    Hyperlipidemia Daughter     Thyroid disease Grandchild     Heart attack Brother     Cancer Brother         prostate, esophagus, bladder    Hypertension Son     Hyperlipidemia Son     Cancer Brother         Non hodgkins lymphoma    Amblyopia Neg Hx     Blindness Neg Hx     Diabetes Neg Hx     Glaucoma Neg Hx     Macular degeneration Neg Hx     Retinal detachment Neg Hx     Stroke Neg Hx        Review of patient's allergies indicates:   Allergen Reactions    Penicillins Swelling    " Astelin [azelastine] Other (See Comments)     Dizziness    Pseudoephedrine hcl Palpitations    Sulfa (sulfonamide antibiotics) Palpitations       Current Outpatient Medications   Medication Sig    albuterol (PROVENTIL/VENTOLIN HFA) 90 mcg/actuation inhaler Inhale 1 puff into the lungs.    amLODIPine (NORVASC) 5 MG tablet Take 1 tablet (5 mg total) by mouth once daily.    apixaban (ELIQUIS) 2.5 mg Tab Take 1 tablet (2.5 mg total) by mouth 2 (two) times daily.    sotaloL (BETAPACE) 80 MG tablet Take 1 tablet (80 mg total) by mouth 2 (two) times daily.    vitamin D (VITAMIN D3) 1000 units Tab Take 1 tablet (1,000 Units total) by mouth once daily.    acetaminophen (TYLENOL) 500 MG tablet Take 2 tablets (1,000 mg total) by mouth every 8 (eight) hours. (Patient not taking: Reported on 2/26/2024)    senna-docusate 8.6-50 mg (PERICOLACE) 8.6-50 mg per tablet Take 1 tablet by mouth 2 (two) times daily. (Patient not taking: Reported on 2/26/2024)     No current facility-administered medications for this visit.       ROS:  GENERAL: No fever. No chills. No fatigue. Denies weight loss. Denies weight gain.  HEENT: Denies headaches. Denies vision change. Denies eye pain. Denies double vision. Denies ear pain.   CV: Denies chest pain.   PULM: Denies of shortness of breath.  GI: Denies constipation. No diarrhea. No abdominal pain. Denies nausea. Denies vomiting. No blood in stool.  HEME: Denies bleeding problems.  : Denies urgency. No painful urination. No blood in urine.  MS: Denies joint stiffness. Denies joint swelling.  Denies back pain.  SKIN: Denies rash.   NEURO: Denies seizures. No weakness.  PSYCH:  Denies difficulty sleeping. No anxiety. Denies depression. No suicidal thoughts.       VITALS:   Vitals:    03/26/24 1037   BP: (!) 143/73   Pulse: (!) 59   Temp: 96.5 °F (35.8 °C)   Weight: 51.5 kg (113 lb 8.6 oz)   PainSc:   5   PainLoc: Leg         PHYSICAL EXAM:   GENERAL: Well appearing, in no acute distress, alert and  oriented x3.  PSYCH:  Mood and affect appropriate.   SKIN: Skin color, texture, turgor normal, no rashes or lesions.  HEENT:  Normocephalic, atraumatic. Cranial nerves grossly intact.  NECK: No pain to palpation over the cervical paraspinous muscles. No pain to palpation over facets. No pain with neck flexion, extension, or lateral flexion.   PULM: No evidence of respiratory difficulty, symmetric chest rise.  GI:  Non-distended  BACK: Normal range of motion. No pain to palpation over the spinous processes. No pain to palpation over facet joints. There is no pain with palpation over the sacroiliac joints bilaterally.   EXTREMITIES: No deformities, edema, or skin discoloration.   MUSCULOSKELETAL: Right SIJ: +FELICITA, +Compression, +Distraction. Shoulder, hip, and knee provocative maneuvers are negative. No atrophy is noted.  NEURO: Sensation is equal and appropriate bilaterally. Bilateral upper and lower extremity strength is normal and symmetric. Bilateral upper and lower extremity coordination and muscle stretch reflexes are physiologic and symmetric. Plantar response are downgoing. Straight leg raising in the supine position is negative to radicular pain.   GAIT: Antalgic.      LABS:  Lab Results   Component Value Date    WBC 5.52 09/08/2023    HGB 12.7 09/08/2023    HCT 38.9 09/08/2023    MCV 93 09/08/2023     09/08/2023       CMP  Sodium   Date Value Ref Range Status   10/24/2023 136 136 - 145 mmol/L Final     Potassium   Date Value Ref Range Status   10/24/2023 4.0 3.5 - 5.1 mmol/L Final     Chloride   Date Value Ref Range Status   10/24/2023 102 95 - 110 mmol/L Final     CO2   Date Value Ref Range Status   10/24/2023 24 23 - 29 mmol/L Final     Glucose   Date Value Ref Range Status   10/24/2023 89 70 - 110 mg/dL Final     BUN   Date Value Ref Range Status   10/24/2023 9 (L) 10 - 30 mg/dL Final     Creatinine   Date Value Ref Range Status   10/24/2023 0.7 0.5 - 1.4 mg/dL Final     Calcium   Date Value Ref  Range Status   10/24/2023 10.5 8.7 - 10.5 mg/dL Final     Total Protein   Date Value Ref Range Status   10/24/2023 7.5 6.0 - 8.4 g/dL Final     Albumin   Date Value Ref Range Status   10/24/2023 4.4 3.5 - 5.2 g/dL Final     Total Bilirubin   Date Value Ref Range Status   10/24/2023 0.5 0.1 - 1.0 mg/dL Final     Comment:     For infants and newborns, interpretation of results should be based  on gestational age, weight and in agreement with clinical  observations.    Premature Infant recommended reference ranges:  Up to 24 hours.............<8.0 mg/dL  Up to 48 hours............<12.0 mg/dL  3-5 days..................<15.0 mg/dL  6-29 days.................<15.0 mg/dL       Alkaline Phosphatase   Date Value Ref Range Status   10/24/2023 133 55 - 135 U/L Final     AST   Date Value Ref Range Status   10/24/2023 17 10 - 40 U/L Final     ALT   Date Value Ref Range Status   10/24/2023 12 10 - 44 U/L Final     Anion Gap   Date Value Ref Range Status   10/24/2023 10 8 - 16 mmol/L Final     eGFR   Date Value Ref Range Status   10/24/2023 >60.0 >60 mL/min/1.73 m^2 Final         IMAGING:    EXAMINATION:  MRI LUMBAR SPINE WITHOUT CONTRAST     CLINICAL HISTORY:  Compression fracture, lumbar; Wedge compression fracture of first lumbar vertebra, initial encounter for closed fracture     TECHNIQUE:  Multiplanar, multisequence MR images were acquired from the thoracolumbar junction to the sacrum without the administration of contrast.     COMPARISON:  X-ray 10/16/2023     FINDINGS:  Alignment: Grade 1 retrolisthesis of L2 on L3.  Grade 1 anterolisthesis of L4 on L5 and L5 on S1.     Vertebrae: Approximately 50% height loss centrally of the L1 vertebral body with extensive marrow edema, suggestive of subacute compression fracture and/or instability.  3 mm osseous retropulsion into the spinal canal.  No spinal canal stenosis.  No aggressive marrow replacement process.     Discs: Prominent degenerative disc disease on the left side at  L2-3, noting marked disc height loss with extensive sub endplate marrow edema/Modic 1 changes.  There is prominent right-sided degenerative disc disease at L5-S1 with mild sub endplate marrow edema/Modic 1 changes.     Cord: Normal caliber in signal.  Conus terminates at L1-L2.  Cauda equina nerve roots are unremarkable.     Degenerative findings:     T12-L1:  Osseous retropulsion with encroachment on the ventral thecal sac inferior to the T12-L1 disc space with mild spinal canal stenosis. No neural foraminal narrowing.     L1-L2: Mild disc bulging asymmetric to the left, contributing to mild left neural foraminal narrowing.  No significant right neural foraminal narrowing or spinal canal stenosis.     L2-L3: Small disc bulge and bilateral facet arthropathy with facet joint effusions.  Mild left neural foraminal.  Mild spinal canal stenosis.     L3-L4: Circumferential disc bulge and bilateral facet arthropathy with small joint effusions.  No neural foraminal narrowing.  Mild spinal canal stenosis.     L4-L5: Circumferential disc bulge, bilateral facet arthropathy, and ligamentum flavum hypertrophy.  Mild bilateral neural foraminal narrowing.  Severe spinal canal stenosis.     L5-S1: Advanced facet joint arthropathy, noting joint hypertrophy with extensive subchondral marrow edema extending into the pedicles with surrounding inflammatory changes.  Slight/grade 1 anterolisthesis noted.  Moderate disc bulging and facet joint hypertrophy, contribute to moderate spinal canal stenosis, moderate right and mild left-sided neural foraminal narrowing.     Paraspinal muscles & soft tissues: Unremarkable.     Impression:     L1 compression fracture with significant marrow edema, suggestive of a subacute injury and/or instability.     Lumbar spondylosis, contributing to severe spinal canal stenosis at L4-5 and moderate spinal canal and neural foraminal stenosis at L5-S1, as above.     Prominent L5-S1 facet joint arthropathy, as  above.     Electronically signed by resident: Magui Uriostegui  Date:                                            02/02/2024  Time:                                           13:15     Electronically signed by: Marquis Lynn MD  Date:                                            02/02/2024  Time:                                           14:28    ASSESSMENT: 89 y.o. year old female with pain, consistent with:    No diagnosis found.      DISCUSSION: Cathy Kearns is a retired  who comes to us from Dr. Xiong. She had a fall in July, 2023 complicated by a left hip intertrochanteric fracture. She presents to us with mid-back pain with sitting and right buttock pain with standing. MRI shows an acute L1 compression fracture, significant DDD at L2/3 and L5/S1 with Modic changes, severe canal stenosis at L4/5, significant facet arthropathy/edema at L5/S1.     PLAN:  Encouraged patient to continue PT and HEP  Discussed her Osteoporosis, she has been seen in the fracture clinic and declined anabolic injection therapy  Discussed the risk of additional fractures and to follow up with us if back pain worsens  Discussed right L5/S1 TF RACHELLE.   RTC PRN.    The above plan and management options were discussed at length with patient. Patient is in agreement with the above and verbalized understanding. \    Jessica Henley  03/26/2024

## 2024-04-02 NOTE — PATIENT INSTRUCTIONS

## 2024-04-08 ENCOUNTER — LAB VISIT (OUTPATIENT)
Dept: LAB | Facility: OTHER | Age: 89
End: 2024-04-08
Attending: INTERNAL MEDICINE
Payer: MEDICARE

## 2024-04-08 DIAGNOSIS — I51.89 DIASTOLIC DYSFUNCTION: ICD-10-CM

## 2024-04-08 DIAGNOSIS — E55.9 VITAMIN D DEFICIENCY: ICD-10-CM

## 2024-04-08 DIAGNOSIS — E78.00 PURE HYPERCHOLESTEROLEMIA: ICD-10-CM

## 2024-04-08 DIAGNOSIS — I10 PRIMARY HYPERTENSION: ICD-10-CM

## 2024-04-08 LAB
25(OH)D3+25(OH)D2 SERPL-MCNC: 48 NG/ML (ref 30–96)
ALBUMIN SERPL BCP-MCNC: 4.1 G/DL (ref 3.5–5.2)
ALP SERPL-CCNC: 76 U/L (ref 55–135)
ALT SERPL W/O P-5'-P-CCNC: 16 U/L (ref 10–44)
ANION GAP SERPL CALC-SCNC: 8 MMOL/L (ref 8–16)
AST SERPL-CCNC: 17 U/L (ref 10–40)
BASOPHILS # BLD AUTO: 0.03 K/UL (ref 0–0.2)
BASOPHILS NFR BLD: 0.5 % (ref 0–1.9)
BILIRUB SERPL-MCNC: 0.8 MG/DL (ref 0.1–1)
BUN SERPL-MCNC: 12 MG/DL (ref 10–30)
CALCIUM SERPL-MCNC: 10.3 MG/DL (ref 8.7–10.5)
CHLORIDE SERPL-SCNC: 103 MMOL/L (ref 95–110)
CHOLEST SERPL-MCNC: 208 MG/DL (ref 120–199)
CHOLEST/HDLC SERPL: 3.4 {RATIO} (ref 2–5)
CO2 SERPL-SCNC: 26 MMOL/L (ref 23–29)
CREAT SERPL-MCNC: 0.8 MG/DL (ref 0.5–1.4)
DIFFERENTIAL METHOD BLD: ABNORMAL
EOSINOPHIL # BLD AUTO: 0.1 K/UL (ref 0–0.5)
EOSINOPHIL NFR BLD: 1.9 % (ref 0–8)
ERYTHROCYTE [DISTWIDTH] IN BLOOD BY AUTOMATED COUNT: 14.4 % (ref 11.5–14.5)
EST. GFR  (NO RACE VARIABLE): >60 ML/MIN/1.73 M^2
GLUCOSE SERPL-MCNC: 94 MG/DL (ref 70–110)
HCT VFR BLD AUTO: 38.8 % (ref 37–48.5)
HDLC SERPL-MCNC: 61 MG/DL (ref 40–75)
HDLC SERPL: 29.3 % (ref 20–50)
HGB BLD-MCNC: 13.1 G/DL (ref 12–16)
IMM GRANULOCYTES # BLD AUTO: 0.07 K/UL (ref 0–0.04)
IMM GRANULOCYTES NFR BLD AUTO: 1.1 % (ref 0–0.5)
LDLC SERPL CALC-MCNC: 130.6 MG/DL (ref 63–159)
LYMPHOCYTES # BLD AUTO: 1.8 K/UL (ref 1–4.8)
LYMPHOCYTES NFR BLD: 27.4 % (ref 18–48)
MCH RBC QN AUTO: 30.8 PG (ref 27–31)
MCHC RBC AUTO-ENTMCNC: 33.8 G/DL (ref 32–36)
MCV RBC AUTO: 91 FL (ref 82–98)
MONOCYTES # BLD AUTO: 1.6 K/UL (ref 0.3–1)
MONOCYTES NFR BLD: 25.7 % (ref 4–15)
NEUTROPHILS # BLD AUTO: 2.8 K/UL (ref 1.8–7.7)
NEUTROPHILS NFR BLD: 43.4 % (ref 38–73)
NONHDLC SERPL-MCNC: 147 MG/DL
NRBC BLD-RTO: 0 /100 WBC
PLATELET # BLD AUTO: 162 K/UL (ref 150–450)
PMV BLD AUTO: 10.3 FL (ref 9.2–12.9)
POTASSIUM SERPL-SCNC: 4.1 MMOL/L (ref 3.5–5.1)
PROT SERPL-MCNC: 7.2 G/DL (ref 6–8.4)
RBC # BLD AUTO: 4.26 M/UL (ref 4–5.4)
SODIUM SERPL-SCNC: 137 MMOL/L (ref 136–145)
TRIGL SERPL-MCNC: 82 MG/DL (ref 30–150)
TSH SERPL DL<=0.005 MIU/L-ACNC: 0.95 UIU/ML (ref 0.4–4)
WBC # BLD AUTO: 6.39 K/UL (ref 3.9–12.7)

## 2024-04-08 PROCEDURE — 85025 COMPLETE CBC W/AUTO DIFF WBC: CPT | Performed by: INTERNAL MEDICINE

## 2024-04-08 PROCEDURE — 84443 ASSAY THYROID STIM HORMONE: CPT | Performed by: INTERNAL MEDICINE

## 2024-04-08 PROCEDURE — 36415 COLL VENOUS BLD VENIPUNCTURE: CPT | Performed by: INTERNAL MEDICINE

## 2024-04-08 PROCEDURE — 80061 LIPID PANEL: CPT | Performed by: INTERNAL MEDICINE

## 2024-04-08 PROCEDURE — 82306 VITAMIN D 25 HYDROXY: CPT | Performed by: INTERNAL MEDICINE

## 2024-04-08 PROCEDURE — 80053 COMPREHEN METABOLIC PANEL: CPT | Performed by: INTERNAL MEDICINE

## 2024-05-03 ENCOUNTER — PROCEDURE VISIT (OUTPATIENT)
Dept: OPHTHALMOLOGY | Facility: CLINIC | Age: 89
End: 2024-05-03
Payer: MEDICARE

## 2024-05-03 DIAGNOSIS — H35.3211 EXUDATIVE AGE-RELATED MACULAR DEGENERATION OF RIGHT EYE WITH ACTIVE CHOROIDAL NEOVASCULARIZATION: Primary | ICD-10-CM

## 2024-05-03 PROCEDURE — 99999PBSHW PR PBB SHADOW TECHNICAL ONLY FILED TO HB: Mod: JZ,PBBFAC,,

## 2024-05-03 PROCEDURE — 92014 COMPRE OPH EXAM EST PT 1/>: CPT | Mod: 25,S$PBB,, | Performed by: OPHTHALMOLOGY

## 2024-05-03 PROCEDURE — 67028 INJECTION EYE DRUG: CPT | Mod: PBBFAC,RT | Performed by: OPHTHALMOLOGY

## 2024-05-03 PROCEDURE — 67028 INJECTION EYE DRUG: CPT | Mod: S$PBB,RT,, | Performed by: OPHTHALMOLOGY

## 2024-05-03 PROCEDURE — 92134 CPTRZ OPH DX IMG PST SGM RTA: CPT | Mod: PBBFAC | Performed by: OPHTHALMOLOGY

## 2024-05-03 RX ORDER — OFLOXACIN 3 MG/ML
SOLUTION AURICULAR (OTIC)
COMMUNITY
Start: 2024-04-16

## 2024-05-03 RX ADMIN — FARICIMAB 6 MG: 6 INJECTION, SOLUTION INTRAVITREAL at 11:05

## 2024-05-03 NOTE — PROGRESS NOTES
HPI     VASBYSMO   INJECTION  OD      2 MONTH  FOLLOW UP      Additional comments: INJECTION   OCT   BLURRED VA   TRACE IRF            Comments    Vision stable, hasn't been reading as often, no floaters/flashes.    DLS  01/16/24              OCT -OD -  stable trace IRF - improved  OS - Drusen, no SRF - small HE - ? 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 PVD OU    4. Pseudophakia - great result    5. Allergic conjunctivitis/sinusitis - try OTC antihistamines  Recent trouble with sinusitis - pain behind eye improved with antihistamines  Ok for Zaditor BID - has improved with topical tx    6. AFib - considering anticoagulation - ok to proceed  On Anti-VEGF therapy, risk of large macular hemorrhage is minimal.         Plan:      2   months OCT NOdilate (last DFE  5/24)    Risks, benefits, and alternatives to treatment discussed in detail with the patient.  The patient voiced understanding and wished to proceed with the procedure    Injection Procedure Note:  Diagnosis: Wet AMD OD    Patient Identified and Time Out complete  Topical Proparacaine and Betadine. Conj prep only  Inject Vabysmo OD at 6:00 @ 3.5-4mm posterior to limbus  Post Operative Dx: Same  Complications: None  Follow up as above.

## 2024-05-08 ENCOUNTER — PATIENT OUTREACH (OUTPATIENT)
Dept: ADMINISTRATIVE | Facility: HOSPITAL | Age: 89
End: 2024-05-08
Payer: MEDICARE

## 2024-05-08 NOTE — PROGRESS NOTES
Health Maintenance Due   Topic Date Due    Shingles Vaccine (1 of 2) Never done    RSV Vaccine (Age 60+ and Pregnant patients) (1 - 1-dose 60+ series) Never done    COVID-19 Vaccine (4 - 2023-24 season) 09/01/2023     Health Maintenance reviewed, updated and links triggered. HM'S up to date. ([facility]) 5/8/24

## 2024-05-29 ENCOUNTER — PATIENT MESSAGE (OUTPATIENT)
Dept: PAIN MEDICINE | Facility: CLINIC | Age: 89
End: 2024-05-29
Payer: MEDICARE

## 2024-06-27 ENCOUNTER — OFFICE VISIT (OUTPATIENT)
Dept: INTERNAL MEDICINE | Facility: CLINIC | Age: 89
End: 2024-06-27
Attending: INTERNAL MEDICINE
Payer: MEDICARE

## 2024-06-27 VITALS
SYSTOLIC BLOOD PRESSURE: 127 MMHG | HEIGHT: 62 IN | BODY MASS INDEX: 21.22 KG/M2 | HEART RATE: 63 BPM | DIASTOLIC BLOOD PRESSURE: 64 MMHG | WEIGHT: 115.31 LBS | OXYGEN SATURATION: 98 %

## 2024-06-27 DIAGNOSIS — I48.91 ATRIAL FIBRILLATION, UNSPECIFIED TYPE: ICD-10-CM

## 2024-06-27 DIAGNOSIS — M80.00XD AGE-RELATED OSTEOPOROSIS WITH CURRENT PATHOLOGICAL FRACTURE WITH ROUTINE HEALING: ICD-10-CM

## 2024-06-27 DIAGNOSIS — L98.9 SKIN LESION: Primary | ICD-10-CM

## 2024-06-27 DIAGNOSIS — J44.9 CHRONIC OBSTRUCTIVE PULMONARY DISEASE, UNSPECIFIED COPD TYPE: ICD-10-CM

## 2024-06-27 DIAGNOSIS — Z87.81 HISTORY OF HIP FRACTURE: ICD-10-CM

## 2024-06-27 DIAGNOSIS — M25.559 HIP PAIN, UNSPECIFIED LATERALITY: ICD-10-CM

## 2024-06-27 DIAGNOSIS — I51.89 DIASTOLIC DYSFUNCTION: ICD-10-CM

## 2024-06-27 DIAGNOSIS — M80.00XD AGE-RELATED OSTEOPOROSIS WITH CURRENT PATHOLOGICAL FRACTURE WITH ROUTINE HEALING, SUBSEQUENT ENCOUNTER: ICD-10-CM

## 2024-06-27 DIAGNOSIS — I10 PRIMARY HYPERTENSION: ICD-10-CM

## 2024-06-27 DIAGNOSIS — M25.473 ANKLE SWELLING, UNSPECIFIED LATERALITY: ICD-10-CM

## 2024-06-27 PROCEDURE — G2211 COMPLEX E/M VISIT ADD ON: HCPCS | Mod: S$PBB,,, | Performed by: INTERNAL MEDICINE

## 2024-06-27 PROCEDURE — 99999 PR PBB SHADOW E&M-EST. PATIENT-LVL V: CPT | Mod: PBBFAC,,, | Performed by: INTERNAL MEDICINE

## 2024-06-27 PROCEDURE — 99214 OFFICE O/P EST MOD 30 MIN: CPT | Mod: S$PBB,,, | Performed by: INTERNAL MEDICINE

## 2024-06-27 PROCEDURE — 99215 OFFICE O/P EST HI 40 MIN: CPT | Mod: PBBFAC | Performed by: INTERNAL MEDICINE

## 2024-06-27 RX ORDER — MUPIROCIN 20 MG/G
OINTMENT TOPICAL 2 TIMES DAILY
Qty: 30 G | Refills: 0 | Status: SHIPPED | OUTPATIENT
Start: 2024-06-27 | End: 2024-07-04

## 2024-06-27 NOTE — PROGRESS NOTES
Subjective:   Patient ID: Cathy Kearns is a 89 y.o. female  Chief complaint:   Chief Complaint   Patient presents with    Hypertension     F/u       HPI    Here for HTN follow up - lcv 1/2024  Saw pain mgmt and nsx     Scratched left side of neck and irritated     Went to [place] and saw family there - ankles were severely swollen the whole time   No sob with this   Today no ankle swelling   At times will notice mild swelling of ankles when walkign for long periods of time or [facility]     Lower back pain   Seen by pain mgmt - had injection that only helped for 1 week   Wouldl anna to revisit this as hopting to travel to see family soon   Attended crane pt after fracture   Pain in hip improved but over past several months pain with walking - fractured left hip   Now pain with right hip    HTN:   Bp at goal: 120-140/70s at home, pulse   C/w meds per med card   - taking sotalol 80mg twice daily   - amlodipine 5mg   - exercising reg  - following low salt diet   No longer taking:   - took lisinopril 40mg      COPD:   - asymptomatic today   Prev:   - intermittent wheezing - needs to reschedule pulm   - taking: albuterol prn   Previously:   Reaction to breo and started on trial of albuterol   Seen by pulm 6/2018   Smoked at night with  when had a drink - stopped after  had MI now over 40 years ago   + 2nd hand exposure: 3 ppd  smoked   Prev:  F/u with pulm Dr. Tiffany Medrano for asthma per her. Reports breathing markedly improved since moving.   Hx of Asthma - took self off of Breo after made lips swell - reports no wheezing or sob - not taking albuterol inh and no nighttime awakenings. She would like to hold off on establishing care with a new pulmonologist since moving to [place] since her symptoms are stable off medication at this time.     Betty bateman for osteoporosiss still  F/u with ortho porosis clinic      Osteoporosis:  Seen by  "specialists   Declines tx at this time   Agrees to take ca; and vit d suppl   Prev:    Taking caltrate supplement and vitamin d  dexa 1/2023 reviewed - discussed tx options and endo referral  - prescribed alendronate but stopped as above   - discussed resuming tx and endo referral to discuss other tx options     ### not addressed today ####    As above   Prev:  Hip fracture - now with sciatica right to down back of leg  Having right sided sciatica - radiates to lower ankle   Bilateral lower back pain   No dysuria   More sciatica pain in am   Previously:   left hip fracture 7/2023 2/2 fall - see prior clinic note for further details  At [facility] for outpt PT   - has upcoming appt with osteoporosis clinic   - given alendronate in past and did not take due to having to take on empty stomach and waiting 1h to eat     L1 compression fracture seen on xray - due for MRI still   Today discussed proceeding with this   - seen by ortho bone specialists and declines tx for porosis then and again today     Constipation:   On stool softener and prunes  - stable      Post op anemia: now resolved on recent labs   - see details in prior clinic note - s/p transfusion    Afib:   On eliquis and sotolol   Followed by cards      Abnormal CBC - monocytosis:  - seen by h/o 10/29/2021 and 5/24/2022 - will check cbc q6 months as stable   - bm bx deferred for now due to lack of tx options  - to have cbc in 6 months and f/u with h/o in 1 year    Skin cancer:  - followed by derm - due for f/u     ######    HM:   - shingrix  - covid booster   - rsv vaccine    Derm: mario     Review of Systems  127/64  Objective:  Vitals:    06/27/24 1048 06/27/24 1134   BP: (!) 158/64 127/64   BP Location: Left arm    Patient Position: Sitting    Pulse: 63    SpO2: 98%    Weight: 52.3 kg (115 lb 4.8 oz)    Height: 5' 2" (1.575 m)      Body mass index is 21.09 kg/m².    Physical Exam  Vitals reviewed.   Constitutional:       Appearance: Normal appearance. She is " well-developed.   HENT:      Head: Normocephalic and atraumatic.   Eyes:      Extraocular Movements: Extraocular movements intact.      Conjunctiva/sclera: Conjunctivae normal.   Neck:      Thyroid: No thyromegaly.   Cardiovascular:      Rate and Rhythm: Normal rate and regular rhythm.      Pulses: Normal pulses.      Heart sounds: Normal heart sounds.   Pulmonary:      Effort: Pulmonary effort is normal.      Breath sounds: Normal breath sounds.   Abdominal:      General: Bowel sounds are normal.      Palpations: Abdomen is soft.   Musculoskeletal:         General: No swelling.      Cervical back: Neck supple.      Right lower leg: No edema.      Left lower leg: No edema.      Comments: No ttp of bilateral hips or spine  Antalgic gait   Lymphadenopathy:      Cervical: No cervical adenopathy.   Skin:     General: Skin is warm and dry.      Capillary Refill: Capillary refill takes less than 2 seconds.      Comments: Scratch left side of neck    Neurological:      Mental Status: She is alert and oriented to person, place, and time. Mental status is at baseline.   Psychiatric:         Behavior: Behavior normal.         Thought Content: Thought content normal.       Assessment:  1. Skin lesion    2. Ankle swelling, unspecified laterality    3. Atrial fibrillation, unspecified type    4. History of hip fracture    5. Hip pain, unspecified laterality    6. Diastolic dysfunction    7. Primary hypertension    8. Age-related osteoporosis with current pathological fracture with routine healing, subsequent encounter    9. Age-related osteoporosis with current pathological fracture with routine healing    10. Chronic obstructive pulmonary disease, unspecified COPD type      Plan:  Skin lesion  -     mupirocin (BACTROBAN) 2 % ointment; Apply topically 2 (two) times daily. for 7 days  Dispense: 30 g; Refill: 0    Ankle swelling, unspecified laterality  -     Echo; Future  -     Ambulatory referral/consult to Cardiology; Future;  Expected date: 07/04/2024    Atrial fibrillation, unspecified type  -     Echo; Future  -     Ambulatory referral/consult to Cardiology; Future; Expected date: 07/04/2024  Stable cont med     History of hip fracture  -     Ambulatory referral/consult to Orthopedics; Future; Expected date: 07/04/2024  -     Ambulatory referral/consult to Physical/Occupational Therapy; Future; Expected date: 07/04/2024  Suspect strain from compensating since fx other hip   Trial of PT     Hip pain, unspecified laterality  -     Ambulatory referral/consult to Orthopedics; Future; Expected date: 07/04/2024  -     Ambulatory referral/consult to Physical/Occupational Therapy; Future; Expected date: 07/04/2024  -     X-Ray Hip 2 or 3 views Right with Pelvis when performed; Future; Expected date: 06/27/2024    Diastolic dysfunction  Euvolemic   Cont med     Primary hypertension  Stable   Cont current regimen     Age-related osteoporosis with current pathological fracture with routine healing, subsequent encounter  Stable  Cont kwame and vit d   Dexa q2y  Declines tx with rx   If reocnsiders will let me know     Age-related osteoporosis with current pathological fracture with routine healing  As above     Chronic obstructive pulmonary disease, unspecified COPD type  Stable   Cont current regimen     Compression stockings   Visit today included increased complexity associated with the care of the episodic problem htn, diastolic dysfunction addressed and managing the longitudinal care of the patient due to the serious and/or complex managed problem(s) copd, afib.    Health Maintenance   Topic Date Due    Shingles Vaccine (1 of 2) Never done    DEXA Scan  01/12/2025    TETANUS VACCINE  08/16/2027    Lipid Panel  04/08/2029

## 2024-06-28 ENCOUNTER — TELEPHONE (OUTPATIENT)
Dept: ORTHOPEDICS | Facility: CLINIC | Age: 89
End: 2024-06-28
Payer: MEDICARE

## 2024-06-28 NOTE — TELEPHONE ENCOUNTER
Called and spoke to pt regarding apt with Ortho. Offered sooner apt and scheduled XR. Pt understood conversation with no further questions.    ----- Message from Mirela Rubio sent at 6/28/2024 10:15 AM CDT -----  Regarding: x-ray appt  Contact: [phone]  Kimberley from [facility] calling to have the pt put on Ortho 5th floor x-ray schedule on 7/12 and hr before her appt with the provider due to it not allowing her or me to override

## 2024-06-29 PROBLEM — J44.9 CHRONIC OBSTRUCTIVE PULMONARY DISEASE: Status: ACTIVE | Noted: 2024-06-29

## 2024-06-29 PROBLEM — M80.00XD AGE-RELATED OSTEOPOROSIS WITH CURRENT PATHOLOGICAL FRACTURE WITH ROUTINE HEALING: Status: ACTIVE | Noted: 2024-06-29

## 2024-07-02 ENCOUNTER — PROCEDURE VISIT (OUTPATIENT)
Dept: OPHTHALMOLOGY | Facility: CLINIC | Age: 89
End: 2024-07-02
Payer: MEDICARE

## 2024-07-02 ENCOUNTER — CLINICAL SUPPORT (OUTPATIENT)
Dept: OPHTHALMOLOGY | Facility: CLINIC | Age: 89
End: 2024-07-02
Payer: MEDICARE

## 2024-07-02 DIAGNOSIS — H35.3211 EXUDATIVE AGE-RELATED MACULAR DEGENERATION OF RIGHT EYE WITH ACTIVE CHOROIDAL NEOVASCULARIZATION: ICD-10-CM

## 2024-07-02 DIAGNOSIS — H35.3211 EXUDATIVE AGE-RELATED MACULAR DEGENERATION OF RIGHT EYE WITH ACTIVE CHOROIDAL NEOVASCULARIZATION: Primary | ICD-10-CM

## 2024-07-02 DIAGNOSIS — H35.3124 NONEXUDATIVE AGE-RELATED MACULAR DEGENERATION, LEFT EYE, ADVANCED ATROPHIC WITH SUBFOVEAL INVOLVEMENT: ICD-10-CM

## 2024-07-02 DIAGNOSIS — H35.721 SEROUS DETACHMENT OF RETINAL PIGMENT EPITHELIUM OF RIGHT EYE: ICD-10-CM

## 2024-07-02 PROCEDURE — 92134 CPTRZ OPH DX IMG PST SGM RTA: CPT | Mod: PBBFAC

## 2024-07-02 PROCEDURE — 67028 INJECTION EYE DRUG: CPT | Mod: PBBFAC,RT | Performed by: OPHTHALMOLOGY

## 2024-07-02 PROCEDURE — 99999PBSHW PR PBB SHADOW TECHNICAL ONLY FILED TO HB: Mod: JZ,PBBFAC,,

## 2024-07-02 NOTE — PROGRESS NOTES
Weak gait using rule out HPI     follow up   Vasbymo   injection OD      Additional comments: VASBYMO  OD   ARMD       Blurry  va              Comments    VA stable ou, no pain ou and denies floaters or flashes ou. Patient states   that she saw a lot of floaters after last injection, but resolved.  Dls 05/03/24          Last edited by Bernadine Schaeffer on 7/2/2024  9:53 AM.                 OCT -OD -  stable trace IRF - improved  OS - Drusen, no SRF - small HE - ? RAP lesion       Assessment:    1. 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 PVD OU    4. Pseudophakia - great result    5. Allergic conjunctivitis/sinusitis - try OTC antihistamines  Recent trouble with sinusitis - pain behind eye improved with antihistamines  Ok for Zaditor BID - has improved with topical tx    6. AFib - considering anticoagulation - ok to proceed  On Anti-VEGF therapy, risk of large macular hemorrhage is minimal.         Plan:      2   months OCT NO also dilate (last DFE  5/24)    Risks, benefits, and alternatives to treatment discussed in detail with the patient.  The patient voiced understanding and wished to proceed with the procedure    Injection Procedure Note:  Diagnosis: Wet AMD OD    Patient Identified and Time Out complete  Topical Proparacaine and Betadine. Conj prep only  Inject Vabysmo OD at 6:00 @ 3.5-4mm posterior to limbus  Post Operative Dx: Same  Complications: None  Follow up as above.

## 2024-07-03 DIAGNOSIS — Z71.89 COMPLEX CARE COORDINATION: ICD-10-CM

## 2024-07-09 ENCOUNTER — PATIENT MESSAGE (OUTPATIENT)
Dept: INTERNAL MEDICINE | Facility: CLINIC | Age: 89
End: 2024-07-09
Payer: MEDICARE

## 2024-07-09 ENCOUNTER — CLINICAL SUPPORT (OUTPATIENT)
Dept: REHABILITATION | Facility: OTHER | Age: 89
End: 2024-07-09
Attending: INTERNAL MEDICINE
Payer: MEDICARE

## 2024-07-09 DIAGNOSIS — R29.3 POSTURE IMBALANCE: ICD-10-CM

## 2024-07-09 DIAGNOSIS — Z87.81 HISTORY OF HIP FRACTURE: ICD-10-CM

## 2024-07-09 DIAGNOSIS — Z74.09 IMPAIRED FUNCTIONAL MOBILITY, BALANCE, GAIT, AND ENDURANCE: ICD-10-CM

## 2024-07-09 DIAGNOSIS — M25.559 HIP PAIN, UNSPECIFIED LATERALITY: ICD-10-CM

## 2024-07-09 DIAGNOSIS — M54.16 LUMBAR RADICULOPATHY: Primary | ICD-10-CM

## 2024-07-09 PROCEDURE — 97530 THERAPEUTIC ACTIVITIES: CPT | Mod: PN | Performed by: PHYSICAL THERAPIST

## 2024-07-09 PROCEDURE — 97163 PT EVAL HIGH COMPLEX 45 MIN: CPT | Mod: PN | Performed by: PHYSICAL THERAPIST

## 2024-07-10 ENCOUNTER — HOSPITAL ENCOUNTER (OUTPATIENT)
Dept: RADIOLOGY | Facility: HOSPITAL | Age: 89
Discharge: HOME | End: 2024-07-10
Attending: INTERNAL MEDICINE
Payer: MEDICARE

## 2024-07-10 DIAGNOSIS — M25.559 HIP PAIN, UNSPECIFIED LATERALITY: ICD-10-CM

## 2024-07-10 PROCEDURE — 73502 X-RAY EXAM HIP UNI 2-3 VIEWS: CPT | Mod: 26,RT,, | Performed by: RADIOLOGY

## 2024-07-10 PROCEDURE — 73502 X-RAY EXAM HIP UNI 2-3 VIEWS: CPT | Mod: TC,RT

## 2024-07-16 ENCOUNTER — DOCUMENTATION ONLY (OUTPATIENT)
Dept: REHABILITATION | Facility: OTHER | Age: 89
End: 2024-07-16

## 2024-07-16 ENCOUNTER — CLINICAL SUPPORT (OUTPATIENT)
Dept: REHABILITATION | Facility: OTHER | Age: 89
End: 2024-07-16
Payer: MEDICARE

## 2024-07-16 DIAGNOSIS — Z74.09 IMPAIRED FUNCTIONAL MOBILITY, BALANCE, GAIT, AND ENDURANCE: ICD-10-CM

## 2024-07-16 DIAGNOSIS — M54.16 LUMBAR RADICULOPATHY: Primary | ICD-10-CM

## 2024-07-16 DIAGNOSIS — R29.3 POSTURE IMBALANCE: ICD-10-CM

## 2024-07-16 PROCEDURE — 97112 NEUROMUSCULAR REEDUCATION: CPT | Mod: PN,CQ

## 2024-07-16 PROCEDURE — 97530 THERAPEUTIC ACTIVITIES: CPT | Mod: PN,CQ

## 2024-07-16 NOTE — PROGRESS NOTES
"[facility]   Physical Therapy Treatment Note      Name: Cathy Kearns  [facility] Number: [id number]    Therapy Diagnosis:   Encounter Diagnoses   Name Primary?    Lumbar radiculopathy Yes    Posture imbalance     Impaired functional mobility, balance, gait, and endurance      Physician: Wilma Xiong MD    Visit Date: 7/16/2024    Physician Orders: PT Eval and Treat   Medical Diagnosis from Referral: Z87.81 (ICD-10-CM) - History of hip fracture M25.559 (ICD-10-CM) - Hip pain, unspecified laterality  Evaluation Date: 7/9/2024  Authorization Period Expiration: 6/27/2025  Plan of Care Expiration: 9/6/2024   Progress Note Due: 8/6/2024   Date of Surgery: n/a   Visit # / Visits authorized: 2/25   FOTO: 1/ 3      Precautions: Standard and osteopenia      Time In: 2:00 pm   Time Out: 2:54 pm   Total Billable Time: 54 minutes     PTA Visit #: 1/5       Subjective     Patient reports: she went to the grocery store today and is a little worn out. States "my hip hurts like heck." Reports she has an appointment to see MD in pain management, and is hoping to get second injection soon. States she ran into a doorknob with her R hip, when taking clothes out of washer and dryer, and it hurt "all the way down to the top of my toes." States the bottom of her foot is currently tingling.     States she did her exercises yesterday, but did not do them today. States she thinks she is "failing" the bridges, and the LTR hurts before she gets to 20 repetitions. "I want to be able to climb on the bus to go to the grocery store. I want to be able to visit my grandchildren, who don't have 1 story houses. I want to practice standing up straight. Those are the things I want you to do. I have 3 grandchildren in [place], and as soon as they give me that second shot, I want to be able to climb their stairs." States she feels tight in the anterior hip today. States "the L leg has been swelling for the last couple " "months." So she ordered two pairs of compression socks, and they have been working and feeling good.       She was not compliant with home exercise program.  Response to previous treatment: "states she had the eval the week after she had the shot, and I told her the shot lasted only a week."   Functional change:     Pain: 5/10   Location: R hip     Objective      Objective Measures updated at progress report unless specified.     Treatment     Cathy received the treatments listed below:      therapeutic exercises to develop strength, endurance, ROM, flexibility, posture, and core stabilization for 00 minutes including:  None today     manual therapy techniques: Joint mobilizations, Myofacial release, Soft tissue Mobilization, and Friction Massage were applied to the:  for 00 minutes, including:  None today     neuromuscular re-education activities to improve: Balance, Coordination, Kinesthetic, Sense, Proprioception, and Posture for 25 minutes. The following activities were included:  TrA contraction + BKFO x 10 ea   SLR with pilates ring press 2 x 15   HL bilateral hip abduction vs RTB x 2'   HL unilateral hip abduction vs light green loop 2 x 10 ea     therapeutic activities to improve functional performance for 29  minutes, including:  Reviewed and educated pt on HEP   Educated pt on importance of general strengthening in order to accomplish functional ADLs without pain, such as climbing stairs.   + collected hx   LTR x 2'   SKTC 10 x 10" ea   TrA contraction 10 x 10"   Bridges vs pilates ring around knees 2 x 10 -- increased symptoms       Patient Education and Home Exercises       Education provided:   - Reviewed and educated pt on HEP     Written Home Exercises Provided: Patient instructed to cont prior HEP. Exercises were reviewed and Cathy was able to demonstrate them prior to the end of the session.  Cathy demonstrated good  understanding of the education provided. See Electronic Medical Record under Patient " Instructions for exercises provided during therapy sessions    Assessment     Cathy had overall good tolerance to treatment with no adverse effects. Good tolerance to strengthening today, and adequate mm fatigue noted at end of session. Pt required intermittent verbal cues for coordinated breathing with exercise exertion. Continue to monitor and progress pt as tolerated.     Cathy Is progressing well towards her goals.   Patient prognosis is Good.     Patient will continue to benefit from skilled outpatient physical therapy to address the deficits listed in the problem list box on initial evaluation, provide pt/family education and to maximize pt's level of independence in the home and community environment.     Patient's spiritual, cultural and educational needs considered and pt agreeable to plan of care and goals.     Anticipated barriers to physical therapy: standard     Goals: updated 07/16/2024   Short Term Goals (4 Weeks):   1. Pt will report 20% reduction in pain of the lumbar spine and R LE for ease with ADL's (Progressing, not met)   2. PT will demonstrate improved upright posture with minimal cuing for ease with functional positioning in home and community.(Progressing, not met)   3. Pt will demonstrate improved lumbar spine ROM in all directions by 10% for ease with bending activities. (Progressing, not met)   4. Pt to demonstrate improved functional ability with FOTO limitation <=35% disability.(Progressing, not met)      Long Term Goals (8 Weeks):   1. Pt will report being independent with HEP for maintenance of improvements gained during therapy sessions(Progressing, not met)   2. PT will report 50% reduction of pain of the back and R LE for ease with positional tolerance for meals and leisure reading. (Progressing, not met)   3. Pt will demonstrate trunk and extremity strength to >=4+/5 without the provocation of pain for ease with stair navigation(Progressing, not met)   4. Pt will demonstrate  appropriate upright posture without external cueing for ease with community and home mobility. (Progressing, not met)   5. Pt to demonstrate improved functional ability with FOTO limitation <=48% disability. (Progressing, not met)      Plan      Plan of care Certification: 7/9/2024 to 9/6/2024.    Continue pt's current POC to reduce pain in R LE and LB, and to improve strength and ROM in B LE and lumbar spine and upright posture for functional performance.     Shayna Mirza, PTA

## 2024-07-17 NOTE — PROGRESS NOTES
Physical Therapist and Physical Therapist Assistant met face to face to discuss patient's treatment plan and progress towards established goals. Pt will be seen by a physical therapist minimally every 6th visit or every 30 days.    Shayna Mirza PTA  07/16/2024

## 2024-07-23 ENCOUNTER — PATIENT MESSAGE (OUTPATIENT)
Dept: PAIN MEDICINE | Facility: OTHER | Age: 89
End: 2024-07-23
Payer: MEDICARE

## 2024-07-23 ENCOUNTER — OFFICE VISIT (OUTPATIENT)
Dept: PAIN MEDICINE | Facility: CLINIC | Age: 89
End: 2024-07-23
Payer: MEDICARE

## 2024-07-23 ENCOUNTER — CLINICAL SUPPORT (OUTPATIENT)
Dept: REHABILITATION | Facility: OTHER | Age: 89
End: 2024-07-23
Payer: MEDICARE

## 2024-07-23 VITALS
DIASTOLIC BLOOD PRESSURE: 75 MMHG | HEIGHT: 62 IN | SYSTOLIC BLOOD PRESSURE: 128 MMHG | BODY MASS INDEX: 21.3 KG/M2 | RESPIRATION RATE: 18 BRPM | HEART RATE: 67 BPM | WEIGHT: 115.75 LBS

## 2024-07-23 DIAGNOSIS — M46.1 SACROILIITIS: Primary | ICD-10-CM

## 2024-07-23 DIAGNOSIS — M54.16 LUMBAR RADICULOPATHY: Primary | ICD-10-CM

## 2024-07-23 DIAGNOSIS — Z74.09 IMPAIRED FUNCTIONAL MOBILITY, BALANCE, GAIT, AND ENDURANCE: ICD-10-CM

## 2024-07-23 DIAGNOSIS — R29.3 POSTURE IMBALANCE: ICD-10-CM

## 2024-07-23 PROCEDURE — 99213 OFFICE O/P EST LOW 20 MIN: CPT | Mod: PBBFAC | Performed by: NURSE PRACTITIONER

## 2024-07-23 PROCEDURE — 99213 OFFICE O/P EST LOW 20 MIN: CPT | Mod: S$PBB,,, | Performed by: NURSE PRACTITIONER

## 2024-07-23 PROCEDURE — 97530 THERAPEUTIC ACTIVITIES: CPT | Mod: PN,CQ

## 2024-07-23 PROCEDURE — 97112 NEUROMUSCULAR REEDUCATION: CPT | Mod: PN,CQ

## 2024-07-23 PROCEDURE — 99999 PR PBB SHADOW E&M-EST. PATIENT-LVL III: CPT | Mod: PBBFAC,,, | Performed by: NURSE PRACTITIONER

## 2024-07-23 NOTE — H&P (VIEW-ONLY)
PCP: Wilma Xiong MD    REFERRING PHYSICIAN: No ref. provider found    CHIEF COMPLAINT: Low back pain    Original HISTORY OF PRESENT ILLNESS: Cathy Kearns presents to the clinic for the evaluation of the above pain. The pain started 5 months ago    Original Pain Description:  The pain is located in the low back and is radiating to the right foot . The pain is described as dull. Exacerbating factors: Sitting. Mitigating factors walking and standing. Symptoms interfere with daily activity. The patient feels like symptoms have been unchanged. Patient denies significant motor weakness and loss of sensations. Ambulates with walker. Patient states that she currently lives in a nursing home and is very active.     Original PAIN SCORES:  Best: Pain is 0  Worst: Pain is 6  Current: Pain is 0        7/23/2024     9:18 AM   Last 3 PDI Scores   Pain Disability Index (PDI) 25       INTERVAL HISTORY: (Newest visit at the bottom)   Interval History (Date):     Interval History 3/26/2024:  The patient is here for follow up of back and buttock pain. She is s/p right SI joint injection with 80% relief. She has very minimal pain to this area now. She does have right lower leg pain and numbness. She says this is mild at this time. Her pain today is 5/10.    Interval History 7/23/2024:  Mrs. Kearns is here today for worsened right sided lower back and buttock pain. She previously underwent right SI joint injection on 3/19/24 with 80% relief for about 3 months. Her pain has been gradually returning since that time. It bothers her most with prolonged sitting and with changing from sitting to standing. She is currently in PT which sometimes worsens her pain. Her pain today is 5/10.    6 weeks of Conservative therapy:  PT: Yes (October 2023-February 2024)  Chiro: No  HEP: Yes      Treatments / Medications: (Ice/Heat/NSAIDS/APAP/etc):  - Tylenol      Interventional Pain Procedures: (Previous injections)  3/19/24 Right SI joint  injection- 80% relief for 3 months    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 7/9/2012    Vasovagal syncope      Past Surgical History:   Procedure Laterality Date    ADENOIDECTOMY      APPENDECTOMY      CATARACT EXTRACTION W/  INTRAOCULAR LENS IMPLANT Bilateral n/a    HYSTERECTOMY      2/2 uterine CA, removed ovaries, no longer f/u with gyn    INJECTION, SACROILIAC JOINT Right 3/19/2024    Procedure: INJECTION,SACROILIAC JOINT RIGHT *OPTHALMOLOGY CLEARANCE IN CHART*;  Surgeon: Lata Casey MD;  Location: [facility];  Service: Pain Management;  Laterality: Right;  [phone]  2 WK F/U CRAIG    INTRAMEDULLARY RODDING OF FEMUR Left 7/25/2023    Procedure: INSERTION, INTRAMEDULLARY DAPHNEY, LEFT FEMUR;  Surgeon: Rogerio Chand MD;  Location: [facility] OR [address] [place] [address];  Service: Orthopedics;  Laterality: Left;    Intravitreal Injection      Avastin od x's 12 dltx 2-16-12    TONSILLECTOMY       Social History     Socioeconomic History    Marital status:    Tobacco Use    Smoking status: Former     Current packs/day: 0.25     Average packs/day: 0.3 packs/day for 15.0 years (3.8 ttl pk-yrs)     Types: Cigarettes    Smokeless tobacco: Never   Substance and Sexual Activity    Alcohol use: Yes     Comment: 1-2 drinks at night    Drug use: No   Other Topics Concern    Are you pregnant or think you may be? 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     Social Determinants of Health  "    Financial Resource Strain: Low Risk  (7/26/2023)    Overall Financial Resource Strain (CARDIA)     Difficulty of Paying Living Expenses: Not hard at all   Food Insecurity: No Food Insecurity (7/26/2023)    Hunger Vital Sign     Worried About Running Out of Food in the Last Year: Never true     Ran Out of Food in the Last Year: Never true   Transportation Needs: No Transportation Needs (7/26/2023)    PRAPARE - Transportation     Lack of Transportation (Medical): No     Lack of Transportation (Non-Medical): No   Physical Activity: Inactive (7/26/2023)    Exercise Vital Sign     Days of Exercise per Week: 0 days     Minutes of Exercise per Session: 0 min   Stress: No Stress Concern Present (7/26/2023)    [employer] - Occupational Stress Questionnaire     Feeling of Stress : Not at all   Housing Stability: Low Risk  (7/26/2023)    Housing Stability Vital Sign     Unable to Pay for Housing in the Last Year: No     Number of Places Lived in the Last Year: 1     Unstable Housing in the Last Year: No     Family History   Problem Relation Name Age of Onset    Cancer Mother          ovarian CA    Cancer Father          prostate    Cataracts Father      Cancer Maternal Grandmother          colon    Heart attack Maternal Grandfather      Cancer Paternal Grandmother          colon    Cancer Paternal Grandfather      Strabismus Paternal Aunt      Hypertension Daughter      Cancer Daughter          "cancer of bone"    Hyperlipidemia Daughter      Thyroid disease Grandchild      Heart attack Brother      Cancer Brother          prostate, esophagus, bladder    Hypertension Son      Hyperlipidemia Son      Cancer Brother          Non hodgkins lymphoma    Amblyopia Neg Hx      Blindness Neg Hx      Diabetes Neg Hx      Glaucoma Neg Hx      Macular degeneration Neg Hx      Retinal detachment Neg Hx      Stroke Neg Hx         Review of patient's allergies indicates:   Allergen Reactions    Penicillins " "Swelling    Astelin [azelastine] Other (See Comments)     Dizziness    Pseudoephedrine hcl Palpitations    Sulfa (sulfonamide antibiotics) Palpitations       Current Outpatient Medications   Medication Sig    acetaminophen (TYLENOL) 500 MG tablet Take 2 tablets (1,000 mg total) by mouth every 8 (eight) hours.    albuterol (PROVENTIL/VENTOLIN HFA) 90 mcg/actuation inhaler Inhale 1 puff into the lungs.    amLODIPine (NORVASC) 5 MG tablet Take 1 tablet (5 mg total) by mouth once daily.    apixaban (ELIQUIS) 2.5 mg Tab Take 1 tablet (2.5 mg total) by mouth 2 (two) times daily.    ofloxacin (FLOXIN) 0.3 % otic solution     sotaloL (BETAPACE) 80 MG tablet Take 1 tablet (80 mg total) by mouth 2 (two) times daily.    vitamin D (VITAMIN D3) 1000 units Tab Take 1 tablet (1,000 Units total) by mouth once daily.     No current facility-administered medications for this visit.       ROS:  GENERAL: No fever. No chills. No fatigue. Denies weight loss. Denies weight gain.  HEENT: Denies headaches. Denies vision change. Denies eye pain. Denies double vision. Denies ear pain.   CV: Denies chest pain.   PULM: Denies of shortness of breath.  GI: Denies constipation. No diarrhea. No abdominal pain. Denies nausea. Denies vomiting. No blood in stool.  HEME: Denies bleeding problems.  : Denies urgency. No painful urination. No blood in urine.  MS: Denies joint stiffness. Denies joint swelling.  Denies back pain.  SKIN: Denies rash.   NEURO: Denies seizures. No weakness.  PSYCH:  Denies difficulty sleeping. No anxiety. Denies depression. No suicidal thoughts.       VITALS:   Vitals:    07/23/24 0919   BP: 128/75   Pulse: 67   Resp: 18   Weight: 52.5 kg (115 lb 11.9 oz)   Height: 5' 2" (1.575 m)   PainSc:   5   PainLoc: Hip       PHYSICAL EXAM:   GENERAL: Well appearing, in no acute distress, alert and oriented x3.  PSYCH:  Mood and affect appropriate.   SKIN: Skin color, texture, turgor normal, no rashes or lesions.  HEENT:  " Normocephalic, atraumatic. Cranial nerves grossly intact.  BACK: Normal range of motion. No pain to palpation over the spinous processes. No pain to palpation over facet joints. There is no pain with palpation over the sacroiliac joints bilaterally.   EXTREMITIES: No deformities, edema, or skin discoloration.   MUSCULOSKELETAL: There is TTP over both SI joints. FELICITA is positive on the right. Yeoman's is positive on the right. Positive SI compression test on the right.  NEURO: Sensation is equal and appropriate bilaterally. Straight leg raising in the supine position is negative to radicular pain.   GAIT: Antalgic- ambulates with walker.      LABS:  Lab Results   Component Value Date    WBC 6.39 04/08/2024    HGB 13.1 04/08/2024    HCT 38.8 04/08/2024    MCV 91 04/08/2024     04/08/2024       CMP  Sodium   Date Value Ref Range Status   04/08/2024 137 136 - 145 mmol/L Final     Potassium   Date Value Ref Range Status   04/08/2024 4.1 3.5 - 5.1 mmol/L Final     Chloride   Date Value Ref Range Status   04/08/2024 103 95 - 110 mmol/L Final     CO2   Date Value Ref Range Status   04/08/2024 26 23 - 29 mmol/L Final     Glucose   Date Value Ref Range Status   04/08/2024 94 70 - 110 mg/dL Final     BUN   Date Value Ref Range Status   04/08/2024 12 10 - 30 mg/dL Final     Creatinine   Date Value Ref Range Status   04/08/2024 0.8 0.5 - 1.4 mg/dL Final     Calcium   Date Value Ref Range Status   04/08/2024 10.3 8.7 - 10.5 mg/dL Final     Total Protein   Date Value Ref Range Status   04/08/2024 7.2 6.0 - 8.4 g/dL Final     Albumin   Date Value Ref Range Status   04/08/2024 4.1 3.5 - 5.2 g/dL Final     Total Bilirubin   Date Value Ref Range Status   04/08/2024 0.8 0.1 - 1.0 mg/dL Final     Comment:     For infants and newborns, interpretation of results should be based  on gestational age, weight and in agreement with clinical  observations.    Premature Infant recommended reference ranges:  Up to 24  hours.............<8.0 mg/dL  Up to 48 hours............<12.0 mg/dL  3-5 days..................<15.0 mg/dL  6-29 days.................<15.0 mg/dL       Alkaline Phosphatase   Date Value Ref Range Status   04/08/2024 76 55 - 135 U/L Final     AST   Date Value Ref Range Status   04/08/2024 17 10 - 40 U/L Final     ALT   Date Value Ref Range Status   04/08/2024 16 10 - 44 U/L Final     Anion Gap   Date Value Ref Range Status   04/08/2024 8 8 - 16 mmol/L Final     eGFR   Date Value Ref Range Status   04/08/2024 >60 >60 mL/min/1.73 m^2 Final         IMAGING:    EXAMINATION:  MRI LUMBAR SPINE WITHOUT CONTRAST     CLINICAL HISTORY:  Compression fracture, lumbar; Wedge compression fracture of first lumbar vertebra, initial encounter for closed fracture     TECHNIQUE:  Multiplanar, multisequence MR images were acquired from the thoracolumbar junction to the sacrum without the administration of contrast.     COMPARISON:  X-ray 10/16/2023     FINDINGS:  Alignment: Grade 1 retrolisthesis of L2 on L3.  Grade 1 anterolisthesis of L4 on L5 and L5 on S1.     Vertebrae: Approximately 50% height loss centrally of the L1 vertebral body with extensive marrow edema, suggestive of subacute compression fracture and/or instability.  3 mm osseous retropulsion into the spinal canal.  No spinal canal stenosis.  No aggressive marrow replacement process.     Discs: Prominent degenerative disc disease on the left side at L2-3, noting marked disc height loss with extensive sub endplate marrow edema/Modic 1 changes.  There is prominent right-sided degenerative disc disease at L5-S1 with mild sub endplate marrow edema/Modic 1 changes.     Cord: Normal caliber in signal.  Conus terminates at L1-L2.  Cauda equina nerve roots are unremarkable.     Degenerative findings:     T12-L1:  Osseous retropulsion with encroachment on the ventral thecal sac inferior to the T12-L1 disc space with mild spinal canal stenosis. No neural foraminal narrowing.      L1-L2: Mild disc bulging asymmetric to the left, contributing to mild left neural foraminal narrowing.  No significant right neural foraminal narrowing or spinal canal stenosis.     L2-L3: Small disc bulge and bilateral facet arthropathy with facet joint effusions.  Mild left neural foraminal.  Mild spinal canal stenosis.     L3-L4: Circumferential disc bulge and bilateral facet arthropathy with small joint effusions.  No neural foraminal narrowing.  Mild spinal canal stenosis.     L4-L5: Circumferential disc bulge, bilateral facet arthropathy, and ligamentum flavum hypertrophy.  Mild bilateral neural foraminal narrowing.  Severe spinal canal stenosis.     L5-S1: Advanced facet joint arthropathy, noting joint hypertrophy with extensive subchondral marrow edema extending into the pedicles with surrounding inflammatory changes.  Slight/grade 1 anterolisthesis noted.  Moderate disc bulging and facet joint hypertrophy, contribute to moderate spinal canal stenosis, moderate right and mild left-sided neural foraminal narrowing.     Paraspinal muscles & soft tissues: Unremarkable.     Impression:     L1 compression fracture with significant marrow edema, suggestive of a subacute injury and/or instability.     Lumbar spondylosis, contributing to severe spinal canal stenosis at L4-5 and moderate spinal canal and neural foraminal stenosis at L5-S1, as above.     Prominent L5-S1 facet joint arthropathy, as above.     Electronically signed by resident: Magui Uriostegui  Date:                                            02/02/2024  Time:                                           13:15     Electronically signed by: Marquis Lynn MD  Date:                                            02/02/2024  Time:                                           14:28    ASSESSMENT: 89 y.o. year old female with pain, consistent with:    No diagnosis found.      DISCUSSION: Cathy Kearns is a retired  who comes to us from Dr. Xiong. She had  a fall in July, 2023 complicated by a left hip intertrochanteric fracture. She presents to us with mid-back pain with sitting and right buttock pain with standing. MRI shows an acute L1 compression fracture, significant DDD at L2/3 and L5/S1 with Modic changes, severe canal stenosis at L4/5, significant facet arthropathy/edema at L5/S1.     PLAN:  Encouraged patient to continue PT and HEP.   Orders placed for repeat right SI joint injection. Previous provided 80% relief for 3 months and pain has been returning.  RTC 2 weeks after injection.    The above plan and management options were discussed at length with patient. Patient is in agreement with the above and verbalized understanding.     Jessica Henley  07/23/2024

## 2024-07-23 NOTE — PROGRESS NOTES
PCP: Wilma Xiong MD    REFERRING PHYSICIAN: No ref. provider found    CHIEF COMPLAINT: Low back pain    Original HISTORY OF PRESENT ILLNESS: Cathy Kearns presents to the clinic for the evaluation of the above pain. The pain started 5 months ago    Original Pain Description:  The pain is located in the low back and is radiating to the right foot . The pain is described as dull. Exacerbating factors: Sitting. Mitigating factors walking and standing. Symptoms interfere with daily activity. The patient feels like symptoms have been unchanged. Patient denies significant motor weakness and loss of sensations. Ambulates with walker. Patient states that she currently lives in a nursing home and is very active.     Original PAIN SCORES:  Best: Pain is 0  Worst: Pain is 6  Current: Pain is 0        7/23/2024     9:18 AM   Last 3 PDI Scores   Pain Disability Index (PDI) 25       INTERVAL HISTORY: (Newest visit at the bottom)   Interval History (Date):     Interval History 3/26/2024:  The patient is here for follow up of back and buttock pain. She is s/p right SI joint injection with 80% relief. She has very minimal pain to this area now. She does have right lower leg pain and numbness. She says this is mild at this time. Her pain today is 5/10.    Interval History 7/23/2024:  Mrs. Kearns is here today for worsened right sided lower back and buttock pain. She previously underwent right SI joint injection on 3/19/24 with 80% relief for about 3 months. Her pain has been gradually returning since that time. It bothers her most with prolonged sitting and with changing from sitting to standing. She is currently in PT which sometimes worsens her pain. Her pain today is 5/10.    6 weeks of Conservative therapy:  PT: Yes (October 2023-February 2024)  Chiro: No  HEP: Yes      Treatments / Medications: (Ice/Heat/NSAIDS/APAP/etc):  - Tylenol      Interventional Pain Procedures: (Previous injections)  3/19/24 Right SI joint  injection- 80% relief for 3 months    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 7/9/2012    Vasovagal syncope      Past Surgical History:   Procedure Laterality Date    ADENOIDECTOMY      APPENDECTOMY      CATARACT EXTRACTION W/  INTRAOCULAR LENS IMPLANT Bilateral n/a    HYSTERECTOMY      2/2 uterine CA, removed ovaries, no longer f/u with gyn    INJECTION, SACROILIAC JOINT Right 3/19/2024    Procedure: INJECTION,SACROILIAC JOINT RIGHT *OPTHALMOLOGY CLEARANCE IN CHART*;  Surgeon: Lata Casey MD;  Location: [facility];  Service: Pain Management;  Laterality: Right;  [phone]  2 WK F/U CRAIG    INTRAMEDULLARY RODDING OF FEMUR Left 7/25/2023    Procedure: INSERTION, INTRAMEDULLARY DAPHNEY, LEFT FEMUR;  Surgeon: Rogerio Chand MD;  Location: [facility] OR [address] [place] [address];  Service: Orthopedics;  Laterality: Left;    Intravitreal Injection      Avastin od x's 12 dltx 2-16-12    TONSILLECTOMY       Social History     Socioeconomic History    Marital status:    Tobacco Use    Smoking status: Former     Current packs/day: 0.25     Average packs/day: 0.3 packs/day for 15.0 years (3.8 ttl pk-yrs)     Types: Cigarettes    Smokeless tobacco: Never   Substance and Sexual Activity    Alcohol use: Yes     Comment: 1-2 drinks at night    Drug use: No   Other Topics Concern    Are you pregnant or think you may be? No    Breast-feeding No   Social History Narrative    Moved from [place] to 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     Social Determinants of Health  "    Financial Resource Strain: Low Risk  (7/26/2023)    Overall Financial Resource Strain (CARDIA)     Difficulty of Paying Living Expenses: Not hard at all   Food Insecurity: No Food Insecurity (7/26/2023)    Hunger Vital Sign     Worried About Running Out of Food in the Last Year: Never true     Ran Out of Food in the Last Year: Never true   Transportation Needs: No Transportation Needs (7/26/2023)    PRAPARE - Transportation     Lack of Transportation (Medical): No     Lack of Transportation (Non-Medical): No   Physical Activity: Inactive (7/26/2023)    Exercise Vital Sign     Days of Exercise per Week: 0 days     Minutes of Exercise per Session: 0 min   Stress: No Stress Concern Present (7/26/2023)    [employer] - Occupational Stress Questionnaire     Feeling of Stress : Not at all   Housing Stability: Low Risk  (7/26/2023)    Housing Stability Vital Sign     Unable to Pay for Housing in the Last Year: No     Number of Places Lived in the Last Year: 1     Unstable Housing in the Last Year: No     Family History   Problem Relation Name Age of Onset    Cancer Mother          ovarian CA    Cancer Father          prostate    Cataracts Father      Cancer Maternal Grandmother          colon    Heart attack Maternal Grandfather      Cancer Paternal Grandmother          colon    Cancer Paternal Grandfather      Strabismus Paternal Aunt      Hypertension Daughter      Cancer Daughter          "cancer of bone"    Hyperlipidemia Daughter      Thyroid disease Grandchild      Heart attack Brother      Cancer Brother          prostate, esophagus, bladder    Hypertension Son      Hyperlipidemia Son      Cancer Brother          Non hodgkins lymphoma    Amblyopia Neg Hx      Blindness Neg Hx      Diabetes Neg Hx      Glaucoma Neg Hx      Macular degeneration Neg Hx      Retinal detachment Neg Hx      Stroke Neg Hx         Review of patient's allergies indicates:   Allergen Reactions    Penicillins " "Swelling    Astelin [azelastine] Other (See Comments)     Dizziness    Pseudoephedrine hcl Palpitations    Sulfa (sulfonamide antibiotics) Palpitations       Current Outpatient Medications   Medication Sig    acetaminophen (TYLENOL) 500 MG tablet Take 2 tablets (1,000 mg total) by mouth every 8 (eight) hours.    albuterol (PROVENTIL/VENTOLIN HFA) 90 mcg/actuation inhaler Inhale 1 puff into the lungs.    amLODIPine (NORVASC) 5 MG tablet Take 1 tablet (5 mg total) by mouth once daily.    apixaban (ELIQUIS) 2.5 mg Tab Take 1 tablet (2.5 mg total) by mouth 2 (two) times daily.    ofloxacin (FLOXIN) 0.3 % otic solution     sotaloL (BETAPACE) 80 MG tablet Take 1 tablet (80 mg total) by mouth 2 (two) times daily.    vitamin D (VITAMIN D3) 1000 units Tab Take 1 tablet (1,000 Units total) by mouth once daily.     No current facility-administered medications for this visit.       ROS:  GENERAL: No fever. No chills. No fatigue. Denies weight loss. Denies weight gain.  HEENT: Denies headaches. Denies vision change. Denies eye pain. Denies double vision. Denies ear pain.   CV: Denies chest pain.   PULM: Denies of shortness of breath.  GI: Denies constipation. No diarrhea. No abdominal pain. Denies nausea. Denies vomiting. No blood in stool.  HEME: Denies bleeding problems.  : Denies urgency. No painful urination. No blood in urine.  MS: Denies joint stiffness. Denies joint swelling.  Denies back pain.  SKIN: Denies rash.   NEURO: Denies seizures. No weakness.  PSYCH:  Denies difficulty sleeping. No anxiety. Denies depression. No suicidal thoughts.       VITALS:   Vitals:    07/23/24 0919   BP: 128/75   Pulse: 67   Resp: 18   Weight: 52.5 kg (115 lb 11.9 oz)   Height: 5' 2" (1.575 m)   PainSc:   5   PainLoc: Hip       PHYSICAL EXAM:   GENERAL: Well appearing, in no acute distress, alert and oriented x3.  PSYCH:  Mood and affect appropriate.   SKIN: Skin color, texture, turgor normal, no rashes or lesions.  HEENT:  " Normocephalic, atraumatic. Cranial nerves grossly intact.  BACK: Normal range of motion. No pain to palpation over the spinous processes. No pain to palpation over facet joints. There is no pain with palpation over the sacroiliac joints bilaterally.   EXTREMITIES: No deformities, edema, or skin discoloration.   MUSCULOSKELETAL: There is TTP over both SI joints. FELICITA is positive on the right. Yeoman's is positive on the right. Positive SI compression test on the right.  NEURO: Sensation is equal and appropriate bilaterally. Straight leg raising in the supine position is negative to radicular pain.   GAIT: Antalgic- ambulates with walker.      LABS:  Lab Results   Component Value Date    WBC 6.39 04/08/2024    HGB 13.1 04/08/2024    HCT 38.8 04/08/2024    MCV 91 04/08/2024     04/08/2024       CMP  Sodium   Date Value Ref Range Status   04/08/2024 137 136 - 145 mmol/L Final     Potassium   Date Value Ref Range Status   04/08/2024 4.1 3.5 - 5.1 mmol/L Final     Chloride   Date Value Ref Range Status   04/08/2024 103 95 - 110 mmol/L Final     CO2   Date Value Ref Range Status   04/08/2024 26 23 - 29 mmol/L Final     Glucose   Date Value Ref Range Status   04/08/2024 94 70 - 110 mg/dL Final     BUN   Date Value Ref Range Status   04/08/2024 12 10 - 30 mg/dL Final     Creatinine   Date Value Ref Range Status   04/08/2024 0.8 0.5 - 1.4 mg/dL Final     Calcium   Date Value Ref Range Status   04/08/2024 10.3 8.7 - 10.5 mg/dL Final     Total Protein   Date Value Ref Range Status   04/08/2024 7.2 6.0 - 8.4 g/dL Final     Albumin   Date Value Ref Range Status   04/08/2024 4.1 3.5 - 5.2 g/dL Final     Total Bilirubin   Date Value Ref Range Status   04/08/2024 0.8 0.1 - 1.0 mg/dL Final     Comment:     For infants and newborns, interpretation of results should be based  on gestational age, weight and in agreement with clinical  observations.    Premature Infant recommended reference ranges:  Up to 24  hours.............<8.0 mg/dL  Up to 48 hours............<12.0 mg/dL  3-5 days..................<15.0 mg/dL  6-29 days.................<15.0 mg/dL       Alkaline Phosphatase   Date Value Ref Range Status   04/08/2024 76 55 - 135 U/L Final     AST   Date Value Ref Range Status   04/08/2024 17 10 - 40 U/L Final     ALT   Date Value Ref Range Status   04/08/2024 16 10 - 44 U/L Final     Anion Gap   Date Value Ref Range Status   04/08/2024 8 8 - 16 mmol/L Final     eGFR   Date Value Ref Range Status   04/08/2024 >60 >60 mL/min/1.73 m^2 Final         IMAGING:    EXAMINATION:  MRI LUMBAR SPINE WITHOUT CONTRAST     CLINICAL HISTORY:  Compression fracture, lumbar; Wedge compression fracture of first lumbar vertebra, initial encounter for closed fracture     TECHNIQUE:  Multiplanar, multisequence MR images were acquired from the thoracolumbar junction to the sacrum without the administration of contrast.     COMPARISON:  X-ray 10/16/2023     FINDINGS:  Alignment: Grade 1 retrolisthesis of L2 on L3.  Grade 1 anterolisthesis of L4 on L5 and L5 on S1.     Vertebrae: Approximately 50% height loss centrally of the L1 vertebral body with extensive marrow edema, suggestive of subacute compression fracture and/or instability.  3 mm osseous retropulsion into the spinal canal.  No spinal canal stenosis.  No aggressive marrow replacement process.     Discs: Prominent degenerative disc disease on the left side at L2-3, noting marked disc height loss with extensive sub endplate marrow edema/Modic 1 changes.  There is prominent right-sided degenerative disc disease at L5-S1 with mild sub endplate marrow edema/Modic 1 changes.     Cord: Normal caliber in signal.  Conus terminates at L1-L2.  Cauda equina nerve roots are unremarkable.     Degenerative findings:     T12-L1:  Osseous retropulsion with encroachment on the ventral thecal sac inferior to the T12-L1 disc space with mild spinal canal stenosis. No neural foraminal narrowing.      L1-L2: Mild disc bulging asymmetric to the left, contributing to mild left neural foraminal narrowing.  No significant right neural foraminal narrowing or spinal canal stenosis.     L2-L3: Small disc bulge and bilateral facet arthropathy with facet joint effusions.  Mild left neural foraminal.  Mild spinal canal stenosis.     L3-L4: Circumferential disc bulge and bilateral facet arthropathy with small joint effusions.  No neural foraminal narrowing.  Mild spinal canal stenosis.     L4-L5: Circumferential disc bulge, bilateral facet arthropathy, and ligamentum flavum hypertrophy.  Mild bilateral neural foraminal narrowing.  Severe spinal canal stenosis.     L5-S1: Advanced facet joint arthropathy, noting joint hypertrophy with extensive subchondral marrow edema extending into the pedicles with surrounding inflammatory changes.  Slight/grade 1 anterolisthesis noted.  Moderate disc bulging and facet joint hypertrophy, contribute to moderate spinal canal stenosis, moderate right and mild left-sided neural foraminal narrowing.     Paraspinal muscles & soft tissues: Unremarkable.     Impression:     L1 compression fracture with significant marrow edema, suggestive of a subacute injury and/or instability.     Lumbar spondylosis, contributing to severe spinal canal stenosis at L4-5 and moderate spinal canal and neural foraminal stenosis at L5-S1, as above.     Prominent L5-S1 facet joint arthropathy, as above.     Electronically signed by resident: Magui Uriostegui  Date:                                            02/02/2024  Time:                                           13:15     Electronically signed by: Marquis Lynn MD  Date:                                            02/02/2024  Time:                                           14:28    ASSESSMENT: 89 y.o. year old female with pain, consistent with:    No diagnosis found.      DISCUSSION: Cathy Kearns is a retired  who comes to us from Dr. Xiong. She had  a fall in July, 2023 complicated by a left hip intertrochanteric fracture. She presents to us with mid-back pain with sitting and right buttock pain with standing. MRI shows an acute L1 compression fracture, significant DDD at L2/3 and L5/S1 with Modic changes, severe canal stenosis at L4/5, significant facet arthropathy/edema at L5/S1.     PLAN:  Encouraged patient to continue PT and HEP.   Orders placed for repeat right SI joint injection. Previous provided 80% relief for 3 months and pain has been returning.  RTC 2 weeks after injection.    The above plan and management options were discussed at length with patient. Patient is in agreement with the above and verbalized understanding.     Jessica Henley  07/23/2024

## 2024-07-23 NOTE — PROGRESS NOTES
"[facility]   Physical Therapy Treatment Note      Name: Cathy Kearns  [facility] Number: [id number]    Therapy Diagnosis:   Encounter Diagnoses   Name Primary?    Lumbar radiculopathy Yes    Posture imbalance     Impaired functional mobility, balance, gait, and endurance      Physician: Wilma Xiong MD    Visit Date: 7/23/2024    Physician Orders: PT Eval and Treat   Medical Diagnosis from Referral: Z87.81 (ICD-10-CM) - History of hip fracture M25.559 (ICD-10-CM) - Hip pain, unspecified laterality  Evaluation Date: 7/9/2024  Authorization Period Expiration: 6/27/2025  Plan of Care Expiration: 9/6/2024   Progress Note Due: 8/6/2024   Date of Surgery: n/a   Visit # / Visits authorized: 3/25   FOTO: 1/ 3      Precautions: Standard and osteopenia      Time In: 2:00 pm    Time Out: 2:55 pm   Total Billable Time: 55 minutes     PTA Visit #: 1/5       Subjective     Patient reports: she went to pain management, and she has an appointment on 7/30/24 at 2pm to get a shot in the hip, and cannot have exercise for 48 hours. States she is hoping her son () will take her to see her grandchildren in [place] the first week in August. (Wants to leave the day after the shot).     States she was doing early morning exercises [HEP] on Sunday morning, and "a lightning bolt shot from hip to the bottom of her foot. And that made me positive that I wanted to go to the pain management clinic." States, "as long as I do it [HEP] slow, and break up into segments of 5, it helps." States it hurts to turn off the microwave. Hurts the most in the morning.     She was compliant with home exercise program.  Response to previous treatment: "states she had the eval the week after she had the shot, and I told her the shot lasted only a week."   Functional change: ongoing, better tolerance with HEP     Pain: 5/10   Location: R hip     Objective      Objective Measures updated at progress report unless " "specified.     Treatment     Catyh received the treatments listed below:      therapeutic exercises to develop strength, endurance, ROM, flexibility, posture, and core stabilization for 00 minutes including:  None today     manual therapy techniques: Joint mobilizations, Myofacial release, Soft tissue Mobilization, and Friction Massage were applied to the:  for 00 minutes, including:  None today     neuromuscular re-education activities to improve: Balance, Coordination, Kinesthetic, Sense, Proprioception, and Posture for 25 minutes. The following activities were included:   TrA contraction + BKFO x 10 ea   SLR with pilates ring press 2 x 15   HL bilateral hip abduction vs pilates ring x 2'   HL (B & U) hip abduction vs RTB 2 x 10 B, 2 x 5 U (added to HEP)   + PPT x 2 min, hold 5"     therapeutic activities to improve functional performance for 30 minutes, including:  Reviewed and educated pt on HEP   Educated pt on importance of general strengthening in order to accomplish functional ADLs without pain, such as climbing stairs.   Collected hx   LTR x 2' , hold 5"   SKTC 10 x 10" ea   TrA contraction 10 x 10"   PPT+ Bridges vs pilates ring around knees 2 x 10 -- increased symptoms   + educated pt on cane OH flexion to help with shoulder AAROM, and educated pt on scapular retraction for posture.       Patient Education and Home Exercises       Education provided:   - Reviewed and educated pt on HEP     Written Home Exercises Provided: Patient instructed to cont prior HEP. Exercises were reviewed and Cathy was able to demonstrate them prior to the end of the session.  Cathy demonstrated good  understanding of the education provided. See Electronic Medical Record under Patient Instructions for exercises provided during therapy sessions    Assessment     Cathy had overall good tolerance to treatment with no adverse effects. Reviewed and educated pt on existing HEP, and added supine clamshells vs RTB, which pt demo good " understanding. Pt had good training effects post session. Continue to monitor and progress pt as tolerated.     Cathy Is progressing well towards her goals.   Patient prognosis is Good.     Patient will continue to benefit from skilled outpatient physical therapy to address the deficits listed in the problem list box on initial evaluation, provide pt/family education and to maximize pt's level of independence in the home and community environment.     Patient's spiritual, cultural and educational needs considered and pt agreeable to plan of care and goals.     Anticipated barriers to physical therapy: standard     Goals: updated 07/23/2024   Short Term Goals (4 Weeks):   1. Pt will report 20% reduction in pain of the lumbar spine and R LE for ease with ADL's (Progressing, not met)   2. PT will demonstrate improved upright posture with minimal cuing for ease with functional positioning in home and community.(Progressing, not met)   3. Pt will demonstrate improved lumbar spine ROM in all directions by 10% for ease with bending activities. (Progressing, not met)   4. Pt to demonstrate improved functional ability with FOTO limitation <=35% disability.(Progressing, not met)      Long Term Goals (8 Weeks):   1. Pt will report being independent with HEP for maintenance of improvements gained during therapy sessions(Progressing, not met)   2. PT will report 50% reduction of pain of the back and R LE for ease with positional tolerance for meals and leisure reading. (Progressing, not met)   3. Pt will demonstrate trunk and extremity strength to >=4+/5 without the provocation of pain for ease with stair navigation(Progressing, not met)   4. Pt will demonstrate appropriate upright posture without external cueing for ease with community and home mobility. (Progressing, not met)   5. Pt to demonstrate improved functional ability with FOTO limitation <=48% disability. (Progressing, not met)      Plan      Plan of care  Certification: 7/9/2024 to 9/6/2024.    Continue pt's current POC to reduce pain in R LE and LB, and to improve strength and ROM in B LE and lumbar spine and upright posture for functional performance.     Shayna Mirza, PTA

## 2024-07-25 ENCOUNTER — DOCUMENTATION ONLY (OUTPATIENT)
Dept: REHABILITATION | Facility: OTHER | Age: 89
End: 2024-07-25
Payer: MEDICARE

## 2024-07-25 ENCOUNTER — CLINICAL SUPPORT (OUTPATIENT)
Dept: REHABILITATION | Facility: OTHER | Age: 89
End: 2024-07-25
Payer: MEDICARE

## 2024-07-25 ENCOUNTER — PATIENT MESSAGE (OUTPATIENT)
Dept: PAIN MEDICINE | Facility: OTHER | Age: 89
End: 2024-07-25
Payer: MEDICARE

## 2024-07-25 DIAGNOSIS — R29.3 POSTURE IMBALANCE: ICD-10-CM

## 2024-07-25 DIAGNOSIS — M54.16 LUMBAR RADICULOPATHY: Primary | ICD-10-CM

## 2024-07-25 DIAGNOSIS — Z74.09 IMPAIRED FUNCTIONAL MOBILITY, BALANCE, GAIT, AND ENDURANCE: ICD-10-CM

## 2024-07-25 PROCEDURE — 97112 NEUROMUSCULAR REEDUCATION: CPT | Mod: PN | Performed by: INTERNAL MEDICINE

## 2024-07-25 NOTE — PROGRESS NOTES
"[facility]   Physical Therapy Treatment Note      Name: Cathy Kearns  [facility] Number: [id number]    Therapy Diagnosis:   Encounter Diagnoses   Name Primary?    Lumbar radiculopathy Yes    Posture imbalance     Impaired functional mobility, balance, gait, and endurance      Physician: Wilma Xiong MD    Visit Date: 7/23/2024    Physician Orders: PT Eval and Treat   Medical Diagnosis from Referral: Z87.81 (ICD-10-CM) - History of hip fracture M25.559 (ICD-10-CM) - Hip pain, unspecified laterality  Evaluation Date: 7/9/2024  Authorization Period Expiration: 6/27/2025  Plan of Care Expiration: 9/6/2024   Progress Note Due: 8/6/2024   Date of Surgery: n/a   Visit # / Visits authorized: 3/25   FOTO: 1/ 3      Precautions: Standard and osteopenia      Time In: 2:00 pm    Time Out: 2:55 pm   Total Billable Time: 55 minutes     PTA Visit #: 1/5       Subjective     Patient reports: she went to pain management, and she has an appointment on 7/30/24 at 2pm to get a shot in the hip, and cannot have exercise for 48 hours. States she is hoping her son () will take her to see her grandchildren in [place] the first week in August. (Wants to leave the day after the shot).     States she was doing early morning exercises [HEP] on Sunday morning, and "a lightning bolt shot from hip to the bottom of her foot. And that made me positive that I wanted to go to the pain management clinic." States, "as long as I do it [HEP] slow, and break up into segments of 5, it helps." States it hurts to turn off the microwave. Hurts the most in the morning.     She was compliant with home exercise program.  Response to previous treatment: "states she had the eval the week after she had the shot, and I told her the shot lasted only a week."   Functional change: ongoing, better tolerance with HEP     Pain: 5/10   Location: R hip     Objective      Objective Measures updated at progress report unless " "specified.     Treatment     Cathy received the treatments listed below:      therapeutic exercises to develop strength, endurance, ROM, flexibility, posture, and core stabilization for 00 minutes including:  None today     manual therapy techniques: Joint mobilizations, Myofacial release, Soft tissue Mobilization, and Friction Massage were applied to the:  for 00 minutes, including:  None today     neuromuscular re-education activities to improve: Balance, Coordination, Kinesthetic, Sense, Proprioception, and Posture for 25 minutes. The following activities were included:   TrA contraction + BKFO x 10 ea   SLR with pilates ring press 2 x 15   HL bilateral hip abduction vs pilates ring x 2'   HL (B & U) hip abduction vs RTB 2 x 10 B, 2 x 5 U (added to HEP)   + PPT x 2 min, hold 5"     therapeutic activities to improve functional performance for 30 minutes, including:  Reviewed and educated pt on HEP   Educated pt on importance of general strengthening in order to accomplish functional ADLs without pain, such as climbing stairs.   Collected hx   LTR x 2' , hold 5"   SKTC 10 x 10" ea   TrA contraction 10 x 10"   PPT+ Bridges vs pilates ring around knees 2 x 10 -- increased symptoms   + educated pt on cane OH flexion to help with shoulder AAROM, and educated pt on scapular retraction for posture.       Patient Education and Home Exercises       Education provided:   - Reviewed and educated pt on HEP     Written Home Exercises Provided: Patient instructed to cont prior HEP. Exercises were reviewed and Cathy was able to demonstrate them prior to the end of the session.  Cathy demonstrated good  understanding of the education provided. See Electronic Medical Record under Patient Instructions for exercises provided during therapy sessions    Assessment     Cathy had overall good tolerance to treatment with no adverse effects. Reviewed and educated pt on existing HEP, and added supine clamshells vs RTB, which pt demo good " understanding. Pt had good training effects post session. Continue to monitor and progress pt as tolerated.     Cathy Is progressing well towards her goals.   Patient prognosis is Good.     Patient will continue to benefit from skilled outpatient physical therapy to address the deficits listed in the problem list box on initial evaluation, provide pt/family education and to maximize pt's level of independence in the home and community environment.     Patient's spiritual, cultural and educational needs considered and pt agreeable to plan of care and goals.     Anticipated barriers to physical therapy: standard     Goals: updated 07/23/2024   Short Term Goals (4 Weeks):   1. Pt will report 20% reduction in pain of the lumbar spine and R LE for ease with ADL's (Progressing, not met)   2. PT will demonstrate improved upright posture with minimal cuing for ease with functional positioning in home and community.(Progressing, not met)   3. Pt will demonstrate improved lumbar spine ROM in all directions by 10% for ease with bending activities. (Progressing, not met)   4. Pt to demonstrate improved functional ability with FOTO limitation <=35% disability.(Progressing, not met)      Long Term Goals (8 Weeks):   1. Pt will report being independent with HEP for maintenance of improvements gained during therapy sessions(Progressing, not met)   2. PT will report 50% reduction of pain of the back and R LE for ease with positional tolerance for meals and leisure reading. (Progressing, not met)   3. Pt will demonstrate trunk and extremity strength to >=4+/5 without the provocation of pain for ease with stair navigation(Progressing, not met)   4. Pt will demonstrate appropriate upright posture without external cueing for ease with community and home mobility. (Progressing, not met)   5. Pt to demonstrate improved functional ability with FOTO limitation <=48% disability. (Progressing, not met)      Plan      Plan of care  Certification: 7/9/2024 to 9/6/2024.    Continue pt's current POC to reduce pain in R LE and LB, and to improve strength and ROM in B LE and lumbar spine and upright posture for functional performance.     Shayna Mirza, PTA

## 2024-07-25 NOTE — PROGRESS NOTES
"[facility]   Physical Therapy Treatment Note      Name: Cathy Kearns  [facility] Number: [id number]    Therapy Diagnosis:   Encounter Diagnoses   Name Primary?    Lumbar radiculopathy Yes    Posture imbalance     Impaired functional mobility, balance, gait, and endurance      Physician: Wilma Xiong MD    Visit Date: 7/25/2024    Physician Orders: PT Eval and Treat   Medical Diagnosis from Referral: Z87.81 (ICD-10-CM) - History of hip fracture M25.559 (ICD-10-CM) - Hip pain, unspecified laterality  Evaluation Date: 7/9/2024  Authorization Period Expiration: 6/27/2025  Plan of Care Expiration: 9/6/2024   Progress Note Due: 8/6/2024   Date of Surgery: n/a   Visit # / Visits authorized: 4/25   FOTO: 1/ 3      Precautions: Standard and osteopenia      Time In: 115 pm    Time Out: 2:10 pm   Total Billable Time: 55 minutes     PTA Visit #: 1/5     L LE shorter , hip ORIF  Subjective     Patient reports: doing nu step for 10 min at home gym.   Has nu steps at [facility] and would prefer to go it there. Did chair yoga this morning.   R buttock to medial calf pain presently and usually. Will get an injection in the  R hip which only lasted a week last time.          She was not  compliant with home exercise program   this am due to preparing bed to be changed by staff.  Response to previous treatment: "states she had the eval the week after she had the shot, and I told her the shot lasted only a week."   Functional change: ongoing, better tolerance with HEP     Pain: 5/10   Location: R hip to medial calf    Objective      Objective Measures updated at progress report unless specified.       RW MI   Slow speed with t/f to mat. Needs to sit prior to lying on R side due to fear of increased pain.          Treatment     Cathy received the treatments listed below:      therapeutic exercises to develop strength, endurance, ROM, flexibility, posture, and core stabilization for 00 minutes including:  None " "today     manual therapy techniques: Joint mobilizations, Myofacial release, Soft tissue Mobilization, and Friction Massage were applied to the:  for 00 minutes, including:  None today     neuromuscular re-education activities to improve: Balance, Coordination, Kinesthetic, Sense, Proprioception, and Posture for 55 minutes. The following activities were included:   TrA contraction + BKFO x 10 ea   SLR with pilates ring press 2 x 10  HL bilateral hip abduction vs pilates ring x 2'   HL (B & U) hip abduction vs RTB 2 x 10 B, 2 x 5 U (added to HEP)     PPT x 2 min, hold 5"   LAQ 20x  Clam 20x  Side hip abd 20x    B rows green 20x seated    B shd ext standing in front of mat with SBA 10 x 2   LTR x 2' , hold 5"   SKTC 10 x 010" ea   TrA contraction 10 x 10"   Bridge 10x R 1st toe sx's ( f/b SKTC 2 min hold)     therapeutic activities to improve functional performance for  0 minutes, including:  Reviewed and educated pt on HEP   Educated pt on importance of general strengthening in order to accomplish functional ADLs without pain, such as climbing stairs.   Collected hx      PPT+ Bridges vs pilates ring around knees 2 x 10 -- increased symptoms    educated pt on cane OH flexion to help with shoulder AAROM, and educated pt on scapular retraction for posture.       Patient Education and Home Exercises       Education provided:   - Reviewed and educated pt on HEP     Written Home Exercises Provided: Patient instructed to cont prior HEP. Exercises were reviewed and Cathy was able to demonstrate them prior to the end of the session.  Cathy demonstrated good  understanding of the education provided. See Electronic Medical Record under Patient Instructions for exercises provided during therapy sessions    Assessment     Cathy needs cues for core contraction and correct exercise positioning. Increased R hip and R LE sx's with bridging which decreased with SKTC R.     Cathy Is progressing well towards her goals.   Patient prognosis " is Good.     Patient will continue to benefit from skilled outpatient physical therapy to address the deficits listed in the problem list box on initial evaluation, provide pt/family education and to maximize pt's level of independence in the home and community environment.     Patient's spiritual, cultural and educational needs considered and pt agreeable to plan of care and goals.     Anticipated barriers to physical therapy: standard     Goals: updated 07/23/2024   Short Term Goals (4 Weeks):   1. Pt will report 20% reduction in pain of the lumbar spine and R LE for ease with ADL's (Progressing, not met)   2. PT will demonstrate improved upright posture with minimal cuing for ease with functional positioning in home and community.(Progressing, not met)   3. Pt will demonstrate improved lumbar spine ROM in all directions by 10% for ease with bending activities. (Progressing, not met)   4. Pt to demonstrate improved functional ability with FOTO limitation <=35% disability.(Progressing, not met)      Long Term Goals (8 Weeks):   1. Pt will report being independent with HEP for maintenance of improvements gained during therapy sessions(Progressing, not met)   2. PT will report 50% reduction of pain of the back and R LE for ease with positional tolerance for meals and leisure reading. (Progressing, not met)   3. Pt will demonstrate trunk and extremity strength to >=4+/5 without the provocation of pain for ease with stair navigation(Progressing, not met)   4. Pt will demonstrate appropriate upright posture without external cueing for ease with community and home mobility. (Progressing, not met)   5. Pt to demonstrate improved functional ability with FOTO limitation <=48% disability. (Progressing, not met)      Plan      Plan of care Certification: 7/9/2024 to 9/6/2024.    Continue pt's current POC to reduce pain in R LE and LB, and to improve strength and ROM in B LE and lumbar spine and upright posture for functional  performance.     Heather Romero, PT

## 2024-07-25 NOTE — PROGRESS NOTES
Physical Therapist and Physical Therapist Assistant met face to face to discuss patient's treatment plan and progress towards established goals. Pt will be seen by a physical therapist minimally every 6th visit or every 30 days.    Shayna Mirza PTA  07/25/2024

## 2024-07-30 ENCOUNTER — CLINICAL SUPPORT (OUTPATIENT)
Dept: REHABILITATION | Facility: OTHER | Age: 89
End: 2024-07-30
Attending: INTERNAL MEDICINE
Payer: MEDICARE

## 2024-07-30 DIAGNOSIS — Z74.09 IMPAIRED FUNCTIONAL MOBILITY, BALANCE, GAIT, AND ENDURANCE: ICD-10-CM

## 2024-07-30 DIAGNOSIS — M54.16 LUMBAR RADICULOPATHY: Primary | ICD-10-CM

## 2024-07-30 DIAGNOSIS — R29.3 POSTURE IMBALANCE: ICD-10-CM

## 2024-07-30 PROCEDURE — 97112 NEUROMUSCULAR REEDUCATION: CPT | Mod: PN

## 2024-07-30 PROCEDURE — 97530 THERAPEUTIC ACTIVITIES: CPT | Mod: PN

## 2024-07-30 NOTE — PROGRESS NOTES
"[facility]   Physical Therapy Treatment Note      Name: Cathy Kearns  [facility] Number: [id number]    Therapy Diagnosis:   Encounter Diagnoses   Name Primary?    Lumbar radiculopathy Yes    Posture imbalance     Impaired functional mobility, balance, gait, and endurance      Physician: Wilma Xiong MD    Visit Date: 7/30/2024     Physician Orders: PT Eval and Treat   Medical Diagnosis from Referral: Z87.81 (ICD-10-CM) - History of hip fracture M25.559 (ICD-10-CM) - Hip pain, unspecified laterality  Evaluation Date: 7/9/2024  Authorization Period Expiration: 6/27/2025  Plan of Care Expiration: 9/6/2024   Progress Note Due: 8/6/2024   Date of Surgery: n/a   Visit # / Visits authorized: 5/25   FOTO: 1/ 3      Precautions: Standard and osteopenia      Time In: 3:00 pm    Time Out: 4:00 pm   Total Billable Time: 60 minutes     PTA Visit #: 1/5     L LE shorter , hip ORIF  Subjective     Patient reports: no increased soreness after previous visit. Scheduled for an injection for R back pain on 8/6/24.         She was not  compliant with home exercise program   this am due to preparing bed to be changed by staff.  Response to previous treatment: "states she had the eval the week after she had the shot, and I told her the shot lasted only a week."   Functional change: ongoing, better tolerance with HEP     Pain: 5/10   Location: R hip to medial calf    Objective      Objective Measures updated at progress report unless specified.       RW MI   Slow speed with t/f to mat. Needs to sit prior to lying on R side due to fear of increased pain.          Treatment     Cathy received the treatments listed below:      neuromuscular re-education activities to improve: Balance, Coordination, Kinesthetic, Sense, Proprioception, and Posture for 40 minutes. The following   LTR x 2' , hold 5"   SKTC 10 x 10" ea   PPT x 2 min, hold 5"   TrA contraction 10 x 10"   SLR with pilates ring press 2 x 10  Clam " 20x  Side hip abd 20x   Bridge 10x R 1st toe sx's ( f/b SKTC 2 min hold)   LAQ 20x    therapeutic activities to improve functional performance for 20 minutes, including:  B rows green 20x seated    B shd ext standing in front of mat with SBA 10 x 2   +sit to stands (holding 3# DB) x1x15 - from hi-lo today  +seated resisted trunk rotations x1x8 x RTB           Patient Education and Home Exercises       Education provided:   - Reviewed and educated pt on HEP     Written Home Exercises Provided: Patient instructed to cont prior HEP. Exercises were reviewed and Cathy was able to demonstrate them prior to the end of the session.  Cathy demonstrated good  understanding of the education provided. See Electronic Medical Record under Patient Instructions for exercises provided during therapy sessions    Assessment     Discharged bridges 2* to c/o foot pain. Added sit to stand progression and seated resisted trunk rotations to promote trunk/glute strength and mm coordination to stabilize low back/pelvic girdle during ADLs. Pt able to perform without increased sx, but noted quick to fatigue in R glutes with sit to stands, despite performing from elevated surface. Consider adding resistance at knees to promote lateral glute activation for increased pelvic stabilization with sit to stands next visit.    Cathy Is progressing well towards her goals.   Patient prognosis is Good.     Patient will continue to benefit from skilled outpatient physical therapy to address the deficits listed in the problem list box on initial evaluation, provide pt/family education and to maximize pt's level of independence in the home and community environment.     Patient's spiritual, cultural and educational needs considered and pt agreeable to plan of care and goals.     Anticipated barriers to physical therapy: standard     Goals: updated 07/23/2024   Short Term Goals (4 Weeks):   1. Pt will report 20% reduction in pain of the lumbar spine and R LE for  ease with ADL's (Progressing, not met)   2. PT will demonstrate improved upright posture with minimal cuing for ease with functional positioning in home and community.(Progressing, not met)   3. Pt will demonstrate improved lumbar spine ROM in all directions by 10% for ease with bending activities. (Progressing, not met)   4. Pt to demonstrate improved functional ability with FOTO limitation <=35% disability.(Progressing, not met)      Long Term Goals (8 Weeks):   1. Pt will report being independent with HEP for maintenance of improvements gained during therapy sessions(Progressing, not met)   2. PT will report 50% reduction of pain of the back and R LE for ease with positional tolerance for meals and leisure reading. (Progressing, not met)   3. Pt will demonstrate trunk and extremity strength to >=4+/5 without the provocation of pain for ease with stair navigation(Progressing, not met)   4. Pt will demonstrate appropriate upright posture without external cueing for ease with community and home mobility. (Progressing, not met)   5. Pt to demonstrate improved functional ability with FOTO limitation <=48% disability. (Progressing, not met)      Plan      Plan of care Certification: 7/9/2024 to 9/6/2024.    Continue pt's current POC to reduce pain in R LE and LB, and to improve strength and ROM in B LE and lumbar spine and upright posture for functional performance.     Mariya Valencia, PT

## 2024-08-06 ENCOUNTER — HOSPITAL ENCOUNTER (OUTPATIENT)
Facility: OTHER | Age: 89
Discharge: HOME | End: 2024-08-06
Attending: ANESTHESIOLOGY | Admitting: ANESTHESIOLOGY
Payer: MEDICARE

## 2024-08-06 VITALS
RESPIRATION RATE: 15 BRPM | BODY MASS INDEX: 21.35 KG/M2 | TEMPERATURE: 98 F | DIASTOLIC BLOOD PRESSURE: 69 MMHG | OXYGEN SATURATION: 96 % | SYSTOLIC BLOOD PRESSURE: 142 MMHG | HEART RATE: 62 BPM | HEIGHT: 62 IN | WEIGHT: 116 LBS

## 2024-08-06 DIAGNOSIS — M46.1 SACROILIITIS: Primary | ICD-10-CM

## 2024-08-06 DIAGNOSIS — G89.29 CHRONIC PAIN: ICD-10-CM

## 2024-08-06 PROCEDURE — 25500020 PHARM REV CODE 255: Performed by: ANESTHESIOLOGY

## 2024-08-06 PROCEDURE — 25000003 PHARM REV CODE 250: Performed by: ANESTHESIOLOGY

## 2024-08-06 PROCEDURE — 63600175 PHARM REV CODE 636 W HCPCS: Mod: JZ,JG | Performed by: ANESTHESIOLOGY

## 2024-08-06 PROCEDURE — 27096 INJECT SACROILIAC JOINT: CPT | Mod: RT,,, | Performed by: ANESTHESIOLOGY

## 2024-08-06 PROCEDURE — 27096 INJECT SACROILIAC JOINT: CPT | Mod: RT | Performed by: ANESTHESIOLOGY

## 2024-08-06 RX ORDER — LIDOCAINE HYDROCHLORIDE 20 MG/ML
INJECTION, SOLUTION INFILTRATION; PERINEURAL
Status: DISCONTINUED | OUTPATIENT
Start: 2024-08-06 | End: 2024-08-06 | Stop reason: HOSPADM

## 2024-08-06 RX ORDER — TRIAMCINOLONE ACETONIDE 40 MG/ML
INJECTION, SUSPENSION INTRA-ARTICULAR; INTRAMUSCULAR
Status: DISCONTINUED | OUTPATIENT
Start: 2024-08-06 | End: 2024-08-06 | Stop reason: HOSPADM

## 2024-08-06 RX ORDER — BUPIVACAINE HYDROCHLORIDE 2.5 MG/ML
INJECTION, SOLUTION EPIDURAL; INFILTRATION; INTRACAUDAL
Status: DISCONTINUED | OUTPATIENT
Start: 2024-08-06 | End: 2024-08-06 | Stop reason: HOSPADM

## 2024-08-06 RX ORDER — SODIUM CHLORIDE 9 MG/ML
INJECTION, SOLUTION INTRAVENOUS CONTINUOUS
Status: DISCONTINUED | OUTPATIENT
Start: 2024-08-06 | End: 2024-08-06 | Stop reason: HOSPADM

## 2024-08-06 NOTE — OP NOTE
Sacroiliac Joint Injection under Fluoroscopic Guidance    The procedure, risks, benefits, and options were discussed with the patient. There are no contraindications to the procedure. The patent expressed understanding and agreed to the procedure. Informed written consent was obtained prior to the start of the procedure and can be found in the patient's chart.    PATIENT NAME: Cathy Kearns   MRN: [id number]     DATE OF PROCEDURE: 08/06/2024    PROCEDURE: Right Sacroiliac Joint Injection under Fluoroscopic Guidance    PRE-OP DIAGNOSIS: Sacroiliitis [M46.1]    POST-OP DIAGNOSIS: Same    PHYSICIAN: Lata Casey MD    ASSISTANTS: Jonathan Benavides MD - [employer] Pain Fellow     MEDICATIONS INJECTED: Preservative-free Kenalog 40mg with 3cc of Bupivacine 0.25%     LOCAL ANESTHETIC INJECTED: Xylocaine 2%     SEDATION: None    ESTIMATED BLOOD LOSS: None    COMPLICATIONS: None    TECHNIQUE: Time-out was performed to identify the patient and procedure to be performed. With the patient laying in a prone position, the surgical area was prepped and draped in the usual sterile fashion using ChloraPrep and a fenestrated drape. The sacroiliac joint was determined under fluoroscopy guidance. Skin anesthesia was achieved by injecting Lidocaine 2% over the injection site. The sacroiliac joint was  then approached with a 22 gauge, 3.5 inch spinal quinke needle that was introduced under fluoroscopic guidance in the AP and Lateral views. Once the needle tip was in the area of the joint, and there was no blood, contrast dye Omnipaque (300mg/mL) was injected to confirm placement and there was no vascular runoff. Fluoroscopic imaging in the AP and lateral views revealed a clear outline of the joint space. 4 mL of the medication mixture listed above was injected slowly intraarticular and osorio-articular. Displacement of the radio opaque contrast after injection of the medication confirmed that the medication went into the area of the joint. The needles  were removed and bleeding was nil. A sterile dressing was applied. No specimens collected. The patient tolerated the procedure well.       The patient was monitored after the procedure in the recovery area. They were given post-procedure and discharge instructions to follow at home. The patient was discharged in a stable condition.    Mirella Shah MD    I reviewed and edited the fellow's note. I conducted my own interview and physical examination. I agree with the findings. I was present and supervising all critical portions of the procedure.

## 2024-08-06 NOTE — DISCHARGE SUMMARY
Discharge Note  Short Stay      SUMMARY     Admit Date: 8/6/2024    Attending Physician: Mirella Shah      Discharge Physician: Mirella Shah      Discharge Date: 8/6/2024 11:15 AM    Procedure(s) (LRB):  INJECTION,SACROILIAC JOINT RIGHT (Right)    Final Diagnosis: Sacroiliitis [M46.1]    Disposition: Home or self care    Patient Instructions:   Current Discharge Medication List        CONTINUE these medications which have NOT CHANGED    Details   acetaminophen (TYLENOL) 500 MG tablet Take 2 tablets (1,000 mg total) by mouth every 8 (eight) hours.  Refills: 0      albuterol (PROVENTIL/VENTOLIN HFA) 90 mcg/actuation inhaler Inhale 1 puff into the lungs.      amLODIPine (NORVASC) 5 MG tablet Take 1 tablet (5 mg total) by mouth once daily.  Qty: 90 tablet, Refills: 3    Comments: .  Associated Diagnoses: Hypertension, unspecified type      apixaban (ELIQUIS) 2.5 mg Tab Take 1 tablet (2.5 mg total) by mouth 2 (two) times daily.  Qty: 180 tablet, Refills: 3    Associated Diagnoses: Paroxysmal atrial fibrillation      ofloxacin (FLOXIN) 0.3 % otic solution       sotaloL (BETAPACE) 80 MG tablet Take 1 tablet (80 mg total) by mouth 2 (two) times daily.  Qty: 180 tablet, Refills: 3    Associated Diagnoses: Paroxysmal atrial fibrillation      vitamin D (VITAMIN D3) 1000 units Tab Take 1 tablet (1,000 Units total) by mouth once daily.                 Discharge Diagnosis: Sacroiliitis [M46.1]  Condition on Discharge: Stable with no complications to procedure   Diet on Discharge: Same as before.  Activity: as per instruction sheet.  Discharge to: Home with a responsible adult.  Follow up: 2-4 weeks       Please call my office or pager at [phone] if experienced any weakness or loss of sensation, fever > 101.5, pain uncontrolled with oral medications, persistent nausea/vomiting/or diarrhea, redness or drainage from the incisions, or any other worrisome concerns. If physician on call was not reached or could not communicate  with our office for any reason please go to the nearest emergency department

## 2024-08-13 ENCOUNTER — CLINICAL SUPPORT (OUTPATIENT)
Dept: REHABILITATION | Facility: OTHER | Age: 89
End: 2024-08-13
Payer: MEDICARE

## 2024-08-13 DIAGNOSIS — Z74.09 IMPAIRED FUNCTIONAL MOBILITY, BALANCE, GAIT, AND ENDURANCE: ICD-10-CM

## 2024-08-13 DIAGNOSIS — M54.16 LUMBAR RADICULOPATHY: Primary | ICD-10-CM

## 2024-08-13 DIAGNOSIS — R29.3 POSTURE IMBALANCE: ICD-10-CM

## 2024-08-13 PROCEDURE — 97530 THERAPEUTIC ACTIVITIES: CPT | Mod: KX,PN | Performed by: PHYSICAL THERAPIST

## 2024-08-13 PROCEDURE — 97112 NEUROMUSCULAR REEDUCATION: CPT | Mod: KX,PN | Performed by: PHYSICAL THERAPIST

## 2024-08-13 NOTE — PROGRESS NOTES
"[facility]   Physical Therapy Treatment Note      Name: Cathy Kearns  [facility] Number: [id number]    Therapy Diagnosis:   Encounter Diagnoses   Name Primary?    Lumbar radiculopathy Yes    Posture imbalance     Impaired functional mobility, balance, gait, and endurance      Physician: Wilma Xiong MD    Visit Date: 8/13/2024     Physician Orders: PT Eval and Treat   Medical Diagnosis from Referral: Z87.81 (ICD-10-CM) - History of hip fracture M25.559 (ICD-10-CM) - Hip pain, unspecified laterality  Evaluation Date: 7/9/2024  Authorization Period Expiration: 6/27/2025  Plan of Care Expiration: 9/6/2024   Progress Note Due: 9/6/2024   Date of Surgery: n/a   Visit # / Visits authorized: 6/25   FOTO: 2/ 3 last issued 8/13/2024     Precautions: Standard and osteopenia      Time In: 1405    Time Out: 1500  Total Billable Time: 55 minutes     PTA Visit #: 0/5     L LE shorter , hip ORIF  Subjective     Patient reports: no significant change with recent injection. She says she feels ok pain-wise with walking forward, but has pain with doing things out to the side and balancing with her weight completely on her R leg.     She was compliant with home exercise program.   Response to previous treatment: "states she had the eval the week after she had the shot, and I told her the shot lasted only a week."   Functional change: ongoing, better tolerance with HEP     Pain: 3/10   Location: R hip to medial calf    Objective      Objective Measures updated at progress report unless specified.      8/13/2024  Lower Extremity Strength  Right LE   Left LE     Ankle dorsiflexion: 4+/5 Ankle dorsiflexion: 4+/5   Knee extension: 4+/5 Knee extension: 4+/5   Knee flexion: 4+/5 Knee flexion: 4+/5   Hip flexion: 4/5 Hip flexion: 4+/5   Hip external rotation: 4+/5 Hip external rotation: 4+/5   Hip internal rotation: 4+/5 Hip internal rotation: 4+/5   Hip extension:  4-/5 Hip extension: 4-/5   Hip abduction: 4-/5 " "Hip abduction: 4+/5   Hip adduction: 4+/5 Hip adduction 4+/5             Treatment     Cathy received the treatments listed below:      neuromuscular re-education activities to improve: Balance, Coordination, Kinesthetic, Sense, Proprioception, and Posture for 25 minutes. The following   LTR x 2' , hold 5" (reviewed for HEP)  SKTC 10 x 10" ea (reviewed for HEP)  PPT x 2 min, hold 5"   TrA contraction 10 x 10"   SLR with pilates ring press 2 x 10  Clam 20x  Side hip abd 20x   Bridge 2 x 10   LAQ 20x    therapeutic activities to improve functional performance for 30 minutes, including:  Reassessment for progress note  B rows green 20x seated    B shd ext standing in front of mat with SBA 10 x 2   +sit to stands (holding 3# DB) x3x10 - from hi-lo (heavy cuing)  +seated resisted trunk rotations x1x8 x RTB  +Step ups on 4" step 2 x 10           Patient Education and Home Exercises       Education provided:   - Reviewed and educated pt on HEP     Written Home Exercises Provided: Patient instructed to cont prior HEP. Exercises were reviewed and Cathy was able to demonstrate them prior to the end of the session.  Cathy demonstrated good  understanding of the education provided. See Electronic Medical Record under Patient Instructions for exercises provided during therapy sessions    Assessment     Overall Cathy is making good progress, with improvement noted in B LE with MMT compared to evaluation. She continues with weakness, particularly to hip extensors, which is limiting to transfers and stair navigation. She will benefit from continued intervention with focus on functional strengthening. Heavy cuing today for technique with rising from sitting ("nose over toes"), and initiated step ups with good tolerance.     Cathy Is progressing well towards her goals.   Patient prognosis is Good.     Patient will continue to benefit from skilled outpatient physical therapy to address the deficits listed in the problem list box on initial " evaluation, provide pt/family education and to maximize pt's level of independence in the home and community environment.     Patient's spiritual, cultural and educational needs considered and pt agreeable to plan of care and goals.     Anticipated barriers to physical therapy: standard     Goals: updated 07/23/2024   Short Term Goals (4 Weeks):   1. Pt will report 20% reduction in pain of the lumbar spine and R LE for ease with ADL's (Progressing, not met)   2. PT will demonstrate improved upright posture with minimal cuing for ease with functional positioning in home and community.(met)   3. Pt will demonstrate improved lumbar spine ROM in all directions by 10% for ease with bending activities. (Progressing, not met)   4. Pt to demonstrate improved functional ability with FOTO limitation <=35% disability.(met)      Long Term Goals (8 Weeks):   1. Pt will report being independent with HEP for maintenance of improvements gained during therapy sessions(Progressing, not met)   2. PT will report 50% reduction of pain of the back and R LE for ease with positional tolerance for meals and leisure reading. (Progressing, not met)   3. Pt will demonstrate trunk and extremity strength to >=4+/5 without the provocation of pain for ease with stair navigation(Progressing, not met)   4. Pt will demonstrate appropriate upright posture without external cueing for ease with community and home mobility. (Progressing, not met)   5. Pt to demonstrate improved functional ability with FOTO limitation <=48% disability. (Progressing, not met)      Plan      Plan of care Certification: 7/9/2024 to 9/6/2024.    Continue pt's current POC to reduce pain in R LE and LB, and to improve strength and ROM in B LE and lumbar spine and upright posture for functional performance.     Keara Arcos, PT

## 2024-08-15 ENCOUNTER — CLINICAL SUPPORT (OUTPATIENT)
Dept: REHABILITATION | Facility: OTHER | Age: 89
End: 2024-08-15
Payer: MEDICARE

## 2024-08-15 DIAGNOSIS — Z74.09 IMPAIRED FUNCTIONAL MOBILITY, BALANCE, GAIT, AND ENDURANCE: ICD-10-CM

## 2024-08-15 DIAGNOSIS — M54.16 LUMBAR RADICULOPATHY: Primary | ICD-10-CM

## 2024-08-15 DIAGNOSIS — R29.3 POSTURE IMBALANCE: ICD-10-CM

## 2024-08-15 PROCEDURE — 97530 THERAPEUTIC ACTIVITIES: CPT | Mod: KX,PN | Performed by: PHYSICAL THERAPIST

## 2024-08-15 PROCEDURE — 97112 NEUROMUSCULAR REEDUCATION: CPT | Mod: KX,PN | Performed by: PHYSICAL THERAPIST

## 2024-08-15 NOTE — PROGRESS NOTES
"[facility]   Physical Therapy Treatment Note      Name: Cathy Kearns  [facility] Number: [id number]    Therapy Diagnosis:   Encounter Diagnoses   Name Primary?    Lumbar radiculopathy Yes    Posture imbalance     Impaired functional mobility, balance, gait, and endurance      Physician: Wilma Xiong MD    Visit Date: 8/15/2024     Physician Orders: PT Eval and Treat   Medical Diagnosis from Referral: Z87.81 (ICD-10-CM) - History of hip fracture M25.559 (ICD-10-CM) - Hip pain, unspecified laterality  Evaluation Date: 7/9/2024  Authorization Period Expiration: 6/27/2025  Plan of Care Expiration: 9/6/2024   Progress Note Due: 9/6/2024   Date of Surgery: n/a   Visit # / Visits authorized: 7/25   FOTO: 2/ 3 last issued 8/13/2024     Precautions: Standard and osteopenia      Time In: 1415    Time Out: 1510  Total Billable Time: 55 minutes     PTA Visit #: 0/5     L LE shorter , hip ORIF  Subjective     Patient reports: hip is feeling ok today. She did chair yoga this morning, and has been active getting her home ready for visitors today.     She was compliant with home exercise program.   Response to previous treatment: "states she had the eval the week after she had the shot, and I told her the shot lasted only a week."   Functional change: ongoing, better tolerance with HEP     Pain: 3/10   Location: R hip to medial calf    Objective      Objective Measures updated at progress report unless specified.      8/13/2024  Lower Extremity Strength  Right LE   Left LE     Ankle dorsiflexion: 4+/5 Ankle dorsiflexion: 4+/5   Knee extension: 4+/5 Knee extension: 4+/5   Knee flexion: 4+/5 Knee flexion: 4+/5   Hip flexion: 4/5 Hip flexion: 4+/5   Hip external rotation: 4+/5 Hip external rotation: 4+/5   Hip internal rotation: 4+/5 Hip internal rotation: 4+/5   Hip extension:  4-/5 Hip extension: 4-/5   Hip abduction: 4-/5 Hip abduction: 4+/5   Hip adduction: 4+/5 Hip adduction 4+/5         " "    Treatment     Cathy received the treatments listed below:      neuromuscular re-education activities to improve: Balance, Coordination, Kinesthetic, Sense, Proprioception, and Posture for 25 minutes. The following   LTR x 2' , hold 5" (reviewed for HEP)  SKTC 10 x 10" ea (reviewed for HEP)  PPT x 2 min, hold 5"   TrA contraction 10 x 10"   SLR with pilates ring press 3 x 10  Clam 20x  SLR abd 20x   Bridge 3 x 10   LAQ 20x  +Prone quad stretch with strap 2 x 30"  +Modified tacho stretch 1 x 2 min B    therapeutic activities to improve functional performance for 30 minutes, including:    B rows green 20x seated    B shd ext standing in front of mat with SBA 10 x 2   sit to stands (holding 3# DB) x3x10 - from hi-lo   +seated resisted trunk rotations x1x8 x RTB  Step ups on 4" step 2 x 10 -- increase to 6" nv  +B HR 20x  +GS SB 3 x 30"           Patient Education and Home Exercises       Education provided:   - Reviewed and educated pt on HEP     Written Home Exercises Provided: Patient instructed to cont prior HEP. Exercises were reviewed and Cathy was able to demonstrate them prior to the end of the session.  Cathy demonstrated good  understanding of the education provided. See Electronic Medical Record under Patient Instructions for exercises provided during therapy sessions    Assessment     Good tolerance to treatment today. Able to increase repetitions with supine exercises. Mild c/o mm fatigue with sidelying exercises, but able to complete. Continued with 4" step for step ups today, but pt able to ascend with trial on 6" step so recommend increase next visit. Added quad stretches today to help to improve tolerance to upright posture.     Cathy Is progressing well towards her goals.   Patient prognosis is Good.     Patient will continue to benefit from skilled outpatient physical therapy to address the deficits listed in the problem list box on initial evaluation, provide pt/family education and to maximize pt's " level of independence in the home and community environment.     Patient's spiritual, cultural and educational needs considered and pt agreeable to plan of care and goals.     Anticipated barriers to physical therapy: standard     Goals: updated 07/23/2024   Short Term Goals (4 Weeks):   1. Pt will report 20% reduction in pain of the lumbar spine and R LE for ease with ADL's (Progressing, not met)   2. PT will demonstrate improved upright posture with minimal cuing for ease with functional positioning in home and community.(met)   3. Pt will demonstrate improved lumbar spine ROM in all directions by 10% for ease with bending activities. (Progressing, not met)   4. Pt to demonstrate improved functional ability with FOTO limitation <=35% disability.(met)      Long Term Goals (8 Weeks):   1. Pt will report being independent with HEP for maintenance of improvements gained during therapy sessions(Progressing, not met)   2. PT will report 50% reduction of pain of the back and R LE for ease with positional tolerance for meals and leisure reading. (Progressing, not met)   3. Pt will demonstrate trunk and extremity strength to >=4+/5 without the provocation of pain for ease with stair navigation(Progressing, not met)   4. Pt will demonstrate appropriate upright posture without external cueing for ease with community and home mobility. (Progressing, not met)   5. Pt to demonstrate improved functional ability with FOTO limitation <=48% disability. (Progressing, not met)      Plan      Plan of care Certification: 7/9/2024 to 9/6/2024.    Continue pt's current POC to reduce pain in R LE and LB, and to improve strength and ROM in B LE and lumbar spine and upright posture for functional performance.     Keara Arcos, PT

## 2024-08-20 ENCOUNTER — CLINICAL SUPPORT (OUTPATIENT)
Dept: REHABILITATION | Facility: OTHER | Age: 89
End: 2024-08-20
Payer: MEDICARE

## 2024-08-20 DIAGNOSIS — M54.16 LUMBAR RADICULOPATHY: Primary | ICD-10-CM

## 2024-08-20 DIAGNOSIS — R29.3 POSTURE IMBALANCE: ICD-10-CM

## 2024-08-20 DIAGNOSIS — Z74.09 IMPAIRED FUNCTIONAL MOBILITY, BALANCE, GAIT, AND ENDURANCE: ICD-10-CM

## 2024-08-20 PROCEDURE — 97112 NEUROMUSCULAR REEDUCATION: CPT | Mod: PN | Performed by: PHYSICAL THERAPIST

## 2024-08-20 PROCEDURE — 97530 THERAPEUTIC ACTIVITIES: CPT | Mod: PN | Performed by: PHYSICAL THERAPIST

## 2024-08-20 NOTE — PROGRESS NOTES
"[facility]   Physical Therapy Treatment Note      Name: Cathy Kearns  [facility] Number: [id number]    Therapy Diagnosis:   Encounter Diagnoses   Name Primary?    Lumbar radiculopathy Yes    Posture imbalance     Impaired functional mobility, balance, gait, and endurance      Physician: Wilma Xiong MD    Visit Date: 8/20/2024     Physician Orders: PT Eval and Treat   Medical Diagnosis from Referral: Z87.81 (ICD-10-CM) - History of hip fracture M25.559 (ICD-10-CM) - Hip pain, unspecified laterality  Evaluation Date: 7/9/2024  Authorization Period Expiration: 6/27/2025  Plan of Care Expiration: 9/6/2024   Progress Note Due: 9/6/2024   Date of Surgery: n/a   Visit # / Visits authorized: 8/25   FOTO: 2/ 3 last issued 8/13/2024     Precautions: Standard and osteopenia      Time In: 1355    Time Out: 1455  Total Billable Time: 60 minutes     PTA Visit #: 0/5     L LE shorter , hip ORIF  Subjective     Patient reports: no new complaints today. She felt good with her last visit, feels like all the exercises and stretches are very helpful.     She was compliant with home exercise program.   Response to previous treatment: "states she had the eval the week after she had the shot, and I told her the shot lasted only a week."   Functional change: ongoing, better tolerance with HEP     Pain: 3/10   Location: R hip to medial calf    Objective      Objective Measures updated at progress report unless specified.      8/13/2024  Lower Extremity Strength  Right LE   Left LE     Ankle dorsiflexion: 4+/5 Ankle dorsiflexion: 4+/5   Knee extension: 4+/5 Knee extension: 4+/5   Knee flexion: 4+/5 Knee flexion: 4+/5   Hip flexion: 4/5 Hip flexion: 4+/5   Hip external rotation: 4+/5 Hip external rotation: 4+/5   Hip internal rotation: 4+/5 Hip internal rotation: 4+/5   Hip extension:  4-/5 Hip extension: 4-/5   Hip abduction: 4-/5 Hip abduction: 4+/5   Hip adduction: 4+/5 Hip adduction 4+/5         " "    Treatment     Cathy received the treatments listed below:      neuromuscular re-education activities to improve: Balance, Coordination, Kinesthetic, Sense, Proprioception, and Posture for 30 minutes. The following   LTR x 2' , hold 5" (reviewed for HEP)  SKTC 10 x 10" ea (reviewed for HEP)  PPT x 2 min, hold 5"   TrA contraction 10 x 10"   SLR with pilates ring press 3 x 10  Bridge 3 x 10   Clam 20x  SLR abd 20x     LAQ 20x  +Prone quad stretch with strap 2 x 30"  +Modified tacho stretch 1 x 2 min B    therapeutic activities to improve functional performance for 30 minutes, including:  New HEP provided and reviewed per pt request  B rows green 20x seated    B shd ext standing in front of mat with SBA 10 x 2   sit to stands (holding 3# DB) x3x10 - from hi-lo   +seated resisted trunk rotations x1x8 x RTB  Step ups on 4" step 2 x 10 -- increase to 6" nv  B HR 20x  GS SB 3 x 30"           Patient Education and Home Exercises       Education provided:   - Reviewed and educated pt on HEP     Written Home Exercises Provided: Patient instructed to cont prior HEP. Exercises were reviewed and Cathy was able to demonstrate them prior to the end of the session.  Cathy demonstrated good  understanding of the education provided. See Electronic Medical Record under Patient Instructions for exercises provided during therapy sessions    Assessment     Good tolerance to treatment today. Pt reports fatigue at beginning of session, but is able to participate fully and reported feeling improved at end of treatment. Some limitation with bridges with pain to R toe during bridges, trial use of ball add squeeze and pilates circles abd with most comfort with ball squeeze. Able to increase to 6" step with step ups today, requires B UE use for R LE but able to perform. Pt provided with new HEP today per her request, all exercises reviewed.     Cathy Is progressing well towards her goals.   Patient prognosis is Good.     Patient will continue " to benefit from skilled outpatient physical therapy to address the deficits listed in the problem list box on initial evaluation, provide pt/family education and to maximize pt's level of independence in the home and community environment.     Patient's spiritual, cultural and educational needs considered and pt agreeable to plan of care and goals.     Anticipated barriers to physical therapy: standard     Goals: updated 07/23/2024   Short Term Goals (4 Weeks):   1. Pt will report 20% reduction in pain of the lumbar spine and R LE for ease with ADL's (Progressing, not met)   2. PT will demonstrate improved upright posture with minimal cuing for ease with functional positioning in home and community.(met)   3. Pt will demonstrate improved lumbar spine ROM in all directions by 10% for ease with bending activities. (Progressing, not met)   4. Pt to demonstrate improved functional ability with FOTO limitation <=35% disability.(met)      Long Term Goals (8 Weeks):   1. Pt will report being independent with HEP for maintenance of improvements gained during therapy sessions(Progressing, not met)   2. PT will report 50% reduction of pain of the back and R LE for ease with positional tolerance for meals and leisure reading. (Progressing, not met)   3. Pt will demonstrate trunk and extremity strength to >=4+/5 without the provocation of pain for ease with stair navigation(Progressing, not met)   4. Pt will demonstrate appropriate upright posture without external cueing for ease with community and home mobility. (Progressing, not met)   5. Pt to demonstrate improved functional ability with FOTO limitation <=48% disability. (Progressing, not met)      Plan      Plan of care Certification: 7/9/2024 to 9/6/2024.    Continue pt's current POC to reduce pain in R LE and LB, and to improve strength and ROM in B LE and lumbar spine and upright posture for functional performance.     Keara Arcos, PT

## 2024-08-21 ENCOUNTER — OFFICE VISIT (OUTPATIENT)
Dept: PAIN MEDICINE | Facility: CLINIC | Age: 89
End: 2024-08-21
Payer: MEDICARE

## 2024-08-21 VITALS
BODY MASS INDEX: 21.05 KG/M2 | DIASTOLIC BLOOD PRESSURE: 67 MMHG | WEIGHT: 115.06 LBS | TEMPERATURE: 97 F | SYSTOLIC BLOOD PRESSURE: 158 MMHG | HEART RATE: 64 BPM | OXYGEN SATURATION: 99 %

## 2024-08-21 DIAGNOSIS — G57.01 PIRIFORMIS SYNDROME OF RIGHT SIDE: ICD-10-CM

## 2024-08-21 DIAGNOSIS — G89.4 CHRONIC PAIN SYNDROME: ICD-10-CM

## 2024-08-21 DIAGNOSIS — M51.36 DDD (DEGENERATIVE DISC DISEASE), LUMBAR: ICD-10-CM

## 2024-08-21 DIAGNOSIS — M48.061 SPINAL STENOSIS OF LUMBAR REGION, UNSPECIFIED WHETHER NEUROGENIC CLAUDICATION PRESENT: ICD-10-CM

## 2024-08-21 DIAGNOSIS — M46.1 SACROILIITIS: Primary | ICD-10-CM

## 2024-08-21 DIAGNOSIS — M47.816 LUMBAR SPONDYLOSIS: ICD-10-CM

## 2024-08-21 DIAGNOSIS — M47.816 FACET ARTHROPATHY, LUMBAR: ICD-10-CM

## 2024-08-21 PROCEDURE — 99213 OFFICE O/P EST LOW 20 MIN: CPT | Mod: S$PBB,,,

## 2024-08-21 PROCEDURE — 99999 PR PBB SHADOW E&M-EST. PATIENT-LVL IV: CPT | Mod: PBBFAC,,,

## 2024-08-21 PROCEDURE — 99214 OFFICE O/P EST MOD 30 MIN: CPT | Mod: PBBFAC

## 2024-08-21 NOTE — PROGRESS NOTES
PCP: Wilma Xiong MD    REFERRING PHYSICIAN: No ref. provider found    CHIEF COMPLAINT: Low back pain    Original HISTORY OF PRESENT ILLNESS: Cathy Kearns presents to the clinic for the evaluation of the above pain. The pain started 5 months ago    Original Pain Description:  The pain is located in the low back and is radiating to the right foot . The pain is described as dull. Exacerbating factors: Sitting. Mitigating factors walking and standing. Symptoms interfere with daily activity. The patient feels like symptoms have been unchanged. Patient denies significant motor weakness and loss of sensations. Ambulates with walker. Patient states that she currently lives in a nursing home and is very active.     Original PAIN SCORES:  Best: Pain is 0  Worst: Pain is 6  Current: Pain is 0        8/21/2024     9:11 AM   Last 3 PDI Scores   Pain Disability Index (PDI) 35       INTERVAL HISTORY:     Interval History 3/26/2024:  The patient is here for follow up of back and buttock pain. She is s/p right SI joint injection with 80% relief. She has very minimal pain to this area now. She does have right lower leg pain and numbness. She says this is mild at this time. Her pain today is 5/10.    Interval History 7/23/2024:  Mrs. Kearns is here today for worsened right sided lower back and buttock pain. She previously underwent right SI joint injection on 3/19/24 with 80% relief for about 3 months. Her pain has been gradually returning since that time. It bothers her most with prolonged sitting and with changing from sitting to standing. She is currently in PT which sometimes worsens her pain. Her pain today is 5/10.    Interval History 8/21/2024:  Cathy Kearns returns to clinic for follow-up after right SI joint injection on 8/6/2024. She reports 80% pain relief.  She states she has not had any shooting pains into her leg since the procedure. She does continue to have some pain to the right leg. The pain in her  right leg is worse with sitting and getting up. It is better with walking. She continues tylenol occasionally with good relief. She denies any perceived side effects. She continues PT and HEP daily. She denies recent falls or trauma. She denies new onset fever/night sweats, urinary incontinence, bowel incontinence, significant weight changes, significant motor weakness or changes, or loss of sensations. Her pain today is 5/10.    6 weeks of Conservative therapy:  PT: Yes (October 2023-February 2024)  Chiro: No  HEP: Yes      Treatments / Medications: (Ice/Heat/NSAIDS/APAP/etc):  - Tylenol      Interventional Pain Procedures: (Previous injections)  3/19/24 Right SI joint injection- 80% relief for 3 months  8/6/24 - right SIJ injection - 80% relief    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 7/9/2012    Vasovagal syncope      Past Surgical History:   Procedure Laterality Date    ADENOIDECTOMY      APPENDECTOMY      CATARACT EXTRACTION W/  INTRAOCULAR LENS IMPLANT Bilateral n/a    HYSTERECTOMY      2/2 uterine CA, removed ovaries, no longer f/u with gyn    INJECTION, SACROILIAC JOINT Right 3/19/2024    Procedure: INJECTION,SACROILIAC JOINT RIGHT *OPTHALMOLOGY CLEARANCE IN CHART*;  Surgeon: Lata Casey MD;  Location: [facility]IN [facility]GT;  Service: Pain Management;  Laterality: Right;  [phone]  2 WK F/U CRAIG    INJECTION, SACROILIAC JOINT Right 8/6/2024    Procedure: INJECTION,SACROILIAC JOINT RIGHT;  Surgeon: aLta Casey MD;  Location: [facility]IN [facility];  Service: Pain Management;  Laterality: Right;  [phone]  2 WK F/U LUIS ENRIQUE    INTRAMEDULLARY RODDING OF FEMUR Left 7/25/2023     Procedure: INSERTION, INTRAMEDULLARY DAPHNEY, LEFT FEMUR;  Surgeon: Rogerio Chand MD;  Location: [facility] OR [address] [place] [address];  Service: Orthopedics;  Laterality: Left;    Intravitreal Injection      Avastin od x's 12 dltx 2-16-12    TONSILLECTOMY       Social History     Socioeconomic History    Marital status:    Tobacco Use    Smoking status: Former     Current packs/day: 0.25     Average packs/day: 0.3 packs/day for 15.0 years (3.8 ttl pk-yrs)     Types: Cigarettes    Smokeless tobacco: Never   Substance and Sexual Activity    Alcohol use: Yes     Comment: 1-2 drinks at night    Drug use: No   Other Topics Concern    Are you pregnant or think you may be? 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     Social Determinants of Health     Financial Resource Strain: Low Risk  (7/26/2023)    Overall Financial Resource Strain (CARDIA)     Difficulty of Paying Living Expenses: Not hard at all   Food Insecurity: No Food Insecurity (7/26/2023)    Hunger Vital Sign     Worried About Running Out of Food in the Last Year: Never true     Ran Out of Food in the Last Year: Never true   Transportation Needs: No Transportation Needs (7/26/2023)    PRAPARE - Transportation     Lack of Transportation (Medical): No     Lack of Transportation (Non-Medical): No   Physical Activity: Inactive (7/26/2023)    Exercise Vital Sign     Days of Exercise per Week: 0 days     Minutes of Exercise per Session: 0 min   Stress: No Stress Concern Present (7/26/2023)    [employer] - Occupational Stress Questionnaire     Feeling of Stress : Not at all   Housing Stability: Low Risk  (7/26/2023)    Housing Stability Vital Sign     Unable to Pay for Housing in the Last Year: No     Number of Places Lived in the Last  "Year: 1     Unstable Housing in the Last Year: No     Family History   Problem Relation Name Age of Onset    Cancer Mother          ovarian CA    Cancer Father          prostate    Cataracts Father      Cancer Maternal Grandmother          colon    Heart attack Maternal Grandfather      Cancer Paternal Grandmother          colon    Cancer Paternal Grandfather      Strabismus Paternal Aunt      Hypertension Daughter      Cancer Daughter          "cancer of bone"    Hyperlipidemia Daughter      Thyroid disease Grandchild      Heart attack Brother      Cancer Brother          prostate, esophagus, bladder    Hypertension Son      Hyperlipidemia Son      Cancer Brother          Non hodgkins lymphoma    Amblyopia Neg Hx      Blindness Neg Hx      Diabetes Neg Hx      Glaucoma Neg Hx      Macular degeneration Neg Hx      Retinal detachment Neg Hx      Stroke Neg Hx         Review of patient's allergies indicates:   Allergen Reactions    Penicillins Swelling    Astelin [azelastine] Other (See Comments)     Dizziness    Pseudoephedrine hcl Palpitations    Sulfa (sulfonamide antibiotics) Palpitations       Current Outpatient Medications   Medication Sig    acetaminophen (TYLENOL) 500 MG tablet Take 2 tablets (1,000 mg total) by mouth every 8 (eight) hours.    albuterol (PROVENTIL/VENTOLIN HFA) 90 mcg/actuation inhaler Inhale 1 puff into the lungs.    amLODIPine (NORVASC) 5 MG tablet Take 1 tablet (5 mg total) by mouth once daily.    apixaban (ELIQUIS) 2.5 mg Tab Take 1 tablet (2.5 mg total) by mouth 2 (two) times daily.    ofloxacin (FLOXIN) 0.3 % otic solution     sotaloL (BETAPACE) 80 MG tablet Take 1 tablet (80 mg total) by mouth 2 (two) times daily.    vitamin D (VITAMIN D3) 1000 units Tab Take 1 tablet (1,000 Units total) by mouth once daily.     No current facility-administered medications for this visit.       ROS:  GENERAL: No fever. No chills. No fatigue. Denies weight loss. Denies weight gain.  HEENT: Denies " headaches. Denies vision change. Denies eye pain. Denies double vision. Denies ear pain.   CV: Denies chest pain.   PULM: Denies of shortness of breath.  GI: Denies constipation. No diarrhea. No abdominal pain. Denies nausea. Denies vomiting. No blood in stool.  HEME: Denies bleeding problems.  : Denies urgency. No painful urination. No blood in urine.  MS: Denies joint stiffness. Denies joint swelling.  Denies back pain.  SKIN: Denies rash.   NEURO: Denies seizures. No weakness.  PSYCH:  Denies difficulty sleeping. No anxiety. Denies depression. No suicidal thoughts.       VITALS:   Vitals:    08/21/24 0909 08/21/24 0911   BP: (!) 158/67    Pulse: 64    Temp: 97.4 °F (36.3 °C)    SpO2: 99%    Weight: 52.2 kg (115 lb 1.3 oz)    PainSc:   5   5   PainLoc: Leg        PHYSICAL EXAM:   GENERAL: Well appearing, in no acute distress, alert and oriented x3.  PSYCH:  Mood and affect appropriate.   SKIN: Skin color, texture, turgor normal, no rashes or lesions.  HEENT:  Normocephalic, atraumatic. Cranial nerves grossly intact.  BACK: Normal range of motion. No pain to palpation over the spinous processes. No pain to palpation over facet joints.   EXTREMITIES: No deformities, edema, or skin discoloration.   MUSCULOSKELETAL: There is no TTP over both SI joints. Maximal tenderness over right piriformis.   NEURO: Sensation is equal and appropriate bilaterally. Straight leg raising in the supine position is negative to radicular pain.   GAIT: Antalgic- ambulates with walker.      LABS:  Lab Results   Component Value Date    WBC 6.39 04/08/2024    HGB 13.1 04/08/2024    HCT 38.8 04/08/2024    MCV 91 04/08/2024     04/08/2024       CMP  Sodium   Date Value Ref Range Status   04/08/2024 137 136 - 145 mmol/L Final     Potassium   Date Value Ref Range Status   04/08/2024 4.1 3.5 - 5.1 mmol/L Final     Chloride   Date Value Ref Range Status   04/08/2024 103 95 - 110 mmol/L Final     CO2   Date Value Ref Range Status    04/08/2024 26 23 - 29 mmol/L Final     Glucose   Date Value Ref Range Status   04/08/2024 94 70 - 110 mg/dL Final     BUN   Date Value Ref Range Status   04/08/2024 12 10 - 30 mg/dL Final     Creatinine   Date Value Ref Range Status   04/08/2024 0.8 0.5 - 1.4 mg/dL Final     Calcium   Date Value Ref Range Status   04/08/2024 10.3 8.7 - 10.5 mg/dL Final     Total Protein   Date Value Ref Range Status   04/08/2024 7.2 6.0 - 8.4 g/dL Final     Albumin   Date Value Ref Range Status   04/08/2024 4.1 3.5 - 5.2 g/dL Final     Total Bilirubin   Date Value Ref Range Status   04/08/2024 0.8 0.1 - 1.0 mg/dL Final     Comment:     For infants and newborns, interpretation of results should be based  on gestational age, weight and in agreement with clinical  observations.    Premature Infant recommended reference ranges:  Up to 24 hours.............<8.0 mg/dL  Up to 48 hours............<12.0 mg/dL  3-5 days..................<15.0 mg/dL  6-29 days.................<15.0 mg/dL       Alkaline Phosphatase   Date Value Ref Range Status   04/08/2024 76 55 - 135 U/L Final     AST   Date Value Ref Range Status   04/08/2024 17 10 - 40 U/L Final     ALT   Date Value Ref Range Status   04/08/2024 16 10 - 44 U/L Final     Anion Gap   Date Value Ref Range Status   04/08/2024 8 8 - 16 mmol/L Final     eGFR   Date Value Ref Range Status   04/08/2024 >60 >60 mL/min/1.73 m^2 Final         IMAGING:    EXAMINATION:  MRI LUMBAR SPINE WITHOUT CONTRAST     CLINICAL HISTORY:  Compression fracture, lumbar; Wedge compression fracture of first lumbar vertebra, initial encounter for closed fracture     TECHNIQUE:  Multiplanar, multisequence MR images were acquired from the thoracolumbar junction to the sacrum without the administration of contrast.     COMPARISON:  X-ray 10/16/2023     FINDINGS:  Alignment: Grade 1 retrolisthesis of L2 on L3.  Grade 1 anterolisthesis of L4 on L5 and L5 on S1.     Vertebrae: Approximately 50% height loss centrally of the  L1 vertebral body with extensive marrow edema, suggestive of subacute compression fracture and/or instability.  3 mm osseous retropulsion into the spinal canal.  No spinal canal stenosis.  No aggressive marrow replacement process.     Discs: Prominent degenerative disc disease on the left side at L2-3, noting marked disc height loss with extensive sub endplate marrow edema/Modic 1 changes.  There is prominent right-sided degenerative disc disease at L5-S1 with mild sub endplate marrow edema/Modic 1 changes.     Cord: Normal caliber in signal.  Conus terminates at L1-L2.  Cauda equina nerve roots are unremarkable.     Degenerative findings:     T12-L1:  Osseous retropulsion with encroachment on the ventral thecal sac inferior to the T12-L1 disc space with mild spinal canal stenosis. No neural foraminal narrowing.     L1-L2: Mild disc bulging asymmetric to the left, contributing to mild left neural foraminal narrowing.  No significant right neural foraminal narrowing or spinal canal stenosis.     L2-L3: Small disc bulge and bilateral facet arthropathy with facet joint effusions.  Mild left neural foraminal.  Mild spinal canal stenosis.     L3-L4: Circumferential disc bulge and bilateral facet arthropathy with small joint effusions.  No neural foraminal narrowing.  Mild spinal canal stenosis.     L4-L5: Circumferential disc bulge, bilateral facet arthropathy, and ligamentum flavum hypertrophy.  Mild bilateral neural foraminal narrowing.  Severe spinal canal stenosis.     L5-S1: Advanced facet joint arthropathy, noting joint hypertrophy with extensive subchondral marrow edema extending into the pedicles with surrounding inflammatory changes.  Slight/grade 1 anterolisthesis noted.  Moderate disc bulging and facet joint hypertrophy, contribute to moderate spinal canal stenosis, moderate right and mild left-sided neural foraminal narrowing.     Paraspinal muscles & soft tissues: Unremarkable.     Impression:     L1  compression fracture with significant marrow edema, suggestive of a subacute injury and/or instability.     Lumbar spondylosis, contributing to severe spinal canal stenosis at L4-5 and moderate spinal canal and neural foraminal stenosis at L5-S1, as above.     Prominent L5-S1 facet joint arthropathy, as above.     Electronically signed by resident: Magui Uriostegui  Date:                                            02/02/2024  Time:                                           13:15     Electronically signed by: Marquis Lynn MD  Date:                                            02/02/2024  Time:                                           14:28    ASSESSMENT: 89 y.o. year old female with pain, consistent with:    Encounter Diagnoses   Name Primary?    Sacroiliitis Yes    Piriformis syndrome of right side     Spinal stenosis of lumbar region, unspecified whether neurogenic claudication present     Chronic pain syndrome     Lumbar spondylosis     DDD (degenerative disc disease), lumbar     Facet arthropathy, lumbar          DISCUSSION: Cathy Kearns is a retired  who comes to us from Dr. Xiong. She had a fall in July, 2023 complicated by a left hip intertrochanteric fracture. She presents to us with mid-back pain with sitting and right buttock pain with standing. MRI shows an acute L1 compression fracture, significant DDD at L2/3 and L5/S1 with Modic changes, severe canal stenosis at L4/5, significant facet arthropathy/edema at L5/S1.     PLAN:  Encouraged patient to continue PT and HEP.   She is s/p right SIJ injection with 80% relief  RTC 1 month to evaluate progress  Consider right piriformis injection if no additional improvement with PT    The above plan and management options were discussed at length with patient. Patient is in agreement with the above and verbalized understanding.     Marisa Ramirez NP  08/21/2024

## 2024-08-23 ENCOUNTER — CLINICAL SUPPORT (OUTPATIENT)
Dept: REHABILITATION | Facility: OTHER | Age: 89
End: 2024-08-23
Payer: MEDICARE

## 2024-08-23 DIAGNOSIS — Z74.09 IMPAIRED FUNCTIONAL MOBILITY, BALANCE, GAIT, AND ENDURANCE: ICD-10-CM

## 2024-08-23 DIAGNOSIS — R29.3 POSTURE IMBALANCE: ICD-10-CM

## 2024-08-23 DIAGNOSIS — M54.16 LUMBAR RADICULOPATHY: Primary | ICD-10-CM

## 2024-08-23 PROCEDURE — 97112 NEUROMUSCULAR REEDUCATION: CPT | Mod: PN

## 2024-08-23 PROCEDURE — 97530 THERAPEUTIC ACTIVITIES: CPT | Mod: PN

## 2024-08-23 NOTE — PROGRESS NOTES
"[facility]   Physical Therapy Treatment Note      Name: Cathy Kearns  [facility] Number: [id number]    Therapy Diagnosis:   Encounter Diagnoses   Name Primary?    Lumbar radiculopathy Yes    Posture imbalance     Impaired functional mobility, balance, gait, and endurance        Physician: Wilma Xiong MD    Visit Date: 8/23/2024     Physician Orders: PT Eval and Treat   Medical Diagnosis from Referral: Z87.81 (ICD-10-CM) - History of hip fracture M25.559 (ICD-10-CM) - Hip pain, unspecified laterality  Evaluation Date: 7/9/2024  Authorization Period Expiration: 6/27/2025  Plan of Care Expiration: 9/6/2024   Progress Note Due: 9/6/2024   Date of Surgery: n/a   Visit # / Visits authorized: 8/25   FOTO: 2/ 3 last issued 8/13/2024     Precautions: Standard and osteopenia      Time In: 1:00    Time Out: 2:00  Total Billable Time: 60 minutes 1:1    PTA Visit #: 0/5     L LE shorter , hip ORIF    Subjective     Patient reports: "I felt really good after last visit but I had a horrible night and next day. I don't know if it's because of what we did here or having to sit 1/5 hrs in traffic to get back to [facility]."    She was compliant with home exercise program.   Response to previous treatment: "states she had the eval the week after she had the shot, and I told her the shot lasted only a week."   Functional change: ongoing, better tolerance with HEP     Pain: 3/10   Location: R hip to medial calf    Objective      Objective Measures updated at progress report unless specified.      8/13/2024  Lower Extremity Strength  Right LE   Left LE     Ankle dorsiflexion: 4+/5 Ankle dorsiflexion: 4+/5   Knee extension: 4+/5 Knee extension: 4+/5   Knee flexion: 4+/5 Knee flexion: 4+/5   Hip flexion: 4/5 Hip flexion: 4+/5   Hip external rotation: 4+/5 Hip external rotation: 4+/5   Hip internal rotation: 4+/5 Hip internal rotation: 4+/5   Hip extension:  4-/5 Hip extension: 4-/5   Hip abduction: 4-/5 " "Hip abduction: 4+/5   Hip adduction: 4+/5 Hip adduction 4+/5             Treatment     Cathy received the treatments listed below:      neuromuscular re-education activities to improve: Balance, Coordination, Kinesthetic, Sense, Proprioception, and Posture for 40 minutes. The following   SLR x 3 x 10  Bridge 3 x 10   Clam 20x  SLR abd 20x   Modified tacho stretch 1 x 2 min B  +seated hip IR (iso) x 2 min x 5" holds - w/ strap at ankles  +seated hip ER (iso) x 2 min x 5" holds - w/ pilates ball at ankles    therapeutic activities to improve functional performance for 20 minutes, including:  B rows green 20x seated    B shd ext standing in front of mat with SBA 10 x 2   sit to stands (holding 3# DB) x3x10 - from hi-lo   seated resisted trunk rotations x1x8 x RTB  Step ups on 4" step 2 x 10 -- increase to 6" nv  B HR 20x  GS SB 3 x 30"           Patient Education and Home Exercises       Education provided:   - Reviewed and educated pt on HEP     Written Home Exercises Provided: Patient instructed to cont prior HEP. Exercises were reviewed and Cathy was able to demonstrate them prior to the end of the session.  Cathy demonstrated good  understanding of the education provided. See Electronic Medical Record under Patient Instructions for exercises provided during therapy sessions    Assessment     Pt notes R posterior-lateral hip pain with repetitive stair climbing. Added seated hip IR/ER to promote dynamic deep hip stabilization with transitional movements. Good tolerance with added isometrics today with appropriate training effect achieved.    Cathy Is progressing well towards her goals.   Patient prognosis is Good.     Patient will continue to benefit from skilled outpatient physical therapy to address the deficits listed in the problem list box on initial evaluation, provide pt/family education and to maximize pt's level of independence in the home and community environment.     Patient's spiritual, cultural and " educational needs considered and pt agreeable to plan of care and goals.     Anticipated barriers to physical therapy: standard     Goals: updated 07/23/2024   Short Term Goals (4 Weeks):   1. Pt will report 20% reduction in pain of the lumbar spine and R LE for ease with ADL's (Progressing, not met)   2. PT will demonstrate improved upright posture with minimal cuing for ease with functional positioning in home and community.(met)   3. Pt will demonstrate improved lumbar spine ROM in all directions by 10% for ease with bending activities. (Progressing, not met)   4. Pt to demonstrate improved functional ability with FOTO limitation <=35% disability.(met)      Long Term Goals (8 Weeks):   1. Pt will report being independent with HEP for maintenance of improvements gained during therapy sessions(Progressing, not met)   2. PT will report 50% reduction of pain of the back and R LE for ease with positional tolerance for meals and leisure reading. (Progressing, not met)   3. Pt will demonstrate trunk and extremity strength to >=4+/5 without the provocation of pain for ease with stair navigation(Progressing, not met)   4. Pt will demonstrate appropriate upright posture without external cueing for ease with community and home mobility. (Progressing, not met)   5. Pt to demonstrate improved functional ability with FOTO limitation <=48% disability. (Progressing, not met)      Plan      Plan of care Certification: 7/9/2024 to 9/6/2024.    Continue pt's current POC to reduce pain in R LE and LB, and to improve strength and ROM in B LE and lumbar spine and upright posture for functional performance.     Mariya Valencia, PT

## 2024-08-27 ENCOUNTER — CLINICAL SUPPORT (OUTPATIENT)
Dept: OPHTHALMOLOGY | Facility: CLINIC | Age: 89
End: 2024-08-27
Payer: MEDICARE

## 2024-08-27 ENCOUNTER — PROCEDURE VISIT (OUTPATIENT)
Dept: OPHTHALMOLOGY | Facility: CLINIC | Age: 89
End: 2024-08-27
Payer: MEDICARE

## 2024-08-27 DIAGNOSIS — H35.3211 EXUDATIVE AGE-RELATED MACULAR DEGENERATION OF RIGHT EYE WITH ACTIVE CHOROIDAL NEOVASCULARIZATION: ICD-10-CM

## 2024-08-27 DIAGNOSIS — H35.3211 EXUDATIVE AGE-RELATED MACULAR DEGENERATION OF RIGHT EYE WITH ACTIVE CHOROIDAL NEOVASCULARIZATION: Primary | ICD-10-CM

## 2024-08-27 PROCEDURE — 99999PBSHW PR PBB SHADOW TECHNICAL ONLY FILED TO HB: Mod: JZ,PBBFAC,,

## 2024-08-27 PROCEDURE — 67028 INJECTION EYE DRUG: CPT | Mod: PBBFAC,RT | Performed by: OPHTHALMOLOGY

## 2024-08-27 PROCEDURE — 92134 CPTRZ OPH DX IMG PST SGM RTA: CPT | Mod: PBBFAC

## 2024-08-27 RX ADMIN — FARICIMAB 6 MG: 6 INJECTION, SOLUTION INTRAVITREAL at 09:08

## 2024-08-27 NOTE — PROGRESS NOTES
HPI     VASBYMO    OD        and   OCT      Additional comments: VABYSMO  OD   OCT     ARMD     BLURRED VA     PVD  No flashe s  no floaters       GTTS        Art  tears ou prn            Comments    VA stable ou, no pain ou and denies floaters or flashes ou. Patient states   that she saw a lot of floaters after last injection,   Dls   07/02/24          Last edited by Bernadine Schaeffer on 8/27/2024  9:05 AM.                    OCT -OD -  stable trace IRF - improved  OS - Drusen, no SRF - small HE - ? RAP lesion       Assessment:    1. 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 PVD OU    4. Pseudophakia - great result    5. Allergic conjunctivitis/sinusitis - try OTC antihistamines  Recent trouble with sinusitis - pain behind eye improved with antihistamines  Ok for Zaditor BID - has improved with topical tx    6. AFib - considering anticoagulation - ok to proceed  On Anti-VEGF therapy, risk of large macular hemorrhage is minimal.         Plan:      2   months OCT and dilate (last DFE  5/24)    Risks, benefits, and alternatives to treatment discussed in detail with the patient.  The patient voiced understanding and wished to proceed with the procedure    Injection Procedure Note:  Diagnosis: Wet AMD OD    Patient Identified and Time Out complete  Topical Proparacaine and Betadine. Conj prep only  Inject Vabysmo OD at 6:00 @ 3.5-4mm posterior to limbus  Post Operative Dx: Same  Complications: None  Follow up as above.

## 2024-08-27 NOTE — PROGRESS NOTES
Assessment /Plan     For exam results, see Encounter Report.    Exudative age-related macular degeneration of right eye with active choroidal neovascularization  -     Posterior Segment OCT Retina-Both eyes      Oct done-CC

## 2024-08-28 PROBLEM — I70.0 AORTIC ATHEROSCLEROSIS: Status: ACTIVE | Noted: 2024-08-28

## 2024-08-28 NOTE — PROGRESS NOTES
Subjective:   Patient ID:  Cathy Kearns is a 89 y.o. 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  She was previously seen by [facility] Mcelroy and [facility].  She has known paroxysmal atrial fibrillation and has been maintaining sinus rhythm on sotalol.  She also has treated hypertension as well as hyperlipidemia.  She currently lives in [facility].  She has been feeling /well.  She goes to exercise class 3 days a week. Cathy Kearns denies any chest pain, shortness of breath, PND, orthopnea, palpitations, leg edema, or syncope.    Interval history: She fell and broke her left hip 7/23 and underwent ORIF. Since then she has been having sciatica pain in her right leg. Cathy Kearns denies any chest pain, shortness of breath, PND, orthopnea, palpitations,  or syncope. Her lisinopril was stopped due to orthostatic hypotension. Recent BP readings have been elevated in EPIC. She is having ankle edema.       ECG 24-JUL-2023 12:14:20: Personally reviewed by me shows sinus bradycardia with LAD and old anteroseptal infarct.     SPECT 3/23:  Interpretation Summary  Show Result Comparison     Normal myocardial perfusion scan. There is no evidence of myocardial ischemia or infarction.    There is a mild intensity perfusion abnormality in the anteroseptal wall of the left ventricle, secondary to breast attenuation.    There is a  trivial intensity fixed perfusion abnormality in the inferoapical wall of the left ventricle secondary to diaphragm attenuation.    The visually estimated ejection fraction is normal at rest and normal during stress.    There is normal wall motion at rest and post stress.    LV cavity size is normal at rest and normal at stress.    The ECG portion of the study is negative for ischemia.    The patient  reported no chest pain during the stress test.    During stress, rare PACs are noted. , During stress, occasional PVCs are noted.    There are no prior studies for comparison.       Echo 8/22:  Summary    The left ventricle is normal in size with normal systolic function. The estimated ejection fraction is 65%.  Normal left ventricular diastolic function.  Normal right ventricular size with normal right ventricular systolic function.  Normal central venous pressure (3 mmHg).    Arterial US 3/19:  Conclusion    No significant plaque bilaterally through the inflow  Triphasic waveforms noted throughout the inflow  Waveforms suggestive of bilateral infrapopliteal disease  Normal NELLY values bilaterally          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 7/9/2012    Vasovagal syncope        Past Surgical History:   Procedure Laterality Date    ADENOIDECTOMY      APPENDECTOMY      CATARACT EXTRACTION W/  INTRAOCULAR LENS IMPLANT Bilateral n/a    HYSTERECTOMY      2/2 uterine CA, removed ovaries, no longer f/u with gyn    INJECTION, SACROILIAC JOINT Right 3/19/2024    Procedure: INJECTION,SACROILIAC JOINT RIGHT *OPTHALMOLOGY CLEARANCE IN CHART*;  Surgeon: Lata Casey MD;  Location: [facility]IN [facility];  Service: Pain Management;  Laterality: Right;  [phone]  2 WK F/U CRAIG    INJECTION, SACROILIAC JOINT Right 8/6/2024    Procedure: INJECTION,SACROILIAC JOINT RIGHT;  Surgeon: Lata Casey MD;  Location: [facility]IN [facility];  Service: Pain Management;  Laterality: Right;  [phone]  2 WK F/U LUIS ENRIQUE    INTRAMEDULLARY RODDING OF FEMUR Left 7/25/2023    Procedure: INSERTION, INTRAMEDULLARY  DAPHNEY, LEFT FEMUR;  Surgeon: Rogerio Chand MD;  Location: [facility] OR [address] [place] [address];  Service: Orthopedics;  Laterality: Left;    Intravitreal Injection      Avastin od x's 12 dltx 2-16-12    TONSILLECTOMY         Social History     Socioeconomic History    Marital status:    Tobacco Use    Smoking status: Former     Current packs/day: 0.25     Average packs/day: 0.3 packs/day for 15.0 years (3.8 ttl pk-yrs)     Types: Cigarettes    Smokeless tobacco: Never   Substance and Sexual Activity    Alcohol use: Yes     Comment: 1-2 drinks at night    Drug use: No   Other Topics Concern    Are you pregnant or think you may be? 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     Social Determinants of Health     Financial Resource Strain: Low Risk  (8/28/2024)    Overall Financial Resource Strain (CARDIA)     Difficulty of Paying Living Expenses: Not very hard   Food Insecurity: No Food Insecurity (8/28/2024)    Hunger Vital Sign     Worried About Running Out of Food in the Last Year: Never true     Ran Out of Food in the Last Year: Never true   Transportation Needs: No Transportation Needs (7/26/2023)    PRAPARE - Transportation     Lack of Transportation (Medical): No     Lack of Transportation (Non-Medical): No   Physical Activity: Insufficiently Active (8/28/2024)    Exercise Vital Sign     Days of Exercise per Week: 4 days     Minutes of Exercise per Session: 20 min   Stress: No Stress Concern Present (8/28/2024)    [employer] [place] [employer] - Occupational Stress Questionnaire     Feeling of Stress : Not at all   Housing Stability: Low Risk  (7/26/2023)    Housing Stability Vital Sign     Unable to Pay for Housing in the Last Year: No     Number of Places Lived in the Last Year: 1     Unstable Housing  "in the Last Year: No       Family History   Problem Relation Name Age of Onset    Cancer Mother          ovarian CA    Cancer Father          prostate    Cataracts Father      Cancer Maternal Grandmother          colon    Heart attack Maternal Grandfather      Cancer Paternal Grandmother          colon    Cancer Paternal Grandfather      Strabismus Paternal Aunt      Hypertension Daughter      Cancer Daughter          "cancer of bone"    Hyperlipidemia Daughter      Thyroid disease Grandchild      Heart attack Brother      Cancer Brother          prostate, esophagus, bladder    Hypertension Son      Hyperlipidemia Son      Cancer Brother          Non hodgkins lymphoma    Amblyopia Neg Hx      Blindness Neg Hx      Diabetes Neg Hx      Glaucoma Neg Hx      Macular degeneration Neg Hx      Retinal detachment Neg Hx      Stroke Neg Hx         Patient's Medications   New Prescriptions    LISINOPRIL 10 MG TABLET    Take 1 tablet (10 mg total) by mouth once daily.   Previous Medications    ACETAMINOPHEN (TYLENOL) 500 MG TABLET    Take 2 tablets (1,000 mg total) by mouth every 8 (eight) hours.    ALBUTEROL (PROVENTIL/VENTOLIN HFA) 90 MCG/ACTUATION INHALER    Inhale 1 puff into the lungs.    APIXABAN (ELIQUIS) 2.5 MG TAB    Take 1 tablet (2.5 mg total) by mouth 2 (two) times daily.    OFLOXACIN (FLOXIN) 0.3 % OTIC SOLUTION        SOTALOL (BETAPACE) 80 MG TABLET    Take 1 tablet (80 mg total) by mouth 2 (two) times daily.    VITAMIN D (VITAMIN D3) 1000 UNITS TAB    Take 1 tablet (1,000 Units total) by mouth once daily.   Modified Medications    No medications on file   Discontinued Medications    AMLODIPINE (NORVASC) 5 MG TABLET    Take 1 tablet (5 mg total) by mouth once daily.       Review of Systems   Constitutional: Negative for malaise/fatigue and weight gain.   Eyes:  Negative for visual disturbance.   Cardiovascular:  Negative for claudication, leg swelling, near-syncope, orthopnea, palpitations, paroxysmal nocturnal " "dyspnea and syncope.   Respiratory:  Negative for cough, shortness of breath, sleep disturbances due to breathing, snoring and wheezing.    Endocrine: Negative for cold intolerance, heat intolerance, polydipsia, polyphagia and polyuria.   Hematologic/Lymphatic: Negative for bleeding problem. Does not bruise/bleed easily.   Skin:  Negative for rash and suspicious lesions.   Musculoskeletal:  Positive for back pain. Negative for arthritis, falls, joint pain, muscle weakness and myalgias.   Gastrointestinal:  Negative for abdominal pain, change in bowel habit, constipation, diarrhea, heartburn, hematochezia, melena and nausea.   Genitourinary:  Negative for hematuria and nocturia.   Neurological:  Positive for paresthesias. Negative for excessive daytime sleepiness, headaches, light-headedness, loss of balance and weakness.   Psychiatric/Behavioral:  Negative for depression. The patient is not nervous/anxious.    Allergic/Immunologic: Negative for environmental allergies.       BP (!) 197/76   Ht 5' 1" (1.549 m)   Wt 54.3 kg (119 lb 11.4 oz)   BMI 22.62 kg/m²     Objective:   Physical Exam  Constitutional:       Appearance: She is well-developed.      Comments:      HENT:      Head: Normocephalic and atraumatic.   Eyes:      General: No scleral icterus.     Conjunctiva/sclera: Conjunctivae normal.      Pupils: Pupils are equal, round, and reactive to light.   Neck:      Thyroid: No thyromegaly.      Vascular: No hepatojugular reflux or JVD.      Trachea: No tracheal deviation.   Cardiovascular:      Rate and Rhythm: Normal rate and regular rhythm.      Chest Wall: PMI is not displaced.      Pulses: Intact distal pulses.           Carotid pulses are 2+ on the right side and 2+ on the left side.       Radial pulses are 2+ on the right side and 2+ on the left side.        Dorsalis pedis pulses are 2+ on the right side and 2+ on the left side.        Posterior tibial pulses are 2+ on the right side and 2+ on the left " side.      Heart sounds: Normal heart sounds.   Pulmonary:      Effort: Pulmonary effort is normal.      Breath sounds: Normal breath sounds.   Abdominal:      General: Bowel sounds are normal. There is no distension.      Palpations: Abdomen is soft. There is no mass.      Tenderness: There is no abdominal tenderness.   Musculoskeletal:         General: No tenderness.      Cervical back: Normal range of motion and neck supple.      Comments: 2+ ankle edema bilaterally   Lymphadenopathy:      Cervical: No cervical adenopathy.   Skin:     General: Skin is warm and dry.      Findings: No erythema or rash.      Nails: There is no clubbing.   Neurological:      Mental Status: She is alert and oriented to person, place, and time.   Psychiatric:         Speech: Speech normal.         Behavior: Behavior normal.         Lab Results   Component Value Date     04/08/2024    K 4.1 04/08/2024     04/08/2024    CO2 26 04/08/2024    BUN 12 04/08/2024    CREATININE 0.8 04/08/2024    GLU 94 04/08/2024    HGBA1C 5.4 07/24/2023    MG 1.6 07/27/2023    AST 17 04/08/2024    ALT 16 04/08/2024    ALBUMIN 4.1 04/08/2024    PROT 7.2 04/08/2024    BILITOT 0.8 04/08/2024    WBC 6.39 04/08/2024    HGB 13.1 04/08/2024    HCT 38.8 04/08/2024    MCV 91 04/08/2024     04/08/2024    INR 1.0 07/24/2023    TSH 0.955 04/08/2024    CHOL 208 (H) 04/08/2024    HDL 61 04/08/2024    LDLCALC 130.6 04/08/2024    TRIG 82 04/08/2024    BNP 46 08/05/2022       Assessment:     1. Paroxysmal atrial fibrillation :  She is on sotalol for rhythm control and anticoagulated with Eliquis.  MAV0OH8- Vasc is 4. HAS-BLED score is 2. ECG today.   2. Primary hypertension :BP has been elevated. Will change amlodipine to lisinopril 10 mg daily and see if ankle edema improves. We discussed keeping a log of home blood pressures and notifying the office if it is consistently running above 140/90 mmHg.   3. Pure hypercholesterolemia : Following a heart health  diet such as the Mediterranean Diet is recommended in addition to 150 minutes a week of moderate intensity exercise to lower cholesterol, maintain a healthy body weight, and improve overall cardiovascular health.   4.       Aortic insufficiency: Mild on last exam.    Plan:     Cathy was seen today for follow-up.    Diagnoses and all orders for this visit:    Aortic atherosclerosis    Panlobular emphysema    Nonrheumatic aortic valve insufficiency    Paroxysmal atrial fibrillation  -     EKG 12-lead; Future  -     EKG 12-lead; Future    Primary hypertension    Pure hypercholesterolemia    Monocytosis    Ankle swelling, unspecified laterality  -     Ambulatory referral/consult to Cardiology    Atrial fibrillation, unspecified type  -     Ambulatory referral/consult to Cardiology    Other orders  -     lisinopriL 10 MG tablet; Take 1 tablet (10 mg total) by mouth once daily.        Thank you for allowing me to participate in this patient's care. Please do not hesitate to contact me with any questions or concerns.

## 2024-08-29 ENCOUNTER — OFFICE VISIT (OUTPATIENT)
Dept: CARDIOLOGY | Facility: CLINIC | Age: 89
End: 2024-08-29
Payer: MEDICARE

## 2024-08-29 VITALS
SYSTOLIC BLOOD PRESSURE: 197 MMHG | BODY MASS INDEX: 22.6 KG/M2 | DIASTOLIC BLOOD PRESSURE: 76 MMHG | HEIGHT: 61 IN | WEIGHT: 119.69 LBS

## 2024-08-29 DIAGNOSIS — E78.00 PURE HYPERCHOLESTEROLEMIA: ICD-10-CM

## 2024-08-29 DIAGNOSIS — M25.473 ANKLE SWELLING, UNSPECIFIED LATERALITY: ICD-10-CM

## 2024-08-29 DIAGNOSIS — I70.0 AORTIC ATHEROSCLEROSIS: Primary | ICD-10-CM

## 2024-08-29 DIAGNOSIS — D72.821 MONOCYTOSIS: ICD-10-CM

## 2024-08-29 DIAGNOSIS — J43.1 PANLOBULAR EMPHYSEMA: ICD-10-CM

## 2024-08-29 DIAGNOSIS — I10 PRIMARY HYPERTENSION: ICD-10-CM

## 2024-08-29 DIAGNOSIS — I48.0 PAROXYSMAL ATRIAL FIBRILLATION: ICD-10-CM

## 2024-08-29 DIAGNOSIS — I48.91 ATRIAL FIBRILLATION, UNSPECIFIED TYPE: ICD-10-CM

## 2024-08-29 DIAGNOSIS — I35.1 NONRHEUMATIC AORTIC VALVE INSUFFICIENCY: ICD-10-CM

## 2024-08-29 PROCEDURE — 99213 OFFICE O/P EST LOW 20 MIN: CPT | Mod: PBBFAC | Performed by: INTERNAL MEDICINE

## 2024-08-29 PROCEDURE — 99214 OFFICE O/P EST MOD 30 MIN: CPT | Mod: S$PBB,,, | Performed by: INTERNAL MEDICINE

## 2024-08-29 PROCEDURE — 99999 PR PBB SHADOW E&M-EST. PATIENT-LVL III: CPT | Mod: PBBFAC,,, | Performed by: INTERNAL MEDICINE

## 2024-08-29 RX ORDER — LISINOPRIL 10 MG/1
10 TABLET ORAL DAILY
Qty: 90 TABLET | Refills: 3 | Status: SHIPPED | OUTPATIENT
Start: 2024-08-29 | End: 2025-08-29

## 2024-08-29 NOTE — PATIENT INSTRUCTIONS
Orthotics [phone]    Replace amlodipine with lisinopril 10 mg daily.    We discussed keeping a log of home blood pressures and notifying the office if it is consistently running above 140/90 mmHg.

## 2024-09-03 ENCOUNTER — CLINICAL SUPPORT (OUTPATIENT)
Dept: REHABILITATION | Facility: OTHER | Age: 89
End: 2024-09-03
Payer: MEDICARE

## 2024-09-03 DIAGNOSIS — Z74.09 IMPAIRED FUNCTIONAL MOBILITY, BALANCE, GAIT, AND ENDURANCE: ICD-10-CM

## 2024-09-03 DIAGNOSIS — M54.16 LUMBAR RADICULOPATHY: Primary | ICD-10-CM

## 2024-09-03 DIAGNOSIS — R29.3 POSTURE IMBALANCE: ICD-10-CM

## 2024-09-03 PROCEDURE — 97112 NEUROMUSCULAR REEDUCATION: CPT | Mod: KX,PN,CQ

## 2024-09-03 PROCEDURE — 97530 THERAPEUTIC ACTIVITIES: CPT | Mod: KX,PN,CQ

## 2024-09-03 NOTE — PROGRESS NOTES
"[facility]   Physical Therapy Treatment Note      Name: Cathy Kearns  [facility] Number: [id number]    Therapy Diagnosis:   Encounter Diagnoses   Name Primary?    Lumbar radiculopathy Yes    Posture imbalance     Impaired functional mobility, balance, gait, and endurance        Physician: Wilma Xiong MD    Visit Date: 9/3/2024     Physician Orders: PT Eval and Treat   Medical Diagnosis from Referral: Z87.81 (ICD-10-CM) - History of hip fracture M25.559 (ICD-10-CM) - Hip pain, unspecified laterality  Evaluation Date: 7/9/2024  Authorization Period Expiration: 6/27/2025  Plan of Care Expiration: 9/6/2024   Progress Note Due: 9/6/2024   Date of Surgery: n/a   Visit # / Visits authorized: 10/25   FOTO: 2/ 3 last issued 8/13/2024      Precautions: Standard and osteopenia      Time In: 2:10   Time Out: 3:10   Total Billable Time: 60 minutes 1:1     PTA Visit #: 1/5     L LE shorter , hip ORIF     Subjective     Patient reports: her hip was hurting when she left [facility] this morning. States she has done laundry, made lunch, and made up the bed today all in standing, which she think helps with pain. Sitting down too long is the worst pain. The rainy day last week, it took an hour and a half to get home, and when she got home, it took her a long time to get out of the car due to stiffness and increased pain from prolonged sitting.     She was compliant with home exercise program.   Response to previous treatment: "I got along fine"   Functional change: ongoing, better tolerance with HEP     Pain: 4/10 "I feel ok"   Location: R hip to medial calf     Objective      Objective Measures updated at progress report unless specified.     9/3/2024   Leg length discrepancy measurements (ASIS to medial malleoli):   R: 34 in   L: 34 in     8/13/2024  Lower Extremity Strength  Right LE   Left LE     Ankle dorsiflexion: 4+/5 Ankle dorsiflexion: 4+/5   Knee extension: 4+/5 Knee extension: 4+/5   Knee " "flexion: 4+/5 Knee flexion: 4+/5   Hip flexion: 4/5 Hip flexion: 4+/5   Hip external rotation: 4+/5 Hip external rotation: 4+/5   Hip internal rotation: 4+/5 Hip internal rotation: 4+/5   Hip extension:  4-/5 Hip extension: 4-/5   Hip abduction: 4-/5 Hip abduction: 4+/5   Hip adduction: 4+/5 Hip adduction 4+/5             Treatment     Cathy received the treatments listed below:      neuromuscular re-education activities to improve: Balance, Coordination, Kinesthetic, Sense, Proprioception, and Posture for 35 minutes. The following   SLR x 3 x 10 B   Bridge 2 x 15   Clam 30x   SLR abd 20x   Modified tacho stretch 1 x 2 min B  seated hip IR (iso) x 2x1 min x 5" holds - w/ strap at ankles (yoga block between knees)   seated hip ER (iso) x 2x1 min x 5" holds - w/ pilates ball at ankles (yoga block between knees)     therapeutic activities to improve functional performance for 25 minutes, including:  + objective measurements taken per pt's request   B rows green 20x seated   B shd ext standing in front of mat with SBA 10 x 2   sit to stands (holding 3# DB) x2x10 - from hi-lo   seated resisted trunk rotations x1x8 x RTB   Step ups on 6" step 2 x 10   B HR 20x  GS SB 3 x 30"      Patient Education and Home Exercises       Education provided:   - Reviewed and educated pt on HEP     Written Home Exercises Provided: Patient instructed to cont prior HEP. Exercises were reviewed and Cathy was able to demonstrate them prior to the end of the session.  Cathy demonstrated good  understanding of the education provided. See Electronic Medical Record under Patient Instructions for exercises provided during therapy sessions    Assessment     Overall good tolerance to treatment with no adverse effects. Good training effect achieved at end of session. Required rest breaks between seated hip ER/IR sets today due to increased fatigue. Leg length discrepancy measurements taken per pt's request. Continue to monitor and progress pt as " tolerated.     Cathy Is progressing well towards her goals.   Patient prognosis is Good.     Patient will continue to benefit from skilled outpatient physical therapy to address the deficits listed in the problem list box on initial evaluation, provide pt/family education and to maximize pt's level of independence in the home and community environment.     Patient's spiritual, cultural and educational needs considered and pt agreeable to plan of care and goals.     Anticipated barriers to physical therapy: standard     Goals: updated 07/23/2024   Short Term Goals (4 Weeks):   1. Pt will report 20% reduction in pain of the lumbar spine and R LE for ease with ADL's (Progressing, not met)   2. PT will demonstrate improved upright posture with minimal cuing for ease with functional positioning in home and community.(met)   3. Pt will demonstrate improved lumbar spine ROM in all directions by 10% for ease with bending activities. (Progressing, not met)   4. Pt to demonstrate improved functional ability with FOTO limitation <=35% disability.(met)      Long Term Goals (8 Weeks):   1. Pt will report being independent with HEP for maintenance of improvements gained during therapy sessions(Progressing, not met)   2. PT will report 50% reduction of pain of the back and R LE for ease with positional tolerance for meals and leisure reading. (Progressing, not met)   3. Pt will demonstrate trunk and extremity strength to >=4+/5 without the provocation of pain for ease with stair navigation(Progressing, not met)   4. Pt will demonstrate appropriate upright posture without external cueing for ease with community and home mobility. (Progressing, not met)   5. Pt to demonstrate improved functional ability with FOTO limitation <=48% disability. (Progressing, not met)      Plan      Plan of care Certification: 7/9/2024 to 9/6/2024.    Continue pt's current POC to reduce pain in R LE and LB, and to improve strength and ROM in B LE and  lumbar spine and upright posture for functional performance.     Shayna Mirza, PTA

## 2024-09-05 ENCOUNTER — DOCUMENTATION ONLY (OUTPATIENT)
Dept: REHABILITATION | Facility: OTHER | Age: 89
End: 2024-09-05
Payer: MEDICARE

## 2024-09-05 ENCOUNTER — CLINICAL SUPPORT (OUTPATIENT)
Dept: REHABILITATION | Facility: OTHER | Age: 89
End: 2024-09-05
Payer: MEDICARE

## 2024-09-05 DIAGNOSIS — M54.16 LUMBAR RADICULOPATHY: Primary | ICD-10-CM

## 2024-09-05 DIAGNOSIS — R29.3 POSTURE IMBALANCE: ICD-10-CM

## 2024-09-05 DIAGNOSIS — Z74.09 IMPAIRED FUNCTIONAL MOBILITY, BALANCE, GAIT, AND ENDURANCE: ICD-10-CM

## 2024-09-05 PROCEDURE — 97112 NEUROMUSCULAR REEDUCATION: CPT | Mod: KX,PN,CQ

## 2024-09-05 PROCEDURE — 97530 THERAPEUTIC ACTIVITIES: CPT | Mod: KX,PN,CQ

## 2024-09-05 NOTE — PROGRESS NOTES
"[facility]   Physical Therapy Treatment Note      Name: Cathy Kearns  [facility] Number: [id number]    Therapy Diagnosis:   Encounter Diagnoses   Name Primary?    Lumbar radiculopathy Yes    Posture imbalance     Impaired functional mobility, balance, gait, and endurance        Physician: Wilma Xiong MD    Visit Date: 9/5/2024     Physician Orders: PT Eval and Treat   Medical Diagnosis from Referral: Z87.81 (ICD-10-CM) - History of hip fracture M25.559 (ICD-10-CM) - Hip pain, unspecified laterality  Evaluation Date: 7/9/2024  Authorization Period Expiration: 6/27/2025  Plan of Care Expiration: 9/6/2024   Progress Note Due: 9/6/2024   Date of Surgery: n/a   Visit # / Visits authorized: 11/25   FOTO: 2/ 3 last issued 8/13/2024      Precautions: Standard and osteopenia      Time In: 2:15   Time Out: 3:15   Total Billable Time: 60 minutes 1:1     PTA Visit #: 2/5     L LE shorter , hip ORIF     Subjective     Patient reports: she is feeling a lot better since last visit. States "I was worked last time." Reports when she woke up the next morning her hips and back felt a lot better. States she went to her chair yoga class this morning 10:30-11:30 am, and said that she was able to handle sitting and meditating in the chair, where usually she is fidgeting from discomfort with sitting.     She was compliant with home exercise program.   Response to previous treatment: "it was a great workout. I felt so much better after."   Functional change: ongoing, better tolerance with HEP     Pain: 4/10 "I've had slight discomfort"   Location: R hip to medial calf     Objective      Objective Measures updated at progress report unless specified.     8/13/2024  Lower Extremity Strength  Right LE   Left LE     Ankle dorsiflexion: 4+/5 Ankle dorsiflexion: 4+/5   Knee extension: 4+/5 Knee extension: 4+/5   Knee flexion: 4+/5 Knee flexion: 4+/5   Hip flexion: 4/5 Hip flexion: 4+/5   Hip external rotation: " "4+/5 Hip external rotation: 4+/5   Hip internal rotation: 4+/5 Hip internal rotation: 4+/5   Hip extension:  4-/5 Hip extension: 4-/5   Hip abduction: 4-/5 Hip abduction: 4+/5   Hip adduction: 4+/5 Hip adduction 4+/5             Treatment     Cathy received the treatments listed below:      neuromuscular re-education activities to improve: Balance, Coordination, Kinesthetic, Sense, Proprioception, and Posture for 35 minutes. The following   SLR x 3 x 10 B   Bridge 2 x 15   Clam 30x   SLR abd 20x   Modified tacho stretch 1 x 2 min B  seated hip IR (iso) x 2x1 min x 5" holds - w/ strap at ankles (yoga block between knees)   seated hip ER (iso) x 2x1 min x 5" holds - w/ pilates ball at ankles (yoga block between knees)     therapeutic activities to improve functional performance for 25 minutes, including:  + silver PB 3-way roll outs x 5 x 10" holds   B rows green 20x seated   B shd ext standing in front of mat with SBA 10 x 2   sit to stands (holding 3# DB) x2x10 - from hi-lo   seated resisted trunk rotations x1x8 x RTB   Step ups on 6" step 2 x 10 (regressed to 4" step on R 2* pain)   B HR 20x   GS SB 3 x 30"      Patient Education and Home Exercises       Education provided:   - Reviewed and educated pt on HEP     Written Home Exercises Provided: Patient instructed to cont prior HEP. Exercises were reviewed and Cathy was able to demonstrate them prior to the end of the session.  Cathy demonstrated good  understanding of the education provided. See Electronic Medical Record under Patient Instructions for exercises provided during therapy sessions    Assessment     Overall good tolerance to treatment with no adverse effects. Provided education on target muscle and purpose of exercise with all interventions today, which pt verbalized understanding and displayed improvements in quality of exercise as well as improvements in LBP. Pt noted increased LBP pain with step ups and had good pain modulation with modification to 4" " step today. Added 3-way ball roll outs for LB, which pt noted good training effects and decreased LBP. Continue to monitor and progress pt as tolerated.     Cathy Is progressing well towards her goals.   Patient prognosis is Good.     Patient will continue to benefit from skilled outpatient physical therapy to address the deficits listed in the problem list box on initial evaluation, provide pt/family education and to maximize pt's level of independence in the home and community environment.     Patient's spiritual, cultural and educational needs considered and pt agreeable to plan of care and goals.     Anticipated barriers to physical therapy: standard     Goals: updated 07/23/2024   Short Term Goals (4 Weeks):   1. Pt will report 20% reduction in pain of the lumbar spine and R LE for ease with ADL's (Progressing, not met)   2. PT will demonstrate improved upright posture with minimal cuing for ease with functional positioning in home and community.(met)   3. Pt will demonstrate improved lumbar spine ROM in all directions by 10% for ease with bending activities. (Progressing, not met)   4. Pt to demonstrate improved functional ability with FOTO limitation <=35% disability.(met)      Long Term Goals (8 Weeks):   1. Pt will report being independent with HEP for maintenance of improvements gained during therapy sessions(Progressing, not met)   2. PT will report 50% reduction of pain of the back and R LE for ease with positional tolerance for meals and leisure reading. (Progressing, not met)   3. Pt will demonstrate trunk and extremity strength to >=4+/5 without the provocation of pain for ease with stair navigation(Progressing, not met)   4. Pt will demonstrate appropriate upright posture without external cueing for ease with community and home mobility. (Progressing, not met)   5. Pt to demonstrate improved functional ability with FOTO limitation <=48% disability. (Progressing, not met)      Plan      Plan of care  Certification: 7/9/2024 to 9/6/2024.    Continue pt's current POC to reduce pain in R LE and LB, and to improve strength and ROM in B LE and lumbar spine and upright posture for functional performance.     Shayna Mirza, PTA

## 2024-09-05 NOTE — PROGRESS NOTES
Physical Therapist and Physical Therapist Assistant met face to face to discuss patient's treatment plan and progress towards established goals. Pt will be seen by a physical therapist minimally every 6th visit or every 30 days.    Shayna Mirza PTA  09/05/2024

## 2024-09-16 ENCOUNTER — OFFICE VISIT (OUTPATIENT)
Dept: PAIN MEDICINE | Facility: CLINIC | Age: 89
End: 2024-09-16
Payer: MEDICARE

## 2024-09-16 ENCOUNTER — HOSPITAL ENCOUNTER (OUTPATIENT)
Dept: RADIOLOGY | Facility: OTHER | Age: 89
Discharge: HOME | End: 2024-09-16
Payer: MEDICARE

## 2024-09-16 VITALS
BODY MASS INDEX: 22.62 KG/M2 | OXYGEN SATURATION: 100 % | RESPIRATION RATE: 18 BRPM | TEMPERATURE: 98 F | WEIGHT: 119.69 LBS | DIASTOLIC BLOOD PRESSURE: 72 MMHG | SYSTOLIC BLOOD PRESSURE: 147 MMHG | HEART RATE: 60 BPM

## 2024-09-16 DIAGNOSIS — M80.00XD AGE-RELATED OSTEOPOROSIS WITH CURRENT PATHOLOGICAL FRACTURE WITH ROUTINE HEALING, SUBSEQUENT ENCOUNTER: ICD-10-CM

## 2024-09-16 DIAGNOSIS — M54.16 LUMBAR RADICULOPATHY: ICD-10-CM

## 2024-09-16 DIAGNOSIS — M48.061 SPINAL STENOSIS OF LUMBAR REGION, UNSPECIFIED WHETHER NEUROGENIC CLAUDICATION PRESENT: ICD-10-CM

## 2024-09-16 DIAGNOSIS — S32.010A CLOSED COMPRESSION FRACTURE OF BODY OF L1 VERTEBRA: Primary | ICD-10-CM

## 2024-09-16 DIAGNOSIS — M54.17 LUMBOSACRAL RADICULOPATHY: ICD-10-CM

## 2024-09-16 DIAGNOSIS — M54.16 LUMBAR RADICULOPATHY: Primary | ICD-10-CM

## 2024-09-16 DIAGNOSIS — G89.4 CHRONIC PAIN DISORDER: ICD-10-CM

## 2024-09-16 DIAGNOSIS — S32.010A CLOSED COMPRESSION FRACTURE OF BODY OF L1 VERTEBRA: ICD-10-CM

## 2024-09-16 PROCEDURE — 99214 OFFICE O/P EST MOD 30 MIN: CPT | Mod: S$PBB,,,

## 2024-09-16 PROCEDURE — 72114 X-RAY EXAM L-S SPINE BENDING: CPT | Mod: 26,,, | Performed by: RADIOLOGY

## 2024-09-16 PROCEDURE — 99214 OFFICE O/P EST MOD 30 MIN: CPT | Mod: PBBFAC,25

## 2024-09-16 PROCEDURE — 72114 X-RAY EXAM L-S SPINE BENDING: CPT | Mod: TC,FY

## 2024-09-16 PROCEDURE — 99999 PR PBB SHADOW E&M-EST. PATIENT-LVL IV: CPT | Mod: PBBFAC,,,

## 2024-09-16 NOTE — PROGRESS NOTES
PCP: Wilma Xiong MD    REFERRING PHYSICIAN: No ref. provider found    CHIEF COMPLAINT: Low back pain    Original HISTORY OF PRESENT ILLNESS: Cathy Kearns presents to the clinic for the evaluation of the above pain. The pain started 5 months ago    Original Pain Description:  The pain is located in the low back and is radiating to the right foot . The pain is described as dull. Exacerbating factors: Sitting. Mitigating factors walking and standing. Symptoms interfere with daily activity. The patient feels like symptoms have been unchanged. Patient denies significant motor weakness and loss of sensations. Ambulates with walker. Patient states that she currently lives in a nursing home and is very active.     Original PAIN SCORES:  Best: Pain is 0  Worst: Pain is 6  Current: Pain is 0        8/21/2024     9:11 AM   Last 3 PDI Scores   Pain Disability Index (PDI) 35       INTERVAL HISTORY:     Interval History 3/26/2024:  The patient is here for follow up of back and buttock pain. She is s/p right SI joint injection with 80% relief. She has very minimal pain to this area now. She does have right lower leg pain and numbness. She says this is mild at this time. Her pain today is 5/10.    Interval History 7/23/2024:  Mrs. Kearns is here today for worsened right sided lower back and buttock pain. She previously underwent right SI joint injection on 3/19/24 with 80% relief for about 3 months. Her pain has been gradually returning since that time. It bothers her most with prolonged sitting and with changing from sitting to standing. She is currently in PT which sometimes worsens her pain. Her pain today is 5/10.    Interval History 8/21/2024:  Cathy Kearns returns to clinic for follow-up after right SI joint injection on 8/6/2024. She reports 80% pain relief.  She states she has not had any shooting pains into her leg since the procedure. She does continue to have some pain to the right leg. The pain in her  right leg is worse with sitting and getting up. It is better with walking. She continues tylenol occasionally with good relief. She denies any perceived side effects. She continues PT and HEP daily. She denies recent falls or trauma. She denies new onset fever/night sweats, urinary incontinence, bowel incontinence, significant weight changes, significant motor weakness or changes, or loss of sensations. Her pain today is 5/10.    Interval History 9/16/2024:  Cathy Kearns returns to clinic for follow-up of right leg pain. She states the pain radiates in an L5 distribution.  She has continued physical therapy with good relief. She continues tylenol occasionally with good relief. She denies any perceived side effects. She continues PT and HEP daily. She denies recent falls or trauma. She denies new onset fever/night sweats, urinary incontinence, bowel incontinence, significant weight changes, significant motor weakness or changes, or loss of sensations. Her pain today is 7/10.     6 weeks of Conservative therapy:  PT: Yes (October 2023-February 2024)  Chiro: No  HEP: Yes, daily currently      Treatments / Medications: (Ice/Heat/NSAIDS/APAP/etc):  - Tylenol      Interventional Pain Procedures: (Previous injections)  3/19/24 Right SI joint injection- 80% relief for 3 months  8/6/24 - right SIJ injection - 80% relief    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 7/9/2012    Vasovagal syncope      Past Surgical History:   Procedure Laterality Date    ADENOIDECTOMY      APPENDECTOMY      CATARACT EXTRACTION W/  INTRAOCULAR LENS IMPLANT Bilateral n/a    HYSTERECTOMY      2/2 uterine CA, removed  ovaries, no longer f/u with gyn    INJECTION, SACROILIAC JOINT Right 3/19/2024    Procedure: INJECTION,SACROILIAC JOINT RIGHT *OPTHALMOLOGY CLEARANCE IN CHART*;  Surgeon: Lata Casey MD;  Location: [facility];  Service: Pain Management;  Laterality: Right;  [phone]  2 WK F/U CRAIG    INJECTION, SACROILIAC JOINT Right 8/6/2024    Procedure: INJECTION,SACROILIAC JOINT RIGHT;  Surgeon: Lata Casey MD;  Location: [facility];  Service: Pain Management;  Laterality: Right;  [phone]  2 WK F/U LUIS ENRIQUE    INTRAMEDULLARY RODDING OF FEMUR Left 7/25/2023    Procedure: INSERTION, INTRAMEDULLARY DAPHNEY, LEFT FEMUR;  Surgeon: Rogerio Chand MD;  Location: [facility] OR [address] [place] [address];  Service: Orthopedics;  Laterality: Left;    Intravitreal Injection      Avastin od x's 12 dltx 2-16-12    TONSILLECTOMY       Social History     Socioeconomic History    Marital status:    Tobacco Use    Smoking status: Former     Current packs/day: 0.25     Average packs/day: 0.3 packs/day for 15.0 years (3.8 ttl pk-yrs)     Types: Cigarettes    Smokeless tobacco: Never   Substance and Sexual Activity    Alcohol use: Yes     Comment: 1-2 drinks at night    Drug use: No   Other Topics Concern    Are you pregnant or think you may be? No    Breast-feeding No   Social History Narrative    Moved from [place] to 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     Social Determinants of Health     Financial Resource Strain: Low Risk  (8/28/2024)    Overall Financial Resource Strain (CARDIA)     Difficulty of Paying Living Expenses: Not very hard   Food Insecurity: No Food Insecurity (8/28/2024)    Hunger Vital Sign     Worried About Running Out of Food in the Last Year: Never true     Ran Out of Food in the Last Year: Never true   Transportation Needs: No Transportation Needs (7/26/2023)    PRAPARE -  "Transportation     Lack of Transportation (Medical): No     Lack of Transportation (Non-Medical): No   Physical Activity: Insufficiently Active (8/28/2024)    Exercise Vital Sign     Days of Exercise per Week: 4 days     Minutes of Exercise per Session: 20 min   Stress: No Stress Concern Present (8/28/2024)    [employer] - Occupational Stress Questionnaire     Feeling of Stress : Not at all   Housing Stability: Low Risk  (7/26/2023)    Housing Stability Vital Sign     Unable to Pay for Housing in the Last Year: No     Number of Places Lived in the Last Year: 1     Unstable Housing in the Last Year: No     Family History   Problem Relation Name Age of Onset    Cancer Mother          ovarian CA    Cancer Father          prostate    Cataracts Father      Cancer Maternal Grandmother          colon    Heart attack Maternal Grandfather      Cancer Paternal Grandmother          colon    Cancer Paternal Grandfather      Strabismus Paternal Aunt      Hypertension Daughter      Cancer Daughter          "cancer of bone"    Hyperlipidemia Daughter      Thyroid disease Grandchild      Heart attack Brother      Cancer Brother          prostate, esophagus, bladder    Hypertension Son      Hyperlipidemia Son      Cancer Brother          Non hodgkins lymphoma    Amblyopia Neg Hx      Blindness Neg Hx      Diabetes Neg Hx      Glaucoma Neg Hx      Macular degeneration Neg Hx      Retinal detachment Neg Hx      Stroke Neg Hx         Review of patient's allergies indicates:   Allergen Reactions    Penicillins Swelling    Astelin [azelastine] Other (See Comments)     Dizziness    Pseudoephedrine hcl Palpitations    Sulfa (sulfonamide antibiotics) Palpitations       Current Outpatient Medications   Medication Sig    acetaminophen (TYLENOL) 500 MG tablet Take 2 tablets (1,000 mg total) by mouth every 8 (eight) hours.    albuterol (PROVENTIL/VENTOLIN HFA) 90 mcg/actuation inhaler Inhale 1 puff into the lungs. "    apixaban (ELIQUIS) 2.5 mg Tab Take 1 tablet (2.5 mg total) by mouth 2 (two) times daily.    lisinopriL 10 MG tablet Take 1 tablet (10 mg total) by mouth once daily.    sotaloL (BETAPACE) 80 MG tablet Take 1 tablet (80 mg total) by mouth 2 (two) times daily.    vitamin D (VITAMIN D3) 1000 units Tab Take 1 tablet (1,000 Units total) by mouth once daily.    ofloxacin (FLOXIN) 0.3 % otic solution      No current facility-administered medications for this visit.       ROS:  GENERAL: No fever. No chills. No fatigue. Denies weight loss. Denies weight gain.  HEENT: Denies headaches. Denies vision change. Denies eye pain. Denies double vision. Denies ear pain.   CV: Denies chest pain.   PULM: Denies of shortness of breath.  GI: Denies constipation. No diarrhea. No abdominal pain. Denies nausea. Denies vomiting. No blood in stool.  HEME: Denies bleeding problems.  : Denies urgency. No painful urination. No blood in urine.  MS: Denies joint stiffness. Denies joint swelling.  Denies back pain.  SKIN: Denies rash.   NEURO: Denies seizures. No weakness.  PSYCH:  Denies difficulty sleeping. No anxiety. Denies depression. No suicidal thoughts.       VITALS:   Vitals:    09/16/24 1013   BP: (!) 147/72   Pulse: 60   Resp: 18   Temp: 98.3 °F (36.8 °C)   SpO2: 100%   Weight: 54.3 kg (119 lb 11.4 oz)   PainSc:   7   PainLoc: Hip         PHYSICAL EXAM:   GENERAL: Well appearing, in no acute distress, alert and oriented x3.  PSYCH:  Mood and affect appropriate.   SKIN: Skin color, texture, turgor normal, no rashes or lesions.  HEENT:  Normocephalic, atraumatic. Cranial nerves grossly intact.  BACK: Normal range of motion. No pain to palpation over the spinous processes. No pain to palpation over facet joints.   EXTREMITIES: No deformities, edema, or skin discoloration.   MUSCULOSKELETAL: There is no TTP over both SI joints. Tenderness over right piriformis.   NEURO: Sensation is equal and appropriate bilaterally. Straight leg raising  in the sitting position is negative to radicular pain.   GAIT: Antalgic- ambulates with walker.      LABS:  Lab Results   Component Value Date    WBC 6.39 04/08/2024    HGB 13.1 04/08/2024    HCT 38.8 04/08/2024    MCV 91 04/08/2024     04/08/2024       CMP  Sodium   Date Value Ref Range Status   04/08/2024 137 136 - 145 mmol/L Final     Potassium   Date Value Ref Range Status   04/08/2024 4.1 3.5 - 5.1 mmol/L Final     Chloride   Date Value Ref Range Status   04/08/2024 103 95 - 110 mmol/L Final     CO2   Date Value Ref Range Status   04/08/2024 26 23 - 29 mmol/L Final     Glucose   Date Value Ref Range Status   04/08/2024 94 70 - 110 mg/dL Final     BUN   Date Value Ref Range Status   04/08/2024 12 10 - 30 mg/dL Final     Creatinine   Date Value Ref Range Status   04/08/2024 0.8 0.5 - 1.4 mg/dL Final     Calcium   Date Value Ref Range Status   04/08/2024 10.3 8.7 - 10.5 mg/dL Final     Total Protein   Date Value Ref Range Status   04/08/2024 7.2 6.0 - 8.4 g/dL Final     Albumin   Date Value Ref Range Status   04/08/2024 4.1 3.5 - 5.2 g/dL Final     Total Bilirubin   Date Value Ref Range Status   04/08/2024 0.8 0.1 - 1.0 mg/dL Final     Comment:     For infants and newborns, interpretation of results should be based  on gestational age, weight and in agreement with clinical  observations.    Premature Infant recommended reference ranges:  Up to 24 hours.............<8.0 mg/dL  Up to 48 hours............<12.0 mg/dL  3-5 days..................<15.0 mg/dL  6-29 days.................<15.0 mg/dL       Alkaline Phosphatase   Date Value Ref Range Status   04/08/2024 76 55 - 135 U/L Final     AST   Date Value Ref Range Status   04/08/2024 17 10 - 40 U/L Final     ALT   Date Value Ref Range Status   04/08/2024 16 10 - 44 U/L Final     Anion Gap   Date Value Ref Range Status   04/08/2024 8 8 - 16 mmol/L Final     eGFR   Date Value Ref Range Status   04/08/2024 >60 >60 mL/min/1.73 m^2 Final         IMAGING:     EXAMINATION:  MRI LUMBAR SPINE WITHOUT CONTRAST     CLINICAL HISTORY:  Compression fracture, lumbar; Wedge compression fracture of first lumbar vertebra, initial encounter for closed fracture     TECHNIQUE:  Multiplanar, multisequence MR images were acquired from the thoracolumbar junction to the sacrum without the administration of contrast.     COMPARISON:  X-ray 10/16/2023     FINDINGS:  Alignment: Grade 1 retrolisthesis of L2 on L3.  Grade 1 anterolisthesis of L4 on L5 and L5 on S1.     Vertebrae: Approximately 50% height loss centrally of the L1 vertebral body with extensive marrow edema, suggestive of subacute compression fracture and/or instability.  3 mm osseous retropulsion into the spinal canal.  No spinal canal stenosis.  No aggressive marrow replacement process.     Discs: Prominent degenerative disc disease on the left side at L2-3, noting marked disc height loss with extensive sub endplate marrow edema/Modic 1 changes.  There is prominent right-sided degenerative disc disease at L5-S1 with mild sub endplate marrow edema/Modic 1 changes.     Cord: Normal caliber in signal.  Conus terminates at L1-L2.  Cauda equina nerve roots are unremarkable.     Degenerative findings:     T12-L1:  Osseous retropulsion with encroachment on the ventral thecal sac inferior to the T12-L1 disc space with mild spinal canal stenosis. No neural foraminal narrowing.     L1-L2: Mild disc bulging asymmetric to the left, contributing to mild left neural foraminal narrowing.  No significant right neural foraminal narrowing or spinal canal stenosis.     L2-L3: Small disc bulge and bilateral facet arthropathy with facet joint effusions.  Mild left neural foraminal.  Mild spinal canal stenosis.     L3-L4: Circumferential disc bulge and bilateral facet arthropathy with small joint effusions.  No neural foraminal narrowing.  Mild spinal canal stenosis.     L4-L5: Circumferential disc bulge, bilateral facet arthropathy, and ligamentum  flavum hypertrophy.  Mild bilateral neural foraminal narrowing.  Severe spinal canal stenosis.     L5-S1: Advanced facet joint arthropathy, noting joint hypertrophy with extensive subchondral marrow edema extending into the pedicles with surrounding inflammatory changes.  Slight/grade 1 anterolisthesis noted.  Moderate disc bulging and facet joint hypertrophy, contribute to moderate spinal canal stenosis, moderate right and mild left-sided neural foraminal narrowing.     Paraspinal muscles & soft tissues: Unremarkable.     Impression:     L1 compression fracture with significant marrow edema, suggestive of a subacute injury and/or instability.     Lumbar spondylosis, contributing to severe spinal canal stenosis at L4-5 and moderate spinal canal and neural foraminal stenosis at L5-S1, as above.     Prominent L5-S1 facet joint arthropathy, as above.     Electronically signed by resident: Magui Uriostegui  Date:                                            02/02/2024  Time:                                           13:15     Electronically signed by: Marquis Lynn MD  Date:                                            02/02/2024  Time:                                           14:28    ASSESSMENT: 89 y.o. year old female with pain, consistent with:    Encounter Diagnoses   Name Primary?    Closed compression fracture of body of L1 vertebra Yes    Age-related osteoporosis with current pathological fracture with routine healing, subsequent encounter     Spinal stenosis of lumbar region, unspecified whether neurogenic claudication present     Lumbar radiculopathy     Lumbosacral radiculopathy     Chronic pain disorder        DISCUSSION: Cathy Kearns is a retired  who comes to us from Dr. Xiong. She had a fall in July, 2023 complicated by a left hip intertrochanteric fracture. She presents to us with mid-back pain with sitting and right buttock pain with standing. MRI shows an acute L1 compression fracture,  significant DDD at L2/3 and L5/S1 with Modic changes, severe canal stenosis at L4/5, significant facet arthropathy/edema at L5/S1.     PLAN:   Previous imaging was reviewed and discussed with the patient today.   Update X-rays Lumbar spine with Flex/Ext to monitor L1 compression fracture  Encouraged patient to continue PT and HEP.   Procedure order placed for L5/S1 ILESI for right-sided radicular symptoms and severe spinal stenosis at L4/5. She is on Eliquis and will need clearance.   Continue Tylenol as needed.   RTC 2 weeks after above procedure.     The above plan and management options were discussed at length with patient. Patient is in agreement with the above and verbalized understanding.     Marisa Ramirez NP  09/16/2024

## 2024-09-16 NOTE — H&P (VIEW-ONLY)
PCP: Wilma Xiong MD    REFERRING PHYSICIAN: No ref. provider found    CHIEF COMPLAINT: Low back pain    Original HISTORY OF PRESENT ILLNESS: Cathy Kearns presents to the clinic for the evaluation of the above pain. The pain started 5 months ago    Original Pain Description:  The pain is located in the low back and is radiating to the right foot . The pain is described as dull. Exacerbating factors: Sitting. Mitigating factors walking and standing. Symptoms interfere with daily activity. The patient feels like symptoms have been unchanged. Patient denies significant motor weakness and loss of sensations. Ambulates with walker. Patient states that she currently lives in a nursing home and is very active.     Original PAIN SCORES:  Best: Pain is 0  Worst: Pain is 6  Current: Pain is 0        8/21/2024     9:11 AM   Last 3 PDI Scores   Pain Disability Index (PDI) 35       INTERVAL HISTORY:     Interval History 3/26/2024:  The patient is here for follow up of back and buttock pain. She is s/p right SI joint injection with 80% relief. She has very minimal pain to this area now. She does have right lower leg pain and numbness. She says this is mild at this time. Her pain today is 5/10.    Interval History 7/23/2024:  Mrs. Kearns is here today for worsened right sided lower back and buttock pain. She previously underwent right SI joint injection on 3/19/24 with 80% relief for about 3 months. Her pain has been gradually returning since that time. It bothers her most with prolonged sitting and with changing from sitting to standing. She is currently in PT which sometimes worsens her pain. Her pain today is 5/10.    Interval History 8/21/2024:  Cathy Kearns returns to clinic for follow-up after right SI joint injection on 8/6/2024. She reports 80% pain relief.  She states she has not had any shooting pains into her leg since the procedure. She does continue to have some pain to the right leg. The pain in her  right leg is worse with sitting and getting up. It is better with walking. She continues tylenol occasionally with good relief. She denies any perceived side effects. She continues PT and HEP daily. She denies recent falls or trauma. She denies new onset fever/night sweats, urinary incontinence, bowel incontinence, significant weight changes, significant motor weakness or changes, or loss of sensations. Her pain today is 5/10.    Interval History 9/16/2024:  Cathy Kearns returns to clinic for follow-up of right leg pain. She states the pain radiates in an L5 distribution.  She has continued physical therapy with good relief. She continues tylenol occasionally with good relief. She denies any perceived side effects. She continues PT and HEP daily. She denies recent falls or trauma. She denies new onset fever/night sweats, urinary incontinence, bowel incontinence, significant weight changes, significant motor weakness or changes, or loss of sensations. Her pain today is 7/10.     6 weeks of Conservative therapy:  PT: Yes (October 2023-February 2024)  Chiro: No  HEP: Yes, daily currently      Treatments / Medications: (Ice/Heat/NSAIDS/APAP/etc):  - Tylenol      Interventional Pain Procedures: (Previous injections)  3/19/24 Right SI joint injection- 80% relief for 3 months  8/6/24 - right SIJ injection - 80% relief    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 7/9/2012    Vasovagal syncope      Past Surgical History:   Procedure Laterality Date    ADENOIDECTOMY      APPENDECTOMY      CATARACT EXTRACTION W/  INTRAOCULAR LENS IMPLANT Bilateral n/a    HYSTERECTOMY      2/2 uterine CA, removed  ovaries, no longer f/u with gyn    INJECTION, SACROILIAC JOINT Right 3/19/2024    Procedure: INJECTION,SACROILIAC JOINT RIGHT *OPTHALMOLOGY CLEARANCE IN CHART*;  Surgeon: Lata Casey MD;  Location: [facility] [place] [facility];  Service: Pain Management;  Laterality: Right;  [phone]  2 WK F/U CRAIG    INJECTION, SACROILIAC JOINT Right 8/6/2024    Procedure: INJECTION,SACROILIAC JOINT RIGHT;  Surgeon: Lata Casey MD;  Location: [facility] [place] [facility];  Service: Pain Management;  Laterality: Right;  [phone]  2 WK F/U LUIS ENRIQUE    INTRAMEDULLARY RODDING OF FEMUR Left 7/25/2023    Procedure: INSERTION, INTRAMEDULLARY DAPHNEY, LEFT FEMUR;  Surgeon: Rogerio Chand MD;  Location: [facility] OR [address] [place] [address];  Service: Orthopedics;  Laterality: Left;    Intravitreal Injection      Avastin od x's 12 dltx 2-16-12    TONSILLECTOMY       Social History     Socioeconomic History    Marital status:    Tobacco Use    Smoking status: Former     Current packs/day: 0.25     Average packs/day: 0.3 packs/day for 15.0 years (3.8 ttl pk-yrs)     Types: Cigarettes    Smokeless tobacco: Never   Substance and Sexual Activity    Alcohol use: Yes     Comment: 1-2 drinks at night    Drug use: No   Other Topics Concern    Are you pregnant or think you may be? 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     Social Determinants of Health     Financial Resource Strain: Low Risk  (8/28/2024)    Overall Financial Resource Strain (CARDIA)     Difficulty of Paying Living Expenses: Not very hard   Food Insecurity: No Food Insecurity (8/28/2024)    Hunger Vital Sign     Worried About Running Out of Food in the Last Year: Never true     Ran Out of Food in the Last Year: Never true   Transportation Needs: No Transportation Needs (7/26/2023)    PRAPARE -  "Transportation     Lack of Transportation (Medical): No     Lack of Transportation (Non-Medical): No   Physical Activity: Insufficiently Active (8/28/2024)    Exercise Vital Sign     Days of Exercise per Week: 4 days     Minutes of Exercise per Session: 20 min   Stress: No Stress Concern Present (8/28/2024)    [employer] [place] [employer] - Occupational Stress Questionnaire     Feeling of Stress : Not at all   Housing Stability: Low Risk  (7/26/2023)    Housing Stability Vital Sign     Unable to Pay for Housing in the Last Year: No     Number of Places Lived in the Last Year: 1     Unstable Housing in the Last Year: No     Family History   Problem Relation Name Age of Onset    Cancer Mother          ovarian CA    Cancer Father          prostate    Cataracts Father      Cancer Maternal Grandmother          colon    Heart attack Maternal Grandfather      Cancer Paternal Grandmother          colon    Cancer Paternal Grandfather      Strabismus Paternal Aunt      Hypertension Daughter      Cancer Daughter          "cancer of bone"    Hyperlipidemia Daughter      Thyroid disease Grandchild      Heart attack Brother      Cancer Brother          prostate, esophagus, bladder    Hypertension Son      Hyperlipidemia Son      Cancer Brother          Non hodgkins lymphoma    Amblyopia Neg Hx      Blindness Neg Hx      Diabetes Neg Hx      Glaucoma Neg Hx      Macular degeneration Neg Hx      Retinal detachment Neg Hx      Stroke Neg Hx         Review of patient's allergies indicates:   Allergen Reactions    Penicillins Swelling    Astelin [azelastine] Other (See Comments)     Dizziness    Pseudoephedrine hcl Palpitations    Sulfa (sulfonamide antibiotics) Palpitations       Current Outpatient Medications   Medication Sig    acetaminophen (TYLENOL) 500 MG tablet Take 2 tablets (1,000 mg total) by mouth every 8 (eight) hours.    albuterol (PROVENTIL/VENTOLIN HFA) 90 mcg/actuation inhaler Inhale 1 puff into the lungs. "    apixaban (ELIQUIS) 2.5 mg Tab Take 1 tablet (2.5 mg total) by mouth 2 (two) times daily.    lisinopriL 10 MG tablet Take 1 tablet (10 mg total) by mouth once daily.    sotaloL (BETAPACE) 80 MG tablet Take 1 tablet (80 mg total) by mouth 2 (two) times daily.    vitamin D (VITAMIN D3) 1000 units Tab Take 1 tablet (1,000 Units total) by mouth once daily.    ofloxacin (FLOXIN) 0.3 % otic solution      No current facility-administered medications for this visit.       ROS:  GENERAL: No fever. No chills. No fatigue. Denies weight loss. Denies weight gain.  HEENT: Denies headaches. Denies vision change. Denies eye pain. Denies double vision. Denies ear pain.   CV: Denies chest pain.   PULM: Denies of shortness of breath.  GI: Denies constipation. No diarrhea. No abdominal pain. Denies nausea. Denies vomiting. No blood in stool.  HEME: Denies bleeding problems.  : Denies urgency. No painful urination. No blood in urine.  MS: Denies joint stiffness. Denies joint swelling.  Denies back pain.  SKIN: Denies rash.   NEURO: Denies seizures. No weakness.  PSYCH:  Denies difficulty sleeping. No anxiety. Denies depression. No suicidal thoughts.       VITALS:   Vitals:    09/16/24 1013   BP: (!) 147/72   Pulse: 60   Resp: 18   Temp: 98.3 °F (36.8 °C)   SpO2: 100%   Weight: 54.3 kg (119 lb 11.4 oz)   PainSc:   7   PainLoc: Hip         PHYSICAL EXAM:   GENERAL: Well appearing, in no acute distress, alert and oriented x3.  PSYCH:  Mood and affect appropriate.   SKIN: Skin color, texture, turgor normal, no rashes or lesions.  HEENT:  Normocephalic, atraumatic. Cranial nerves grossly intact.  BACK: Normal range of motion. No pain to palpation over the spinous processes. No pain to palpation over facet joints.   EXTREMITIES: No deformities, edema, or skin discoloration.   MUSCULOSKELETAL: There is no TTP over both SI joints. Tenderness over right piriformis.   NEURO: Sensation is equal and appropriate bilaterally. Straight leg raising  in the sitting position is negative to radicular pain.   GAIT: Antalgic- ambulates with walker.      LABS:  Lab Results   Component Value Date    WBC 6.39 04/08/2024    HGB 13.1 04/08/2024    HCT 38.8 04/08/2024    MCV 91 04/08/2024     04/08/2024       CMP  Sodium   Date Value Ref Range Status   04/08/2024 137 136 - 145 mmol/L Final     Potassium   Date Value Ref Range Status   04/08/2024 4.1 3.5 - 5.1 mmol/L Final     Chloride   Date Value Ref Range Status   04/08/2024 103 95 - 110 mmol/L Final     CO2   Date Value Ref Range Status   04/08/2024 26 23 - 29 mmol/L Final     Glucose   Date Value Ref Range Status   04/08/2024 94 70 - 110 mg/dL Final     BUN   Date Value Ref Range Status   04/08/2024 12 10 - 30 mg/dL Final     Creatinine   Date Value Ref Range Status   04/08/2024 0.8 0.5 - 1.4 mg/dL Final     Calcium   Date Value Ref Range Status   04/08/2024 10.3 8.7 - 10.5 mg/dL Final     Total Protein   Date Value Ref Range Status   04/08/2024 7.2 6.0 - 8.4 g/dL Final     Albumin   Date Value Ref Range Status   04/08/2024 4.1 3.5 - 5.2 g/dL Final     Total Bilirubin   Date Value Ref Range Status   04/08/2024 0.8 0.1 - 1.0 mg/dL Final     Comment:     For infants and newborns, interpretation of results should be based  on gestational age, weight and in agreement with clinical  observations.    Premature Infant recommended reference ranges:  Up to 24 hours.............<8.0 mg/dL  Up to 48 hours............<12.0 mg/dL  3-5 days..................<15.0 mg/dL  6-29 days.................<15.0 mg/dL       Alkaline Phosphatase   Date Value Ref Range Status   04/08/2024 76 55 - 135 U/L Final     AST   Date Value Ref Range Status   04/08/2024 17 10 - 40 U/L Final     ALT   Date Value Ref Range Status   04/08/2024 16 10 - 44 U/L Final     Anion Gap   Date Value Ref Range Status   04/08/2024 8 8 - 16 mmol/L Final     eGFR   Date Value Ref Range Status   04/08/2024 >60 >60 mL/min/1.73 m^2 Final         IMAGING:     EXAMINATION:  MRI LUMBAR SPINE WITHOUT CONTRAST     CLINICAL HISTORY:  Compression fracture, lumbar; Wedge compression fracture of first lumbar vertebra, initial encounter for closed fracture     TECHNIQUE:  Multiplanar, multisequence MR images were acquired from the thoracolumbar junction to the sacrum without the administration of contrast.     COMPARISON:  X-ray 10/16/2023     FINDINGS:  Alignment: Grade 1 retrolisthesis of L2 on L3.  Grade 1 anterolisthesis of L4 on L5 and L5 on S1.     Vertebrae: Approximately 50% height loss centrally of the L1 vertebral body with extensive marrow edema, suggestive of subacute compression fracture and/or instability.  3 mm osseous retropulsion into the spinal canal.  No spinal canal stenosis.  No aggressive marrow replacement process.     Discs: Prominent degenerative disc disease on the left side at L2-3, noting marked disc height loss with extensive sub endplate marrow edema/Modic 1 changes.  There is prominent right-sided degenerative disc disease at L5-S1 with mild sub endplate marrow edema/Modic 1 changes.     Cord: Normal caliber in signal.  Conus terminates at L1-L2.  Cauda equina nerve roots are unremarkable.     Degenerative findings:     T12-L1:  Osseous retropulsion with encroachment on the ventral thecal sac inferior to the T12-L1 disc space with mild spinal canal stenosis. No neural foraminal narrowing.     L1-L2: Mild disc bulging asymmetric to the left, contributing to mild left neural foraminal narrowing.  No significant right neural foraminal narrowing or spinal canal stenosis.     L2-L3: Small disc bulge and bilateral facet arthropathy with facet joint effusions.  Mild left neural foraminal.  Mild spinal canal stenosis.     L3-L4: Circumferential disc bulge and bilateral facet arthropathy with small joint effusions.  No neural foraminal narrowing.  Mild spinal canal stenosis.     L4-L5: Circumferential disc bulge, bilateral facet arthropathy, and ligamentum  flavum hypertrophy.  Mild bilateral neural foraminal narrowing.  Severe spinal canal stenosis.     L5-S1: Advanced facet joint arthropathy, noting joint hypertrophy with extensive subchondral marrow edema extending into the pedicles with surrounding inflammatory changes.  Slight/grade 1 anterolisthesis noted.  Moderate disc bulging and facet joint hypertrophy, contribute to moderate spinal canal stenosis, moderate right and mild left-sided neural foraminal narrowing.     Paraspinal muscles & soft tissues: Unremarkable.     Impression:     L1 compression fracture with significant marrow edema, suggestive of a subacute injury and/or instability.     Lumbar spondylosis, contributing to severe spinal canal stenosis at L4-5 and moderate spinal canal and neural foraminal stenosis at L5-S1, as above.     Prominent L5-S1 facet joint arthropathy, as above.     Electronically signed by resident: Magui Uriostegui  Date:                                            02/02/2024  Time:                                           13:15     Electronically signed by: Marquis Lynn MD  Date:                                            02/02/2024  Time:                                           14:28    ASSESSMENT: 89 y.o. year old female with pain, consistent with:    Encounter Diagnoses   Name Primary?    Closed compression fracture of body of L1 vertebra Yes    Age-related osteoporosis with current pathological fracture with routine healing, subsequent encounter     Spinal stenosis of lumbar region, unspecified whether neurogenic claudication present     Lumbar radiculopathy     Lumbosacral radiculopathy     Chronic pain disorder        DISCUSSION: Cathy Kearns is a retired  who comes to us from Dr. Xiong. She had a fall in July, 2023 complicated by a left hip intertrochanteric fracture. She presents to us with mid-back pain with sitting and right buttock pain with standing. MRI shows an acute L1 compression fracture,  significant DDD at L2/3 and L5/S1 with Modic changes, severe canal stenosis at L4/5, significant facet arthropathy/edema at L5/S1.     PLAN:   Previous imaging was reviewed and discussed with the patient today.   Update X-rays Lumbar spine with Flex/Ext to monitor L1 compression fracture  Encouraged patient to continue PT and HEP.   Procedure order placed for L5/S1 ILESI for right-sided radicular symptoms and severe spinal stenosis at L4/5. She is on Eliquis and will need clearance.   Continue Tylenol as needed.   RTC 2 weeks after above procedure.     The above plan and management options were discussed at length with patient. Patient is in agreement with the above and verbalized understanding.     Marisa Ramirez NP  09/16/2024

## 2024-09-17 ENCOUNTER — CLINICAL SUPPORT (OUTPATIENT)
Dept: REHABILITATION | Facility: OTHER | Age: 89
End: 2024-09-17
Payer: MEDICARE

## 2024-09-17 ENCOUNTER — TELEPHONE (OUTPATIENT)
Dept: PAIN MEDICINE | Facility: OTHER | Age: 89
End: 2024-09-17
Payer: MEDICARE

## 2024-09-17 DIAGNOSIS — R29.3 POSTURE IMBALANCE: ICD-10-CM

## 2024-09-17 DIAGNOSIS — Z74.09 IMPAIRED FUNCTIONAL MOBILITY, BALANCE, GAIT, AND ENDURANCE: ICD-10-CM

## 2024-09-17 DIAGNOSIS — M54.16 LUMBAR RADICULOPATHY: Primary | ICD-10-CM

## 2024-09-17 PROCEDURE — 97530 THERAPEUTIC ACTIVITIES: CPT | Mod: KX,PN | Performed by: PHYSICAL THERAPIST

## 2024-09-17 NOTE — TELEPHONE ENCOUNTER
----- Message from Kyra Ramsay MD sent at 9/17/2024  8:18 AM CDT -----  Regarding: RE: Request to hold Eliquis  Eliquis can be held for 72 hours prior to the planned procedure and restarted asap.  ----- Message -----  From: Ernestina Amador LPN  Sent: 9/16/2024  11:01 AM CDT  To: Kyra Ramsay MD  Subject: Request to hold Eliquis                          Good morning,    Patient will be scheduled to have a procedure with Dr. Casey, L5/S1 Epidural Steroid Injection . Staff is requesting to hold Eliquis for 3 days prior to procedure. Please advise.    Thank you,  Ernestina

## 2024-09-17 NOTE — PROGRESS NOTES
"[facility]   Physical Therapy Treatment Note      Name: Cathy Kearns  [facility] Number: [id number]    Therapy Diagnosis:   Encounter Diagnoses   Name Primary?    Lumbar radiculopathy Yes    Posture imbalance     Impaired functional mobility, balance, gait, and endurance        Physician: Wilma Xiong MD    Visit Date: 9/17/2024     Physician Orders: PT Eval and Treat   Medical Diagnosis from Referral: Z87.81 (ICD-10-CM) - History of hip fracture M25.559 (ICD-10-CM) - Hip pain, unspecified laterality  Evaluation Date: 7/9/2024  Authorization Period Expiration: 6/27/2025  Plan of Care Expiration: 9/6/2024   Progress Note Due: n/a  Date of Surgery: n/a   Visit # / Visits authorized: 12/25   FOTO: 3/ 3 last issued 9/17/2024      Precautions: Standard and osteopenia      Time In: 1400   Time Out: 1500   Total Billable Time: 60 minutes 1:1     PTA Visit #: 0/5     L LE shorter , hip ORIF     Subjective     Patient reports: last week she had some pain to her R groin that was really bothering her. She's scheduled for epidural in early October. She is in agreement to take a break from therapy at this point. She wants to see how she does after the epidural, and plans to travel in October to visit family. If she's still doing ok at that point she will continue on her own, or will seek referral to return in the future if needed.     She was compliant with home exercise program.   Response to previous treatment: "it was a great workout. I felt so much better after."   Functional change: ongoing, better tolerance with HEP     Pain: 4/10   Location: R hip to medial calf     Objective      Objective Measures updated at progress report unless specified.     9/17/2024  Lower Extremity Strength  Right LE   Left LE     Ankle dorsiflexion: 4+/5 Ankle dorsiflexion: 4+/5   Knee extension: 4+/5 Knee extension: 4+/5   Knee flexion: 4+/5 Knee flexion: 4+/5   Hip flexion: 4/5 Hip flexion: 4+/5   Hip external " "rotation: 4+/5 Hip external rotation: 4+/5   Hip internal rotation: 4+/5 Hip internal rotation: 4+/5   Hip extension:  4/5 Hip extension: 4/5   Hip abduction: 4/5 Hip abduction: 4+/5   Hip adduction: 4+/5 Hip adduction 4+/5        30" STS: 8 reps with no UE use    TUG: with rollator 19"/17"/18"      CMS Impairment/Limitation/Restriction for FOTO Lumbar Spine Survey    Therapist reviewed FOTO scores for Cathy Kearns on 9/17/2024.   FOTO documents entered into [employer] - see Media section.    Evaluation: 29%    Current : 46%    Goal: 49%    Discharge: 46%              Treatment     Cathy received the treatments listed below:      neuromuscular re-education activities to improve: Balance, Coordination, Kinesthetic, Sense, Proprioception, and Posture for 00 minutes. The following   SLR x 3 x 10 B   Bridge 2 x 15   Clam 30x   SLR abd 20x   Modified tacho stretch 1 x 2 min B  seated hip IR (iso) x 2x1 min x 5" holds - w/ strap at ankles (yoga block between knees)   seated hip ER (iso) x 2x1 min x 5" holds - w/ pilates ball at ankles (yoga block between knees)     therapeutic activities to improve functional performance for 60 minutes, including:    Assessment and discharge     + silver PB 3-way roll outs x 5 x 10" holds   B rows green 20x seated   B shd ext standing in front of mat with SBA 10 x 2   sit to stands (holding 3# DB) x2x10 - from hi-lo   seated resisted trunk rotations x1x8 x RTB   Step ups on 6" step 2 x 10 (regressed to 4" step on R 2* pain)   B HR 20x   GS SB 3 x 30"      Patient Education and Home Exercises       Education provided:   - Reviewed and educated pt on HEP     Written Home Exercises Provided: Patient instructed to cont prior HEP. Exercises were reviewed and Cathy was able to demonstrate them prior to the end of the session.  Cathy demonstrated good  understanding of the education provided. See Electronic Medical Record under Patient Instructions for exercises provided during therapy " sessions    Assessment     Overall Cathy has made excellent progress with therapy. She demonstrates excellent progress with B LE strength with MMT. She has also made excellent progress with functional tasks including ability to perform sit to stands without UE use, and ability with stair navigation. Her pain is not significantly changed, and she is scheduled for epidural injections in a few weeks. Based on her progress and current presentation, on discussion with pt we will take a break from therapy at this time. Pt will continue with her HEP and her fitness classes at [facility]. If her status changes she will seek referral to return as needed in the future.     Cathy Is progressing well towards her goals.   Patient prognosis is Good.     Patient will continue to benefit from skilled outpatient physical therapy to address the deficits listed in the problem list box on initial evaluation, provide pt/family education and to maximize pt's level of independence in the home and community environment.     Patient's spiritual, cultural and educational needs considered and pt agreeable to plan of care and goals.     Anticipated barriers to physical therapy: standard     Goals: updated 07/23/2024   Short Term Goals (4 Weeks):   1. Pt will report 20% reduction in pain of the lumbar spine and R LE for ease with ADL's (met)   2. PT will demonstrate improved upright posture with minimal cuing for ease with functional positioning in home and community.(met)   3. Pt will demonstrate improved lumbar spine ROM in all directions by 10% for ease with bending activities. (Progressing, not met)   4. Pt to demonstrate improved functional ability with FOTO limitation <=35% disability.(met)      Long Term Goals (8 Weeks):   1. Pt will report being independent with HEP for maintenance of improvements gained during therapy sessions (met)   2. PT will report 50% reduction of pain of the back and R LE for ease with positional tolerance for  meals and leisure reading. (Progressing, not met)   3. Pt will demonstrate trunk and extremity strength to >=4+/5 without the provocation of pain for ease with stair navigation (Partially met)   4. Pt will demonstrate appropriate upright posture without external cueing for ease with community and home mobility. (met)   5. Pt to demonstrate improved functional ability with FOTO limitation <=48% disability. (Progressing, not met)      Plan      Plan of care Certification: 7/9/2024 to 9/6/2024.     Discharge to [facility]    Keara Arcos, PT

## 2024-09-30 DIAGNOSIS — I48.0 PAROXYSMAL ATRIAL FIBRILLATION: ICD-10-CM

## 2024-09-30 RX ORDER — SOTALOL HYDROCHLORIDE 80 MG/1
80 TABLET ORAL 2 TIMES DAILY
Qty: 180 TABLET | Refills: 1 | Status: SHIPPED | OUTPATIENT
Start: 2024-09-30

## 2024-09-30 NOTE — TELEPHONE ENCOUNTER
Refill Encounter    PCP Visits: Recent Visits  Date Type Provider Dept   06/27/24 Office Visit Wilma Xiong MD [facility] Internal Medicine   01/10/24 Office Visit Wilma Xiong MD [facility] Internal Medicine   Showing recent visits within past 360 days and meeting all other requirements  Future Appointments  No visits were found meeting these conditions.  Showing future appointments within next 720 days and meeting all other requirements     Last 3 Blood Pressure:   BP Readings from Last 3 Encounters:   09/16/24 (!) 147/72   08/29/24 (!) 197/76   08/21/24 (!) 158/67     Preferred Pharmacy:   [facility], [employer]. - [address] [address]  Phone: [phone] Fax: [phone]    Requested RX:  Requested Prescriptions     Pending Prescriptions Disp Refills    sotaloL (BETAPACE) 80 MG tablet [Pharmacy Med Name: SOTALOL HCL 80MG TABS] 180 tablet 1     Sig: TAKE ONE TABLET BY MOUTH TWICE A DAY      RX Route: Normal

## 2024-09-30 NOTE — TELEPHONE ENCOUNTER
No care due was identified.  [facility] Embedded Care Due Messages. Reference number: [id number].   9/30/2024 1:36:49 AM CDT

## 2024-10-01 ENCOUNTER — TELEPHONE (OUTPATIENT)
Dept: PAIN MEDICINE | Facility: CLINIC | Age: 89
End: 2024-10-01
Payer: MEDICARE

## 2024-10-01 NOTE — TELEPHONE ENCOUNTER
----- Message from Mary sent at 10/1/2024  2:30 PM CDT -----  Regarding: Concerns and Questions  Name of Who is Calling:  Patient          What is the request in detail:  Please contact patient she would like to know when should she stop taking her Rx apixaban (ELIQUIS) 2.5 mg Tab before her procedure            Can the clinic reply by [facility]: No            What Number to Call Back if not in [facility]:[phone]

## 2024-10-01 NOTE — TELEPHONE ENCOUNTER
Staff called pt about message that was left with the call center. Pt  would like to know when should she stop taking her ELIQUIS before her procedure .

## 2024-10-01 NOTE — TELEPHONE ENCOUNTER
Staff called patient and left a vm to let her know that someone will be in contact to let her know what's to do.

## 2024-10-08 ENCOUNTER — HOSPITAL ENCOUNTER (OUTPATIENT)
Facility: OTHER | Age: 89
Discharge: HOME | End: 2024-10-08
Attending: ANESTHESIOLOGY | Admitting: ANESTHESIOLOGY
Payer: MEDICARE

## 2024-10-08 VITALS
WEIGHT: 116 LBS | SYSTOLIC BLOOD PRESSURE: 161 MMHG | OXYGEN SATURATION: 99 % | TEMPERATURE: 98 F | DIASTOLIC BLOOD PRESSURE: 60 MMHG | HEART RATE: 70 BPM | BODY MASS INDEX: 21.35 KG/M2 | RESPIRATION RATE: 16 BRPM | HEIGHT: 62 IN

## 2024-10-08 DIAGNOSIS — M54.16 LUMBAR RADICULOPATHY: Primary | ICD-10-CM

## 2024-10-08 DIAGNOSIS — G89.29 CHRONIC PAIN: ICD-10-CM

## 2024-10-08 PROCEDURE — 63600175 PHARM REV CODE 636 W HCPCS: Performed by: ANESTHESIOLOGY

## 2024-10-08 PROCEDURE — 25500020 PHARM REV CODE 255: Performed by: ANESTHESIOLOGY

## 2024-10-08 PROCEDURE — 62323 NJX INTERLAMINAR LMBR/SAC: CPT | Performed by: ANESTHESIOLOGY

## 2024-10-08 PROCEDURE — 62323 NJX INTERLAMINAR LMBR/SAC: CPT | Mod: ,,, | Performed by: ANESTHESIOLOGY

## 2024-10-08 RX ORDER — SODIUM CHLORIDE 9 MG/ML
INJECTION, SOLUTION INTRAVENOUS CONTINUOUS
Status: DISCONTINUED | OUTPATIENT
Start: 2024-10-08 | End: 2024-10-08 | Stop reason: HOSPADM

## 2024-10-08 RX ORDER — LIDOCAINE HYDROCHLORIDE 10 MG/ML
INJECTION, SOLUTION EPIDURAL; INFILTRATION; INTRACAUDAL; PERINEURAL
Status: DISCONTINUED | OUTPATIENT
Start: 2024-10-08 | End: 2024-10-08 | Stop reason: HOSPADM

## 2024-10-08 RX ORDER — LIDOCAINE HYDROCHLORIDE 20 MG/ML
INJECTION, SOLUTION INFILTRATION; PERINEURAL
Status: DISCONTINUED | OUTPATIENT
Start: 2024-10-08 | End: 2024-10-08 | Stop reason: HOSPADM

## 2024-10-08 RX ORDER — DEXAMETHASONE SODIUM PHOSPHATE 10 MG/ML
INJECTION INTRAMUSCULAR; INTRAVENOUS
Status: DISCONTINUED | OUTPATIENT
Start: 2024-10-08 | End: 2024-10-08 | Stop reason: HOSPADM

## 2024-10-08 NOTE — DISCHARGE INSTRUCTIONS

## 2024-10-08 NOTE — DISCHARGE SUMMARY
Discharge Note  Short Stay      SUMMARY     Admit Date: 10/8/2024    Attending Physician: Lata Casey MD    Discharge Physician: Lata Casey MD      Discharge Date: 10/8/2024 11:30 AM    Procedure(s) (LRB):  LUMBAR L5/S1 IL RACHELLE *ELIQUIS CLEARANCE IN CHART* (N/A)    Final Diagnosis: Lumbar radiculopathy [M54.16]    Disposition: Home or self care    Patient Instructions:   Current Discharge Medication List        CONTINUE these medications which have NOT CHANGED    Details   acetaminophen (TYLENOL) 500 MG tablet Take 2 tablets (1,000 mg total) by mouth every 8 (eight) hours.  Refills: 0      albuterol (PROVENTIL/VENTOLIN HFA) 90 mcg/actuation inhaler Inhale 1 puff into the lungs.      apixaban (ELIQUIS) 2.5 mg Tab Take 1 tablet (2.5 mg total) by mouth 2 (two) times daily.  Qty: 180 tablet, Refills: 3    Associated Diagnoses: Paroxysmal atrial fibrillation      lisinopriL 10 MG tablet Take 1 tablet (10 mg total) by mouth once daily.  Qty: 90 tablet, Refills: 3    Comments: .      ofloxacin (FLOXIN) 0.3 % otic solution       sotaloL (BETAPACE) 80 MG tablet TAKE ONE TABLET BY MOUTH TWICE A DAY  Qty: 180 tablet, Refills: 1    Associated Diagnoses: Paroxysmal atrial fibrillation      vitamin D (VITAMIN D3) 1000 units Tab Take 1 tablet (1,000 Units total) by mouth once daily.                 Discharge Diagnosis: Lumbar radiculopathy [M54.16]  Condition on Discharge: Stable with no complications to procedure   Diet on Discharge: Same as before.  Activity: as per instruction sheet.  Discharge to: Home with a responsible adult.  Follow up: 2-4 weeks       Please call my office or pager at [phone] if experienced any weakness or loss of sensation, fever > 101.5, pain uncontrolled with oral medications, persistent nausea/vomiting/or diarrhea, redness or drainage from the incisions, or any other worrisome concerns. If physician on call was not reached or could not communicate with our office for any reason please go  to the nearest emergency department     Kim Grewal MD

## 2024-10-08 NOTE — OP NOTE
Lumbar Interlaminar Epidural Steroid Injection under Fluoroscopic Guidance    The procedure, risks, benefits, and options were discussed with the patient. There are no contraindications to the procedure. The patent expressed understanding and agreed to the procedure. Informed written consent was obtained prior to the start of the procedure and can be found in the patient's chart.    PATIENT NAME: Cathy Kearns   MRN: [id number]     DATE OF PROCEDURE: 10/08/2024    PROCEDURE: Lumbar Interlaminar Epidural Steroid Injection L5/S1 under Fluoroscopic Guidance    PRE-OP DIAGNOSIS: Lumbar radiculopathy [M54.16] Lumbar radiculopathy [M54.16]    POST-OP DIAGNOSIS: Same    PHYSICIAN: Lata Casey MD    ASSISTANTS: Kim Grewal MD     MEDICATIONS INJECTED: Preservative-free Decadron 10mg with 4cc of Lidocaine 1% MPF and preservative free normal saline    LOCAL ANESTHETIC INJECTED: Xylocaine 2%     SEDATION: None    ESTIMATED BLOOD LOSS: None    COMPLICATIONS: None    TECHNIQUE: Time-out was performed to identify the patient and procedure to be performed. With the patient laying in a prone position, the surgical area was prepped and draped in the usual sterile fashion using ChloraPrep and a fenestrated drape. The level was determined under fluoroscopy guidance. Skin anesthesia was achieved by injecting Lidocaine 2% over the injection site. The interlaminar space was then approached with a 18 gauge,  3.5 inch Tuohy needle that was introduced under fluoroscopic guidance in the AP, lateral and/or contralateral oblique imaging. Once the Ligamentum flavum was encountered loss of resistance to air was used to enter the epidural space. With positive loss of resistance and negative aspiration for CSF or Blood, contrast dye Omnipaque (300mg/mL) was injected to confirm placement and there was significant vascular runoff. The needle was retracted and loss of resistance was obtained slightly higher at the same level. Contrast at this  location revealed no further vascular uptake. 5 mL of the medication mixture listed above was injected slowly. Displacement of the radio opaque contrast after injection of the medication confirmed that the medication went into the area of the epidural space. The needles were removed and bleeding was nil. A sterile dressing was applied. No specimens collected. The patient tolerated the procedure well.       The patient was monitored after the procedure in the recovery area. They were given post-procedure and discharge instructions to follow at home. The patient was discharged in a stable condition.    Kim Grewal MD

## 2024-10-23 ENCOUNTER — OFFICE VISIT (OUTPATIENT)
Dept: PAIN MEDICINE | Facility: CLINIC | Age: 89
End: 2024-10-23
Payer: MEDICARE

## 2024-10-23 VITALS
RESPIRATION RATE: 18 BRPM | DIASTOLIC BLOOD PRESSURE: 98 MMHG | SYSTOLIC BLOOD PRESSURE: 141 MMHG | BODY MASS INDEX: 20.89 KG/M2 | HEART RATE: 70 BPM | WEIGHT: 114.19 LBS | TEMPERATURE: 99 F | OXYGEN SATURATION: 98 %

## 2024-10-23 DIAGNOSIS — M54.16 LUMBAR RADICULOPATHY: Primary | ICD-10-CM

## 2024-10-23 DIAGNOSIS — S32.010A CLOSED COMPRESSION FRACTURE OF BODY OF L1 VERTEBRA: ICD-10-CM

## 2024-10-23 DIAGNOSIS — M80.00XD AGE-RELATED OSTEOPOROSIS WITH CURRENT PATHOLOGICAL FRACTURE WITH ROUTINE HEALING, SUBSEQUENT ENCOUNTER: ICD-10-CM

## 2024-10-23 PROCEDURE — 99999 PR PBB SHADOW E&M-EST. PATIENT-LVL III: CPT | Mod: PBBFAC,,, | Performed by: ANESTHESIOLOGY

## 2024-10-23 PROCEDURE — 99213 OFFICE O/P EST LOW 20 MIN: CPT | Mod: PBBFAC | Performed by: ANESTHESIOLOGY

## 2024-10-23 NOTE — ANESTHESIA POST-OP PAIN MANAGEMENT
Acute Pain Service Progress Note    Cathy Kearns is a 89 y.o., female, [id number].    Surgery:  INSERTION, INTRAMEDULLARY DAPHNEY, LEFT FEMUR (Left: Leg Upper)    Post Op Day #: 1    Catheter type: perineural  SIFI    Infusion type: Ropivacaine 0.2%  15mL/3h basal    Problem List:    Active Hospital Problems    Diagnosis  POA    *Closed intertrochanteric fracture, left, initial encounter [S72.142A]  Yes    Acute blood loss anemia [D62]  No    Hyponatremia [E87.1]  Yes    Monocytoses [D72.821]  Yes    Hyperlipidemia [E78.5]  Yes    Chronic obstructive pulmonary disease [J44.9]  Yes    Osteopenia [M85.80]  Yes    Hypertension [I10]  Yes    Paroxysmal atrial fibrillation [I48.0]  Yes      Resolved Hospital Problems   No resolved problems to display.       Subjective:     General appearance of alert, oriented, no complaints   Pain with rest: 2    Numbers   Pain with movement: 2    Numbers   Side Effects    1. Pruritis No    2. Nausea No    3. Motor Blockade No, 2=Inability to bend knees    4. Sedation No, 1=awake and alert    Objective:           Vitals   Vitals:    07/26/23 1040   BP:    Pulse: 63   Resp:    Temp:         Labs    Admission on 07/24/2023   Component Date Value Ref Range Status    WBC 07/24/2023 8.91  3.90 - 12.70 K/uL Final    RBC 07/24/2023 4.10  4.00 - 5.40 M/uL Final    Hemoglobin 07/24/2023 13.0  12.0 - 16.0 g/dL Final    Hematocrit 07/24/2023 37.3  37.0 - 48.5 % Final    MCV 07/24/2023 91  82 - 98 fL Final    MCH 07/24/2023 31.7 (H)  27.0 - 31.0 pg Final    MCHC 07/24/2023 34.9  32.0 - 36.0 g/dL Final    RDW 07/24/2023 14.0  11.5 - 14.5 % Final    Platelets 07/24/2023 197  150 - 450 K/uL Final    MPV 07/24/2023 10.8  9.2 - 12.9 fL Final    Immature Granulocytes 07/24/2023 2.5 (H)  0.0 - 0.5 % Final    Gran # (ANC) 07/24/2023 4.8  1.8 - 7.7 K/uL Final    Immature Grans (Abs) 07/24/2023 0.22 (H)  0.00 - 0.04 K/uL Final    Lymph # 07/24/2023 2.1  1.0 - 4.8 K/uL Final    Mono #  07/24/2023 1.7 (H)  0.3 - 1.0 K/uL Final    Eos # 07/24/2023 0.0  0.0 - 0.5 K/uL Final    Baso # 07/24/2023 0.04  0.00 - 0.20 K/uL Final    nRBC 07/24/2023 0  0 /100 WBC Final    Gran % 07/24/2023 54.4  38.0 - 73.0 % Final    Lymph % 07/24/2023 23.1  18.0 - 48.0 % Final    Mono % 07/24/2023 19.3 (H)  4.0 - 15.0 % Final    Eosinophil % 07/24/2023 0.3  0.0 - 8.0 % Final    Basophil % 07/24/2023 0.4  0.0 - 1.9 % Final    Differential Method 07/24/2023 Automated   Final    Sodium 07/24/2023 130 (L)  136 - 145 mmol/L Final    Potassium 07/24/2023 4.1  3.5 - 5.1 mmol/L Final    Chloride 07/24/2023 99  95 - 110 mmol/L Final    CO2 07/24/2023 24  23 - 29 mmol/L Final    Glucose 07/24/2023 124 (H)  70 - 110 mg/dL Final    BUN 07/24/2023 12  10 - 30 mg/dL Final    Creatinine 07/24/2023 0.7  0.5 - 1.4 mg/dL Final    Calcium 07/24/2023 10.0  8.7 - 10.5 mg/dL Final    Total Protein 07/24/2023 7.1  6.0 - 8.4 g/dL Final    Albumin 07/24/2023 4.1  3.5 - 5.2 g/dL Final    Total Bilirubin 07/24/2023 0.7  0.1 - 1.0 mg/dL Final    Alkaline Phosphatase 07/24/2023 51 (L)  55 - 135 U/L Final    AST 07/24/2023 22  10 - 40 U/L Final    ALT 07/24/2023 16  10 - 44 U/L Final    eGFR 07/24/2023 >60.0  >60 mL/min/1.73 m^2 Final    Anion Gap 07/24/2023 7 (L)  8 - 16 mmol/L Final    Prothrombin Time 07/24/2023 10.9  9.0 - 12.5 sec Final    INR 07/24/2023 1.0  0.8 - 1.2 Final    aPTT 07/24/2023 25.6  21.0 - 32.0 sec Final    Group & Rh 07/24/2023 A POS   Final    Indirect Iggy 07/24/2023 NEG   Final    Specimen Outdate 07/24/2023 07/27/2023 23:59   Final    Specimen UA 07/24/2023 Urine, Clean Catch   Final    Color, UA 07/24/2023 Yellow  Yellow, Straw, Soheila Final    Appearance, UA 07/24/2023 Clear  Clear Final    pH, UA 07/24/2023 7.0  5.0 - 8.0 Final    Specific Gravity, UA 07/24/2023 1.015  1.005 - 1.030 Final    Protein, UA 07/24/2023 Negative  Negative Final    Glucose, UA 07/24/2023 Negative  Negative  Final    Ketones, UA 07/24/2023 1+ (A)  Negative Final    Bilirubin (UA) 07/24/2023 Negative  Negative Final    Occult Blood UA 07/24/2023 Negative  Negative Final    Nitrite, UA 07/24/2023 Negative  Negative Final    Leukocytes, UA 07/24/2023 Negative  Negative Final    Hemoglobin A1C 07/24/2023 5.4  4.0 - 5.6 % Final    Estimated Avg Glucose 07/24/2023 108  68 - 131 mg/dL Final    Magnesium 07/24/2023 1.9  1.6 - 2.6 mg/dL Final    Phosphorus 07/24/2023 2.8  2.7 - 4.5 mg/dL Final    Transferrin 07/24/2023 237  200 - 375 mg/dL Final    Prealbumin 07/24/2023 23  20 - 43 mg/dL Final    Vit D, 25-Hydroxy 07/24/2023 46  30 - 96 ng/mL Final    WBC 07/25/2023 10.66  3.90 - 12.70 K/uL Final    RBC 07/25/2023 3.11 (L)  4.00 - 5.40 M/uL Final    Hemoglobin 07/25/2023 9.7 (L)  12.0 - 16.0 g/dL Final    Hematocrit 07/25/2023 29.2 (L)  37.0 - 48.5 % Final    MCV 07/25/2023 94  82 - 98 fL Final    MCH 07/25/2023 31.2 (H)  27.0 - 31.0 pg Final    MCHC 07/25/2023 33.2  32.0 - 36.0 g/dL Final    RDW 07/25/2023 13.7  11.5 - 14.5 % Final    Platelets 07/25/2023 141 (L)  150 - 450 K/uL Final    MPV 07/25/2023 10.5  9.2 - 12.9 fL Final    Immature Granulocytes 07/25/2023 0.8 (H)  0.0 - 0.5 % Final    Gran # (ANC) 07/25/2023 5.7  1.8 - 7.7 K/uL Final    Immature Grans (Abs) 07/25/2023 0.09 (H)  0.00 - 0.04 K/uL Final    Lymph # 07/25/2023 1.9  1.0 - 4.8 K/uL Final    Mono # 07/25/2023 2.9 (H)  0.3 - 1.0 K/uL Final    Eos # 07/25/2023 0.0  0.0 - 0.5 K/uL Final    Baso # 07/25/2023 0.03  0.00 - 0.20 K/uL Final    nRBC 07/25/2023 0  0 /100 WBC Final    Gran % 07/25/2023 53.8  38.0 - 73.0 % Final    Lymph % 07/25/2023 17.4 (L)  18.0 - 48.0 % Final    Mono % 07/25/2023 27.5 (H)  4.0 - 15.0 % Final    Eosinophil % 07/25/2023 0.2  0.0 - 8.0 % Final    Basophil % 07/25/2023 0.3  0.0 - 1.9 % Final    Platelet Estimate 07/25/2023 Decreased (A)   Final    Aniso 07/25/2023 Slight   Final    Differential Method  07/25/2023 Automated   Final    Sodium 07/25/2023 131 (L)  136 - 145 mmol/L Final    Potassium 07/25/2023 4.0  3.5 - 5.1 mmol/L Final    Chloride 07/25/2023 101  95 - 110 mmol/L Final    CO2 07/25/2023 22 (L)  23 - 29 mmol/L Final    Glucose 07/25/2023 106  70 - 110 mg/dL Final    BUN 07/25/2023 13  10 - 30 mg/dL Final    Creatinine 07/25/2023 0.6  0.5 - 1.4 mg/dL Final    Calcium 07/25/2023 9.1  8.7 - 10.5 mg/dL Final    Anion Gap 07/25/2023 8  8 - 16 mmol/L Final    eGFR 07/25/2023 >60.0  >60 mL/min/1.73 m^2 Final    Magnesium 07/25/2023 1.8  1.6 - 2.6 mg/dL Final    Phosphorus 07/25/2023 3.0  2.7 - 4.5 mg/dL Final    WBC 07/25/2023 10.46  3.90 - 12.70 K/uL Final    RBC 07/25/2023 2.98 (L)  4.00 - 5.40 M/uL Final    Hemoglobin 07/25/2023 9.4 (L)  12.0 - 16.0 g/dL Final    Hematocrit 07/25/2023 28.2 (L)  37.0 - 48.5 % Final    MCV 07/25/2023 95  82 - 98 fL Final    MCH 07/25/2023 31.5 (H)  27.0 - 31.0 pg Final    MCHC 07/25/2023 33.3  32.0 - 36.0 g/dL Final    RDW 07/25/2023 13.8  11.5 - 14.5 % Final    Platelets 07/25/2023 130 (L)  150 - 450 K/uL Final    MPV 07/25/2023 11.2  9.2 - 12.9 fL Final    Immature Granulocytes 07/25/2023 1.3 (H)  0.0 - 0.5 % Final    Gran # (ANC) 07/25/2023 6.2  1.8 - 7.7 K/uL Final    Immature Grans (Abs) 07/25/2023 0.14 (H)  0.00 - 0.04 K/uL Final    Lymph # 07/25/2023 1.5  1.0 - 4.8 K/uL Final    Mono # 07/25/2023 2.5 (H)  0.3 - 1.0 K/uL Final    Eos # 07/25/2023 0.0  0.0 - 0.5 K/uL Final    Baso # 07/25/2023 0.03  0.00 - 0.20 K/uL Final    nRBC 07/25/2023 0  0 /100 WBC Final    Gran % 07/25/2023 59.3  38.0 - 73.0 % Final    Lymph % 07/25/2023 14.5 (L)  18.0 - 48.0 % Final    Mono % 07/25/2023 24.3 (H)  4.0 - 15.0 % Final    Eosinophil % 07/25/2023 0.3  0.0 - 8.0 % Final    Basophil % 07/25/2023 0.3  0.0 - 1.9 % Final    Differential Method 07/25/2023 Automated   Final    WBC 07/26/2023 13.48 (H)  3.90 - 12.70 K/uL Final    RBC 07/26/2023 2.38 (L)   4.00 - 5.40 M/uL Final    Hemoglobin 07/26/2023 7.7 (L)  12.0 - 16.0 g/dL Final    Hematocrit 07/26/2023 22.0 (L)  37.0 - 48.5 % Final    MCV 07/26/2023 92  82 - 98 fL Final    MCH 07/26/2023 32.4 (H)  27.0 - 31.0 pg Final    MCHC 07/26/2023 35.0  32.0 - 36.0 g/dL Final    RDW 07/26/2023 14.1  11.5 - 14.5 % Final    Platelets 07/26/2023 124 (L)  150 - 450 K/uL Final    MPV 07/26/2023 11.0  9.2 - 12.9 fL Final    Immature Granulocytes 07/26/2023 CANCELED  0.0 - 0.5 % Final    Immature Grans (Abs) 07/26/2023 CANCELED  0.00 - 0.04 K/uL Final    nRBC 07/26/2023 0  0 /100 WBC Final    Gran % 07/26/2023 78.0 (H)  38.0 - 73.0 % Final    Lymph % 07/26/2023 6.0 (L)  18.0 - 48.0 % Final    Mono % 07/26/2023 8.0  4.0 - 15.0 % Final    Eosinophil % 07/26/2023 0.0  0.0 - 8.0 % Final    Basophil % 07/26/2023 0.0  0.0 - 1.9 % Final    Bands 07/26/2023 2.0  % Final    Metamyelocytes 07/26/2023 5.0  % Final    Myelocytes 07/26/2023 1.0  % Final    Platelet Estimate 07/26/2023 Decreased (A)   Final    Aniso 07/26/2023 Slight   Final    Poik 07/26/2023 Slight   Final    Poly 07/26/2023 Occasional   Final    Hypo 07/26/2023 Occasional   Final    Tear Drop Cells 07/26/2023 Occasional   Final    [place] Cells 07/26/2023 Occasional   Final    Spherocytes 07/26/2023 Occasional   Final    Acanthocytes 07/26/2023 Present   Final    Schistocytes 07/26/2023 Present   Final    Fragmented Cells 07/26/2023 Occasional   Final    Differential Method 07/26/2023 Automated   Final    Sodium 07/26/2023 129 (L)  136 - 145 mmol/L Final    Potassium 07/26/2023 4.4  3.5 - 5.1 mmol/L Final    Chloride 07/26/2023 99  95 - 110 mmol/L Final    CO2 07/26/2023 22 (L)  23 - 29 mmol/L Final    Glucose 07/26/2023 121 (H)  70 - 110 mg/dL Final    BUN 07/26/2023 16  10 - 30 mg/dL Final    Creatinine 07/26/2023 0.8  0.5 - 1.4 mg/dL Final    Calcium 07/26/2023 9.4  8.7 - 10.5 mg/dL Final    Anion Gap 07/26/2023 8  8 - 16 mmol/L  Final    eGFR 07/26/2023 >60.0  >60 mL/min/1.73 m^2 Final    Magnesium 07/26/2023 1.8  1.6 - 2.6 mg/dL Final    Phosphorus 07/26/2023 3.7  2.7 - 4.5 mg/dL Final        Meds   Current Facility-Administered Medications   Medication Dose Route Frequency Provider Last Rate Last Admin    0.9%  NaCl infusion   Intravenous Continuous Pam Parker MD        0.9%  NaCl infusion   Intravenous Continuous Ludmila Granado  mL/hr at 07/26/23 0839 New Bag at 07/26/23 0839    acetaminophen tablet 1,000 mg  1,000 mg Oral Q6H Ludmila Granado MD   1,000 mg at 07/26/23 1101    amLODIPine tablet 5 mg  5 mg Oral Daily Ludmila Granado MD   5 mg at 07/26/23 1000    apixaban tablet 2.5 mg  2.5 mg Oral BID Jacobo Louise MD   2.5 mg at 07/26/23 1000    bisacodyL suppository 10 mg  10 mg Rectal Daily PRN Ludmila Granado MD        lisinopriL tablet 40 mg  40 mg Oral Daily Ludmila Granado MD   40 mg at 07/26/23 1000    melatonin tablet 6 mg  6 mg Oral Nightly PRN Ludmila Granado MD        methocarbamoL tablet 500 mg  500 mg Oral Q6H PRN Ludmila Granado MD   500 mg at 07/26/23 0520    mupirocin 2 % ointment 1 g  1 g Nasal BID Ludmila Granado MD   1 g at 07/26/23 1000    ondansetron injection 4 mg  4 mg Intravenous Q12H PRN Ludmila Granado MD        polyethylene glycol packet 17 g  17 g Oral Daily Ludmila Granado MD   17 g at 07/26/23 1000    ROPIvacaine (PF) 2 mg/ml (0.2%) solution  0.1 mL/hr Perineural Continuous Pavel Cerrato MD 0.1 mL/hr at 07/26/23 1100 0.1 mL/hr at 07/26/23 1100    senna-docusate 8.6-50 mg per tablet 1 tablet  1 tablet Oral BID Ludmila Granado MD   1 tablet at 07/26/23 1000    sodium chloride 0.9% bolus 500 mL 500 mL  500 mL Intravenous Once Ludmila Granado  mL/hr at 07/26/23 1026 500 mL at 07/26/23 1026    sodium chloride 0.9% flush 10 mL  10 mL Intravenous PRN Ludmila Granado MD        sotaloL tablet 80 mg  80 mg Oral BID Ludmila  JOSE Granado MD   80 mg at 07/26/23 1000    vitamin D 1000 units tablet 1,000 Units  1,000 Units Oral Daily Jacobo Louise MD   1,000 Units at 07/26/23 1000        Anticoagulant dose Apixaban 2.5 BID    Assessment:     Pain control adequate    Plan:     Patient doing well, continue present treatment.    Plan:  - Continue PNC  - Continue Tylenol 1000 q6, PRN Robaxin 500 q6 for spasms  - Will follow       Veto Esteves MD  PGY-1         110

## 2024-10-23 NOTE — PROGRESS NOTES
PCP: Wilma Xiong MD    REFERRING PHYSICIAN: Michael Traylor    CHIEF COMPLAINT: Low back pain    Original HISTORY OF PRESENT ILLNESS: Cathy Kearns presents to the clinic for the evaluation of the above pain. The pain started 5 months ago    Original Pain Description:  The pain is located in the low back and is radiating to the right foot . The pain is described as dull. Exacerbating factors: Sitting. Mitigating factors walking and standing. Symptoms interfere with daily activity. The patient feels like symptoms have been unchanged. Patient denies significant motor weakness and loss of sensations. Ambulates with walker. Patient states that she currently lives in a nursing home and is very active.     Original PAIN SCORES:  Best: Pain is 0  Worst: Pain is 6  Current: Pain is 0        10/23/2024     9:31 AM   Last 3 PDI Scores   Pain Disability Index (PDI) 7       INTERVAL HISTORY:     Interval History 3/26/2024:  The patient is here for follow up of back and buttock pain. She is s/p right SI joint injection with 80% relief. She has very minimal pain to this area now. She does have right lower leg pain and numbness. She says this is mild at this time. Her pain today is 5/10.    Interval History 7/23/2024:  Mrs. Kearns is here today for worsened right sided lower back and buttock pain. She previously underwent right SI joint injection on 3/19/24 with 80% relief for about 3 months. Her pain has been gradually returning since that time. It bothers her most with prolonged sitting and with changing from sitting to standing. She is currently in PT which sometimes worsens her pain. Her pain today is 5/10.    Interval History 8/21/2024:  Cathy Kearns returns to clinic for follow-up after right SI joint injection on 8/6/2024. She reports 80% pain relief.  She states she has not had any shooting pains into her leg since the procedure. She does continue to have some pain to the right leg. The pain in her  "right leg is worse with sitting and getting up. It is better with walking. She continues tylenol occasionally with good relief. She denies any perceived side effects. She continues PT and HEP daily. She denies recent falls or trauma. She denies new onset fever/night sweats, urinary incontinence, bowel incontinence, significant weight changes, significant motor weakness or changes, or loss of sensations. Her pain today is 5/10.    Interval History 9/16/2024:  Cathy Kearns returns to clinic for follow-up of right leg pain. She states the pain radiates in an L5 distribution.  She has continued physical therapy with good relief. She continues tylenol occasionally with good relief. She denies any perceived side effects. She continues PT and HEP daily. She denies recent falls or trauma. She denies new onset fever/night sweats, urinary incontinence, bowel incontinence, significant weight changes, significant motor weakness or changes, or loss of sensations. Her pain today is 7/10.    Interval History 10/24/2024:  Mrs. Kearns is here today for two week follow-up. She previously underwent L5-S1 RACHELLE on 10/08/24 with 90% relief to date. Her pain has been markedly improved and she states that "even if I only had two weeks of relief I would be happy". Remaining baseline pain bothers her most with prolonged sitting and with changing from sitting to standing. She is currently in PT which sometimes worsens her pain. Her pain today is 5/10.     6 weeks of Conservative therapy:  PT: Yes (October 2023-February 2024)  Chiro: No  HEP: Yes, daily currently      Treatments / Medications: (Ice/Heat/NSAIDS/APAP/etc):  - Tylenol      Interventional Pain Procedures: (Previous injections)  3/19/24 Right SI joint injection- 80% relief for 3 months  8/6/24 - right SIJ injection - 80% relief  10/8/24 - L5-S1 RACHELLE 90% relief    Past Medical History:   Diagnosis Date    Aortic valve regurgitation     Arthritis     Atrial fibrillation     " Diastolic dysfunction     Disorder of kidney and ureter     History of second hand smoke exposure     History of tobacco use     Hyperlipidemia     Hypertension     Macular degeneration     Osteopenia     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 7/9/2012    Vasovagal syncope      Past Surgical History:   Procedure Laterality Date    ADENOIDECTOMY      APPENDECTOMY      CATARACT EXTRACTION W/  INTRAOCULAR LENS IMPLANT Bilateral n/a    EPIDURAL STEROID INJECTION N/A 10/8/2024    Procedure: LUMBAR L5/S1 IL RACHELLE *ELIQUIS CLEARANCE IN CHART*;  Surgeon: Lata Casey MD;  Location: [facility] PAIN MGT;  Service: Pain Management;  Laterality: N/A;  [phone]  2 WK F/U BERNADINE    HYSTERECTOMY      2/2 uterine CA, removed ovaries, no longer f/u with gyn    INJECTION, SACROILIAC JOINT Right 3/19/2024    Procedure: INJECTION,SACROILIAC JOINT RIGHT *OPTHALMOLOGY CLEARANCE IN CHART*;  Surgeon: Lata Casey MD;  Location: [facility] PAIN [facility]GT;  Service: Pain Management;  Laterality: Right;  [phone]  2 WK F/U CRAIG    INJECTION, SACROILIAC JOINT Right 8/6/2024    Procedure: INJECTION,SACROILIAC JOINT RIGHT;  Surgeon: Lata Casey MD;  Location: [facility] PAIN [facility]GT;  Service: Pain Management;  Laterality: Right;  [phone]  2 WK F/U LUIS ENRIQUE    INTRAMEDULLARY RODDING OF FEMUR Left 7/25/2023    Procedure: INSERTION, INTRAMEDULLARY DAPHNEY, LEFT FEMUR;  Surgeon: Rogerio Chand MD;  Location: [facility] OR [address] [place] [address];  Service: Orthopedics;  Laterality: Left;    Intravitreal Injection      Avastin od x's 12 dltx 2-16-12    TONSILLECTOMY       Social History     Socioeconomic History    Marital status:    Tobacco Use    Smoking status: Former     Current packs/day: 0.25     Average packs/day: 0.3 packs/day for 15.0 years (3.8 ttl pk-yrs)     Types: Cigarettes    Smokeless tobacco: Never   Substance and Sexual Activity    Alcohol use: Yes     Comment: 1-2 drinks at night    Drug use: No   Other Topics Concern  "   Are you pregnant or think you may be? 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     Social Drivers of Health     Financial Resource Strain: Low Risk  (8/28/2024)    Overall Financial Resource Strain (CARDIA)     Difficulty of Paying Living Expenses: Not very hard   Food Insecurity: No Food Insecurity (8/28/2024)    Hunger Vital Sign     Worried About Running Out of Food in the Last Year: Never true     Ran Out of Food in the Last Year: Never true   Transportation Needs: No Transportation Needs (7/26/2023)    PRAPARE - Transportation     Lack of Transportation (Medical): No     Lack of Transportation (Non-Medical): No   Physical Activity: Insufficiently Active (8/28/2024)    Exercise Vital Sign     Days of Exercise per Week: 4 days     Minutes of Exercise per Session: 20 min   Stress: No Stress Concern Present (8/28/2024)    [employer] - Occupational Stress Questionnaire     Feeling of Stress : Not at all   Housing Stability: Low Risk  (7/26/2023)    Housing Stability Vital Sign     Unable to Pay for Housing in the Last Year: No     Number of Places Lived in the Last Year: 1     Unstable Housing in the Last Year: No     Family History   Problem Relation Name Age of Onset    Cancer Mother          ovarian CA    Cancer Father          prostate    Cataracts Father      Cancer Maternal Grandmother          colon    Heart attack Maternal Grandfather      Cancer Paternal Grandmother          colon    Cancer Paternal Grandfather      Strabismus Paternal Aunt      Hypertension Daughter      Cancer Daughter          "cancer of bone"    Hyperlipidemia Daughter      Thyroid disease Grandchild      Heart attack Brother      Cancer Brother          prostate, esophagus, bladder    Hypertension " Son      Hyperlipidemia Son      Cancer Brother          Non hodgkins lymphoma    Amblyopia Neg Hx      Blindness Neg Hx      Diabetes Neg Hx      Glaucoma Neg Hx      Macular degeneration Neg Hx      Retinal detachment Neg Hx      Stroke Neg Hx         Review of patient's allergies indicates:   Allergen Reactions    Penicillins Swelling    Astelin [azelastine] Other (See Comments)     Dizziness    Pseudoephedrine hcl Palpitations    Sulfa (sulfonamide antibiotics) Palpitations       Current Outpatient Medications   Medication Sig    acetaminophen (TYLENOL) 500 MG tablet Take 2 tablets (1,000 mg total) by mouth every 8 (eight) hours.    albuterol (PROVENTIL/VENTOLIN HFA) 90 mcg/actuation inhaler Inhale 1 puff into the lungs.    apixaban (ELIQUIS) 2.5 mg Tab Take 1 tablet (2.5 mg total) by mouth 2 (two) times daily.    lisinopriL 10 MG tablet Take 1 tablet (10 mg total) by mouth once daily.    ofloxacin (FLOXIN) 0.3 % otic solution     sotaloL (BETAPACE) 80 MG tablet TAKE ONE TABLET BY MOUTH TWICE A DAY    vitamin D (VITAMIN D3) 1000 units Tab Take 1 tablet (1,000 Units total) by mouth once daily.     No current facility-administered medications for this visit.       ROS:  GENERAL: No fever. No chills. No fatigue. Denies weight loss. Denies weight gain.  HEENT: Denies headaches. Denies vision change. Denies eye pain. Denies double vision. Denies ear pain.   CV: Denies chest pain.   PULM: Denies of shortness of breath.  GI: Denies constipation. No diarrhea. No abdominal pain. Denies nausea. Denies vomiting. No blood in stool.  HEME: Denies bleeding problems.  : Denies urgency. No painful urination. No blood in urine.  MS: Denies joint stiffness. Denies joint swelling.  Denies back pain.  SKIN: Denies rash.   NEURO: Denies seizures. No weakness.  PSYCH:  Denies difficulty sleeping. No anxiety. Denies depression. No suicidal thoughts.       VITALS:   Vitals:    10/23/24 0930   BP: (!) 141/98   Pulse: 70   Resp: 18    Temp: 98.8 °F (37.1 °C)   SpO2: 98%   Weight: 51.8 kg (114 lb 3.2 oz)   PainSc:   1           PHYSICAL EXAM:   GENERAL: Well appearing, in no acute distress, alert and oriented x3.  PSYCH:  Mood and affect appropriate.   SKIN: Skin color, texture, turgor normal, no rashes or lesions.  HEENT:  Normocephalic, atraumatic. Cranial nerves grossly intact.  BACK: Normal range of motion. No pain to palpation over the spinous processes. No pain to palpation over facet joints.   EXTREMITIES: No deformities, edema, or skin discoloration.   MUSCULOSKELETAL: There is no TTP over both SI joints.   NEURO: Sensation is equal and appropriate bilaterally. Straight leg raising in the sitting position is negative to radicular pain.   GAIT: Antalgic- ambulates with walker.      LABS:  Lab Results   Component Value Date    WBC 6.39 04/08/2024    HGB 13.1 04/08/2024    HCT 38.8 04/08/2024    MCV 91 04/08/2024     04/08/2024       CMP  Sodium   Date Value Ref Range Status   04/08/2024 137 136 - 145 mmol/L Final     Potassium   Date Value Ref Range Status   04/08/2024 4.1 3.5 - 5.1 mmol/L Final     Chloride   Date Value Ref Range Status   04/08/2024 103 95 - 110 mmol/L Final     CO2   Date Value Ref Range Status   04/08/2024 26 23 - 29 mmol/L Final     Glucose   Date Value Ref Range Status   04/08/2024 94 70 - 110 mg/dL Final     BUN   Date Value Ref Range Status   04/08/2024 12 10 - 30 mg/dL Final     Creatinine   Date Value Ref Range Status   04/08/2024 0.8 0.5 - 1.4 mg/dL Final     Calcium   Date Value Ref Range Status   04/08/2024 10.3 8.7 - 10.5 mg/dL Final     Total Protein   Date Value Ref Range Status   04/08/2024 7.2 6.0 - 8.4 g/dL Final     Albumin   Date Value Ref Range Status   04/08/2024 4.1 3.5 - 5.2 g/dL Final     Total Bilirubin   Date Value Ref Range Status   04/08/2024 0.8 0.1 - 1.0 mg/dL Final     Comment:     For infants and newborns, interpretation of results should be based  on gestational age, weight and in  agreement with clinical  observations.    Premature Infant recommended reference ranges:  Up to 24 hours.............<8.0 mg/dL  Up to 48 hours............<12.0 mg/dL  3-5 days..................<15.0 mg/dL  6-29 days.................<15.0 mg/dL       Alkaline Phosphatase   Date Value Ref Range Status   04/08/2024 76 55 - 135 U/L Final     AST   Date Value Ref Range Status   04/08/2024 17 10 - 40 U/L Final     ALT   Date Value Ref Range Status   04/08/2024 16 10 - 44 U/L Final     Anion Gap   Date Value Ref Range Status   04/08/2024 8 8 - 16 mmol/L Final     eGFR   Date Value Ref Range Status   04/08/2024 >60 >60 mL/min/1.73 m^2 Final         IMAGING:    EXAMINATION:  MRI LUMBAR SPINE WITHOUT CONTRAST     CLINICAL HISTORY:  Compression fracture, lumbar; Wedge compression fracture of first lumbar vertebra, initial encounter for closed fracture     TECHNIQUE:  Multiplanar, multisequence MR images were acquired from the thoracolumbar junction to the sacrum without the administration of contrast.     COMPARISON:  X-ray 10/16/2023     FINDINGS:  Alignment: Grade 1 retrolisthesis of L2 on L3.  Grade 1 anterolisthesis of L4 on L5 and L5 on S1.     Vertebrae: Approximately 50% height loss centrally of the L1 vertebral body with extensive marrow edema, suggestive of subacute compression fracture and/or instability.  3 mm osseous retropulsion into the spinal canal.  No spinal canal stenosis.  No aggressive marrow replacement process.     Discs: Prominent degenerative disc disease on the left side at L2-3, noting marked disc height loss with extensive sub endplate marrow edema/Modic 1 changes.  There is prominent right-sided degenerative disc disease at L5-S1 with mild sub endplate marrow edema/Modic 1 changes.     Cord: Normal caliber in signal.  Conus terminates at L1-L2.  Cauda equina nerve roots are unremarkable.     Degenerative findings:     T12-L1:  Osseous retropulsion with encroachment on the ventral thecal sac  inferior to the T12-L1 disc space with mild spinal canal stenosis. No neural foraminal narrowing.     L1-L2: Mild disc bulging asymmetric to the left, contributing to mild left neural foraminal narrowing.  No significant right neural foraminal narrowing or spinal canal stenosis.     L2-L3: Small disc bulge and bilateral facet arthropathy with facet joint effusions.  Mild left neural foraminal.  Mild spinal canal stenosis.     L3-L4: Circumferential disc bulge and bilateral facet arthropathy with small joint effusions.  No neural foraminal narrowing.  Mild spinal canal stenosis.     L4-L5: Circumferential disc bulge, bilateral facet arthropathy, and ligamentum flavum hypertrophy.  Mild bilateral neural foraminal narrowing.  Severe spinal canal stenosis.     L5-S1: Advanced facet joint arthropathy, noting joint hypertrophy with extensive subchondral marrow edema extending into the pedicles with surrounding inflammatory changes.  Slight/grade 1 anterolisthesis noted.  Moderate disc bulging and facet joint hypertrophy, contribute to moderate spinal canal stenosis, moderate right and mild left-sided neural foraminal narrowing.     Paraspinal muscles & soft tissues: Unremarkable.     Impression:     L1 compression fracture with significant marrow edema, suggestive of a subacute injury and/or instability.     Lumbar spondylosis, contributing to severe spinal canal stenosis at L4-5 and moderate spinal canal and neural foraminal stenosis at L5-S1, as above.     Prominent L5-S1 facet joint arthropathy, as above.     Electronically signed by resident: Magui Uriostegui  Date:                                            02/02/2024  Time:                                           13:15     Electronically signed by: Marquis Lynn MD  Date:                                            02/02/2024  Time:                                           14:28    ASSESSMENT: 89 y.o. year old female with pain, consistent with:    Encounter Diagnoses    Name Primary?    Lumbar radiculopathy Yes    Closed compression fracture of body of L1 vertebra     Age-related osteoporosis with current pathological fracture with routine healing, subsequent encounter        DISCUSSION: Cathy Kearns is a retired  who comes to us from Dr. Xiong. She had a fall in July, 2023 complicated by a left hip intertrochanteric fracture. She presents to us with mid-back pain with sitting and right buttock pain with standing. MRI shows an acute L1 compression fracture, significant DDD at L2/3 and L5/S1 with Modic changes, severe canal stenosis at L4/5, significant facet arthropathy/edema at L5/S1. She did not require kyphoplasty and was seen in the fracture clinic. She did very well with RACHELLE for pain control.     PLAN:   Encouraged patient to continue PT and HEP.   Recommended follow up with fracture clinic  Follow up with us as needed for further assistance     The above plan and management options were discussed at length with patient. Patient is in agreement with the above and verbalized understanding.     Lata Casey MD  10/23/2024

## 2024-11-05 ENCOUNTER — PROCEDURE VISIT (OUTPATIENT)
Dept: OPHTHALMOLOGY | Facility: CLINIC | Age: 89
End: 2024-11-05
Payer: MEDICARE

## 2024-11-05 ENCOUNTER — CLINICAL SUPPORT (OUTPATIENT)
Dept: OPHTHALMOLOGY | Facility: CLINIC | Age: 89
End: 2024-11-05
Payer: MEDICARE

## 2024-11-05 DIAGNOSIS — H35.3211 EXUDATIVE AGE-RELATED MACULAR DEGENERATION OF RIGHT EYE WITH ACTIVE CHOROIDAL NEOVASCULARIZATION: ICD-10-CM

## 2024-11-05 DIAGNOSIS — H35.3211 EXUDATIVE AGE-RELATED MACULAR DEGENERATION OF RIGHT EYE WITH ACTIVE CHOROIDAL NEOVASCULARIZATION: Primary | ICD-10-CM

## 2024-11-05 DIAGNOSIS — H35.3124 NONEXUDATIVE AGE-RELATED MACULAR DEGENERATION, LEFT EYE, ADVANCED ATROPHIC WITH SUBFOVEAL INVOLVEMENT: ICD-10-CM

## 2024-11-05 DIAGNOSIS — H35.721 SEROUS DETACHMENT OF RETINAL PIGMENT EPITHELIUM OF RIGHT EYE: ICD-10-CM

## 2024-11-05 PROCEDURE — 92134 CPTRZ OPH DX IMG PST SGM RTA: CPT | Mod: 26,S$PBB,, | Performed by: OPHTHALMOLOGY

## 2024-11-05 PROCEDURE — 92134 CPTRZ OPH DX IMG PST SGM RTA: CPT | Mod: PBBFAC

## 2024-11-05 PROCEDURE — 67028 INJECTION EYE DRUG: CPT | Mod: PBBFAC,RT | Performed by: OPHTHALMOLOGY

## 2024-11-05 PROCEDURE — 99999PBSHW PR PBB SHADOW TECHNICAL ONLY FILED TO HB: Mod: JZ,PBBFAC,,

## 2024-11-05 RX ADMIN — FARICIMAB 6 MG: 6 INJECTION, SOLUTION INTRAVITREAL at 12:11

## 2024-11-05 NOTE — PROGRESS NOTES
HPI     vasbymo   OD        ARMD    DFE        Additional comments: ARMD   VASBYMO  OD   DFE     PVD     Blurred va     Dls   08/27/24          Last edited by Bernadine Schaeffer on 11/5/2024 10:37 AM.           OCT -OD -  stable trace IRF - improved  OS - Drusen, no SRF - small HE - ? RAP lesion       Assessment:    1. AMD - Wet OD - post Avastin x 13, post Eylea #55  S/p Vabysmo OD x 5  PED increased prior to 10/12 visit    Had slight increase 5/13/13, 7/14, 5/16 4/20 tx done in [place] during Covid  9/21 had tx in [place] following Shaina  11/23 - increased IRF at 12 weeks    [facility] today    [facility]    [facility] V[facility]    2. 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 PVD OU    4. Pseudophakia - great result    5. Allergic conjunctivitis/sinusitis - try OTC antihistamines  Recent trouble with sinusitis - pain behind eye improved with antihistamines  Ok for Zaditor BID - has improved with topical tx    6. AFib - considering anticoagulation - ok to proceed  On Anti-VEGF therapy, risk of large macular hemorrhage is minimal.         Plan:      10 weeks  OCT no  dilate (last DFE  11/24)    Risks, benefits, and alternatives to treatment discussed in detail with the patient.  The patient voiced understanding and wished to proceed with the procedure    Injection Procedure Note:  Diagnosis: Wet AMD OD    Patient Identified and Time Out complete  Topical Proparacaine and Betadine. Conj prep only  Inject Vabysmo OD at 6:00 @ 3.5-4mm posterior to limbus  Post Operative Dx: Same  Complications: None  Follow up as above.

## 2025-01-13 DIAGNOSIS — Z00.00 ENCOUNTER FOR MEDICARE ANNUAL WELLNESS EXAM: ICD-10-CM

## 2025-01-14 ENCOUNTER — PROCEDURE VISIT (OUTPATIENT)
Dept: OPHTHALMOLOGY | Facility: CLINIC | Age: OVER 89
End: 2025-01-14
Payer: MEDICARE

## 2025-01-14 ENCOUNTER — CLINICAL SUPPORT (OUTPATIENT)
Dept: OPHTHALMOLOGY | Facility: CLINIC | Age: OVER 89
End: 2025-01-14
Payer: MEDICARE

## 2025-01-14 DIAGNOSIS — H35.3211 EXUDATIVE AGE-RELATED MACULAR DEGENERATION OF RIGHT EYE WITH ACTIVE CHOROIDAL NEOVASCULARIZATION: Primary | ICD-10-CM

## 2025-01-14 PROCEDURE — 92134 CPTRZ OPH DX IMG PST SGM RTA: CPT | Mod: PBBFAC | Performed by: OPHTHALMOLOGY

## 2025-01-14 PROCEDURE — 67028 INJECTION EYE DRUG: CPT | Mod: PBBFAC,RT | Performed by: OPHTHALMOLOGY

## 2025-01-14 PROCEDURE — 67028 INJECTION EYE DRUG: CPT | Mod: S$PBB,RT,, | Performed by: OPHTHALMOLOGY

## 2025-01-14 PROCEDURE — 99999PBSHW PR PBB SHADOW TECHNICAL ONLY FILED TO HB: Mod: JZ,PBBFAC,,

## 2025-01-14 PROCEDURE — 92012 INTRM OPH EXAM EST PATIENT: CPT | Mod: 25,S$PBB,, | Performed by: OPHTHALMOLOGY

## 2025-01-14 RX ADMIN — Medication 1.25 MG: at 11:01

## 2025-01-14 NOTE — PROGRESS NOTES
HPI     vasbymo    od      armd       oct      Additional comments: Vasbymo   OD     OCT  ARMD     Fuzzy va     Pvd  ou    RAP  lesion ?  Dls  11/05/24          Last edited by Bernadine Schaeffer on 1/14/2025 10:08 AM.            OCT -OD -  stable trace IRF - improved  OS - Drusen, no SRF - small HE - ? RAP lesion       Assessment:    1. AMD - Wet OD - post Avastin x 13, post Eylea #55  S/p Vabysmo OD x 6  Last 11/5/24  PED increased prior to 10/12 visit    Had slight increase 5/13/13, 7/14, 5/16 4/20 tx done in [place] during Covid  9/21 had tx in [place] following Shaina  11/23 - increased IRF at 12 weeks    Avastin OD today    Continue slow extension tou    Get approval for Vabysmo    2. 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 PVD OU    4. Pseudophakia - great result    5. Allergic conjunctivitis/sinusitis - try OTC antihistamines  Recent trouble with sinusitis - pain behind eye improved with antihistamines  Ok for Zaditor BID - has improved with topical tx    6. AFib - considering anticoagulation - ok to proceed  On Anti-VEGF therapy, risk of large macular hemorrhage is minimal.         Plan:      9-10 weeks  OCT and  dilate (last DFE  11/24)    Risks, benefits, and alternatives to treatment discussed in detail with the patient.  The patient voiced understanding and wished to proceed with the procedure    Injection Procedure Note:  Diagnosis: Wet AMD OD    Patient Identified and Time Out complete  Topical Proparacaine and Betadine. Conj prep only  Inject Avastin OD at 6:00 @ 3.5-4mm posterior to limbus  Post Operative Dx: Same  Complications: None  Follow up as above.

## 2025-02-14 DIAGNOSIS — I48.0 PAROXYSMAL ATRIAL FIBRILLATION: ICD-10-CM

## 2025-02-14 NOTE — TELEPHONE ENCOUNTER
Requesting Prescription Refill. LOV 06/27/24. States Dr. Xiong has been refilling the Eliquis for her.

## 2025-02-14 NOTE — TELEPHONE ENCOUNTER
----- Message from Summer sent at 2/14/2025 10:14 AM CST -----  Regarding: apixaban (ELIQUIS) 2.5 mg Tab  Type:  RX Refill Request     Who Called: pt  Refill or New Rx:refill  RX Name and Strength:apixaban (ELIQUIS) 2.5 mg Tab  How is the patient currently taking it? (ex. 1XDay):2  Is this a 30 day or 90 day RX:180  Preferred Pharmacy with phone number:[facility]. [address]   Phone: [phone]  Fax: [phone]        Local or Mail Order:order  Ordering Provider:  Would the patient rather a call back or a response via [facility]? Call   Best Call Back Number: [phone]   Additional Information:

## 2025-02-21 DIAGNOSIS — I48.0 PAROXYSMAL ATRIAL FIBRILLATION: ICD-10-CM

## 2025-02-22 NOTE — TELEPHONE ENCOUNTER
----- Message from Joyce sent at 2/21/2025  3:19 PM CST -----  Regarding: Refill  Patient calling to get a refill on apixaban (ELIQUIS) 2.5 mg Tab @ [address] [place], [place] [address]  8/29/24Please call back @ [phone]. Thank you  Joyce

## 2025-03-28 ENCOUNTER — PROCEDURE VISIT (OUTPATIENT)
Dept: OPHTHALMOLOGY | Facility: CLINIC | Age: OVER 89
End: 2025-03-28
Payer: MEDICARE

## 2025-03-28 ENCOUNTER — CLINICAL SUPPORT (OUTPATIENT)
Dept: OPHTHALMOLOGY | Facility: CLINIC | Age: OVER 89
End: 2025-03-28
Payer: MEDICARE

## 2025-03-28 DIAGNOSIS — H35.3211 EXUDATIVE AGE-RELATED MACULAR DEGENERATION OF RIGHT EYE WITH ACTIVE CHOROIDAL NEOVASCULARIZATION: Primary | ICD-10-CM

## 2025-03-28 DIAGNOSIS — H35.3124 NONEXUDATIVE AGE-RELATED MACULAR DEGENERATION, LEFT EYE, ADVANCED ATROPHIC WITH SUBFOVEAL INVOLVEMENT: ICD-10-CM

## 2025-03-28 DIAGNOSIS — H35.721 SEROUS DETACHMENT OF RETINAL PIGMENT EPITHELIUM OF RIGHT EYE: ICD-10-CM

## 2025-03-28 NOTE — PROGRESS NOTES
HPI     Follow-up     Additional comments: 10 week Avastin OD           Comments    VA stable ou, no pain ou and denies floaters or flashes ou.            Last edited by Annita Miranda on 3/28/2025 10:00 AM.              OCT -OD -  stable trace IRF - slight increase with Avastin  OS - Drusen, no SRF - small HE - ? RAP lesion       Assessment:    1. AMD - Wet OD - post Avastin x 14, post Eylea #55  S/p Vabysmo OD x 6  Last 11/5/24  PED increased prior to 10/12 visit    Had slight increase 5/13/13, 7/14, 5/16 4/20 tx done in [place] during Covid  9/21 had tx in [place] following Shaina  11/23 - increased IRF at 12 weeks  3/25 - small increase in IRF with Avatin at 9-10 weeks    Vabysmo OD today    Keep at 9-10 weeks for now    Get approval for Vabysmo    2. 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 PVD OU    4. Pseudophakia - great result    5. Allergic conjunctivitis/sinusitis - try OTC antihistamines  Recent trouble with sinusitis - pain behind eye improved with antihistamines  Ok for Zaditor BID - has improved with topical tx    6. AFib - considering anticoagulation - ok to proceed  On Anti-VEGF therapy, risk of large macular hemorrhage is minimal.         Plan:      9-10 weeks  OCT no  dilate (last DFE  3/25)    Risks, benefits, and alternatives to treatment discussed in detail with the patient.  The patient voiced understanding and wished to proceed with the procedure    Injection Procedure Note:  Diagnosis: Wet AMD OD    Patient Identified and Time Out complete  Topical Proparacaine and Betadine. Conj prep only  Inject Vabysmo OD at 6:00 @ 3.5-4mm posterior to limbus  Post Operative Dx: Same  Complications: None  Follow up as above.

## 2025-04-07 DIAGNOSIS — I48.0 PAROXYSMAL ATRIAL FIBRILLATION: ICD-10-CM

## 2025-04-07 RX ORDER — SOTALOL HYDROCHLORIDE 80 MG/1
80 TABLET ORAL 2 TIMES DAILY
Qty: 180 TABLET | Refills: 1 | Status: SHIPPED | OUTPATIENT
Start: 2025-04-07

## 2025-04-07 NOTE — TELEPHONE ENCOUNTER
Refill Encounter    PCP Visits: Recent Visits  Date Type Provider Dept   06/27/24 Office Visit Wilma Xiong MD [facility] Internal Medicine   Showing recent visits within past 360 days and meeting all other requirements  Future Appointments  Date Type Provider Dept   05/01/25 Appointment Wilma Xiong MD [facility] Internal Medicine   Showing future appointments within next 720 days and meeting all other requirements     Last 3 Blood Pressure:   BP Readings from Last 3 Encounters:   10/23/24 (!) 141/98   10/08/24 (!) 161/60   09/16/24 (!) 147/72     Preferred Pharmacy:   [facility], [place]. - [address] [address]  Phone: [phone] Fax: [phone]      Requested RX:  Requested Prescriptions     Pending Prescriptions Disp Refills    sotaloL (BETAPACE) 80 MG tablet [Pharmacy Med Name: SOTALOL HCL 80MG TABS] 180 tablet 1     Sig: TAKE ONE TABLET BY MOUTH TWICE A DAY      RX Route: Normal

## 2025-04-07 NOTE — TELEPHONE ENCOUNTER
Care Due:                  Date            Visit Type   Department     Provider  --------------------------------------------------------------------------------                                EP -                              PRIMARY      [facility] INTERNAL  Last Visit: 06-      CARE ([facility])   MEDICINE       Wilma Xiong                              EP -                              PRIMARY      [facility] INTERNAL  Next Visit: 05-      CARE ([facility])   MEDICINE       Wilma Xiong                                                            Last  Test          Frequency    Reason                     Performed    Due Date  --------------------------------------------------------------------------------    CBC.........  12 months..  apixaban.................  04- 04-    CMP.........  12 months..  apixaban, lisinopriL.....  04- 04-    [facility] Embedded Care Due Messages. Reference number: [id number].   4/07/2025 2:04:45 AM CDT

## 2025-05-01 ENCOUNTER — OFFICE VISIT (OUTPATIENT)
Dept: INTERNAL MEDICINE | Facility: CLINIC | Age: OVER 89
End: 2025-05-01
Attending: INTERNAL MEDICINE
Payer: MEDICARE

## 2025-05-01 VITALS
TEMPERATURE: 98 F | SYSTOLIC BLOOD PRESSURE: 132 MMHG | HEART RATE: 65 BPM | OXYGEN SATURATION: 98 % | RESPIRATION RATE: 18 BRPM | DIASTOLIC BLOOD PRESSURE: 68 MMHG | WEIGHT: 116.38 LBS | BODY MASS INDEX: 21.42 KG/M2 | HEIGHT: 62 IN

## 2025-05-01 DIAGNOSIS — M80.052D: ICD-10-CM

## 2025-05-01 DIAGNOSIS — M46.1 SACROILIITIS: ICD-10-CM

## 2025-05-01 DIAGNOSIS — I51.89 DIASTOLIC DYSFUNCTION: ICD-10-CM

## 2025-05-01 DIAGNOSIS — J44.9 CHRONIC OBSTRUCTIVE PULMONARY DISEASE, UNSPECIFIED COPD TYPE: Primary | ICD-10-CM

## 2025-05-01 DIAGNOSIS — L98.9 SKIN LESION: ICD-10-CM

## 2025-05-01 DIAGNOSIS — E78.00 PURE HYPERCHOLESTEROLEMIA: ICD-10-CM

## 2025-05-01 DIAGNOSIS — I70.0 AORTIC ATHEROSCLEROSIS: ICD-10-CM

## 2025-05-01 DIAGNOSIS — I10 HYPERTENSION, UNSPECIFIED TYPE: ICD-10-CM

## 2025-05-01 DIAGNOSIS — M80.80XA PATHOLOGICAL FRACTURE DUE TO OSTEOPOROSIS, UNSPECIFIED FRACTURE SITE, UNSPECIFIED OSTEOPOROSIS TYPE, INITIAL ENCOUNTER: ICD-10-CM

## 2025-05-01 DIAGNOSIS — D72.821 MONOCYTOSIS: ICD-10-CM

## 2025-05-01 DIAGNOSIS — E55.9 VITAMIN D DEFICIENCY: ICD-10-CM

## 2025-05-01 DIAGNOSIS — I10 PRIMARY HYPERTENSION: ICD-10-CM

## 2025-05-01 DIAGNOSIS — I48.0 PAROXYSMAL ATRIAL FIBRILLATION: ICD-10-CM

## 2025-05-01 DIAGNOSIS — Z97.4 WEARS HEARING AID: ICD-10-CM

## 2025-05-01 PROCEDURE — 99999 PR PBB SHADOW E&M-EST. PATIENT-LVL IV: CPT | Mod: PBBFAC,,, | Performed by: INTERNAL MEDICINE

## 2025-05-01 PROCEDURE — 99214 OFFICE O/P EST MOD 30 MIN: CPT | Mod: PBBFAC | Performed by: INTERNAL MEDICINE

## 2025-05-01 RX ORDER — SOTALOL HYDROCHLORIDE 80 MG/1
80 TABLET ORAL 2 TIMES DAILY
Qty: 180 TABLET | Refills: 3 | Status: SHIPPED | OUTPATIENT
Start: 2025-05-01

## 2025-05-01 RX ORDER — LISINOPRIL 10 MG/1
10 TABLET ORAL DAILY
Qty: 90 TABLET | Refills: 3 | Status: SHIPPED | OUTPATIENT
Start: 2025-05-01 | End: 2026-05-01

## 2025-05-01 NOTE — PROGRESS NOTES
Subjective:   Patient ID: Cathy Kearns is a 89 y.o. female  Chief complaint:   Chief Complaint   Patient presents with    Hypertension     Here for HTN follow up - lcv 6/2024  Saw Ophthalmology March 28, 2025 and pain management October 23, 2024    History of Present Illness    CHIEF COMPLAINT:  Patient presents today for medication refills.    MEDICATIONS:  She requires refills for Sotalol, Lisinopril, and Eliquis through [facility] mail order pharmacy. She previously experienced a 4-day gap in medication due to mail delivery delays. She has an unused Albuterol inhaler from last year.    CARDIOVASCULAR:  Home blood pressure readings over the last four days show systolic pressures 114-120 mmHg, diastolic pressures 54-60 mmHg, and heart rates 59-69 BPM.  - at goal and rosenda medication     RESPIRATORY:  She has history of bronchitis and COPD, and reports being short of breath at times - not using ics or albuterol - she attrib sx to deconditioning  - had nuclear stress test to eval sx 2/2023 neg for ischemia    No cp or sob   Sx for > years  Discussed trial of albuterol to see if allev sx and if so then will revisit trial of another ics other than breo as had reaction to this in past     MUSCULOSKELETAL:  She experiences shoulder pain with limited range of motion, particularly with lateral movement. Her left leg is shorter following a hip fracture.  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 She has new hearing aids and reports improved hearing for sounds directly in front of her.  - f/u with ophthal  "consistently     Abnormal CBC - monocytosis:  - seen by h/o 10/29/2021 and 5/24/2022 - will check cbc q6 months as stable   - bm bx deferred for now due to lack of tx options  - to have cbc in 6 months     ROS:  General: -fever, -chills, -fatigue, -weight gain, -weight loss  Eyes: -vision changes, -redness, -discharge  ENT: -ear pain, +nasal congestion, -sore throat, +difficulty hearing, +hearing loss  Cardiovascular: -chest pain, -palpitations, -lower extremity edema  Respiratory: -cough, +shortness of breath, +wheezing  Gastrointestinal: -abdominal pain, -nausea, -vomiting, -diarrhea, -constipation, -blood in stool  Genitourinary: -dysuria, -hematuria, -frequency  Musculoskeletal: +joint pain, -muscle pain, -joint swelling, +limb pain, +burning sensation, +pain with movement  Skin: -rash, +lesion  Neurological: -headache, -dizziness, -numbness, -tingling  Psychiatric: -anxiety, -depression, -sleep difficulty         Objective:  Vitals:    05/01/25 1251 05/01/25 1340   BP: (!) 152/68 132/68   BP Location: Left arm    Patient Position: Sitting    Pulse: 65    Resp: 18    Temp: 97.7 °F (36.5 °C)    TempSrc: Oral    SpO2: 98%    Weight: 52.8 kg (116 lb 6.5 oz)    Height: 5' 2" (1.575 m)      Body mass index is 21.29 kg/m².    Physical Exam    General: No acute distress. Well-developed. Well-nourished.  Eyes: EOMI. Sclerae anicteric.  HENT: Normocephalic. Atraumatic. Nares patent. Moist oral mucosa.  Ears: Bilateral TMs clear. Bilateral EACs clear.  Cardiovascular: Regular rate. Regular rhythm. No murmurs. No rubs. No gallops. Normal S1, S2.  Respiratory: Normal respiratory effort. Clear to auscultation bilaterally. No rales. No rhonchi. Expiratory wheeze.  Abdomen: Soft. Non-tender. Non-distended. Normoactive bowel sounds.  Musculoskeletal: No  obvious deformity. Full range of motion with abduction, but painful.  Extremities: No lower extremity edema.  Neurological: Alert & oriented x3. No slurred speech. Normal " gait.  Psychiatric: Normal mood. Normal affect. Good insight. Good judgment.  Skin: Warm. Dry. No rash. Small area 2mm round at dorsal aspec tof left foot - no redness or inc warmth or drainage or fluctuance       Assessment & Plan    Cathy was seen today for hypertension.    Diagnoses and all orders for this visit:    Chronic obstructive pulmonary disease, unspecified COPD type    Hypertension, unspecified type  -     lisinopriL 10 MG tablet; Take 1 tablet (10 mg total) by mouth once daily.    Pathological fracture of left hip due to osteoporosis with routine healing, unspecified osteoporosis type, subsequent encounter    Paroxysmal atrial fibrillation  -     sotaloL (BETAPACE) 80 MG tablet; Take 1 tablet (80 mg total) by mouth 2 (two) times daily.  -     apixaban (ELIQUIS) 2.5 mg Tab; Take 1 tablet (2.5 mg total) by mouth 2 (two) times daily.    Monocytosis    Wears hearing aid    Vitamin D deficiency  Comments:  stable   cont suppl   monitor level  Orders:  -     Vitamin D; Future    Primary hypertension  Comments:  controlled   cont meds and bp monitoring  low salt diet  Orders:  -     Comprehensive Metabolic Panel; Future  -     CBC Auto Differential; Future    Pure hypercholesterolemia  Comments:  cont mediterranean diet, graded exercise program, maintain healthy weight   moniotr labs  Orders:  -     Comprehensive Metabolic Panel; Future  -     Lipid Panel; Future  -     TSH; Future    Diastolic dysfunction  Comments:  euvolemic   stable on current regimen   bp controlled  Orders:  -     CBC Auto Differential; Future    Pathological fracture due to osteoporosis, unspecified fracture site, unspecified osteoporosis type, initial encounter  -     PTH, Intact; Future    Skin lesion  -     Ambulatory referral/consult to Dermatology; Future    Sacroiliitis  Stable   F/u with pain mgmt   Avoid nsaids as on oac   Apap arthritis prn     Aortic atherosclerosis  Stable   I recommend the Mediterranean diet, a graded  exercise program and maintaining a healthy weight to optimize cholesterol levels.    IMPRESSION:  - BP readings within acceptable range (114-120/54-60, HR 59-69), higher BP when active is normal and should lower with rest.  - Shoulder pain likely due to impingement syndrome rather than bursitis.  - Recommend albuterol inhaler before activity/exercise for COPD management, continued as rescue medication.  - Reviewed osteoporosis treatment options, patient remains hesitant about medication and declines starting tx beyond ca/vit d suppl and exercise  - Recommend dermatology evaluation for skin lesions on foot and other areas due to history of skin cancer.  - Explained napping is acceptable as long as does nto interfere with nighttime sleep but recommended limiting to 30 minutes and earlier in the day to protect nighttime sleep.    CHRONIC OBSTRUCTIVE PULMONARY DISEASE AND CHRONIC BRONCHITIS:  - Patient has history of bronchitis and COPD with rescue inhaler at home.  - Reports being short of breath with intermittent wheezing at times.  - No current wheezing or concerning symptoms observed on evaluation.  - Advised to use albuterol before exercise to help improve endurance.    SHORTNESS OF BREATH:  - Patient reports chronic shortness of breath, which appears related to COPD condition noted above.  - neg stress test in 2023 for sx   - use albuterol to see if helpful and if so we can consider escalating to revisiting a ics for daily use     SHOULDER PAIN:  - Patient reports pain when moving shoulder, especially with lateral reaching.  - Full range of motion with abduction was observed, but with pain.  - Suspected impingement syndrome due to inflammation of soft tissue with motion, with possible rotator cuff tear.  - Recommend acetaminophen for pain management and considering massage therapy.  - Referred to Dr. Casey for pain management and potential injection.    UNEQUAL LEG LENGTH AND HIP FRACTURE:  - Patient reports left  leg is shorter since hip fracture, causing leaning over the walker.  - Referred to Dr. Horne for pain management to address potential hip issues.    SKIN DISORDERS:  - Patient has 3 bleeding spots on skin, with one on the foot causing pain.  - Evaluation showed spots look clean and not infected, but flaky.  - Instructed to keep bandage on foot lesion, particularly when wearing shoes.  - Referred to [facility] for evaluation of skin lesions.    VISION AND HEARING IMPAIRMENT:  - Patient has one functional eye and emphasizes its importance for overall health.  - Reports hearing deterioration despite using brand new hearing aids.    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  - Encouraged to continue attending balance class at current frequency.    ANTICOAGULANT THERAPY:  - Patient is on long-term anticoagulant therapy with Eliquis.  - Home blood pressure readings of 114-120/54-60 with heart rate 59-69 indicate stable condition while on anticoagulants.  - Advised to keep a log of blood pressure and report if consistently above 140/90.  - Prescribed Eliquis for a 1-year supply through [facility] mail order pharmacy.    MEDICATION MANAGEMENT:  - Continued Sotalol and Lisinopril, both for 1-year supply through [facility] mail order pharmacy.    FOLLOW-UP:  - Ordered labs to update baseline values, in  cluding thyroid function test.       Osteoporosis: declines bisphosphonate or other treatments     HM: reviewed - covi, shignrix, rsv, dexa     Health Maintenance   Topic Date Due    Shingles Vaccine (1 of 2) Never done    RSV Vaccine (Age 60+ and Pregnant patients) (1 - 1-dose 75+ series) Never done    COVID-19 Vaccine (5 - 2024-25 season) 11/16/2024    DEXA Scan  01/12/2025    TETANUS VACCINE  08/16/2027    Lipid Panel  04/08/2029    Influenza Vaccine  Completed    Pneumococcal Vaccines (Age 50+)  Completed       This note was generated with the assistance of [employer]  listening technology. Verbal consent was obtained by the patient and accompanying visitor(s) for the recording of patient appointment to facilitate this note. I attest to having reviewed and edited the generated note for accuracy, though some syntax or spelling errors may persist. Please contact the author of this note for any clarification.    43 min spent in care of patient including chart review, history, orders, physical, orders and coordination of care  Visit today included increased complexity associated with the care of the episodic problem copd, back pain - sacroileitis addressed and managing the longitudinal care of the patient due to the serious and/or complex managed problem(s) htn, afib, osteoporosis.

## 2025-06-06 ENCOUNTER — LAB VISIT (OUTPATIENT)
Dept: LAB | Facility: HOSPITAL | Age: OVER 89
End: 2025-06-06
Attending: INTERNAL MEDICINE
Payer: MEDICARE

## 2025-06-06 ENCOUNTER — PROCEDURE VISIT (OUTPATIENT)
Dept: OPHTHALMOLOGY | Facility: CLINIC | Age: OVER 89
End: 2025-06-06
Payer: MEDICARE

## 2025-06-06 ENCOUNTER — CLINICAL SUPPORT (OUTPATIENT)
Dept: OPHTHALMOLOGY | Facility: CLINIC | Age: OVER 89
End: 2025-06-06
Payer: MEDICARE

## 2025-06-06 DIAGNOSIS — M80.80XA PATHOLOGICAL FRACTURE DUE TO OSTEOPOROSIS, UNSPECIFIED FRACTURE SITE, UNSPECIFIED OSTEOPOROSIS TYPE, INITIAL ENCOUNTER: ICD-10-CM

## 2025-06-06 DIAGNOSIS — I10 PRIMARY HYPERTENSION: ICD-10-CM

## 2025-06-06 DIAGNOSIS — E55.9 VITAMIN D DEFICIENCY: ICD-10-CM

## 2025-06-06 DIAGNOSIS — E78.00 PURE HYPERCHOLESTEROLEMIA: ICD-10-CM

## 2025-06-06 DIAGNOSIS — H35.3211 EXUDATIVE AGE-RELATED MACULAR DEGENERATION OF RIGHT EYE WITH ACTIVE CHOROIDAL NEOVASCULARIZATION: Primary | ICD-10-CM

## 2025-06-06 DIAGNOSIS — I51.89 DIASTOLIC DYSFUNCTION: ICD-10-CM

## 2025-06-06 LAB
25(OH)D3+25(OH)D2 SERPL-MCNC: 55 NG/ML (ref 30–96)
ABSOLUTE EOSINOPHIL (OHS): 0.01 K/UL
ABSOLUTE MONOCYTE (OHS): 1.52 K/UL (ref 0.3–1)
ABSOLUTE NEUTROPHIL COUNT (OHS): 2.31 K/UL (ref 1.8–7.7)
ALBUMIN SERPL BCP-MCNC: 4.2 G/DL (ref 3.5–5.2)
ALP SERPL-CCNC: 74 UNIT/L (ref 40–150)
ALT SERPL W/O P-5'-P-CCNC: 10 UNIT/L (ref 10–44)
ANION GAP (OHS): 9 MMOL/L (ref 8–16)
AST SERPL-CCNC: 15 UNIT/L (ref 11–45)
BASOPHILS # BLD AUTO: 0.04 K/UL
BASOPHILS NFR BLD AUTO: 0.8 %
BILIRUB SERPL-MCNC: 0.5 MG/DL (ref 0.1–1)
BUN SERPL-MCNC: 12 MG/DL (ref 10–30)
CALCIUM SERPL-MCNC: 10.1 MG/DL (ref 8.7–10.5)
CHLORIDE SERPL-SCNC: 103 MMOL/L (ref 95–110)
CHOLEST SERPL-MCNC: 191 MG/DL (ref 120–199)
CHOLEST/HDLC SERPL: 2.8 {RATIO} (ref 2–5)
CO2 SERPL-SCNC: 25 MMOL/L (ref 23–29)
CREAT SERPL-MCNC: 0.6 MG/DL (ref 0.5–1.4)
ERYTHROCYTE [DISTWIDTH] IN BLOOD BY AUTOMATED COUNT: 13.9 % (ref 11.5–14.5)
GFR SERPLBLD CREATININE-BSD FMLA CKD-EPI: >60 ML/MIN/1.73/M2
GLUCOSE SERPL-MCNC: 89 MG/DL (ref 70–110)
HCT VFR BLD AUTO: 38.3 % (ref 37–48.5)
HDLC SERPL-MCNC: 69 MG/DL (ref 40–75)
HDLC SERPL: 36.1 % (ref 20–50)
HGB BLD-MCNC: 12.6 GM/DL (ref 12–16)
IMM GRANULOCYTES # BLD AUTO: 0.03 K/UL (ref 0–0.04)
IMM GRANULOCYTES NFR BLD AUTO: 0.6 % (ref 0–0.5)
LDLC SERPL CALC-MCNC: 105.4 MG/DL (ref 63–159)
LYMPHOCYTES # BLD AUTO: 1.4 K/UL (ref 1–4.8)
MCH RBC QN AUTO: 30.4 PG (ref 27–31)
MCHC RBC AUTO-ENTMCNC: 32.9 G/DL (ref 32–36)
MCV RBC AUTO: 93 FL (ref 82–98)
NONHDLC SERPL-MCNC: 122 MG/DL
NUCLEATED RBC (/100WBC) (OHS): 0 /100 WBC
PLATELET # BLD AUTO: 197 K/UL (ref 150–450)
PMV BLD AUTO: 10.6 FL (ref 9.2–12.9)
POTASSIUM SERPL-SCNC: 4.4 MMOL/L (ref 3.5–5.1)
PROT SERPL-MCNC: 7.1 GM/DL (ref 6–8.4)
PTH-INTACT SERPL-MCNC: 70.7 PG/ML (ref 9–77)
RBC # BLD AUTO: 4.14 M/UL (ref 4–5.4)
RELATIVE EOSINOPHIL (OHS): 0.2 %
RELATIVE LYMPHOCYTE (OHS): 26.4 % (ref 18–48)
RELATIVE MONOCYTE (OHS): 28.6 % (ref 4–15)
RELATIVE NEUTROPHIL (OHS): 43.4 % (ref 38–73)
SODIUM SERPL-SCNC: 137 MMOL/L (ref 136–145)
TRIGL SERPL-MCNC: 83 MG/DL (ref 30–150)
TSH SERPL-ACNC: 1.45 UIU/ML (ref 0.4–4)
WBC # BLD AUTO: 5.31 K/UL (ref 3.9–12.7)

## 2025-06-06 PROCEDURE — 36415 COLL VENOUS BLD VENIPUNCTURE: CPT

## 2025-06-06 PROCEDURE — 85025 COMPLETE CBC W/AUTO DIFF WBC: CPT

## 2025-06-06 PROCEDURE — 80061 LIPID PANEL: CPT

## 2025-06-06 PROCEDURE — 82374 ASSAY BLOOD CARBON DIOXIDE: CPT

## 2025-06-06 PROCEDURE — 84443 ASSAY THYROID STIM HORMONE: CPT

## 2025-06-06 PROCEDURE — 82306 VITAMIN D 25 HYDROXY: CPT

## 2025-06-06 PROCEDURE — 83970 ASSAY OF PARATHORMONE: CPT

## 2025-06-08 ENCOUNTER — RESULTS FOLLOW-UP (OUTPATIENT)
Dept: INTERNAL MEDICINE | Facility: CLINIC | Age: OVER 89
End: 2025-06-08

## 2025-06-09 ENCOUNTER — OFFICE VISIT (OUTPATIENT)
Dept: PAIN MEDICINE | Facility: CLINIC | Age: OVER 89
End: 2025-06-09
Payer: MEDICARE

## 2025-06-09 VITALS
SYSTOLIC BLOOD PRESSURE: 127 MMHG | DIASTOLIC BLOOD PRESSURE: 75 MMHG | BODY MASS INDEX: 21.3 KG/M2 | WEIGHT: 115.75 LBS | RESPIRATION RATE: 18 BRPM | TEMPERATURE: 97 F | HEART RATE: 59 BPM | OXYGEN SATURATION: 93 % | HEIGHT: 62 IN

## 2025-06-09 DIAGNOSIS — M48.061 SPINAL STENOSIS OF LUMBAR REGION, UNSPECIFIED WHETHER NEUROGENIC CLAUDICATION PRESENT: ICD-10-CM

## 2025-06-09 DIAGNOSIS — M80.00XD AGE-RELATED OSTEOPOROSIS WITH CURRENT PATHOLOGICAL FRACTURE WITH ROUTINE HEALING, SUBSEQUENT ENCOUNTER: ICD-10-CM

## 2025-06-09 DIAGNOSIS — G89.29 CHRONIC LEFT SHOULDER PAIN: Primary | ICD-10-CM

## 2025-06-09 DIAGNOSIS — M25.512 CHRONIC LEFT SHOULDER PAIN: Primary | ICD-10-CM

## 2025-06-09 DIAGNOSIS — M62.84 SARCOPENIA: ICD-10-CM

## 2025-06-09 PROCEDURE — 99999 PR PBB SHADOW E&M-EST. PATIENT-LVL III: CPT | Mod: PBBFAC,,, | Performed by: ANESTHESIOLOGY

## 2025-06-09 PROCEDURE — 99213 OFFICE O/P EST LOW 20 MIN: CPT | Mod: PBBFAC | Performed by: ANESTHESIOLOGY

## 2025-06-09 NOTE — PROGRESS NOTES
PCP: Wilma Xiong MD    REFERRING PHYSICIAN: No ref. provider found    CHIEF COMPLAINT: Low back pain    Original HISTORY OF PRESENT ILLNESS: Cathy Kearns presents to the clinic for the evaluation of the above pain. The pain started 5 months ago    Original Pain Description:  The pain is located in the low back and is radiating to the right foot. The pain is described as dull. Exacerbating factors: Sitting. Mitigating factors walking and standing. Symptoms interfere with daily activity. The patient feels like symptoms have been unchanged. Patient denies significant motor weakness and loss of sensations. Ambulates with walker. Patient states that she currently lives in a nursing home and is very active.     Original PAIN SCORES:  Best: Pain is 0  Worst: Pain is 6  Current: Pain is 0        6/9/2025    10:40 AM   Last 3 PDI Scores   Pain Disability Index (PDI) 56       INTERVAL HISTORY:     Interval History 3/26/2024:  The patient is here for follow up of back and buttock pain. She is s/p right SI joint injection with 80% relief. She has very minimal pain to this area now. She does have right lower leg pain and numbness. She says this is mild at this time. Her pain today is 5/10.    Interval History 7/23/2024:  Mrs. Kearns is here today for worsened right sided lower back and buttock pain. She previously underwent right SI joint injection on 3/19/24 with 80% relief for about 3 months. Her pain has been gradually returning since that time. It bothers her most with prolonged sitting and with changing from sitting to standing. She is currently in PT which sometimes worsens her pain. Her pain today is 5/10.    Interval History 8/21/2024:  Cathy Kearns returns to clinic for follow-up after right SI joint injection on 8/6/2024. She reports 80% pain relief.  She states she has not had any shooting pains into her leg since the procedure. She does continue to have some pain to the right leg. The pain in her  "right leg is worse with sitting and getting up. It is better with walking. She continues tylenol occasionally with good relief. She denies any perceived side effects. She continues PT and HEP daily. She denies recent falls or trauma. She denies new onset fever/night sweats, urinary incontinence, bowel incontinence, significant weight changes, significant motor weakness or changes, or loss of sensations. Her pain today is 5/10.    Interval History 9/16/2024:  Cathy Kearns returns to clinic for follow-up of right leg pain. She states the pain radiates in an L5 distribution.  She has continued physical therapy with good relief. She continues tylenol occasionally with good relief. She denies any perceived side effects. She continues PT and HEP daily. She denies recent falls or trauma. She denies new onset fever/night sweats, urinary incontinence, bowel incontinence, significant weight changes, significant motor weakness or changes, or loss of sensations. Her pain today is 7/10.    Interval History 10/24/2024:  Mrs. Kearns is here today for two week follow-up. She previously underwent L5-S1 RACHELLE on 10/08/24 with 90% relief to date. Her pain has been markedly improved and she states that "even if I only had two weeks of relief I would be happy". Remaining baseline pain bothers her most with prolonged sitting and with changing from sitting to standing. She is currently in PT which sometimes worsens her pain. Her pain today is 5/10.     Interval History 6/9/25: Cathy Kearns returns for follow up. At our last visit Ms. Kearns was doing well after RACHELLE for pain control. We encouraged HEP and follow up with the fracture clinic. I suggested that she could follow up for repeat injections after several months. Fortunately, it has been over 1 year with only some return of symptoms. She returns today reporting low back "weakness." She also has some discomfort in the left shoulder with elevating her left arm. She decided not to " start injections for her osteoporosis. She decided that she was too old to start growing bone.     6 weeks of Conservative therapy:  PT: Yes (October 2023-February 2024)  Chiro: No  HEP: Yes, daily currently      Treatments / Medications: (Ice/Heat/NSAIDS/APAP/etc):  - Tylenol      Interventional Pain Procedures: (Previous injections)  3/19/24 Right SI joint injection- 80% relief for 3 months  8/6/24 - right SIJ injection - 80% relief  10/8/24 - L5-S1 RACHELLE 90% relief for >6 months    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 7/9/2012    Vasovagal syncope      Past Surgical History:   Procedure Laterality Date    ADENOIDECTOMY      APPENDECTOMY      CATARACT EXTRACTION W/  INTRAOCULAR LENS IMPLANT Bilateral n/a    EPIDURAL STEROID INJECTION N/A 10/8/2024    Procedure: LUMBAR L5/S1 IL RACHELLE *ELIQUIS CLEARANCE IN CHART*;  Surgeon: Lata Casey MD;  Location: [facility];  Service: Pain Management;  Laterality: N/A;  [phone]  2 WK F/U BERNADINE    HYSTERECTOMY      2/2 uterine CA, removed ovaries, no longer f/u with gyn    INJECTION, SACROILIAC JOINT Right 3/19/2024    Procedure: INJECTION,SACROILIAC JOINT RIGHT *OPTHALMOLOGY CLEARANCE IN CHART*;  Surgeon: Lata Casey MD;  Location: [facility];  Service: Pain Management;  Laterality: Right;  [phone]  2 WK F/U CRAIG    INJECTION, SACROILIAC JOINT Right 8/6/2024    Procedure: INJECTION,SACROILIAC JOINT RIGHT;  Surgeon: Lata Casey MD;  Location: [facility];  Service: Pain Management;  Laterality: Right;  [phone]  2 WK F/U LUIS ENRIQUE    INTRAMEDULLARY RODDING OF FEMUR Left 7/25/2023    Procedure: INSERTION,  INTRAMEDULLARY DAPHNEY, LEFT FEMUR;  Surgeon: Rogerio Chand MD;  Location: [facility] OR [address] [place] [address];  Service: Orthopedics;  Laterality: Left;    Intravitreal Injection      Avastin od x's 12 dltx 2-16-12    TONSILLECTOMY       Social History     Socioeconomic History    Marital status:    Tobacco Use    Smoking status: Former     Current packs/day: 0.25     Average packs/day: 0.3 packs/day for 15.0 years (3.8 ttl pk-yrs)     Types: Cigarettes    Smokeless tobacco: Never   Substance and Sexual Activity    Alcohol use: Yes     Comment: 1-2 drinks at night    Drug use: No   Other Topics Concern    Are you pregnant or think you may be? 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     Social Drivers of Health     Financial Resource Strain: Low Risk  (3/21/2025)    Overall Financial Resource Strain (CARDIA)     Difficulty of Paying Living Expenses: Not hard at all   Food Insecurity: No Food Insecurity (3/21/2025)    Hunger Vital Sign     Worried About Running Out of Food in the Last Year: Never true     Ran Out of Food in the Last Year: Never true   Transportation Needs: Unmet Transportation Needs (3/21/2025)    PRAPARE - Transportation     Lack of Transportation (Medical): Yes     Lack of Transportation (Non-Medical): No   Physical Activity: Unknown (3/21/2025)    Exercise Vital Sign     Days of Exercise per Week: Patient declined     Minutes of Exercise per Session: 20 min   Stress: No Stress Concern Present (3/21/2025)    [employer] - Occupational Stress Questionnaire     Feeling of Stress : Not at all   Housing Stability: Low Risk  (3/21/2025)    Housing Stability Vital Sign     Unable to Pay for Housing in the Last Year: No     Homeless in the Last Year: No     Family History  "  Problem Relation Name Age of Onset    Cancer Mother          ovarian CA    Cancer Father          prostate    Cataracts Father      Cancer Maternal Grandmother          colon    Heart attack Maternal Grandfather      Cancer Paternal Grandmother          colon    Cancer Paternal Grandfather      Strabismus Paternal Aunt      Hypertension Daughter      Cancer Daughter          "cancer of bone"    Hyperlipidemia Daughter      Thyroid disease Grandchild      Heart attack Brother      Cancer Brother          prostate, esophagus, bladder    Hypertension Son      Hyperlipidemia Son      Cancer Brother          Non hodgkins lymphoma    Amblyopia Neg Hx      Blindness Neg Hx      Diabetes Neg Hx      Glaucoma Neg Hx      Macular degeneration Neg Hx      Retinal detachment Neg Hx      Stroke Neg Hx         Review of patient's allergies indicates:   Allergen Reactions    Penicillins Swelling    Astelin [azelastine] Other (See Comments)     Dizziness    Pseudoephedrine hcl Palpitations    Sulfa (sulfonamide antibiotics) Palpitations       Current Outpatient Medications   Medication Sig    apixaban (ELIQUIS) 2.5 mg Tab Take 1 tablet (2.5 mg total) by mouth 2 (two) times daily.    lisinopriL 10 MG tablet Take 1 tablet (10 mg total) by mouth once daily.    sotaloL (BETAPACE) 80 MG tablet Take 1 tablet (80 mg total) by mouth 2 (two) times daily.    acetaminophen (TYLENOL) 500 MG tablet Take 2 tablets (1,000 mg total) by mouth every 8 (eight) hours.    albuterol (PROVENTIL/VENTOLIN HFA) 90 mcg/actuation inhaler Inhale 1 puff into the lungs.    vitamin D (VITAMIN D3) 1000 units Tab Take 1 tablet (1,000 Units total) by mouth once daily.     No current facility-administered medications for this visit.       ROS:  GENERAL: No fever. No chills. No fatigue. Denies weight loss. Denies weight gain.  HEENT: Denies headaches. Denies vision change. Denies eye pain. Denies double vision. Denies ear pain.   CV: Denies chest pain.   PULM: " "Denies of shortness of breath.  GI: Denies constipation. No diarrhea. No abdominal pain. Denies nausea. Denies vomiting. No blood in stool.  HEME: Denies bleeding problems.  : Denies urgency. No painful urination. No blood in urine.  MS: Denies joint stiffness. Denies joint swelling.  Denies back pain.  SKIN: Denies rash.   NEURO: Denies seizures. No weakness.  PSYCH:  Denies difficulty sleeping. No anxiety. Denies depression. No suicidal thoughts.       VITALS:   Vitals:    06/09/25 1039   BP: 127/75   Pulse: (!) 59   Resp: 18   Temp: 97 °F (36.1 °C)   TempSrc: Oral   SpO2: (!) 93%   Weight: 52.5 kg (115 lb 11.9 oz)   Height: 5' 2" (1.575 m)   PainSc:   2   PainLoc: Back         PHYSICAL EXAM:   GENERAL: Well appearing, in no acute distress, alert and oriented x3.  PSYCH:  Mood and affect appropriate.  SKIN: Skin color, texture, turgor normal, no rashes or lesions.  HEENT:  Normocephalic, atraumatic. Cranial nerves grossly intact.  NECK: No pain to palpation over the cervical paraspinous muscles. No pain to palpation over facets. No pain with neck flexion, extension, or lateral flexion.   PULM: No evidence of respiratory difficulty, symmetric chest rise.  GI:  Non-distended  BACK: Normal range of motion. No pain to palpation over the spinous processes. No pain to palpation over facet joints. There is no pain with palpation over the sacroiliac joints bilaterally. Straight leg raising in the supine position is negative to radicular pain.   EXTREMITIES: No deformities, edema, or skin discoloration.   MUSCULOSKELETAL: Left Shoulder: Full overhead ROM. Some pain through partial elevation.   NEURO: Sensation is equal and appropriate bilaterally. Bilateral upper and lower extremity coordination and muscle stretch reflexes are physiologic and symmetric. Plantar response are downgoing.   GAIT: Walker.        LABS:  Lab Results   Component Value Date    WBC 5.31 06/06/2025    HGB 12.6 06/06/2025    HCT 38.3 06/06/2025    " MCV 93 06/06/2025     06/06/2025       CMP  Sodium   Date Value Ref Range Status   06/06/2025 137 136 - 145 mmol/L Final   04/08/2024 137 136 - 145 mmol/L Final     Potassium   Date Value Ref Range Status   06/06/2025 4.4 3.5 - 5.1 mmol/L Final   04/08/2024 4.1 3.5 - 5.1 mmol/L Final     Chloride   Date Value Ref Range Status   06/06/2025 103 95 - 110 mmol/L Final   04/08/2024 103 95 - 110 mmol/L Final     CO2   Date Value Ref Range Status   06/06/2025 25 23 - 29 mmol/L Final   04/08/2024 26 23 - 29 mmol/L Final     Glucose   Date Value Ref Range Status   06/06/2025 89 70 - 110 mg/dL Final   04/08/2024 94 70 - 110 mg/dL Final     BUN   Date Value Ref Range Status   06/06/2025 12 10 - 30 mg/dL Final     Creatinine   Date Value Ref Range Status   06/06/2025 0.6 0.5 - 1.4 mg/dL Final     Calcium   Date Value Ref Range Status   06/06/2025 10.1 8.7 - 10.5 mg/dL Final   04/08/2024 10.3 8.7 - 10.5 mg/dL Final     Protein Total   Date Value Ref Range Status   06/06/2025 7.1 6.0 - 8.4 gm/dL Final     Total Protein   Date Value Ref Range Status   04/08/2024 7.2 6.0 - 8.4 g/dL Final     Albumin   Date Value Ref Range Status   06/06/2025 4.2 3.5 - 5.2 g/dL Final   04/08/2024 4.1 3.5 - 5.2 g/dL Final     Total Bilirubin   Date Value Ref Range Status   04/08/2024 0.8 0.1 - 1.0 mg/dL Final     Comment:     For infants and newborns, interpretation of results should be based  on gestational age, weight and in agreement with clinical  observations.    Premature Infant recommended reference ranges:  Up to 24 hours.............<8.0 mg/dL  Up to 48 hours............<12.0 mg/dL  3-5 days..................<15.0 mg/dL  6-29 days.................<15.0 mg/dL       Bilirubin Total   Date Value Ref Range Status   06/06/2025 0.5 0.1 - 1.0 mg/dL Final     Comment:     For infants and newborns, interpretation of results should be based   on gestational age, weight and in agreement with clinical   observations.    Premature Infant  recommended reference ranges:   0-24 hours:  <8.0 mg/dL   24-48 hours: <12.0 mg/dL   3-5 days:    <15.0 mg/dL   6-29 days:   <15.0 mg/dL     Alkaline Phosphatase   Date Value Ref Range Status   04/08/2024 76 55 - 135 U/L Final     ALP   Date Value Ref Range Status   06/06/2025 74 40 - 150 unit/L Final     AST   Date Value Ref Range Status   06/06/2025 15 11 - 45 unit/L Final   04/08/2024 17 10 - 40 U/L Final     ALT   Date Value Ref Range Status   06/06/2025 10 10 - 44 unit/L Final   04/08/2024 16 10 - 44 U/L Final     Anion Gap   Date Value Ref Range Status   06/06/2025 9 8 - 16 mmol/L Final     eGFR   Date Value Ref Range Status   06/06/2025 >60 >60 mL/min/1.73/m2 Final     Comment:     Estimated GFR calculated using the CKD-EPI creatinine (2021) equation.   04/08/2024 >60 >60 mL/min/1.73 m^2 Final         IMAGING:    EXAMINATION:  MRI LUMBAR SPINE WITHOUT CONTRAST     CLINICAL HISTORY:  Compression fracture, lumbar; Wedge compression fracture of first lumbar vertebra, initial encounter for closed fracture     TECHNIQUE:  Multiplanar, multisequence MR images were acquired from the thoracolumbar junction to the sacrum without the administration of contrast.     COMPARISON:  X-ray 10/16/2023     FINDINGS:  Alignment: Grade 1 retrolisthesis of L2 on L3.  Grade 1 anterolisthesis of L4 on L5 and L5 on S1.     Vertebrae: Approximately 50% height loss centrally of the L1 vertebral body with extensive marrow edema, suggestive of subacute compression fracture and/or instability.  3 mm osseous retropulsion into the spinal canal.  No spinal canal stenosis.  No aggressive marrow replacement process.     Discs: Prominent degenerative disc disease on the left side at L2-3, noting marked disc height loss with extensive sub endplate marrow edema/Modic 1 changes.  There is prominent right-sided degenerative disc disease at L5-S1 with mild sub endplate marrow edema/Modic 1 changes.     Cord: Normal caliber in signal.  Conus  terminates at L1-L2.  Cauda equina nerve roots are unremarkable.     Degenerative findings:     T12-L1:  Osseous retropulsion with encroachment on the ventral thecal sac inferior to the T12-L1 disc space with mild spinal canal stenosis. No neural foraminal narrowing.     L1-L2: Mild disc bulging asymmetric to the left, contributing to mild left neural foraminal narrowing.  No significant right neural foraminal narrowing or spinal canal stenosis.     L2-L3: Small disc bulge and bilateral facet arthropathy with facet joint effusions.  Mild left neural foraminal.  Mild spinal canal stenosis.     L3-L4: Circumferential disc bulge and bilateral facet arthropathy with small joint effusions.  No neural foraminal narrowing.  Mild spinal canal stenosis.     L4-L5: Circumferential disc bulge, bilateral facet arthropathy, and ligamentum flavum hypertrophy.  Mild bilateral neural foraminal narrowing.  Severe spinal canal stenosis.     L5-S1: Advanced facet joint arthropathy, noting joint hypertrophy with extensive subchondral marrow edema extending into the pedicles with surrounding inflammatory changes.  Slight/grade 1 anterolisthesis noted.  Moderate disc bulging and facet joint hypertrophy, contribute to moderate spinal canal stenosis, moderate right and mild left-sided neural foraminal narrowing.     Paraspinal muscles & soft tissues: Unremarkable.     Impression:     L1 compression fracture with significant marrow edema, suggestive of a subacute injury and/or instability.     Lumbar spondylosis, contributing to severe spinal canal stenosis at L4-5 and moderate spinal canal and neural foraminal stenosis at L5-S1, as above.     Prominent L5-S1 facet joint arthropathy, as above.     Electronically signed by resident: Magui Uriostegui  Date:                                            02/02/2024  Time:                                           13:15     Electronically signed by: Marquis Lynn MD  Date:                                             02/02/2024  Time:                                           14:28    ASSESSMENT: 89 y.o. year old female with pain, consistent with:    Encounter Diagnoses   Name Primary?    Age-related osteoporosis with current pathological fracture with routine healing, subsequent encounter     Spinal stenosis of lumbar region, unspecified whether neurogenic claudication present     Chronic left shoulder pain Yes    Sarcopenia          DISCUSSION: Cathy Kearns is a retired  who comes to us from Dr. Xiong. She had a fall in July, 2023 complicated by a left hip intertrochanteric fracture. She presents to us with mid-back pain with sitting and right buttock pain with standing. MRI shows an acute L1 compression fracture, significant DDD at L2/3 and L5/S1 with Modic changes, severe canal stenosis at L4/5, significant facet arthropathy/edema at L5/S1. She did not require kyphoplasty and was seen in the fracture clinic. She did very well with RACHELLE for pain control.     PLAN:   Discussed loss of muscle with aging  Recommended continued PT and Yoga  Encouraged more PT (currently doing 20 min)  She declines a PT referral but will request more exercises from the PT who visits her facility  Discussed that since she isn't having pain I wouldn't recommend additional imaging  She can follow up with me any time she needs assistance    The above plan and management options were discussed at length with patient. Patient is in agreement with the above and verbalized understanding.     Lata Casey MD  06/09/2025

## 2025-06-23 ENCOUNTER — TELEPHONE (OUTPATIENT)
Dept: DERMATOLOGY | Facility: CLINIC | Age: OVER 89
End: 2025-06-23
Payer: MEDICARE

## 2025-06-23 ENCOUNTER — PATIENT MESSAGE (OUTPATIENT)
Dept: DERMATOLOGY | Facility: CLINIC | Age: OVER 89
End: 2025-06-23
Payer: MEDICARE

## 2025-06-23 DIAGNOSIS — C44.729 SCC (SQUAMOUS CELL CARCINOMA), LEG, LEFT: Primary | ICD-10-CM

## 2025-06-23 NOTE — TELEPHONE ENCOUNTER
Pt is referral from Dr. Beaulieu for SCC L lower leg. Over the phone consult completed. Scheduled for Mohs on 6/26 at 10. Confirmed date, time, and location. Reminder sent in portal.

## 2025-06-23 NOTE — TELEPHONE ENCOUNTER
Pt is referral from Dr. Beaulieu for SCC L lower leg. Left message to give us a call back to get scheduled.

## 2025-06-25 ENCOUNTER — TELEPHONE (OUTPATIENT)
Dept: DERMATOLOGY | Facility: CLINIC | Age: OVER 89
End: 2025-06-25
Payer: MEDICARE

## 2025-06-25 NOTE — TELEPHONE ENCOUNTER
Left message to let pt know she will be able to walk following Mohs procedure./ We will just want her to take it easy. Gave her our number to give us a call back with any questions.

## 2025-06-26 ENCOUNTER — PROCEDURE VISIT (OUTPATIENT)
Dept: DERMATOLOGY | Facility: CLINIC | Age: OVER 89
End: 2025-06-26
Payer: MEDICARE

## 2025-06-26 VITALS — DIASTOLIC BLOOD PRESSURE: 80 MMHG | SYSTOLIC BLOOD PRESSURE: 209 MMHG | HEART RATE: 56 BPM

## 2025-06-26 DIAGNOSIS — C44.729 SCC (SQUAMOUS CELL CARCINOMA), LEG, LEFT: Primary | ICD-10-CM

## 2025-06-26 DIAGNOSIS — D48.5 NEOPLASM OF UNCERTAIN BEHAVIOR OF SKIN: ICD-10-CM

## 2025-06-26 PROCEDURE — 11102 TANGNTL BX SKIN SINGLE LES: CPT | Mod: 59,PBBFAC | Performed by: DERMATOLOGY

## 2025-06-26 PROCEDURE — 13122 CMPLX RPR S/A/L ADDL 5 CM/>: CPT | Mod: PBBFAC | Performed by: DERMATOLOGY

## 2025-06-26 PROCEDURE — 13121 CMPLX RPR S/A/L 2.6-7.5 CM: CPT | Mod: PBBFAC | Performed by: DERMATOLOGY

## 2025-06-26 PROCEDURE — 17313 MOHS 1 STAGE T/A/L: CPT | Mod: PBBFAC | Performed by: DERMATOLOGY

## 2025-06-26 PROCEDURE — 17314 MOHS ADDL STAGE T/A/L: CPT | Mod: PBBFAC | Performed by: DERMATOLOGY

## 2025-06-26 PROCEDURE — 17315 MOHS SURG ADDL BLOCK: CPT | Mod: PBBFAC | Performed by: DERMATOLOGY

## 2025-06-26 PROCEDURE — 88305 TISSUE EXAM BY PATHOLOGIST: CPT | Mod: TC | Performed by: DERMATOLOGY

## 2025-06-26 RX ORDER — DOXYCYCLINE HYCLATE 100 MG
100 TABLET ORAL EVERY 12 HOURS
Qty: 28 TABLET | Refills: 0 | Status: SHIPPED | OUTPATIENT
Start: 2025-06-26 | End: 2025-07-10

## 2025-06-26 RX ORDER — HYDROCODONE BITARTRATE AND ACETAMINOPHEN 5; 325 MG/1; MG/1
1 TABLET ORAL EVERY 6 HOURS PRN
Qty: 10 TABLET | Refills: 0 | Status: SHIPPED | OUTPATIENT
Start: 2025-06-26

## 2025-06-26 NOTE — PROGRESS NOTES
New patient with a 6 months h/o growth on the L lower leg. (+) painful, bleeding, and scabbing. Biopsy c/w SCC. No prior treatment. (+) h/o several Mohs procedures, not with me.  Also c/o of new growth on the R medial lower leg. (+) growing larger, painful. Lesion has not been biopsied yet. (+) Eliquis.    Physical Exam   Skin:           L proximal lower leg      R medial lower leg    L proximal lower leg with a 32 x 34 mm pink exophytic nodule.  R medial lower leg with an 11 x 13  mm pink plaque located 12.5 cm proximally from the R medial malleolus.    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  Diagnosis, photograph, and pathology report were reviewed with patient.  Discussed risks, benefits, and alternatives of Mohs surgery.  Discussed repair options including complex closure, skin flap, skin graft, and second intention healing.  Patient agreed to proceed with Mohs surgery today- for the SCC L lower leg.    R/O SCC, R medial lower leg.  Shave bx after verbal consent.  Etoh prep, 1% lido with epi 1;100,000, 2 cc, shave, AlCl, light hyfrecation, pressure bandage, no complications.   Specimen to pathology.  Discussed wound care.      PROCEDURE: Mohs' Micrographic Surgery    INDICATION: Biopsy-proven skin cancer of cosmetically and functionally important areas, including head, neck, genital, hand, foot, or areas known for having difficulty in healing, such as the lower anterior legs. Tumors with aggressive clinical behavior (rapidly growing, greater than 1 cm in diameter). Tumor with aggressive histopathology. Tumor size >2 cm on the trunk or extremities. Aggressive histopathology including sclerosing, morpheaform/infiltrating, micronodular, superficial multicentric, poorly differentiated, basosquamous, or perineural invasion.    REFERRING PROVIDER: Amaris Beaulieu M.D.    CASE NUMBER:     ANESTHETIC: 10.5 cc 0.5% Lidocaine with Epi 1:200,000 mixed  1:1 with 0.5% Bupivacaine    SURGICAL PREP: Hibiclens    SURGEON: Julia Gore MD    ASSISTANTS: Lucy Shah PA-C and Rosalina Wells MA    PREOPERATIVE DIAGNOSIS: squamous cell carcinoma- superficially invasive, moderate to poorly differentiated    POSTOPERATIVE DIAGNOSIS: squamous cell carcinoma- moderately differentiated; SCCIS    PATHOLOGIC DIAGNOSIS: squamous cell carcinoma- superficially invasive, moderate to poorly differentiated; SCCIS    HISTOLOGY OF SPECIMENS IN FIRST STAGE:   Debulking tumor confirms moderately differentiated squamous cell carcinoma.  Tumor Type: Tumor seen. Squamous cell carcinoma in situ: Epidermis with full-thickness atypia and variable epidermal maturation.  Moderately-differentiated squamous cell carcinoma: Proliferation of squamous cells exhibiting atypia of moderate differentiation and infiltrating within the dermis.   Depth of Invasion: epidermis and dermis  Perineural Invasion: No    HISTOLOGY OF SPECIMENS IN SUBSEQUENT STAGES:  Tumor Type: No tumor seen.    STAGES OF MOHS' SURGERY PERFORMED: 2    TUMOR-FREE PLANE ACHIEVED: Yes    HEMOSTASIS: electrocoagulation     SPECIMENS: 8 (6 in stage A and 2 in stage B)    LOCATION: left lower leg. Location verified with Dr. Beaulieu's clinical photograph. Patient also verified location.    INITIAL LESION SIZE: 3.2 x 3.4 cm    FINAL DEFECT SIZE: 4.3 x 5.0 cm    WOUND REPAIR/DISPOSITION: The patient tolerated Mohs' Micrographic Surgery for a squamous cell carcinoma very well. When the tumor was completely removed, a repair of the surgical defect was undertaken.      PROCEDURE: Complex Linear Repair    INDICATION: Status post Mohs' Micrographic Surgery for squamous cell carcinoma.    CASE NUMBER:     SURGEON: Julia Gore MD    ASSISTANTS: Lucy Shah PA-C, Rosalina Wells MA, and Brielle Quiroz MA    ANESTHETIC: 3 cc 1% Lidocaine with Epinephrine 1:100,000    SURGICAL PREP: Hibiclens, prepped by Rosalina Wells MA    LOCATION: left  lower leg    DEFECT SIZE: 4.3 x 5.0 cm    WOUND REPAIR/DISPOSITION:  After the patient's carcinoma had been completely removed with Mohs' Micrographic Surgery, a repair of the surgical defect was undertaken. The patient was returned to the operating suite where the area of left lower leg was prepped, draped, and anesthetized in the usual sterile fashion. The wound was widely undermined in all directions. The wound was undermined to a distance at least the maximum width of the defect as measured perpendicular to the closure line along at least one entire edge of the defect, in this case 4.5 cm. Then, electrocoagulation was used to obtain meticulous hemostasis. A three-layered closure was then performed. First, 3-0 Vicryl buried vertical mattress sutures were placed into the deep subcutaneous tissue and superficial fascial planes to close off dead space. Then a more superficial layer of 3-0 and 4-0 Vicryl buried vertical mattress sutures were placed into the superficial subcutaneous plane to close the wound and suzi the cutaneous wound edge. Bilateral dog ears were identified and were removed by a standard Burow's triangle technique. The cutaneous wound edges were closed using interrupted 3-0 Prolene suture.    The patient tolerated the procedure well.    The area was cleaned and dressed appropriately and the patient was given wound care instructions, as well as appointment for follow-up evaluation and suture removal in 14 days. Patient was placed on Norco 5-325 mg prn postop pain and doxycycline 100 mg BID x 14 days. Advised patient to take doxycycline with food, not one hour prior to bedtime, and to apply additional sunscreen/wear hat while outdoors as patient is more photosensitive.    LENGTH OF REPAIR: 9 cm    Vitals:    06/26/25 0943 06/26/25 1344   BP: (!) 176/74 (!) 209/80   BP Location: Left arm Left arm   Patient Position: Sitting Sitting   Pulse: (!) 56

## 2025-06-27 LAB
ESTROGEN SERPL-MCNC: NORMAL PG/ML
INSULIN SERPL-ACNC: NORMAL U[IU]/ML
LAB AP CLINICAL INFORMATION: NORMAL
LAB AP GROSS DESCRIPTION: NORMAL
LAB AP REPORT FOOTNOTES: NORMAL
T3RU NFR SERPL: NORMAL %

## 2025-06-30 ENCOUNTER — RESULTS FOLLOW-UP (OUTPATIENT)
Dept: DERMATOLOGY | Facility: CLINIC | Age: OVER 89
End: 2025-06-30

## 2025-07-10 ENCOUNTER — PROCEDURE VISIT (OUTPATIENT)
Dept: DERMATOLOGY | Facility: CLINIC | Age: OVER 89
End: 2025-07-10
Payer: MEDICARE

## 2025-07-10 VITALS — SYSTOLIC BLOOD PRESSURE: 188 MMHG | HEART RATE: 61 BPM | DIASTOLIC BLOOD PRESSURE: 82 MMHG

## 2025-07-10 DIAGNOSIS — C44.722 SQUAMOUS CELL CARCINOMA OF SKIN OF RIGHT LOWER EXTREMITY: Primary | ICD-10-CM

## 2025-07-10 PROCEDURE — 17313 MOHS 1 STAGE T/A/L: CPT | Mod: PBBFAC | Performed by: DERMATOLOGY

## 2025-07-10 PROCEDURE — 17314 MOHS ADDL STAGE T/A/L: CPT | Mod: PBBFAC | Performed by: DERMATOLOGY

## 2025-07-10 PROCEDURE — 13121 CMPLX RPR S/A/L 2.6-7.5 CM: CPT | Mod: PBBFAC | Performed by: DERMATOLOGY

## 2025-07-10 NOTE — PROGRESS NOTES
PROCEDURE: Mohs' Micrographic Surgery    INDICATION: Biopsy-proven skin cancer of cosmetically and functionally important areas, including head, neck, genital, hand, foot, or areas known for having difficulty in healing, such as the lower anterior legs. Tumor with ill-defined borders.    REFERRING PROVIDER: Amaris Beaulieu M.D. (This lesion was biopsied by Julia Gore MD)    CASE NUMBER:     ANESTHETIC: 8.5 cc 0.5% Lidocaine with Epi 1:200,000 mixed 1:1 with 0.5% Bupivacaine    SURGICAL PREP: Hibiclens    SURGEON: Julia Gore MD    ASSISTANTS: Lucy Shah PA-C and Rosalina Wells MA    PREOPERATIVE DIAGNOSIS: squamous cell carcinoma in situ    POSTOPERATIVE DIAGNOSIS: squamous cell carcinoma in situ; squamous cell carcinoma- superficially invasive    PATHOLOGIC DIAGNOSIS: squamous cell carcinoma in situ; squamous cell carcinoma- superficially invasive    HISTOLOGY OF SPECIMENS IN FIRST STAGE:   Tumor Type: Tumor seen. Squamous cell carcinoma in situ: Epidermis with full-thickness atypia and variable epidermal maturation.  Superficial squamous cell carcinoma: Proliferation of squamous cells exhibiting atypia and infiltrating within the superficial papillary dermis.  Invasive squamous cell carcinoma: Proliferation of squamous cells exhibiting atypia and infiltrating within the dermis.   Depth of Invasion: epidermis and dermis  Perineural Invasion: No    HISTOLOGY OF SPECIMENS IN SUBSEQUENT STAGES:  Tumor Type: Tumor seen. Same as in first stage.   Depth of Invasion: epidermis  Perineural Invasion: No    STAGES OF MOHS' SURGERY PERFORMED: 3    TUMOR-FREE PLANE ACHIEVED: Yes    HEMOSTASIS: electrocoagulation     SPECIMENS: 5 (2 in stage A, 2 in stage B, and 1 in stage C)    LOCATION: right medial lower leg. Location verified with my clinical photograph. Patient also verified location.    INITIAL LESION SIZE: 0.9 x 1.0 cm    FINAL DEFECT SIZE: 1.9 x 3.0 cm    WOUND REPAIR/DISPOSITION: The patient tolerated  Mohs' Micrographic Surgery for a squamous cell carcinoma in situ very well. When the tumor was completely removed, a repair of the surgical defect was undertaken.        PROCEDURE: Complex Linear Repair    INDICATION: Status post Mohs' Micrographic Surgery for squamous cell carcinoma in situ.    CASE NUMBER:     SURGEON: Julia Gore MD    ASSISTANTS: Lucy Shah PA-C and Rosalina Wells MA    ANESTHETIC: 2.5 cc 1% Lidocaine with Epinephrine 1:100,000    SURGICAL PREP: Hibiclens, prepped by Rosalina Wells MA    LOCATION: right medial lower leg    DEFECT SIZE: 1.9 x 3.0 cm    WOUND REPAIR/DISPOSITION:  After the patient's carcinoma had been completely removed with Mohs' Micrographic Surgery, a repair of the surgical defect was undertaken. The patient was returned to the operating suite where the area of right medial lower leg was prepped, draped, and anesthetized in the usual sterile fashion. The wound was widely undermined in all directions. The wound was undermined to a distance at least the maximum width of the defect as measured perpendicular to the closure line along at least one entire edge of the defect, in this case 3 cm. Then, electrocoagulation was used to obtain meticulous hemostasis. 3-0 Vicryl and 4-0 Vicryl buried vertical mattress sutures were placed into the deeper layers of subcutaneous and superficial (non-muscle) fascial plane to close the wound and suzi the cutaneous wound edge. Bilateral dog ears were identified and were removed by a standard Burow's triangle technique. The cutaneous wound edges were closed using interrupted 3-0 Prolene suture.    The patient tolerated the procedure well.    The area was cleaned and dressed appropriately and the patient was given wound care instructions, as well as appointment for follow-up evaluation and suture removal in 14 days.    LENGTH OF REPAIR: 6 cm    Vitals:    07/10/25 1125 07/10/25 1513   BP: (!) 188/66 (!) 188/82   BP Location: Left arm  Left arm   Patient Position: Sitting Sitting   Pulse: (P) 67 61     NOTE: Pt s/p Mohs for SCC left lower leg with CLC, postop day # 14, sutures intact and healing well.  Sutures removed today.

## 2025-07-15 NOTE — PLAN OF CARE
[facility]   Physical Therapy Initial Evaluation      Name: Cathy Kearns  [facility] Number: [id number]    Therapy Diagnosis:   Encounter Diagnoses   Name Primary?    History of hip fracture     Hip pain, unspecified laterality     Lumbar radiculopathy Yes    Posture imbalance     Impaired functional mobility, balance, gait, and endurance         Physician: Wilma Xiong MD    Physician Orders: PT Eval and Treat   Medical Diagnosis from Referral: Z87.81 (ICD-10-CM) - History of hip fracture M25.559 (ICD-10-CM) - Hip pain, unspecified laterality  Evaluation Date: 7/9/2024  Authorization Period Expiration: 6/27/2025  Plan of Care Expiration: 9/6/2024  Progress Note Due: 8/6/2024  Date of Surgery: n/a  Visit # / Visits authorized: 1/ 1   FOTO: 1/ 3    Precautions: Standard and osteopenia      Time In: 1420  Time Out: 1500  Total Billable Time: 10 minutes    Subjective     Date of onset: 1 year    History of current condition - Cathy reports: she had surgery to L hip 2* fracture, but R hip and leg is really hurting her. She did therapy at [facility] which helped a lot with the L leg, it feels like the strong leg now. She does a 30 minute class 3 days/week at [facility] but feels she is getting weaker and having more and more difficulty. She really wants to be able to stand up straighter.  She says her biggest goal is to be able to go up and down stairs more easily so she can visit her great grandsons    Falls: none in the past year    Imaging: bone scan films: FINDINGS:  Stable postsurgical changes of the left femur.  Hardware and alignment are maintained.  No evidence of acute fracture or dislocation.  A few pelvic phleboliths.  Degenerative changes of the lumbar spine and pubic symphysis.     Impression:     No evidence of acute fracture.    Prior Therapy: yes for L hip  Social History: Pt lives at [facility]  Occupation: retired  Prior Level of Function: I with ADL's. Exercise class at  Detail Level: Generalized Viviane 3 days/week  Current Level of Function: mod I with ADL's, but limited with standing on R leg. Doing exercise, but feels like she's having more difficulty     Pain:  Current 0/10, worst 5/10, best 0/10   Location: R anterior and posterior hip, radiates down anterior leg to ankle  Description: Aching, stiff,   Aggravating Factors: sitting too long, sleeping (difficulty finding comfortable position, stiff from being still for too long), getting OOB in the morning  Easing Factors: movement     Patients goals: stand up straighter, go up and down stairs      Medical History:   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 7/9/2012    Vasovagal syncope        Surgical History:   Cathy Kearns  has a past surgical history that includes Appendectomy; Intravitreal Injection; Adenoidectomy; Tonsillectomy; Hysterectomy; Cataract extraction w/  intraocular lens implant (Bilateral, n/a); Intramedullary rodding of femur (Left, 7/25/2023); and injection, sacroiliac joint (Right, 3/19/2024).    Medications:   Cathy has a current medication list which includes the following prescription(s): acetaminophen, albuterol, amlodipine, apixaban, ofloxacin, sotalol, and vitamin d.    Allergies:   Review of patient's allergies indicates:   Allergen Reactions    Penicillins Swelling    Astelin [azelastine] Other (See Comments)     Dizziness    Pseudoephedrine hcl Palpitations    Sulfa (sulfonamide antibiotics) Palpitations        Objective      Observation: Pt is alert and oriented, good historian. Rollater for gait    Posture: posterior pelvic tilt, forward flexion    Gait: forward flexed posture with walker  "      Lumbar Range of Motion:    Percent WFL Pain   Flexion WFL   Pain to R hip        Extension 50%   Pain to R ankle        Left Side Bending 50% Tight R        Right Side Bending 50% Increased pain R LE        Left rotation   50%         Right Rotation   50%              Lower Extremity Strength  Right LE  Left LE    Ankle dorsiflexion: 4+/5 Ankle dorsiflexion: 4+/5   Knee extension: 4+/5 Knee extension: 4+/5   Knee flexion: 4+/5 Knee flexion: 4+/5   Hip flexion: 4-/5 Hip flexion: 4/5   Hip external rotation: 4/5 Hip external rotation: 4/5   Hip internal rotation: 4/5 Hip internal rotation: 4+/5   Hip extension:  4-/5 Hip extension: 4-/5   Hip abduction: 4-/5 Hip abduction: 4+/5   Hip adduction: 4+/5 Hip adduction 4+/5         Special Tests:  -Repeated Flexion: inconclusive        Neuro Dynamic Testing:    Sciatic nerve:      SLR: R = -     L = -          Joint Mobility: general hypomobility with CPA to lumbar spine, but without pain or symptom exacerbation    Palpation: no tenderness to palpation. Pt indicates anterior hip pain that wraps around to glut and radiates down anterior thigh to ankle.     Sensation: grossly intact to light touch B LE. Pt reports feeling increased sensitivity to ankles    Flexibility:    Hamstring: R = WFL; L = WFL   Quad: R = marked; L = markd   Piriformis: R: slight; L slight     Leg length   ASIS to medial malleolus  R 88 cm  L 86 CM         Limitation/Restriction for FOTO Hip Survey    Therapist reviewed FOTO scores for Cathy Kearns on 7/9/2024.   FOTO documents entered into [employer] - see Media section.    Intake Score: 29%    Goal: 48%         Treatment     Total Treatment time (time-based codes) separate from Evaluation: 10 minutes      Cathy received the treatments listed below:      therapeutic activities to improve functional performance for 10  minutes, including:  Development, demonstration, and review of home exercise program to include:   SKTC 10 x 10"   LTR 5" x 20   TrA 5" " Detail Level: Detailed x 20        Patient Education and Home Exercises     Education provided:   - therapy rationale and plan of care    Written Home Exercises Provided: yes. Exercises were reviewed and Cathy was able to demonstrate them prior to the end of the session.  Cathy demonstrated good  understanding of the education provided. See EMR under Patient Instructions for exercises provided during therapy sessions.    Assessment     Cathy is a 89 y.o. female referred to outpatient Physical Therapy with a medical diagnosis of Z87.81 (ICD-10-CM) - History of hip fracture M25.559 (ICD-10-CM) - Hip pain, unspecified laterality. Patient presents with c/o pain to R hip and LE that started concurrently with L hip fracture one year ago. Symptoms seem to refer from lumbar spine, as pain noted with active lumbar ROM as noted above. Mild weakness to R>L LE with MMT. Marked flexibility deficits to B quads. Pt reports difficulty with standing upright, particularly with initial rising in AM, and is motivated to improve stair navigation so that she may visit family.     Patient prognosis is Good.   Patient will benefit from skilled outpatient Physical Therapy to address the deficits stated above and in the chart below, provide patient /family education, and to maximize patientt's level of independence.     Plan of care discussed with patient: Yes  Patient's spiritual, cultural and educational needs considered and patient is agreeable to the plan of care and goals as stated below:     Anticipated Barriers for therapy: standard    Medical Necessity is demonstrated by the following  History  Co-morbidities and personal factors that may impact the plan of care [] LOW: no personal factors / co-morbidities  [] MODERATE: 1-2 personal factors / co-morbidities  [x] HIGH: 3+ personal factors / co-morbidities    Moderate / High Support Documentation:   Co-morbidities affecting plan of care: arthritis, A-fib, HTN, osteopenia, L intermedullary huy     Personal  Factors:   lifestyle     Examination  Body Structures and Functions, activity limitations and participation restrictions that may impact the plan of care [] LOW: addressing 1-2 elements  [] MODERATE: 3+ elements  [x] HIGH: 4+ elements (please support below)    Moderate / High Support Documentation: hx falls, prolonged positioning, getting OOB, sleeping, walking, stair navigation, bathing (poor endurance with shower)     Clinical Presentation [] LOW: stable  [] MODERATE: Evolving  [x] HIGH: Unstable     Decision Making/ Complexity Score: high       Goals:  Short Term Goals (4 Weeks):   1. Pt will report 20% reduction in pain of the lumbar spine and R LE for ease with ADL's  2. PT will demonstrate improved upright posture with minimal cuing for ease with functional positioning in home and community.  3. Pt will demonstrate improved lumbar spine ROM in all directions by 10% for ease with bending activities.   4. Pt to demonstrate improved functional ability with FOTO limitation <=35% disability.    Long Term Goals (8 Weeks):   1. Pt will report being independent with HEP for maintenance of improvements gained during therapy sessions  2. PT will report 50% reduction of pain of the back and R LE for ease with positional tolerance for meals and leisure reading.   3. Pt will demonstrate trunk and extremity strength to >=4+/5 without the provocation of pain for ease with stair navigation  4. Pt will demonstrate appropriate upright posture without external cueing for ease with community and home mobility.   5. Pt to demonstrate improved functional ability with FOTO limitation <=48% disability.     Plan     Plan of care Certification: 7/9/2024 to 9/6/2024.    Outpatient Physical Therapy 2 times weekly for 8 weeks to include the following interventions: Aquatic Therapy, Gait Training, Manual Therapy, Moist Heat/ Ice, Neuromuscular Re-ed, Patient Education, Therapeutic Activities, and Therapeutic Exercise.     Keara Arcos, PT        Physician's Signature: _________________________________________ Date: ________________       Patient Specific Counseling (Will Not Stick From Patient To Patient): Cont and RF betamethasone cr as dir.

## 2025-07-20 DIAGNOSIS — I10 HYPERTENSION, UNSPECIFIED TYPE: ICD-10-CM

## 2025-07-20 NOTE — TELEPHONE ENCOUNTER
Refill Routing Note   Medication(s) are not appropriate for processing by [facility] for the following reason(s):        Required vitals abnormal    [facility] action(s):  Defer        Medication Therapy Plan: 07/10/25 (!) 188/82; PCP ordered rx 5/1/25      Appointments  past 12m or future 3m with PCP    Date Provider   Last Visit   8/29/2024 Kyra Ramsay MD   Next Visit   Visit date not found Kyra Ramsay MD   ED visits in past 90 days: 0        Note composed:4:08 PM 07/20/2025

## 2025-07-21 RX ORDER — LISINOPRIL 10 MG/1
10 TABLET ORAL
Qty: 90 TABLET | Refills: 3 | Status: SHIPPED | OUTPATIENT
Start: 2025-07-21

## 2025-07-24 ENCOUNTER — OFFICE VISIT (OUTPATIENT)
Dept: DERMATOLOGY | Facility: CLINIC | Age: OVER 89
End: 2025-07-24
Payer: MEDICARE

## 2025-07-24 DIAGNOSIS — Z09 POSTOP CHECK: Primary | ICD-10-CM

## 2025-07-24 PROCEDURE — 99999 PR PBB SHADOW E&M-EST. PATIENT-LVL II: CPT | Mod: PBBFAC,,, | Performed by: DERMATOLOGY

## 2025-07-24 PROCEDURE — 99212 OFFICE O/P EST SF 10 MIN: CPT | Mod: PBBFAC | Performed by: DERMATOLOGY

## 2025-07-24 PROCEDURE — 99024 POSTOP FOLLOW-UP VISIT: CPT | Mod: POP,,, | Performed by: DERMATOLOGY

## 2025-07-24 NOTE — PROGRESS NOTES
89 y.o. female patient is here for suture removal following Mohs' surgery.    Patient reports no problems.    WOUND PE:  The right medial lower leg  sutures intact. Wound healing well. Good skin edges. No signs or symptoms of infection.    IMPRESSION:  Healing operative site from Mohs' surgery SCCIS right medial lower leg s/p Mohs with CLC, postop day #14.    PLAN:  Sutures removed today by Ana Byrnes MA. Steri-strips applied.  Continue wound care.  Keep moist with Aquaphor.  Call if any issues arise    RTC:  In 3-6 months with Amaris Beaulieu M.D. for skin check or sooner if new concern arises.

## 2025-08-19 ENCOUNTER — PROCEDURE VISIT (OUTPATIENT)
Dept: OPHTHALMOLOGY | Facility: CLINIC | Age: OVER 89
End: 2025-08-19
Payer: MEDICARE

## 2025-08-19 ENCOUNTER — CLINICAL SUPPORT (OUTPATIENT)
Dept: OPHTHALMOLOGY | Facility: CLINIC | Age: OVER 89
End: 2025-08-19
Payer: MEDICARE

## 2025-08-19 DIAGNOSIS — H35.3211 EXUDATIVE AGE-RELATED MACULAR DEGENERATION OF RIGHT EYE WITH ACTIVE CHOROIDAL NEOVASCULARIZATION: Primary | ICD-10-CM

## 2025-08-19 DIAGNOSIS — H35.3124 NONEXUDATIVE AGE-RELATED MACULAR DEGENERATION, LEFT EYE, ADVANCED ATROPHIC WITH SUBFOVEAL INVOLVEMENT: ICD-10-CM

## 2025-08-19 PROCEDURE — 92134 CPTRZ OPH DX IMG PST SGM RTA: CPT | Mod: PBBFAC | Performed by: OPHTHALMOLOGY

## 2025-08-19 PROCEDURE — 92014 COMPRE OPH EXAM EST PT 1/>: CPT | Mod: 25,S$PBB,, | Performed by: OPHTHALMOLOGY

## 2025-08-19 PROCEDURE — 67028 INJECTION EYE DRUG: CPT | Mod: S$PBB,RT,, | Performed by: OPHTHALMOLOGY

## 2025-08-19 PROCEDURE — 67028 INJECTION EYE DRUG: CPT | Mod: PBBFAC,RT | Performed by: OPHTHALMOLOGY

## 2025-08-19 PROCEDURE — 99999PBSHW PR PBB SHADOW TECHNICAL ONLY FILED TO HB: Mod: JZ,PBBFAC,,

## 2025-08-19 RX ADMIN — FARICIMAB 6 MG: 6 INJECTION, SOLUTION INTRAVITREAL at 11:08

## (undated) DEVICE — TRAY MINOR ORTHO OMC

## (undated) DEVICE — DRAPE IOBAN 2 STERI

## (undated) DEVICE — DRILL T2 ALPH FREHND 4.2X185MM

## (undated) DEVICE — DRAPE C-ARM ELAS CLIP 42X120IN

## (undated) DEVICE — GUIDE WIRE 3.0X1000MM BALL TIP
Type: IMPLANTABLE DEVICE | Site: FEMUR | Status: NON-FUNCTIONAL
Removed: 2023-07-25

## (undated) DEVICE — REAMER SHAFT MOD TRINKLE 8X510

## (undated) DEVICE — DRESSING AQUACEL RIBBON 2X45CM

## (undated) DEVICE — SUT VICRYL PLUS 0 CT1 18IN

## (undated) DEVICE — DRAPE C-ARMOR EQUIPMENT COVER

## (undated) DEVICE — DRAPE THREE-QTR REINF 53X77IN

## (undated) DEVICE — ADHESIVE DERMABOND ADVANCED

## (undated) DEVICE — KIT PT CARE HANA PROFX SSXT

## (undated) DEVICE — SPONGE COTTON TRAY 4X4IN

## (undated) DEVICE — TAPE SURG DURAPORE 2 X10YD

## (undated) DEVICE — BNDG COFLEX FOAM LF2 ST 4X5YD

## (undated) DEVICE — DRESSING TRANS 4X4 TEGADERM

## (undated) DEVICE — SUT MONOCRYL 3-0 PS-2 UND

## (undated) DEVICE — KWIRE RECON THREADED 3.2X400MM
Type: IMPLANTABLE DEVICE | Site: FEMUR | Status: NON-FUNCTIONAL
Removed: 2023-07-25

## (undated) DEVICE — SUT VICRYL PLUS 2-0 CT1 18